# Patient Record
Sex: MALE | URBAN - METROPOLITAN AREA
[De-identification: names, ages, dates, MRNs, and addresses within clinical notes are randomized per-mention and may not be internally consistent; named-entity substitution may affect disease eponyms.]

---

## 2017-01-26 ENCOUNTER — 3 MONTH EXAM (OUTPATIENT)
Dept: URBAN - METROPOLITAN AREA CLINIC 44 | Facility: CLINIC | Age: 77
End: 2017-01-26

## 2017-01-26 DIAGNOSIS — H35.342: ICD-10-CM

## 2017-01-26 DIAGNOSIS — E11.9: ICD-10-CM

## 2017-01-26 DIAGNOSIS — Z98.890: ICD-10-CM

## 2017-01-26 PROCEDURE — 92014 COMPRE OPH EXAM EST PT 1/>: CPT

## 2017-01-26 PROCEDURE — 92134 CPTRZ OPH DX IMG PST SGM RTA: CPT

## 2017-01-26 ASSESSMENT — TONOMETRY
OS_IOP_MMHG: 15
OD_IOP_MMHG: 12

## 2017-01-26 ASSESSMENT — VISUAL ACUITY
OS_PH: 20/60
OS_CC: 20/80+1
OD_CC: 20/70

## 2017-05-18 ENCOUNTER — 3 MONTH EXAM (OUTPATIENT)
Dept: URBAN - METROPOLITAN AREA CLINIC 44 | Facility: CLINIC | Age: 77
End: 2017-05-18

## 2017-05-18 DIAGNOSIS — Z98.890: ICD-10-CM

## 2017-05-18 DIAGNOSIS — E11.9: ICD-10-CM

## 2017-05-18 DIAGNOSIS — H35.342: ICD-10-CM

## 2017-05-18 PROCEDURE — G8482 FLU IMMUNIZE ORDER/ADMIN: HCPCS

## 2017-05-18 PROCEDURE — 1036F TOBACCO NON-USER: CPT

## 2017-05-18 PROCEDURE — 92134 CPTRZ OPH DX IMG PST SGM RTA: CPT

## 2017-05-18 PROCEDURE — G8427 DOCREV CUR MEDS BY ELIG CLIN: HCPCS

## 2017-05-18 PROCEDURE — 92014 COMPRE OPH EXAM EST PT 1/>: CPT

## 2017-05-18 PROCEDURE — 4040F PNEUMOC VAC/ADMIN/RCVD: CPT

## 2017-05-18 ASSESSMENT — VISUAL ACUITY
OS_CC: 20/200
OD_CC: 20/60
OS_PH: 20/50-1

## 2017-05-18 ASSESSMENT — TONOMETRY
OS_IOP_MMHG: 9
OD_IOP_MMHG: 7

## 2017-09-28 ENCOUNTER — 3 MONTH EXAM (OUTPATIENT)
Dept: URBAN - METROPOLITAN AREA CLINIC 44 | Facility: CLINIC | Age: 77
End: 2017-09-28

## 2017-09-28 DIAGNOSIS — E11.9: ICD-10-CM

## 2017-09-28 DIAGNOSIS — H35.342: ICD-10-CM

## 2017-09-28 DIAGNOSIS — Z98.890: ICD-10-CM

## 2017-09-28 PROCEDURE — 92014 COMPRE OPH EXAM EST PT 1/>: CPT

## 2017-09-28 PROCEDURE — 92134 CPTRZ OPH DX IMG PST SGM RTA: CPT

## 2017-09-28 ASSESSMENT — VISUAL ACUITY
OD_CC: 20/80+1
OS_PH: 20/70
OS_CC: 20/200+2

## 2017-09-28 ASSESSMENT — TONOMETRY
OS_IOP_MMHG: 13
OD_IOP_MMHG: 13

## 2018-04-05 ENCOUNTER — 3 MONTH EXAM (OUTPATIENT)
Dept: URBAN - METROPOLITAN AREA CLINIC 44 | Facility: CLINIC | Age: 78
End: 2018-04-05

## 2018-04-05 DIAGNOSIS — Z98.890: ICD-10-CM

## 2018-04-05 DIAGNOSIS — H35.342: ICD-10-CM

## 2018-04-05 DIAGNOSIS — E11.9: ICD-10-CM

## 2018-04-05 PROCEDURE — 92014 COMPRE OPH EXAM EST PT 1/>: CPT

## 2018-04-05 PROCEDURE — 92134 CPTRZ OPH DX IMG PST SGM RTA: CPT

## 2018-04-05 ASSESSMENT — TONOMETRY
OD_IOP_MMHG: 13
OS_IOP_MMHG: 13

## 2018-04-05 ASSESSMENT — VISUAL ACUITY
OD_CC: 20/60+2
OS_PH: 20/70
OS_CC: 20/80

## 2018-10-25 ENCOUNTER — FOLLOW UP (OUTPATIENT)
Dept: URBAN - METROPOLITAN AREA CLINIC 44 | Facility: CLINIC | Age: 78
End: 2018-10-25

## 2018-10-25 DIAGNOSIS — H35.81: ICD-10-CM

## 2018-10-25 DIAGNOSIS — Z98.890: ICD-10-CM

## 2018-10-25 DIAGNOSIS — H35.342: ICD-10-CM

## 2018-10-25 PROCEDURE — 92134 CPTRZ OPH DX IMG PST SGM RTA: CPT

## 2018-10-25 PROCEDURE — 92014 COMPRE OPH EXAM EST PT 1/>: CPT

## 2018-10-25 ASSESSMENT — TONOMETRY
OD_IOP_MMHG: 16
OS_IOP_MMHG: 15

## 2018-10-25 ASSESSMENT — VISUAL ACUITY
OD_PH: 20/50
OS_PH: 20/70
OD_CC: 20/60
OS_CC: 20/80+1

## 2019-04-25 ENCOUNTER — FOLLOW UP (OUTPATIENT)
Dept: URBAN - METROPOLITAN AREA CLINIC 44 | Facility: CLINIC | Age: 79
End: 2019-04-25

## 2019-04-25 DIAGNOSIS — H35.81: ICD-10-CM

## 2019-04-25 DIAGNOSIS — H35.342: ICD-10-CM

## 2019-04-25 DIAGNOSIS — Z98.890: ICD-10-CM

## 2019-04-25 PROCEDURE — 92014 COMPRE OPH EXAM EST PT 1/>: CPT

## 2019-04-25 PROCEDURE — 92134 CPTRZ OPH DX IMG PST SGM RTA: CPT

## 2019-04-25 ASSESSMENT — VISUAL ACUITY
OS_CC: 20/60
OD_CC: 20/70

## 2023-01-01 ENCOUNTER — TRANSITIONAL CARE UNIT VISIT (OUTPATIENT)
Dept: GERIATRICS | Facility: CLINIC | Age: 83
End: 2023-01-01
Payer: COMMERCIAL

## 2023-01-01 ENCOUNTER — APPOINTMENT (OUTPATIENT)
Dept: PHYSICAL THERAPY | Facility: CLINIC | Age: 83
End: 2023-01-01
Payer: COMMERCIAL

## 2023-01-01 ENCOUNTER — LAB REQUISITION (OUTPATIENT)
Dept: LAB | Facility: CLINIC | Age: 83
End: 2023-01-01

## 2023-01-01 ENCOUNTER — ASSISTED LIVING VISIT (OUTPATIENT)
Dept: GERIATRICS | Facility: CLINIC | Age: 83
End: 2023-01-01
Payer: COMMERCIAL

## 2023-01-01 ENCOUNTER — APPOINTMENT (OUTPATIENT)
Dept: PHYSICAL THERAPY | Facility: CLINIC | Age: 83
End: 2023-01-01
Attending: INTERNAL MEDICINE
Payer: COMMERCIAL

## 2023-01-01 ENCOUNTER — HOSPITAL ENCOUNTER (OUTPATIENT)
Facility: CLINIC | Age: 83
Setting detail: OBSERVATION
Discharge: NURSING, REHAB OR LONG-TERM CARE | End: 2023-10-17
Attending: STUDENT IN AN ORGANIZED HEALTH CARE EDUCATION/TRAINING PROGRAM | Admitting: STUDENT IN AN ORGANIZED HEALTH CARE EDUCATION/TRAINING PROGRAM
Payer: COMMERCIAL

## 2023-01-01 ENCOUNTER — LAB REQUISITION (OUTPATIENT)
Dept: LAB | Facility: CLINIC | Age: 83
End: 2023-01-01
Payer: COMMERCIAL

## 2023-01-01 ENCOUNTER — APPOINTMENT (OUTPATIENT)
Dept: SPEECH THERAPY | Facility: CLINIC | Age: 83
End: 2023-01-01
Payer: COMMERCIAL

## 2023-01-01 ENCOUNTER — ANCILLARY PROCEDURE (OUTPATIENT)
Dept: CARDIOLOGY | Facility: CLINIC | Age: 83
End: 2023-01-01
Attending: INTERNAL MEDICINE
Payer: COMMERCIAL

## 2023-01-01 ENCOUNTER — HOSPITAL ENCOUNTER (OUTPATIENT)
Dept: WOUND CARE | Facility: CLINIC | Age: 83
Discharge: HOME | End: 2023-10-26
Attending: PHYSICIAN ASSISTANT
Payer: COMMERCIAL

## 2023-01-01 ENCOUNTER — HOSPITAL ENCOUNTER (OUTPATIENT)
Dept: CARDIOLOGY | Facility: CLINIC | Age: 83
Discharge: HOME | End: 2023-09-22
Attending: INTERNAL MEDICINE | Admitting: INTERNAL MEDICINE
Payer: COMMERCIAL

## 2023-01-01 ENCOUNTER — APPOINTMENT (OUTPATIENT)
Dept: OCCUPATIONAL THERAPY | Facility: CLINIC | Age: 83
End: 2023-01-01
Attending: INTERNAL MEDICINE
Payer: COMMERCIAL

## 2023-01-01 ENCOUNTER — LAB (OUTPATIENT)
Dept: LAB | Facility: CLINIC | Age: 83
End: 2023-01-01
Attending: INTERNAL MEDICINE
Payer: COMMERCIAL

## 2023-01-01 ENCOUNTER — DISCHARGE SUMMARY NURSING HOME (OUTPATIENT)
Dept: GERIATRICS | Facility: CLINIC | Age: 83
End: 2023-01-01
Payer: COMMERCIAL

## 2023-01-01 ENCOUNTER — APPOINTMENT (OUTPATIENT)
Dept: MRI IMAGING | Facility: CLINIC | Age: 83
End: 2023-01-01
Attending: INTERNAL MEDICINE
Payer: COMMERCIAL

## 2023-01-01 ENCOUNTER — APPOINTMENT (OUTPATIENT)
Dept: CT IMAGING | Facility: CLINIC | Age: 83
End: 2023-01-01
Attending: STUDENT IN AN ORGANIZED HEALTH CARE EDUCATION/TRAINING PROGRAM
Payer: COMMERCIAL

## 2023-01-01 ENCOUNTER — DOCUMENTATION ONLY (OUTPATIENT)
Dept: CARDIOLOGY | Facility: CLINIC | Age: 83
End: 2023-01-01

## 2023-01-01 ENCOUNTER — APPOINTMENT (OUTPATIENT)
Dept: OCCUPATIONAL THERAPY | Facility: CLINIC | Age: 83
End: 2023-01-01
Payer: COMMERCIAL

## 2023-01-01 ENCOUNTER — APPOINTMENT (OUTPATIENT)
Dept: SPEECH THERAPY | Facility: CLINIC | Age: 83
End: 2023-01-01
Attending: INTERNAL MEDICINE
Payer: COMMERCIAL

## 2023-01-01 ENCOUNTER — APPOINTMENT (OUTPATIENT)
Dept: GENERAL RADIOLOGY | Facility: CLINIC | Age: 83
End: 2023-01-01
Attending: STUDENT IN AN ORGANIZED HEALTH CARE EDUCATION/TRAINING PROGRAM
Payer: COMMERCIAL

## 2023-01-01 ENCOUNTER — PATIENT OUTREACH (OUTPATIENT)
Dept: CARE COORDINATION | Facility: CLINIC | Age: 83
End: 2023-01-01

## 2023-01-01 VITALS
HEART RATE: 66 BPM | BODY MASS INDEX: 26.51 KG/M2 | WEIGHT: 168.9 LBS | TEMPERATURE: 97.7 F | SYSTOLIC BLOOD PRESSURE: 156 MMHG | OXYGEN SATURATION: 96 % | RESPIRATION RATE: 18 BRPM | HEIGHT: 67 IN | DIASTOLIC BLOOD PRESSURE: 68 MMHG

## 2023-01-01 VITALS
BODY MASS INDEX: 26.97 KG/M2 | DIASTOLIC BLOOD PRESSURE: 59 MMHG | HEART RATE: 60 BPM | RESPIRATION RATE: 16 BRPM | TEMPERATURE: 98.4 F | HEIGHT: 67 IN | OXYGEN SATURATION: 99 % | WEIGHT: 171.8 LBS | SYSTOLIC BLOOD PRESSURE: 128 MMHG

## 2023-01-01 VITALS
DIASTOLIC BLOOD PRESSURE: 74 MMHG | HEART RATE: 60 BPM | SYSTOLIC BLOOD PRESSURE: 137 MMHG | BODY MASS INDEX: 27.94 KG/M2 | HEIGHT: 67 IN | WEIGHT: 178 LBS | OXYGEN SATURATION: 98 %

## 2023-01-01 VITALS
TEMPERATURE: 97.8 F | WEIGHT: 168.6 LBS | RESPIRATION RATE: 16 BRPM | BODY MASS INDEX: 26.46 KG/M2 | SYSTOLIC BLOOD PRESSURE: 120 MMHG | DIASTOLIC BLOOD PRESSURE: 71 MMHG | HEART RATE: 59 BPM | HEIGHT: 67 IN | OXYGEN SATURATION: 100 %

## 2023-01-01 VITALS
HEIGHT: 67 IN | WEIGHT: 166 LBS | DIASTOLIC BLOOD PRESSURE: 71 MMHG | BODY MASS INDEX: 26.06 KG/M2 | RESPIRATION RATE: 16 BRPM | HEART RATE: 60 BPM | OXYGEN SATURATION: 98 % | SYSTOLIC BLOOD PRESSURE: 123 MMHG | TEMPERATURE: 98 F

## 2023-01-01 VITALS
DIASTOLIC BLOOD PRESSURE: 62 MMHG | TEMPERATURE: 97.6 F | OXYGEN SATURATION: 97 % | WEIGHT: 162.2 LBS | BODY MASS INDEX: 25.46 KG/M2 | RESPIRATION RATE: 16 BRPM | HEART RATE: 60 BPM | SYSTOLIC BLOOD PRESSURE: 119 MMHG | HEIGHT: 67 IN

## 2023-01-01 VITALS
SYSTOLIC BLOOD PRESSURE: 131 MMHG | OXYGEN SATURATION: 97 % | BODY MASS INDEX: 26.34 KG/M2 | WEIGHT: 167.8 LBS | HEART RATE: 86 BPM | DIASTOLIC BLOOD PRESSURE: 74 MMHG | RESPIRATION RATE: 18 BRPM | TEMPERATURE: 97.6 F | HEIGHT: 67 IN

## 2023-01-01 VITALS
TEMPERATURE: 98.3 F | RESPIRATION RATE: 16 BRPM | HEART RATE: 61 BPM | HEIGHT: 67 IN | WEIGHT: 194.75 LBS | OXYGEN SATURATION: 100 % | DIASTOLIC BLOOD PRESSURE: 59 MMHG | SYSTOLIC BLOOD PRESSURE: 129 MMHG | BODY MASS INDEX: 30.57 KG/M2

## 2023-01-01 VITALS
BODY MASS INDEX: 26.43 KG/M2 | WEIGHT: 168.4 LBS | SYSTOLIC BLOOD PRESSURE: 129 MMHG | HEART RATE: 86 BPM | TEMPERATURE: 97.6 F | DIASTOLIC BLOOD PRESSURE: 66 MMHG | RESPIRATION RATE: 18 BRPM | HEIGHT: 67 IN | OXYGEN SATURATION: 95 %

## 2023-01-01 DIAGNOSIS — F03.90 DEMENTIA, UNSPECIFIED DEMENTIA SEVERITY, UNSPECIFIED DEMENTIA TYPE, UNSPECIFIED WHETHER BEHAVIORAL, PSYCHOTIC, OR MOOD DISTURBANCE OR ANXIETY (H): ICD-10-CM

## 2023-01-01 DIAGNOSIS — G47.52 REM SLEEP BEHAVIOR DISORDER: ICD-10-CM

## 2023-01-01 DIAGNOSIS — I44.2 CHB (COMPLETE HEART BLOCK) (H): ICD-10-CM

## 2023-01-01 DIAGNOSIS — I10 ESSENTIAL HYPERTENSION: ICD-10-CM

## 2023-01-01 DIAGNOSIS — E78.5 DYSLIPIDEMIA: ICD-10-CM

## 2023-01-01 DIAGNOSIS — E11.69 TYPE 2 DIABETES MELLITUS WITH OTHER SPECIFIED COMPLICATION, WITHOUT LONG-TERM CURRENT USE OF INSULIN (H): ICD-10-CM

## 2023-01-01 DIAGNOSIS — Z11.1 ENCOUNTER FOR SCREENING FOR RESPIRATORY TUBERCULOSIS: ICD-10-CM

## 2023-01-01 DIAGNOSIS — I50.9 CONGESTIVE HEART FAILURE, UNSPECIFIED HF CHRONICITY, UNSPECIFIED HEART FAILURE TYPE (H): ICD-10-CM

## 2023-01-01 DIAGNOSIS — R53.81 PHYSICAL DECONDITIONING: ICD-10-CM

## 2023-01-01 DIAGNOSIS — D64.9 ANEMIA, UNSPECIFIED TYPE: ICD-10-CM

## 2023-01-01 DIAGNOSIS — Z95.0 CARDIAC PACEMAKER IN SITU: ICD-10-CM

## 2023-01-01 DIAGNOSIS — Z95.3 S/P TAVR (TRANSCATHETER AORTIC VALVE REPLACEMENT), BIOPROSTHETIC: Primary | ICD-10-CM

## 2023-01-01 DIAGNOSIS — H35.3230 BILATERAL EXUDATIVE AGE-RELATED MACULAR DEGENERATION, UNSPECIFIED STAGE (H): ICD-10-CM

## 2023-01-01 DIAGNOSIS — R41.0 ACUTE CONFUSION: Primary | ICD-10-CM

## 2023-01-01 DIAGNOSIS — F32.A DEPRESSION, UNSPECIFIED DEPRESSION TYPE: ICD-10-CM

## 2023-01-01 DIAGNOSIS — Z95.3 STATUS POST TRANSCATHETER AORTIC VALVE REPLACEMENT (TAVR) USING BIOPROSTHESIS: ICD-10-CM

## 2023-01-01 DIAGNOSIS — I65.21 STENOSIS OF RIGHT CAROTID ARTERY: ICD-10-CM

## 2023-01-01 DIAGNOSIS — I10 ESSENTIAL (PRIMARY) HYPERTENSION: ICD-10-CM

## 2023-01-01 DIAGNOSIS — Z95.2 S/P TAVR (TRANSCATHETER AORTIC VALVE REPLACEMENT): ICD-10-CM

## 2023-01-01 DIAGNOSIS — I25.10 CORONARY ARTERY DISEASE INVOLVING NATIVE CORONARY ARTERY OF NATIVE HEART WITHOUT ANGINA PECTORIS: ICD-10-CM

## 2023-01-01 DIAGNOSIS — G93.40 ACUTE ENCEPHALOPATHY: Primary | ICD-10-CM

## 2023-01-01 DIAGNOSIS — I35.0 SEVERE AORTIC STENOSIS: ICD-10-CM

## 2023-01-01 DIAGNOSIS — I44.7 LBBB (LEFT BUNDLE BRANCH BLOCK): ICD-10-CM

## 2023-01-01 DIAGNOSIS — F03.B3 MODERATE DEMENTIA WITH MOOD DISTURBANCE, UNSPECIFIED DEMENTIA TYPE (H): ICD-10-CM

## 2023-01-01 DIAGNOSIS — R41.82 ALTERED MENTAL STATUS, UNSPECIFIED ALTERED MENTAL STATUS TYPE: ICD-10-CM

## 2023-01-01 DIAGNOSIS — I50.9 CHRONIC CONGESTIVE HEART FAILURE, UNSPECIFIED HEART FAILURE TYPE (H): ICD-10-CM

## 2023-01-01 DIAGNOSIS — Z95.3 S/P TAVR (TRANSCATHETER AORTIC VALVE REPLACEMENT), BIOPROSTHETIC: ICD-10-CM

## 2023-01-01 DIAGNOSIS — F32.1 DEPRESSION, MAJOR, SINGLE EPISODE, MODERATE (H): ICD-10-CM

## 2023-01-01 DIAGNOSIS — I25.10 CORONARY ARTERY DISEASE INVOLVING NATIVE CORONARY ARTERY OF NATIVE HEART WITHOUT ANGINA PECTORIS: Primary | ICD-10-CM

## 2023-01-01 DIAGNOSIS — R29.898 LEFT LEG WEAKNESS: Primary | ICD-10-CM

## 2023-01-01 DIAGNOSIS — M62.81 GENERALIZED MUSCLE WEAKNESS: ICD-10-CM

## 2023-01-01 DIAGNOSIS — F03.90 DEMENTIA, UNSPECIFIED DEMENTIA SEVERITY, UNSPECIFIED DEMENTIA TYPE, UNSPECIFIED WHETHER BEHAVIORAL, PSYCHOTIC, OR MOOD DISTURBANCE OR ANXIETY (H): Primary | ICD-10-CM

## 2023-01-01 DIAGNOSIS — I44.2 COMPLETE HEART BLOCK (H): ICD-10-CM

## 2023-01-01 DIAGNOSIS — R29.898 LEFT LEG WEAKNESS: ICD-10-CM

## 2023-01-01 DIAGNOSIS — D64.9 ANEMIA, UNSPECIFIED: ICD-10-CM

## 2023-01-01 DIAGNOSIS — K59.01 SLOW TRANSIT CONSTIPATION: ICD-10-CM

## 2023-01-01 DIAGNOSIS — E11.59 TYPE 2 DIABETES MELLITUS WITH OTHER CIRCULATORY COMPLICATION, WITHOUT LONG-TERM CURRENT USE OF INSULIN (H): ICD-10-CM

## 2023-01-01 LAB
ALBUMIN SERPL BCG-MCNC: 4.2 G/DL (ref 3.5–5.2)
ALBUMIN UR-MCNC: NEGATIVE MG/DL
ALP SERPL-CCNC: 102 U/L (ref 40–129)
ALT SERPL W P-5'-P-CCNC: 14 U/L (ref 0–70)
ANION GAP SERPL CALCULATED.3IONS-SCNC: 12 MMOL/L (ref 7–15)
ANION GAP SERPL CALCULATED.3IONS-SCNC: 13 MMOL/L (ref 7–15)
ANION GAP SERPL CALCULATED.3IONS-SCNC: 16 MMOL/L (ref 7–15)
APPEARANCE UR: CLEAR
APTT PPP: 32 SECONDS (ref 22–38)
AST SERPL W P-5'-P-CCNC: 24 U/L (ref 0–45)
ATRIAL RATE - MUSE: 72 BPM
BASO+EOS+MONOS # BLD AUTO: ABNORMAL 10*3/UL
BASO+EOS+MONOS NFR BLD AUTO: ABNORMAL %
BASOPHILS # BLD AUTO: 0.1 10E3/UL (ref 0–0.2)
BASOPHILS NFR BLD AUTO: 1 %
BILIRUB SERPL-MCNC: 1.1 MG/DL
BILIRUB UR QL STRIP: NEGATIVE
BUN SERPL-MCNC: 12.2 MG/DL (ref 8–23)
BUN SERPL-MCNC: 14.1 MG/DL (ref 8–23)
BUN SERPL-MCNC: 14.8 MG/DL (ref 8–23)
CALCIUM SERPL-MCNC: 9.3 MG/DL (ref 8.8–10.2)
CALCIUM SERPL-MCNC: 9.6 MG/DL (ref 8.8–10.2)
CALCIUM SERPL-MCNC: 9.6 MG/DL (ref 8.8–10.2)
CHLORIDE SERPL-SCNC: 100 MMOL/L (ref 98–107)
CHLORIDE SERPL-SCNC: 103 MMOL/L (ref 98–107)
CHLORIDE SERPL-SCNC: 105 MMOL/L (ref 98–107)
CHOLEST SERPL-MCNC: 137 MG/DL
COLOR UR AUTO: ABNORMAL
CREAT SERPL-MCNC: 0.61 MG/DL (ref 0.67–1.17)
CREAT SERPL-MCNC: 0.73 MG/DL (ref 0.67–1.17)
CREAT SERPL-MCNC: 0.73 MG/DL (ref 0.67–1.17)
DEPRECATED HCO3 PLAS-SCNC: 20 MMOL/L (ref 22–29)
DEPRECATED HCO3 PLAS-SCNC: 23 MMOL/L (ref 22–29)
DEPRECATED HCO3 PLAS-SCNC: 23 MMOL/L (ref 22–29)
DIASTOLIC BLOOD PRESSURE - MUSE: NORMAL MMHG
EGFRCR SERPLBLD CKD-EPI 2021: 89 ML/MIN/1.73M2
EGFRCR SERPLBLD CKD-EPI 2021: 89 ML/MIN/1.73M2
EGFRCR SERPLBLD CKD-EPI 2021: >90 ML/MIN/1.73M2
EOSINOPHIL # BLD AUTO: 0.1 10E3/UL (ref 0–0.7)
EOSINOPHIL NFR BLD AUTO: 2 %
ERYTHROCYTE [DISTWIDTH] IN BLOOD BY AUTOMATED COUNT: 12.8 % (ref 10–15)
ERYTHROCYTE [DISTWIDTH] IN BLOOD BY AUTOMATED COUNT: 12.9 % (ref 10–15)
ERYTHROCYTE [DISTWIDTH] IN BLOOD BY AUTOMATED COUNT: 13.5 % (ref 10–15)
FLUAV RNA SPEC QL NAA+PROBE: NEGATIVE
FLUBV RNA RESP QL NAA+PROBE: NEGATIVE
GAMMA INTERFERON BACKGROUND BLD IA-ACNC: 0.05 IU/ML
GLUCOSE BLDC GLUCOMTR-MCNC: 101 MG/DL (ref 70–99)
GLUCOSE BLDC GLUCOMTR-MCNC: 102 MG/DL (ref 70–99)
GLUCOSE BLDC GLUCOMTR-MCNC: 103 MG/DL (ref 70–99)
GLUCOSE BLDC GLUCOMTR-MCNC: 103 MG/DL (ref 70–99)
GLUCOSE BLDC GLUCOMTR-MCNC: 104 MG/DL (ref 70–99)
GLUCOSE BLDC GLUCOMTR-MCNC: 107 MG/DL (ref 70–99)
GLUCOSE BLDC GLUCOMTR-MCNC: 109 MG/DL (ref 70–99)
GLUCOSE BLDC GLUCOMTR-MCNC: 111 MG/DL (ref 70–99)
GLUCOSE BLDC GLUCOMTR-MCNC: 113 MG/DL (ref 70–99)
GLUCOSE BLDC GLUCOMTR-MCNC: 113 MG/DL (ref 70–99)
GLUCOSE BLDC GLUCOMTR-MCNC: 114 MG/DL (ref 70–99)
GLUCOSE BLDC GLUCOMTR-MCNC: 116 MG/DL (ref 70–99)
GLUCOSE BLDC GLUCOMTR-MCNC: 118 MG/DL (ref 70–99)
GLUCOSE BLDC GLUCOMTR-MCNC: 126 MG/DL (ref 70–99)
GLUCOSE BLDC GLUCOMTR-MCNC: 128 MG/DL (ref 70–99)
GLUCOSE BLDC GLUCOMTR-MCNC: 131 MG/DL (ref 70–99)
GLUCOSE BLDC GLUCOMTR-MCNC: 132 MG/DL (ref 70–99)
GLUCOSE BLDC GLUCOMTR-MCNC: 134 MG/DL (ref 70–99)
GLUCOSE BLDC GLUCOMTR-MCNC: 154 MG/DL (ref 70–99)
GLUCOSE BLDC GLUCOMTR-MCNC: 162 MG/DL (ref 70–99)
GLUCOSE BLDC GLUCOMTR-MCNC: 201 MG/DL (ref 70–99)
GLUCOSE BLDC GLUCOMTR-MCNC: 97 MG/DL (ref 70–99)
GLUCOSE BLDC GLUCOMTR-MCNC: 99 MG/DL (ref 70–99)
GLUCOSE SERPL-MCNC: 137 MG/DL (ref 70–99)
GLUCOSE SERPL-MCNC: 177 MG/DL (ref 70–99)
GLUCOSE SERPL-MCNC: 92 MG/DL (ref 70–99)
GLUCOSE UR STRIP-MCNC: NEGATIVE MG/DL
HCO3 BLDV-SCNC: 26 MMOL/L (ref 21–28)
HCT VFR BLD AUTO: 37.8 % (ref 40–53)
HCT VFR BLD AUTO: 38.2 % (ref 40–53)
HCT VFR BLD AUTO: 38.5 % (ref 40–53)
HDLC SERPL-MCNC: 69 MG/DL
HGB BLD-MCNC: 12.7 G/DL (ref 13.3–17.7)
HGB BLD-MCNC: 12.7 G/DL (ref 13.3–17.7)
HGB BLD-MCNC: 12.8 G/DL (ref 13.3–17.7)
HGB UR QL STRIP: NEGATIVE
HOLD SPECIMEN: NORMAL
HOLD SPECIMEN: NORMAL
IMM GRANULOCYTES # BLD: 0 10E3/UL
IMM GRANULOCYTES NFR BLD: 1 %
INR PPP: 1.03 (ref 0.85–1.15)
INTERPRETATION ECG - MUSE: NORMAL
KETONES UR STRIP-MCNC: 10 MG/DL
LACTATE BLD-SCNC: 1.9 MMOL/L
LDLC SERPL CALC-MCNC: 52 MG/DL
LEUKOCYTE ESTERASE UR QL STRIP: NEGATIVE
LVEF ECHO: NORMAL
LYMPHOCYTES # BLD AUTO: 2 10E3/UL (ref 0.8–5.3)
LYMPHOCYTES NFR BLD AUTO: 24 %
M TB IFN-G BLD-IMP: NEGATIVE
M TB IFN-G CD4+ BCKGRND COR BLD-ACNC: 5.78 IU/ML
MCH RBC QN AUTO: 31.8 PG (ref 26.5–33)
MCH RBC QN AUTO: 32.2 PG (ref 26.5–33)
MCH RBC QN AUTO: 32.4 PG (ref 26.5–33)
MCHC RBC AUTO-ENTMCNC: 33 G/DL (ref 31.5–36.5)
MCHC RBC AUTO-ENTMCNC: 33.5 G/DL (ref 31.5–36.5)
MCHC RBC AUTO-ENTMCNC: 33.6 G/DL (ref 31.5–36.5)
MCV RBC AUTO: 95 FL (ref 78–100)
MCV RBC AUTO: 97 FL (ref 78–100)
MCV RBC AUTO: 98 FL (ref 78–100)
MDC_IDC_EPISODE_DTM: NORMAL
MDC_IDC_EPISODE_DURATION: 2 S
MDC_IDC_EPISODE_ID: 1
MDC_IDC_EPISODE_TYPE: NORMAL
MDC_IDC_LEAD_CONNECTION_STATUS: NORMAL
MDC_IDC_LEAD_CONNECTION_STATUS: NORMAL
MDC_IDC_LEAD_IMPLANT_DT: NORMAL
MDC_IDC_LEAD_LOCATION: NORMAL
MDC_IDC_LEAD_LOCATION_DETAIL_1: NORMAL
MDC_IDC_LEAD_MFG: NORMAL
MDC_IDC_LEAD_MODEL: NORMAL
MDC_IDC_LEAD_POLARITY_TYPE: NORMAL
MDC_IDC_LEAD_SERIAL: NORMAL
MDC_IDC_MSMT_BATTERY_DTM: NORMAL
MDC_IDC_MSMT_BATTERY_DTM: NORMAL
MDC_IDC_MSMT_BATTERY_REMAINING_LONGEVITY: 148 MO
MDC_IDC_MSMT_BATTERY_REMAINING_LONGEVITY: 156 MO
MDC_IDC_MSMT_BATTERY_RRT_TRIGGER: 2.62
MDC_IDC_MSMT_BATTERY_RRT_TRIGGER: 2.62
MDC_IDC_MSMT_BATTERY_STATUS: NORMAL
MDC_IDC_MSMT_BATTERY_STATUS: NORMAL
MDC_IDC_MSMT_BATTERY_VOLTAGE: 3.19 V
MDC_IDC_MSMT_BATTERY_VOLTAGE: 3.22 V
MDC_IDC_MSMT_LEADCHNL_RA_IMPEDANCE_VALUE: 342 OHM
MDC_IDC_MSMT_LEADCHNL_RA_IMPEDANCE_VALUE: 380 OHM
MDC_IDC_MSMT_LEADCHNL_RA_IMPEDANCE_VALUE: 494 OHM
MDC_IDC_MSMT_LEADCHNL_RA_IMPEDANCE_VALUE: 570 OHM
MDC_IDC_MSMT_LEADCHNL_RA_PACING_THRESHOLD_AMPLITUDE: 0.88 V
MDC_IDC_MSMT_LEADCHNL_RA_PACING_THRESHOLD_AMPLITUDE: 1 V
MDC_IDC_MSMT_LEADCHNL_RA_PACING_THRESHOLD_AMPLITUDE: 2 V
MDC_IDC_MSMT_LEADCHNL_RA_PACING_THRESHOLD_PULSEWIDTH: 0.4 MS
MDC_IDC_MSMT_LEADCHNL_RA_SENSING_INTR_AMPL: 1.25 MV
MDC_IDC_MSMT_LEADCHNL_RA_SENSING_INTR_AMPL: 1.38 MV
MDC_IDC_MSMT_LEADCHNL_RA_SENSING_INTR_AMPL: 2 MV
MDC_IDC_MSMT_LEADCHNL_RA_SENSING_INTR_AMPL: 2 MV
MDC_IDC_MSMT_LEADCHNL_RV_IMPEDANCE_VALUE: 532 OHM
MDC_IDC_MSMT_LEADCHNL_RV_IMPEDANCE_VALUE: 608 OHM
MDC_IDC_MSMT_LEADCHNL_RV_IMPEDANCE_VALUE: 665 OHM
MDC_IDC_MSMT_LEADCHNL_RV_IMPEDANCE_VALUE: 760 OHM
MDC_IDC_MSMT_LEADCHNL_RV_PACING_THRESHOLD_AMPLITUDE: 0.5 V
MDC_IDC_MSMT_LEADCHNL_RV_PACING_THRESHOLD_AMPLITUDE: 0.5 V
MDC_IDC_MSMT_LEADCHNL_RV_PACING_THRESHOLD_PULSEWIDTH: 0.4 MS
MDC_IDC_MSMT_LEADCHNL_RV_PACING_THRESHOLD_PULSEWIDTH: 0.4 MS
MDC_IDC_MSMT_LEADCHNL_RV_SENSING_INTR_AMPL: 14.12 MV
MDC_IDC_MSMT_LEADCHNL_RV_SENSING_INTR_AMPL: 17 MV
MDC_IDC_MSMT_LEADCHNL_RV_SENSING_INTR_AMPL: 17 MV
MDC_IDC_MSMT_LEADCHNL_RV_SENSING_INTR_AMPL: 20.62 MV
MDC_IDC_PG_IMPLANT_DTM: NORMAL
MDC_IDC_PG_IMPLANT_DTM: NORMAL
MDC_IDC_PG_MFG: NORMAL
MDC_IDC_PG_MFG: NORMAL
MDC_IDC_PG_MODEL: NORMAL
MDC_IDC_PG_MODEL: NORMAL
MDC_IDC_PG_SERIAL: NORMAL
MDC_IDC_PG_SERIAL: NORMAL
MDC_IDC_PG_TYPE: NORMAL
MDC_IDC_PG_TYPE: NORMAL
MDC_IDC_SESS_CLINIC_NAME: NORMAL
MDC_IDC_SESS_CLINIC_NAME: NORMAL
MDC_IDC_SESS_DTM: NORMAL
MDC_IDC_SESS_DTM: NORMAL
MDC_IDC_SESS_TYPE: NORMAL
MDC_IDC_SESS_TYPE: NORMAL
MDC_IDC_SET_BRADY_AT_MODE_SWITCH_RATE: 171 {BEATS}/MIN
MDC_IDC_SET_BRADY_AT_MODE_SWITCH_RATE: 171 {BEATS}/MIN
MDC_IDC_SET_BRADY_HYSTRATE: NORMAL
MDC_IDC_SET_BRADY_HYSTRATE: NORMAL
MDC_IDC_SET_BRADY_LOWRATE: 60 {BEATS}/MIN
MDC_IDC_SET_BRADY_LOWRATE: 60 {BEATS}/MIN
MDC_IDC_SET_BRADY_MAX_SENSOR_RATE: 130 {BEATS}/MIN
MDC_IDC_SET_BRADY_MAX_SENSOR_RATE: 130 {BEATS}/MIN
MDC_IDC_SET_BRADY_MAX_TRACKING_RATE: 130 {BEATS}/MIN
MDC_IDC_SET_BRADY_MAX_TRACKING_RATE: 130 {BEATS}/MIN
MDC_IDC_SET_BRADY_MODE: NORMAL
MDC_IDC_SET_BRADY_MODE: NORMAL
MDC_IDC_SET_BRADY_PAV_DELAY_LOW: 180 MS
MDC_IDC_SET_BRADY_PAV_DELAY_LOW: 180 MS
MDC_IDC_SET_BRADY_SAV_DELAY_LOW: 150 MS
MDC_IDC_SET_BRADY_SAV_DELAY_LOW: 150 MS
MDC_IDC_SET_LEADCHNL_RA_PACING_AMPLITUDE: 1.5 V
MDC_IDC_SET_LEADCHNL_RA_PACING_AMPLITUDE: 1.5 V
MDC_IDC_SET_LEADCHNL_RA_PACING_ANODE_ELECTRODE_1: NORMAL
MDC_IDC_SET_LEADCHNL_RA_PACING_ANODE_ELECTRODE_1: NORMAL
MDC_IDC_SET_LEADCHNL_RA_PACING_ANODE_LOCATION_1: NORMAL
MDC_IDC_SET_LEADCHNL_RA_PACING_ANODE_LOCATION_1: NORMAL
MDC_IDC_SET_LEADCHNL_RA_PACING_CAPTURE_MODE: NORMAL
MDC_IDC_SET_LEADCHNL_RA_PACING_CAPTURE_MODE: NORMAL
MDC_IDC_SET_LEADCHNL_RA_PACING_CATHODE_ELECTRODE_1: NORMAL
MDC_IDC_SET_LEADCHNL_RA_PACING_CATHODE_ELECTRODE_1: NORMAL
MDC_IDC_SET_LEADCHNL_RA_PACING_CATHODE_LOCATION_1: NORMAL
MDC_IDC_SET_LEADCHNL_RA_PACING_CATHODE_LOCATION_1: NORMAL
MDC_IDC_SET_LEADCHNL_RA_PACING_POLARITY: NORMAL
MDC_IDC_SET_LEADCHNL_RA_PACING_POLARITY: NORMAL
MDC_IDC_SET_LEADCHNL_RA_PACING_PULSEWIDTH: 0.4 MS
MDC_IDC_SET_LEADCHNL_RA_PACING_PULSEWIDTH: 0.4 MS
MDC_IDC_SET_LEADCHNL_RA_SENSING_ANODE_ELECTRODE_1: NORMAL
MDC_IDC_SET_LEADCHNL_RA_SENSING_ANODE_ELECTRODE_1: NORMAL
MDC_IDC_SET_LEADCHNL_RA_SENSING_ANODE_LOCATION_1: NORMAL
MDC_IDC_SET_LEADCHNL_RA_SENSING_ANODE_LOCATION_1: NORMAL
MDC_IDC_SET_LEADCHNL_RA_SENSING_CATHODE_ELECTRODE_1: NORMAL
MDC_IDC_SET_LEADCHNL_RA_SENSING_CATHODE_ELECTRODE_1: NORMAL
MDC_IDC_SET_LEADCHNL_RA_SENSING_CATHODE_LOCATION_1: NORMAL
MDC_IDC_SET_LEADCHNL_RA_SENSING_CATHODE_LOCATION_1: NORMAL
MDC_IDC_SET_LEADCHNL_RA_SENSING_POLARITY: NORMAL
MDC_IDC_SET_LEADCHNL_RA_SENSING_POLARITY: NORMAL
MDC_IDC_SET_LEADCHNL_RA_SENSING_SENSITIVITY: 0.3 MV
MDC_IDC_SET_LEADCHNL_RA_SENSING_SENSITIVITY: 0.3 MV
MDC_IDC_SET_LEADCHNL_RV_PACING_AMPLITUDE: 2 V
MDC_IDC_SET_LEADCHNL_RV_PACING_AMPLITUDE: 2 V
MDC_IDC_SET_LEADCHNL_RV_PACING_ANODE_ELECTRODE_1: NORMAL
MDC_IDC_SET_LEADCHNL_RV_PACING_ANODE_ELECTRODE_1: NORMAL
MDC_IDC_SET_LEADCHNL_RV_PACING_ANODE_LOCATION_1: NORMAL
MDC_IDC_SET_LEADCHNL_RV_PACING_ANODE_LOCATION_1: NORMAL
MDC_IDC_SET_LEADCHNL_RV_PACING_CAPTURE_MODE: NORMAL
MDC_IDC_SET_LEADCHNL_RV_PACING_CAPTURE_MODE: NORMAL
MDC_IDC_SET_LEADCHNL_RV_PACING_CATHODE_ELECTRODE_1: NORMAL
MDC_IDC_SET_LEADCHNL_RV_PACING_CATHODE_ELECTRODE_1: NORMAL
MDC_IDC_SET_LEADCHNL_RV_PACING_CATHODE_LOCATION_1: NORMAL
MDC_IDC_SET_LEADCHNL_RV_PACING_CATHODE_LOCATION_1: NORMAL
MDC_IDC_SET_LEADCHNL_RV_PACING_POLARITY: NORMAL
MDC_IDC_SET_LEADCHNL_RV_PACING_POLARITY: NORMAL
MDC_IDC_SET_LEADCHNL_RV_PACING_PULSEWIDTH: 0.4 MS
MDC_IDC_SET_LEADCHNL_RV_PACING_PULSEWIDTH: 0.4 MS
MDC_IDC_SET_LEADCHNL_RV_SENSING_ANODE_ELECTRODE_1: NORMAL
MDC_IDC_SET_LEADCHNL_RV_SENSING_ANODE_ELECTRODE_1: NORMAL
MDC_IDC_SET_LEADCHNL_RV_SENSING_ANODE_LOCATION_1: NORMAL
MDC_IDC_SET_LEADCHNL_RV_SENSING_ANODE_LOCATION_1: NORMAL
MDC_IDC_SET_LEADCHNL_RV_SENSING_CATHODE_ELECTRODE_1: NORMAL
MDC_IDC_SET_LEADCHNL_RV_SENSING_CATHODE_ELECTRODE_1: NORMAL
MDC_IDC_SET_LEADCHNL_RV_SENSING_CATHODE_LOCATION_1: NORMAL
MDC_IDC_SET_LEADCHNL_RV_SENSING_CATHODE_LOCATION_1: NORMAL
MDC_IDC_SET_LEADCHNL_RV_SENSING_POLARITY: NORMAL
MDC_IDC_SET_LEADCHNL_RV_SENSING_POLARITY: NORMAL
MDC_IDC_SET_LEADCHNL_RV_SENSING_SENSITIVITY: 0.9 MV
MDC_IDC_SET_LEADCHNL_RV_SENSING_SENSITIVITY: 0.9 MV
MDC_IDC_SET_ZONE_DETECTION_INTERVAL: 350 MS
MDC_IDC_SET_ZONE_DETECTION_INTERVAL: 350 MS
MDC_IDC_SET_ZONE_DETECTION_INTERVAL: 400 MS
MDC_IDC_SET_ZONE_DETECTION_INTERVAL: 400 MS
MDC_IDC_SET_ZONE_STATUS: NORMAL
MDC_IDC_SET_ZONE_STATUS: NORMAL
MDC_IDC_SET_ZONE_TYPE: NORMAL
MDC_IDC_SET_ZONE_VENDOR_TYPE: NORMAL
MDC_IDC_STAT_AT_BURDEN_PERCENT: 0 %
MDC_IDC_STAT_AT_BURDEN_PERCENT: 0 %
MDC_IDC_STAT_AT_DTM_END: NORMAL
MDC_IDC_STAT_AT_DTM_END: NORMAL
MDC_IDC_STAT_AT_DTM_START: NORMAL
MDC_IDC_STAT_AT_DTM_START: NORMAL
MDC_IDC_STAT_BRADY_AP_VP_PERCENT: 70.56 %
MDC_IDC_STAT_BRADY_AP_VP_PERCENT: 88.46 %
MDC_IDC_STAT_BRADY_AP_VS_PERCENT: 0.06 %
MDC_IDC_STAT_BRADY_AP_VS_PERCENT: 0.74 %
MDC_IDC_STAT_BRADY_AS_VP_PERCENT: 29.26 %
MDC_IDC_STAT_BRADY_AS_VP_PERCENT: 9.37 %
MDC_IDC_STAT_BRADY_AS_VS_PERCENT: 0.12 %
MDC_IDC_STAT_BRADY_AS_VS_PERCENT: 1.43 %
MDC_IDC_STAT_BRADY_DTM_END: NORMAL
MDC_IDC_STAT_BRADY_DTM_END: NORMAL
MDC_IDC_STAT_BRADY_DTM_START: NORMAL
MDC_IDC_STAT_BRADY_DTM_START: NORMAL
MDC_IDC_STAT_BRADY_RA_PERCENT_PACED: 70.63 %
MDC_IDC_STAT_BRADY_RA_PERCENT_PACED: 89.77 %
MDC_IDC_STAT_BRADY_RV_PERCENT_PACED: 97.83 %
MDC_IDC_STAT_BRADY_RV_PERCENT_PACED: 99.82 %
MDC_IDC_STAT_EPISODE_RECENT_COUNT: 0
MDC_IDC_STAT_EPISODE_RECENT_COUNT: 1
MDC_IDC_STAT_EPISODE_RECENT_COUNT_DTM_END: NORMAL
MDC_IDC_STAT_EPISODE_RECENT_COUNT_DTM_START: NORMAL
MDC_IDC_STAT_EPISODE_TOTAL_COUNT: 0
MDC_IDC_STAT_EPISODE_TOTAL_COUNT: 1
MDC_IDC_STAT_EPISODE_TOTAL_COUNT_DTM_END: NORMAL
MDC_IDC_STAT_EPISODE_TOTAL_COUNT_DTM_START: NORMAL
MDC_IDC_STAT_EPISODE_TYPE: NORMAL
MDC_IDC_STAT_TACHYTHERAPY_RECENT_DTM_END: NORMAL
MDC_IDC_STAT_TACHYTHERAPY_RECENT_DTM_START: NORMAL
MDC_IDC_STAT_TACHYTHERAPY_TOTAL_DTM_END: NORMAL
MDC_IDC_STAT_TACHYTHERAPY_TOTAL_DTM_START: NORMAL
MITOGEN IGNF BCKGRD COR BLD-ACNC: 0 IU/ML
MITOGEN IGNF BCKGRD COR BLD-ACNC: 0.01 IU/ML
MONOCYTES # BLD AUTO: 0.8 10E3/UL (ref 0–1.3)
MONOCYTES NFR BLD AUTO: 9 %
MUCOUS THREADS #/AREA URNS LPF: PRESENT /LPF
NEUTROPHILS # BLD AUTO: 5.4 10E3/UL (ref 1.6–8.3)
NEUTROPHILS NFR BLD AUTO: 63 %
NITRATE UR QL: NEGATIVE
NONHDLC SERPL-MCNC: 68 MG/DL
NRBC # BLD AUTO: 0 10E3/UL
NRBC BLD AUTO-RTO: 0 /100
P AXIS - MUSE: 85 DEGREES
PCO2 BLDV: 35 MM HG (ref 40–50)
PH BLDV: 7.48 [PH] (ref 7.32–7.43)
PH UR STRIP: 7.5 [PH] (ref 5–7)
PLATELET # BLD AUTO: 148 10E3/UL (ref 150–450)
PLATELET # BLD AUTO: 156 10E3/UL (ref 150–450)
PLATELET # BLD AUTO: 194 10E3/UL (ref 150–450)
PO2 BLDV: 19 MM HG (ref 25–47)
POTASSIUM SERPL-SCNC: 3.7 MMOL/L (ref 3.4–5.3)
POTASSIUM SERPL-SCNC: 4 MMOL/L (ref 3.4–5.3)
POTASSIUM SERPL-SCNC: 4.2 MMOL/L (ref 3.4–5.3)
PR INTERVAL - MUSE: 244 MS
PROT SERPL-MCNC: 7.4 G/DL (ref 6.4–8.3)
QRS DURATION - MUSE: 124 MS
QT - MUSE: 442 MS
QTC - MUSE: 483 MS
QUANTIFERON MITOGEN: 5.83 IU/ML
QUANTIFERON NIL TUBE: 0.05 IU/ML
QUANTIFERON TB1 TUBE: 0.06 IU/ML
QUANTIFERON TB2 TUBE: 0.05
R AXIS - MUSE: 74 DEGREES
RBC # BLD AUTO: 3.95 10E6/UL (ref 4.4–5.9)
RBC # BLD AUTO: 3.95 10E6/UL (ref 4.4–5.9)
RBC # BLD AUTO: 4 10E6/UL (ref 4.4–5.9)
RBC URINE: 1 /HPF
RSV RNA SPEC NAA+PROBE: NEGATIVE
SAO2 % BLDV: 33 % (ref 94–100)
SARS-COV-2 RNA RESP QL NAA+PROBE: NEGATIVE
SODIUM SERPL-SCNC: 136 MMOL/L (ref 135–145)
SODIUM SERPL-SCNC: 139 MMOL/L (ref 135–145)
SODIUM SERPL-SCNC: 140 MMOL/L (ref 136–145)
SP GR UR STRIP: 1.02 (ref 1–1.03)
SYSTOLIC BLOOD PRESSURE - MUSE: NORMAL MMHG
T AXIS - MUSE: -55 DEGREES
TRIGL SERPL-MCNC: 80 MG/DL
TROPONIN T SERPL HS-MCNC: 21 NG/L
TROPONIN T SERPL HS-MCNC: 23 NG/L
TROPONIN T SERPL HS-MCNC: 23 NG/L
TSH SERPL DL<=0.005 MIU/L-ACNC: 1.47 UIU/ML (ref 0.3–4.2)
UROBILINOGEN UR STRIP-MCNC: NORMAL MG/DL
VENTRICULAR RATE- MUSE: 72 BPM
WBC # BLD AUTO: 5.8 10E3/UL (ref 4–11)
WBC # BLD AUTO: 6.8 10E3/UL (ref 4–11)
WBC # BLD AUTO: 8.4 10E3/UL (ref 4–11)
WBC URINE: <1 /HPF

## 2023-01-01 PROCEDURE — 0042T CT HEAD PERFUSION W CONTRAST: CPT

## 2023-01-01 PROCEDURE — 71045 X-RAY EXAM CHEST 1 VIEW: CPT

## 2023-01-01 PROCEDURE — 99232 SBSQ HOSP IP/OBS MODERATE 35: CPT | Performed by: INTERNAL MEDICINE

## 2023-01-01 PROCEDURE — 99304 1ST NF CARE SF/LOW MDM 25: CPT | Performed by: PHYSICIAN ASSISTANT

## 2023-01-01 PROCEDURE — G0378 HOSPITAL OBSERVATION PER HR: HCPCS

## 2023-01-01 PROCEDURE — 36415 COLL VENOUS BLD VENIPUNCTURE: CPT | Performed by: INTERNAL MEDICINE

## 2023-01-01 PROCEDURE — 99315 NF DSCHRG MGMT 30 MIN/LESS: CPT | Performed by: NURSE PRACTITIONER

## 2023-01-01 PROCEDURE — 93280 PM DEVICE PROGR EVAL DUAL: CPT | Performed by: INTERNAL MEDICINE

## 2023-01-01 PROCEDURE — 82962 GLUCOSE BLOOD TEST: CPT

## 2023-01-01 PROCEDURE — 250N000013 HC RX MED GY IP 250 OP 250 PS 637: Performed by: INTERNAL MEDICINE

## 2023-01-01 PROCEDURE — 84484 ASSAY OF TROPONIN QUANT: CPT | Performed by: INTERNAL MEDICINE

## 2023-01-01 PROCEDURE — 99285 EMERGENCY DEPT VISIT HI MDM: CPT | Mod: 25

## 2023-01-01 PROCEDURE — 36415 COLL VENOUS BLD VENIPUNCTURE: CPT | Performed by: NURSE PRACTITIONER

## 2023-01-01 PROCEDURE — 96361 HYDRATE IV INFUSION ADD-ON: CPT

## 2023-01-01 PROCEDURE — 85025 COMPLETE CBC W/AUTO DIFF WBC: CPT | Performed by: STUDENT IN AN ORGANIZED HEALTH CARE EDUCATION/TRAINING PROGRAM

## 2023-01-01 PROCEDURE — 250N000011 HC RX IP 250 OP 636: Performed by: INTERNAL MEDICINE

## 2023-01-01 PROCEDURE — 250N000011 HC RX IP 250 OP 636: Performed by: STUDENT IN AN ORGANIZED HEALTH CARE EDUCATION/TRAINING PROGRAM

## 2023-01-01 PROCEDURE — 80053 COMPREHEN METABOLIC PANEL: CPT | Performed by: STUDENT IN AN ORGANIZED HEALTH CARE EDUCATION/TRAINING PROGRAM

## 2023-01-01 PROCEDURE — 80061 LIPID PANEL: CPT | Performed by: INTERNAL MEDICINE

## 2023-01-01 PROCEDURE — 97162 PT EVAL MOD COMPLEX 30 MIN: CPT | Mod: GP | Performed by: PHYSICAL THERAPIST

## 2023-01-01 PROCEDURE — 97535 SELF CARE MNGMENT TRAINING: CPT | Mod: GO

## 2023-01-01 PROCEDURE — 97530 THERAPEUTIC ACTIVITIES: CPT | Mod: GP

## 2023-01-01 PROCEDURE — 99232 SBSQ HOSP IP/OBS MODERATE 35: CPT | Performed by: STUDENT IN AN ORGANIZED HEALTH CARE EDUCATION/TRAINING PROGRAM

## 2023-01-01 PROCEDURE — 85730 THROMBOPLASTIN TIME PARTIAL: CPT | Performed by: STUDENT IN AN ORGANIZED HEALTH CARE EDUCATION/TRAINING PROGRAM

## 2023-01-01 PROCEDURE — 84484 ASSAY OF TROPONIN QUANT: CPT | Performed by: STUDENT IN AN ORGANIZED HEALTH CARE EDUCATION/TRAINING PROGRAM

## 2023-01-01 PROCEDURE — 99344 HOME/RES VST NEW MOD MDM 60: CPT | Performed by: NURSE PRACTITIONER

## 2023-01-01 PROCEDURE — 255N000002 HC RX 255 OP 636: Performed by: INTERNAL MEDICINE

## 2023-01-01 PROCEDURE — 85027 COMPLETE CBC AUTOMATED: CPT | Performed by: NURSE PRACTITIONER

## 2023-01-01 PROCEDURE — 70450 CT HEAD/BRAIN W/O DYE: CPT | Mod: XU

## 2023-01-01 PROCEDURE — 85027 COMPLETE CBC AUTOMATED: CPT | Performed by: INTERNAL MEDICINE

## 2023-01-01 PROCEDURE — 250N000012 HC RX MED GY IP 250 OP 636 PS 637: Performed by: INTERNAL MEDICINE

## 2023-01-01 PROCEDURE — 82803 BLOOD GASES ANY COMBINATION: CPT

## 2023-01-01 PROCEDURE — 70498 CT ANGIOGRAPHY NECK: CPT

## 2023-01-01 PROCEDURE — 85610 PROTHROMBIN TIME: CPT | Performed by: STUDENT IN AN ORGANIZED HEALTH CARE EDUCATION/TRAINING PROGRAM

## 2023-01-01 PROCEDURE — 99223 1ST HOSP IP/OBS HIGH 75: CPT | Mod: 24 | Performed by: NURSE PRACTITIONER

## 2023-01-01 PROCEDURE — 99309 SBSQ NF CARE MODERATE MDM 30: CPT | Performed by: NURSE PRACTITIONER

## 2023-01-01 PROCEDURE — 81001 URINALYSIS AUTO W/SCOPE: CPT | Performed by: STUDENT IN AN ORGANIZED HEALTH CARE EDUCATION/TRAINING PROGRAM

## 2023-01-01 PROCEDURE — 999N000208 ECHOCARDIOGRAM COMPLETE

## 2023-01-01 PROCEDURE — P9604 ONE-WAY ALLOW PRORATED TRIP: HCPCS | Performed by: NURSE PRACTITIONER

## 2023-01-01 PROCEDURE — 97165 OT EVAL LOW COMPLEX 30 MIN: CPT | Mod: GO

## 2023-01-01 PROCEDURE — 92526 ORAL FUNCTION THERAPY: CPT | Mod: GN | Performed by: SPEECH-LANGUAGE PATHOLOGIST

## 2023-01-01 PROCEDURE — 99305 1ST NF CARE MODERATE MDM 35: CPT | Performed by: INTERNAL MEDICINE

## 2023-01-01 PROCEDURE — 84443 ASSAY THYROID STIM HORMONE: CPT | Performed by: INTERNAL MEDICINE

## 2023-01-01 PROCEDURE — 80048 BASIC METABOLIC PNL TOTAL CA: CPT | Performed by: INTERNAL MEDICINE

## 2023-01-01 PROCEDURE — 36415 COLL VENOUS BLD VENIPUNCTURE: CPT | Performed by: STUDENT IN AN ORGANIZED HEALTH CARE EDUCATION/TRAINING PROGRAM

## 2023-01-01 PROCEDURE — 97530 THERAPEUTIC ACTIVITIES: CPT | Mod: GP | Performed by: PHYSICAL THERAPIST

## 2023-01-01 PROCEDURE — 96360 HYDRATION IV INFUSION INIT: CPT

## 2023-01-01 PROCEDURE — 99207 PR NO BILLABLE SERVICE THIS VISIT: CPT | Performed by: INTERNAL MEDICINE

## 2023-01-01 PROCEDURE — 250N000009 HC RX 250: Performed by: STUDENT IN AN ORGANIZED HEALTH CARE EDUCATION/TRAINING PROGRAM

## 2023-01-01 PROCEDURE — 99223 1ST HOSP IP/OBS HIGH 75: CPT | Performed by: INTERNAL MEDICINE

## 2023-01-01 PROCEDURE — 93005 ELECTROCARDIOGRAM TRACING: CPT

## 2023-01-01 PROCEDURE — A9585 GADOBUTROL INJECTION: HCPCS | Performed by: INTERNAL MEDICINE

## 2023-01-01 PROCEDURE — 86481 TB AG RESPONSE T-CELL SUSP: CPT | Performed by: NURSE PRACTITIONER

## 2023-01-01 PROCEDURE — 93306 TTE W/DOPPLER COMPLETE: CPT | Mod: 26 | Performed by: INTERNAL MEDICINE

## 2023-01-01 PROCEDURE — 87637 SARSCOV2&INF A&B&RSV AMP PRB: CPT | Performed by: STUDENT IN AN ORGANIZED HEALTH CARE EDUCATION/TRAINING PROGRAM

## 2023-01-01 PROCEDURE — 99239 HOSP IP/OBS DSCHRG MGMT >30: CPT | Performed by: STUDENT IN AN ORGANIZED HEALTH CARE EDUCATION/TRAINING PROGRAM

## 2023-01-01 PROCEDURE — 92610 EVALUATE SWALLOWING FUNCTION: CPT | Mod: GN

## 2023-01-01 PROCEDURE — 93294 REM INTERROG EVL PM/LDLS PM: CPT | Performed by: INTERNAL MEDICINE

## 2023-01-01 PROCEDURE — 70553 MRI BRAIN STEM W/O & W/DYE: CPT

## 2023-01-01 PROCEDURE — 93296 REM INTERROG EVL PM/IDS: CPT | Performed by: INTERNAL MEDICINE

## 2023-01-01 PROCEDURE — 99349 HOME/RES VST EST MOD MDM 40: CPT | Performed by: INTERNAL MEDICINE

## 2023-01-01 PROCEDURE — 99214 OFFICE O/P EST MOD 30 MIN: CPT | Mod: 25 | Performed by: NURSE PRACTITIONER

## 2023-01-01 PROCEDURE — 92526 ORAL FUNCTION THERAPY: CPT | Mod: GN | Performed by: REHABILITATION PRACTITIONER

## 2023-01-01 RX ORDER — ONDANSETRON 2 MG/ML
4 INJECTION INTRAMUSCULAR; INTRAVENOUS EVERY 6 HOURS PRN
Status: DISCONTINUED | OUTPATIENT
Start: 2023-01-01 | End: 2023-01-01 | Stop reason: HOSPADM

## 2023-01-01 RX ORDER — ASPIRIN 300 MG/1
300 SUPPOSITORY RECTAL DAILY
Status: DISCONTINUED | OUTPATIENT
Start: 2023-01-01 | End: 2023-01-01

## 2023-01-01 RX ORDER — POLYETHYLENE GLYCOL 3350 17 G/17G
17 POWDER, FOR SOLUTION ORAL DAILY
Status: DISCONTINUED | OUTPATIENT
Start: 2023-01-01 | End: 2023-01-01 | Stop reason: HOSPADM

## 2023-01-01 RX ORDER — SODIUM CHLORIDE AND POTASSIUM CHLORIDE 150; 900 MG/100ML; MG/100ML
INJECTION, SOLUTION INTRAVENOUS CONTINUOUS
Status: ACTIVE | OUTPATIENT
Start: 2023-01-01 | End: 2023-01-01

## 2023-01-01 RX ORDER — GADOBUTROL 604.72 MG/ML
8 INJECTION INTRAVENOUS ONCE
Status: COMPLETED | OUTPATIENT
Start: 2023-01-01 | End: 2023-01-01

## 2023-01-01 RX ORDER — CARBOXYMETHYLCELLULOSE SODIUM 5 MG/ML
1 SOLUTION/ DROPS OPHTHALMIC 2 TIMES DAILY PRN
Status: DISCONTINUED | OUTPATIENT
Start: 2023-01-01 | End: 2023-01-01 | Stop reason: HOSPADM

## 2023-01-01 RX ORDER — ACETAMINOPHEN 325 MG/10.15ML
650 LIQUID ORAL EVERY 4 HOURS PRN
Status: DISCONTINUED | OUTPATIENT
Start: 2023-01-01 | End: 2023-01-01 | Stop reason: HOSPADM

## 2023-01-01 RX ORDER — METOPROLOL SUCCINATE 25 MG/1
25 TABLET, EXTENDED RELEASE ORAL DAILY
Status: DISCONTINUED | OUTPATIENT
Start: 2023-01-01 | End: 2023-01-01 | Stop reason: HOSPADM

## 2023-01-01 RX ORDER — QUETIAPINE FUMARATE 25 MG/1
25 TABLET, FILM COATED ORAL
Status: DISCONTINUED | OUTPATIENT
Start: 2023-01-01 | End: 2023-01-01 | Stop reason: HOSPADM

## 2023-01-01 RX ORDER — AMOXICILLIN 250 MG
2 CAPSULE ORAL DAILY
Status: DISCONTINUED | OUTPATIENT
Start: 2023-01-01 | End: 2023-01-01

## 2023-01-01 RX ORDER — ASPIRIN 81 MG/1
324 TABLET, CHEWABLE ORAL DAILY
Status: DISCONTINUED | OUTPATIENT
Start: 2023-01-01 | End: 2023-01-01

## 2023-01-01 RX ORDER — DEXTROSE MONOHYDRATE 25 G/50ML
25-50 INJECTION, SOLUTION INTRAVENOUS
Status: DISCONTINUED | OUTPATIENT
Start: 2023-01-01 | End: 2023-01-01 | Stop reason: HOSPADM

## 2023-01-01 RX ORDER — POLYETHYLENE GLYCOL 3350 17 G/17G
17 POWDER, FOR SOLUTION ORAL DAILY
Status: DISCONTINUED | OUTPATIENT
Start: 2023-01-01 | End: 2023-01-01

## 2023-01-01 RX ORDER — ONDANSETRON 4 MG/1
4 TABLET, ORALLY DISINTEGRATING ORAL EVERY 6 HOURS PRN
Status: DISCONTINUED | OUTPATIENT
Start: 2023-01-01 | End: 2023-01-01 | Stop reason: HOSPADM

## 2023-01-01 RX ORDER — ACETAMINOPHEN 325 MG/1
650 TABLET ORAL EVERY 4 HOURS PRN
Status: DISCONTINUED | OUTPATIENT
Start: 2023-01-01 | End: 2023-01-01 | Stop reason: HOSPADM

## 2023-01-01 RX ORDER — ROSUVASTATIN CALCIUM 20 MG/1
20 TABLET, COATED ORAL DAILY
Status: DISCONTINUED | OUTPATIENT
Start: 2023-01-01 | End: 2023-01-01 | Stop reason: HOSPADM

## 2023-01-01 RX ORDER — PHENOL 1.4 %
10 AEROSOL, SPRAY (ML) MUCOUS MEMBRANE AT BEDTIME
DISCHARGE
Start: 2023-01-01 | End: 2024-01-01

## 2023-01-01 RX ORDER — NICOTINE POLACRILEX 4 MG
15-30 LOZENGE BUCCAL
Status: DISCONTINUED | OUTPATIENT
Start: 2023-01-01 | End: 2023-01-01 | Stop reason: HOSPADM

## 2023-01-01 RX ORDER — LABETALOL HYDROCHLORIDE 5 MG/ML
10-40 INJECTION, SOLUTION INTRAVENOUS EVERY 10 MIN PRN
Status: DISCONTINUED | OUTPATIENT
Start: 2023-01-01 | End: 2023-01-01 | Stop reason: HOSPADM

## 2023-01-01 RX ORDER — ACETAMINOPHEN 650 MG/1
650 SUPPOSITORY RECTAL EVERY 4 HOURS PRN
Status: DISCONTINUED | OUTPATIENT
Start: 2023-01-01 | End: 2023-01-01 | Stop reason: HOSPADM

## 2023-01-01 RX ORDER — QUETIAPINE FUMARATE 25 MG/1
25 TABLET, FILM COATED ORAL
DISCHARGE
Start: 2023-01-01 | End: 2023-01-01

## 2023-01-01 RX ORDER — ASPIRIN 81 MG/1
81 TABLET ORAL DAILY
Status: DISCONTINUED | OUTPATIENT
Start: 2023-01-01 | End: 2023-01-01 | Stop reason: HOSPADM

## 2023-01-01 RX ORDER — IOPAMIDOL 755 MG/ML
125 INJECTION, SOLUTION INTRAVASCULAR ONCE
Status: COMPLETED | OUTPATIENT
Start: 2023-01-01 | End: 2023-01-01

## 2023-01-01 RX ORDER — ASPIRIN 325 MG
325 TABLET, DELAYED RELEASE (ENTERIC COATED) ORAL DAILY
Status: DISCONTINUED | OUTPATIENT
Start: 2023-01-01 | End: 2023-01-01

## 2023-01-01 RX ORDER — SERTRALINE HYDROCHLORIDE 25 MG/1
25 TABLET, FILM COATED ORAL DAILY
Status: DISCONTINUED | OUTPATIENT
Start: 2023-01-01 | End: 2023-01-01 | Stop reason: HOSPADM

## 2023-01-01 RX ORDER — AMOXICILLIN 250 MG
1-2 CAPSULE ORAL 2 TIMES DAILY
Status: DISCONTINUED | OUTPATIENT
Start: 2023-01-01 | End: 2023-01-01 | Stop reason: HOSPADM

## 2023-01-01 RX ORDER — HYDRALAZINE HYDROCHLORIDE 20 MG/ML
10-20 INJECTION INTRAMUSCULAR; INTRAVENOUS
Status: DISCONTINUED | OUTPATIENT
Start: 2023-01-01 | End: 2023-01-01 | Stop reason: HOSPADM

## 2023-01-01 RX ORDER — BISACODYL 10 MG
10 SUPPOSITORY, RECTAL RECTAL DAILY PRN
Status: DISCONTINUED | OUTPATIENT
Start: 2023-01-01 | End: 2023-01-01 | Stop reason: HOSPADM

## 2023-01-01 RX ADMIN — INSULIN ASPART: 100 INJECTION, SOLUTION INTRAVENOUS; SUBCUTANEOUS at 13:45

## 2023-01-01 RX ADMIN — SENNOSIDES AND DOCUSATE SODIUM 1 TABLET: 50; 8.6 TABLET ORAL at 08:57

## 2023-01-01 RX ADMIN — METOPROLOL SUCCINATE 25 MG: 25 TABLET, EXTENDED RELEASE ORAL at 09:07

## 2023-01-01 RX ADMIN — METOPROLOL SUCCINATE 25 MG: 25 TABLET, EXTENDED RELEASE ORAL at 08:13

## 2023-01-01 RX ADMIN — Medication 1 MG: at 21:44

## 2023-01-01 RX ADMIN — METOPROLOL SUCCINATE 25 MG: 25 TABLET, EXTENDED RELEASE ORAL at 08:57

## 2023-01-01 RX ADMIN — ROSUVASTATIN CALCIUM 20 MG: 20 TABLET, FILM COATED ORAL at 08:57

## 2023-01-01 RX ADMIN — SENNOSIDES AND DOCUSATE SODIUM 1 TABLET: 50; 8.6 TABLET ORAL at 22:20

## 2023-01-01 RX ADMIN — INSULIN ASPART 1 UNITS: 100 INJECTION, SOLUTION INTRAVENOUS; SUBCUTANEOUS at 12:00

## 2023-01-01 RX ADMIN — SENNOSIDES AND DOCUSATE SODIUM 1 TABLET: 50; 8.6 TABLET ORAL at 11:35

## 2023-01-01 RX ADMIN — POLYETHYLENE GLYCOL 3350 17 G: 17 POWDER, FOR SOLUTION ORAL at 09:49

## 2023-01-01 RX ADMIN — GADOBUTROL 8 ML: 604.72 INJECTION INTRAVENOUS at 14:59

## 2023-01-01 RX ADMIN — ROSUVASTATIN CALCIUM 20 MG: 20 TABLET, FILM COATED ORAL at 09:48

## 2023-01-01 RX ADMIN — POLYETHYLENE GLYCOL 3350 17 G: 17 POWDER, FOR SOLUTION ORAL at 09:07

## 2023-01-01 RX ADMIN — SERTRALINE HYDROCHLORIDE 25 MG: 25 TABLET ORAL at 09:07

## 2023-01-01 RX ADMIN — POTASSIUM CHLORIDE AND SODIUM CHLORIDE: 900; 150 INJECTION, SOLUTION INTRAVENOUS at 11:19

## 2023-01-01 RX ADMIN — ASPIRIN 81 MG: 81 TABLET, COATED ORAL at 09:48

## 2023-01-01 RX ADMIN — SENNOSIDES AND DOCUSATE SODIUM 1 TABLET: 50; 8.6 TABLET ORAL at 21:50

## 2023-01-01 RX ADMIN — POLYETHYLENE GLYCOL 3350 17 G: 17 POWDER, FOR SOLUTION ORAL at 08:13

## 2023-01-01 RX ADMIN — SODIUM CHLORIDE 100 ML: 9 INJECTION, SOLUTION INTRAVENOUS at 06:16

## 2023-01-01 RX ADMIN — IOPAMIDOL 125 ML: 755 INJECTION, SOLUTION INTRAVENOUS at 06:15

## 2023-01-01 RX ADMIN — Medication 1 MG: at 22:20

## 2023-01-01 RX ADMIN — MELATONIN TAB 3 MG 10 MG: 3 TAB at 21:50

## 2023-01-01 RX ADMIN — ROSUVASTATIN CALCIUM 20 MG: 20 TABLET, FILM COATED ORAL at 09:07

## 2023-01-01 RX ADMIN — POLYETHYLENE GLYCOL 3350 17 G: 17 POWDER, FOR SOLUTION ORAL at 08:55

## 2023-01-01 RX ADMIN — SENNOSIDES AND DOCUSATE SODIUM 2 TABLET: 50; 8.6 TABLET ORAL at 08:12

## 2023-01-01 RX ADMIN — METOPROLOL SUCCINATE 25 MG: 25 TABLET, EXTENDED RELEASE ORAL at 09:48

## 2023-01-01 RX ADMIN — SENNOSIDES AND DOCUSATE SODIUM 1 TABLET: 50; 8.6 TABLET ORAL at 09:48

## 2023-01-01 RX ADMIN — INSULIN ASPART 2 UNITS: 100 INJECTION, SOLUTION INTRAVENOUS; SUBCUTANEOUS at 12:38

## 2023-01-01 RX ADMIN — MELATONIN TAB 3 MG 10 MG: 3 TAB at 21:24

## 2023-01-01 RX ADMIN — HUMAN ALBUMIN MICROSPHERES AND PERFLUTREN 3 ML: 10; .22 INJECTION, SOLUTION INTRAVENOUS at 10:42

## 2023-01-01 RX ADMIN — SERTRALINE HYDROCHLORIDE 25 MG: 25 TABLET ORAL at 08:57

## 2023-01-01 RX ADMIN — ASPIRIN 81 MG: 81 TABLET, COATED ORAL at 09:07

## 2023-01-01 RX ADMIN — SENNOSIDES AND DOCUSATE SODIUM 1 TABLET: 50; 8.6 TABLET ORAL at 21:44

## 2023-01-01 RX ADMIN — ASPIRIN 81 MG 324 MG: 81 TABLET ORAL at 11:35

## 2023-01-01 RX ADMIN — ASPIRIN 81 MG: 81 TABLET, COATED ORAL at 08:57

## 2023-01-01 RX ADMIN — SERTRALINE HYDROCHLORIDE 25 MG: 25 TABLET ORAL at 09:48

## 2023-01-01 RX ADMIN — SERTRALINE HYDROCHLORIDE 25 MG: 25 TABLET ORAL at 08:15

## 2023-01-01 RX ADMIN — ROSUVASTATIN CALCIUM 20 MG: 20 TABLET, FILM COATED ORAL at 11:35

## 2023-01-01 RX ADMIN — SENNOSIDES AND DOCUSATE SODIUM 1 TABLET: 50; 8.6 TABLET ORAL at 21:25

## 2023-01-01 RX ADMIN — ASPIRIN 81 MG: 81 TABLET, COATED ORAL at 08:14

## 2023-01-01 RX ADMIN — SENNOSIDES AND DOCUSATE SODIUM 1 TABLET: 50; 8.6 TABLET ORAL at 09:07

## 2023-01-01 RX ADMIN — ROSUVASTATIN CALCIUM 20 MG: 20 TABLET, FILM COATED ORAL at 08:14

## 2023-01-01 RX ADMIN — POLYETHYLENE GLYCOL 3350 17 G: 17 POWDER, FOR SOLUTION ORAL at 11:35

## 2023-01-01 ASSESSMENT — ACTIVITIES OF DAILY LIVING (ADL)
ADLS_ACUITY_SCORE: 45
ADLS_ACUITY_SCORE: 47
ADLS_ACUITY_SCORE: 45
ADLS_ACUITY_SCORE: 48
ADLS_ACUITY_SCORE: 35
ADLS_ACUITY_SCORE: 41
ADLS_ACUITY_SCORE: 48
ADLS_ACUITY_SCORE: 53
ADLS_ACUITY_SCORE: 48
ADLS_ACUITY_SCORE: 51
ADLS_ACUITY_SCORE: 53
ADLS_ACUITY_SCORE: 53
ADLS_ACUITY_SCORE: 48
ADLS_ACUITY_SCORE: 51
ADLS_ACUITY_SCORE: 47
ADLS_ACUITY_SCORE: 41
ADLS_ACUITY_SCORE: 48
ADLS_ACUITY_SCORE: 48
ADLS_ACUITY_SCORE: 45
ADLS_ACUITY_SCORE: 53
ADLS_ACUITY_SCORE: 48
ADLS_ACUITY_SCORE: 53
ADLS_ACUITY_SCORE: 48
ADLS_ACUITY_SCORE: 41
ADLS_ACUITY_SCORE: 53
ADLS_ACUITY_SCORE: 53
ADLS_ACUITY_SCORE: 45
ADLS_ACUITY_SCORE: 48
ADLS_ACUITY_SCORE: 49
ADLS_ACUITY_SCORE: 48
ADLS_ACUITY_SCORE: 49
ADLS_ACUITY_SCORE: 53
ADLS_ACUITY_SCORE: 48
ADLS_ACUITY_SCORE: 48
ADLS_ACUITY_SCORE: 47
ADLS_ACUITY_SCORE: 41
ADLS_ACUITY_SCORE: 48
ADLS_ACUITY_SCORE: 39
ADLS_ACUITY_SCORE: 45
DEPENDENT_IADLS:: TRANSPORTATION;MONEY MANAGEMENT;SHOPPING
ADLS_ACUITY_SCORE: 48
ADLS_ACUITY_SCORE: 51
ADLS_ACUITY_SCORE: 48
ADLS_ACUITY_SCORE: 48
ADLS_ACUITY_SCORE: 35
ADLS_ACUITY_SCORE: 48

## 2023-07-23 ENCOUNTER — HOSPITAL ENCOUNTER (INPATIENT)
Facility: CLINIC | Age: 83
LOS: 5 days | Discharge: HOME HEALTH | DRG: 286 | End: 2023-08-02
Attending: EMERGENCY MEDICINE | Admitting: HOSPITALIST
Payer: COMMERCIAL

## 2023-07-23 ENCOUNTER — APPOINTMENT (OUTPATIENT)
Dept: GENERAL RADIOLOGY | Facility: CLINIC | Age: 83
DRG: 286 | End: 2023-07-23
Attending: EMERGENCY MEDICINE
Payer: COMMERCIAL

## 2023-07-23 ENCOUNTER — APPOINTMENT (OUTPATIENT)
Dept: CT IMAGING | Facility: CLINIC | Age: 83
DRG: 286 | End: 2023-07-23
Attending: EMERGENCY MEDICINE
Payer: COMMERCIAL

## 2023-07-23 DIAGNOSIS — I35.0 CRITICAL AORTIC VALVE STENOSIS: Primary | ICD-10-CM

## 2023-07-23 DIAGNOSIS — R29.6 MULTIPLE FALLS: ICD-10-CM

## 2023-07-23 DIAGNOSIS — R94.31 ABNORMAL ELECTROCARDIOGRAM: ICD-10-CM

## 2023-07-23 DIAGNOSIS — M79.604 RIGHT LEG PAIN: ICD-10-CM

## 2023-07-23 DIAGNOSIS — R41.89 COGNITIVE DECLINE: ICD-10-CM

## 2023-07-23 LAB
ALBUMIN SERPL BCG-MCNC: 4.3 G/DL (ref 3.5–5.2)
ALBUMIN UR-MCNC: NEGATIVE MG/DL
ALP SERPL-CCNC: 75 U/L (ref 40–129)
ALT SERPL W P-5'-P-CCNC: 11 U/L (ref 0–70)
ANION GAP SERPL CALCULATED.3IONS-SCNC: 11 MMOL/L (ref 7–15)
APPEARANCE UR: CLEAR
AST SERPL W P-5'-P-CCNC: 22 U/L (ref 0–45)
ATRIAL RATE - MUSE: 60 BPM
BASOPHILS # BLD AUTO: 0 10E3/UL (ref 0–0.2)
BASOPHILS NFR BLD AUTO: 1 %
BILIRUB SERPL-MCNC: 1.1 MG/DL
BILIRUB UR QL STRIP: NEGATIVE
BUN SERPL-MCNC: 16.6 MG/DL (ref 8–23)
CALCIUM SERPL-MCNC: 9.8 MG/DL (ref 8.8–10.2)
CHLORIDE SERPL-SCNC: 104 MMOL/L (ref 98–107)
CK SERPL-CCNC: 79 U/L (ref 39–308)
COLOR UR AUTO: YELLOW
CREAT SERPL-MCNC: 0.8 MG/DL (ref 0.67–1.17)
DEPRECATED HCO3 PLAS-SCNC: 24 MMOL/L (ref 22–29)
DIASTOLIC BLOOD PRESSURE - MUSE: NORMAL MMHG
EOSINOPHIL # BLD AUTO: 0 10E3/UL (ref 0–0.7)
EOSINOPHIL NFR BLD AUTO: 0 %
ERYTHROCYTE [DISTWIDTH] IN BLOOD BY AUTOMATED COUNT: 14 % (ref 10–15)
GFR SERPL CREATININE-BSD FRML MDRD: 87 ML/MIN/1.73M2
GLUCOSE SERPL-MCNC: 120 MG/DL (ref 70–99)
GLUCOSE UR STRIP-MCNC: NEGATIVE MG/DL
HCT VFR BLD AUTO: 39 % (ref 40–53)
HGB BLD-MCNC: 13.4 G/DL (ref 13.3–17.7)
HGB UR QL STRIP: NEGATIVE
HOLD SPECIMEN: NORMAL
HOLD SPECIMEN: NORMAL
IMM GRANULOCYTES # BLD: 0 10E3/UL
IMM GRANULOCYTES NFR BLD: 0 %
INTERPRETATION ECG - MUSE: NORMAL
KETONES UR STRIP-MCNC: NEGATIVE MG/DL
LEUKOCYTE ESTERASE UR QL STRIP: NEGATIVE
LYMPHOCYTES # BLD AUTO: 1.3 10E3/UL (ref 0.8–5.3)
LYMPHOCYTES NFR BLD AUTO: 21 %
MCH RBC QN AUTO: 32.5 PG (ref 26.5–33)
MCHC RBC AUTO-ENTMCNC: 34.4 G/DL (ref 31.5–36.5)
MCV RBC AUTO: 95 FL (ref 78–100)
MONOCYTES # BLD AUTO: 0.6 10E3/UL (ref 0–1.3)
MONOCYTES NFR BLD AUTO: 9 %
MUCOUS THREADS #/AREA URNS LPF: PRESENT /LPF
NEUTROPHILS # BLD AUTO: 4.3 10E3/UL (ref 1.6–8.3)
NEUTROPHILS NFR BLD AUTO: 69 %
NITRATE UR QL: NEGATIVE
NRBC # BLD AUTO: 0 10E3/UL
NRBC BLD AUTO-RTO: 0 /100
P AXIS - MUSE: 59 DEGREES
PH UR STRIP: 6 [PH] (ref 5–7)
PLATELET # BLD AUTO: 182 10E3/UL (ref 150–450)
POTASSIUM SERPL-SCNC: 4.2 MMOL/L (ref 3.4–5.3)
PR INTERVAL - MUSE: 322 MS
PROT SERPL-MCNC: 7.4 G/DL (ref 6.4–8.3)
QRS DURATION - MUSE: 162 MS
QT - MUSE: 486 MS
QTC - MUSE: 486 MS
R AXIS - MUSE: -46 DEGREES
RBC # BLD AUTO: 4.12 10E6/UL (ref 4.4–5.9)
RBC URINE: 2 /HPF
SODIUM SERPL-SCNC: 139 MMOL/L (ref 136–145)
SP GR UR STRIP: 1.02 (ref 1–1.03)
SQUAMOUS EPITHELIAL: <1 /HPF
SYSTOLIC BLOOD PRESSURE - MUSE: NORMAL MMHG
T AXIS - MUSE: 110 DEGREES
TROPONIN T SERPL HS-MCNC: 32 NG/L
UROBILINOGEN UR STRIP-MCNC: NORMAL MG/DL
VENTRICULAR RATE- MUSE: 60 BPM
WBC # BLD AUTO: 6.1 10E3/UL (ref 4–11)
WBC URINE: 1 /HPF

## 2023-07-23 PROCEDURE — 93005 ELECTROCARDIOGRAM TRACING: CPT

## 2023-07-23 PROCEDURE — 82550 ASSAY OF CK (CPK): CPT | Performed by: HOSPITALIST

## 2023-07-23 PROCEDURE — G0378 HOSPITAL OBSERVATION PER HR: HCPCS

## 2023-07-23 PROCEDURE — 85025 COMPLETE CBC W/AUTO DIFF WBC: CPT | Performed by: EMERGENCY MEDICINE

## 2023-07-23 PROCEDURE — 80053 COMPREHEN METABOLIC PANEL: CPT | Performed by: EMERGENCY MEDICINE

## 2023-07-23 PROCEDURE — 81001 URINALYSIS AUTO W/SCOPE: CPT | Performed by: EMERGENCY MEDICINE

## 2023-07-23 PROCEDURE — 36415 COLL VENOUS BLD VENIPUNCTURE: CPT | Performed by: EMERGENCY MEDICINE

## 2023-07-23 PROCEDURE — 99223 1ST HOSP IP/OBS HIGH 75: CPT | Performed by: HOSPITALIST

## 2023-07-23 PROCEDURE — 70450 CT HEAD/BRAIN W/O DYE: CPT

## 2023-07-23 PROCEDURE — 84484 ASSAY OF TROPONIN QUANT: CPT | Performed by: HOSPITALIST

## 2023-07-23 PROCEDURE — 72100 X-RAY EXAM L-S SPINE 2/3 VWS: CPT

## 2023-07-23 PROCEDURE — 99285 EMERGENCY DEPT VISIT HI MDM: CPT | Mod: 25

## 2023-07-23 PROCEDURE — 73502 X-RAY EXAM HIP UNI 2-3 VIEWS: CPT

## 2023-07-23 PROCEDURE — 71045 X-RAY EXAM CHEST 1 VIEW: CPT

## 2023-07-23 RX ORDER — ASPIRIN 81 MG/1
81 TABLET, CHEWABLE ORAL DAILY
Status: ON HOLD | COMMUNITY
End: 2023-08-16

## 2023-07-23 RX ORDER — ROSUVASTATIN CALCIUM 20 MG/1
20 TABLET, COATED ORAL AT BEDTIME
COMMUNITY
Start: 2023-06-21 | End: 2024-01-01

## 2023-07-23 RX ORDER — LANOLIN ALCOHOL/MO/W.PET/CERES
1000 CREAM (GRAM) TOPICAL DAILY
COMMUNITY
End: 2023-08-14

## 2023-07-23 RX ORDER — METHYLCELLULOSE 2 G/19G
1 POWDER, FOR SOLUTION ORAL DAILY
COMMUNITY
End: 2023-09-11

## 2023-07-23 RX ORDER — CYCLOSPORINE 0.5 MG/ML
1 EMULSION OPHTHALMIC 2 TIMES DAILY PRN
COMMUNITY
Start: 2022-06-16 | End: 2024-01-01

## 2023-07-23 RX ORDER — METOPROLOL SUCCINATE 25 MG/1
1 TABLET, EXTENDED RELEASE ORAL DAILY
Status: ON HOLD | COMMUNITY
Start: 2023-06-26 | End: 2023-08-02

## 2023-07-23 RX ORDER — VITAMIN B COMPLEX
25 TABLET ORAL DAILY
COMMUNITY
End: 2024-01-01

## 2023-07-23 RX ORDER — NYSTATIN 100000 [USP'U]/G
1 POWDER TOPICAL 3 TIMES DAILY PRN
COMMUNITY
Start: 2023-07-17 | End: 2024-01-01

## 2023-07-23 ASSESSMENT — ACTIVITIES OF DAILY LIVING (ADL)
ADLS_ACUITY_SCORE: 35

## 2023-07-23 NOTE — ED TRIAGE NOTES
Patient comes in after several days of weakness and altered gait. Today has had two falls onto the right side. Now states that his right calf and thigh hurt and the sensation is different in the leg. Hx R hip replacement in 2018. Able to stand after fall. Hit head on wall, not on thinners.     Triage Assessment       Row Name 07/23/23 [id number]       Triage Assessment (Adult)    Airway WDL WDL       Respiratory WDL    Respiratory WDL WDL       Skin Circulation/Temperature WDL    Skin Circulation/Temperature WDL WDL       Cardiac WDL    Cardiac WDL --  hypotension       Peripheral/Neurovascular WDL    Peripheral Neurovascular WDL --  altered sensation in R leg after fall       Cognitive/Neuro/Behavioral WDL    Cognitive/Neuro/Behavioral WDL WDL

## 2023-07-23 NOTE — ED PROVIDER NOTES
History     Chief Complaint:  Falls     The history is provided by the spouse and a relative (son and daughter).      Erick Shahid is a 83 year old male with history of Dementia, heart disease, hyperlipidemia, and type 2 diabetes who presents to the ED with wife, son, and daughter for evaluation after falls. The patient's son states that today he came to the patient's condo and found him on the floor. The son says that he had 2 falls today, first in his bedroom (unwitnessed) and he fell onto his butt, but was able to use the bed to get back up.  The son says he found him after the second fall, where he fell onto his right side in the kitchen, and when the son came over he was crawling to find something to help him up. Son says that after this fall he was complaining of some leg pain, mostly in the right leg where he has had long term troubles.    Wife notes that the patient has been slowly deteriorating since he fractured his hip in 2018.  She notes that he just had an MRI of his brain in March that looked good.  She says that home health came on Wednesday to evaluate the patient for at home physical therapy, and he was walking around and moving normally.  His daughter notes that he often doesn't move around much so physical therapy might have just tired him out and made his legs sore. Wife says that the patient walks abnormally at baseline, with his knees bent and swinging them to the side to step. Wife states he doesn't use a walker or cane to get around. Wife states that he had a normal wellness check at his clinic on Monday where he was still doing ok. Wife says that he lost weight recently due to not moving much and sleeping more. Wife denies fever, vomiting, diarrhea, change in medications, or history of cancer. Patient denies being in any pain right now.  Family confirms that they do not think he is safe to be at home right now.  They state he has been gradually declining from both a physical and  "cognitive standpoint for multiple years.    Independent Historian:   Wife, Son, and daughter provides supplemental history as noted above.     Review of External Notes:   I personally performed electronic chart review, I see that he had an MRI of the brain in March of this year showing no acute disease.  He also just enrolled in home health on July 19 with a have not shown up to his house yet, due to start this week.    Medications:    Aspirin 81 mg  Cyanocobalamin   Metformin  Toprol XL   Mycostatin   Crestor     Past Medical History:    Macular degeneration   BPH with urinary obstruction   Heart Failure  Dementia   Severe calcific aortic valve stenosis  Sensorineural hearing loss  Type 2 diabetes   Hyperlipidemia   Heart disease  Left bundle branch block   Retinal disease   Right hip fracture    Past Surgical History:    Right hip surgery   Retina surgery   Intracapsular cataract extraction   Vitrectomy     Physical Exam     Patient Vitals for the past 24 hrs:   BP Temp Temp src Pulse Resp SpO2 Height Weight   07/23/23 2300 134/77 -- -- 61 10 97 % -- --   07/23/23 2027 126/60 -- -- -- -- 100 % -- --   07/23/23 2000 128/61 -- -- 59 -- 99 % -- --   07/23/23 1900 128/63 -- -- 57 -- 99 % -- --   07/23/23 1830 131/59 -- -- 57 -- 99 % -- --   07/23/23 1800 118/57 -- -- 59 -- 97 % -- --   07/23/23 1700 136/80 -- -- 71 -- 99 % -- --   07/23/23 1446 103/54 -- -- 70 -- 96 % -- --   07/23/23 1433 91/50 -- -- -- -- -- -- --   07/23/23 1430 -- 98.5  F (36.9  C) Temporal 76 16 95 % 1.702 m (5' 7\") 83.9 kg (185 lb)      Physical Exam  General: Chronically ill and frail appearing male semirecumbent in room 7, family at bedside  HENT: face nontender with full painless ROM mandible, no bony deformity, OP clear, no difficulty controlling secretions, skull nontender  Eyes: PERRL without proptosis  CV:  regular rhythm, cap refill normal in all extremities, minimal symmetric lower extremity edema, compartments soft throughout both " legs, +murmur audible  Resp: normal effort, speaks in full phrases, no stridor  GI: abdomen soft, nontender, no guarding  Nontender external genitalia  MSK:  Cervical spine:  no midline tenderness, FROM  Thoracic spine: no midline tenderness, no CVAT  Lumbar spine: no midline tenderness  Chest wall: nontender without crepitus  Pelvis stable  Extremities: No appreciable focal tenderness to extremities  Skin:   No abrasion  No ecchymosis  No laceration  Neuro: awake, alert, hard of hearing,  equal, can lift both legs off bed, overall poor historian but follows basic commands without difficulty, no nuchal rigidity   Psych: cooperative, somewhat flat affect, no apparent hallucinations    Emergency Department Course   ECG  ECG results from 07/23/23   EKG 12 lead     Value    Systolic Blood Pressure     Diastolic Blood Pressure     Ventricular Rate 60    Atrial Rate 60    NC Interval 322    QRS Duration 162        QTc 486    P Axis 59    R AXIS -46    T Axis 110    Interpretation ECG      Sinus rhythm with 1st degree A-V block  Left axis deviation  Left bundle branch block  When compared to ECG from 11/22/21, no significant change was found       Imaging:  CT Head w/o Contrast   Final Result   IMPRESSION:   1.  No CT finding of a mass, hemorrhage or focal area suggestive of acute infarct.   2.  Diffuse age related changes.      XR Pelvis and Hip Right 1 View   Final Result   IMPRESSION: No acute fracture or dislocation within the limitations of osteopenia. If there is persistent clinical concern, MRI is recommended for further evaluation. Prior fixation of the right femur without evidence of hardware complication.    Degenerative changes of the right knee. Mild to moderate degenerative changes of the hips. Additional degenerative changes in the lower lumbar spine.      XR Lumbar Spine 2/3 Views   Final Result   IMPRESSION: There are 5 lumbar type vertebral bodies. There is good anatomic alignment of the lumbar  spine with no evidence of subluxation. The vertebral body heights are well-maintained throughout. There is diffuse degenerative disc disease throughout    the lumbar disc spaces with a mild loss of height, endplate changes and small anterior lateral osteophyte formations. There is diffuse facet arthropathy. There is possible at least partial ankylosis across the sacroiliac joints bilaterally. The patient    is status post femoral head screw fixation of the right femoral head.      XR Chest 1 View   Final Result   IMPRESSION: Mild bandlike scarring in the lung apices. Benign calcified granuloma left lower lung. Benign calcified left perihilar lymph nodes. Chest otherwise negative. No focal consolidation. No pleural effusion or pneumothorax.         Report per radiology    Laboratory:  Labs Ordered and Resulted from Time of ED Arrival to Time of ED Departure   COMPREHENSIVE METABOLIC PANEL - Abnormal       Result Value    Sodium 139      Potassium 4.2      Chloride 104      Carbon Dioxide (CO2) 24      Anion Gap 11      Urea Nitrogen 16.6      Creatinine 0.80      Calcium 9.8      Glucose 120 (*)     Alkaline Phosphatase 75      AST 22      ALT 11      Protein Total 7.4      Albumin 4.3      Bilirubin Total 1.1      GFR Estimate 87     CBC WITH PLATELETS AND DIFFERENTIAL - Abnormal    WBC Count 6.1      RBC Count 4.12 (*)     Hemoglobin 13.4      Hematocrit 39.0 (*)     MCV 95      MCH 32.5      MCHC 34.4      RDW 14.0      Platelet Count 182      % Neutrophils 69      % Lymphocytes 21      % Monocytes 9      % Eosinophils 0      % Basophils 1      % Immature Granulocytes 0      NRBCs per 100 WBC 0      Absolute Neutrophils 4.3      Absolute Lymphocytes 1.3      Absolute Monocytes 0.6      Absolute Eosinophils 0.0      Absolute Basophils 0.0      Absolute Immature Granulocytes 0.0      Absolute NRBCs 0.0     ROUTINE UA WITH MICROSCOPIC - Abnormal    Color Urine Yellow      Appearance Urine Clear      Glucose Urine  Negative      Bilirubin Urine Negative      Ketones Urine Negative      Specific Gravity Urine 1.021      Blood Urine Negative      pH Urine 6.0      Protein Albumin Urine Negative      Urobilinogen Urine Normal      Nitrite Urine Negative      Leukocyte Esterase Urine Negative      Mucus Urine Present (*)     RBC Urine 2      WBC Urine 1      Squamous Epithelials Urine <1     TROPONIN T, HIGH SENSITIVITY - Abnormal    Troponin T, High Sensitivity 32 (*)    CK TOTAL - Normal    CK 79        Emergency Department Course & Assessments:     Interventions:  Medications - No data to display     Independent Interpretation (X-rays, CTs, rhythm strip):  I personally reviewed XR images, hardware in appropriate positions.     Assessments/Consultations/Discussion of Management or Tests:   ED Course as of 07/24/23 0041   Sun Jul 23, 2023   1709 I obtained history and examined the patient as noted above.    1915 I rechecked the patient and explained findings.    1922 I spoke to Dr. Wells, hospitalist, who agreed to admit the patient.      Social Determinants of Health affecting care:   Healthcare Access/Compliance and Transportation    Disposition:  The patient was admitted to the hospital under the care of Dr. Wells.     Impression & Plan      Medical Decision Making:  He presents with falls of increasing frequency, in the setting of overall functional and cognitive decline progressive over quite some time.  He is a poor historian which made his evaluation more challenging.  I evaluated him for a variety potentially dangerous injuries, including fractures, pneumothorax, intracranial hemorrhage among others, though fortunately there are no immediately dangerous findings.  Troponin is minimally elevated though presentation is not highly suggestive of an acute coronary syndrome so anticoagulation was withheld.  Blood pressure slightly low on arrival the recheck was improved.  Family confirms that they consider him unsafe to  be at home in his current state and I have therefore arranged for him to be hospitalized for close monitoring and further care.  In the meantime I do not identify any fever, leukocytosis, anemia, or major metabolic abnormality warranting further aggressive intervention.  Antibiotics withheld in the absence of stronger suspicion for bacterial infection.  Electrocardiogram is abnormal though no prior comparisons available, he does not have syncope or palpitations so emergent cardiology consultation was deferred.    Diagnosis:    ICD-10-CM    1. Multiple falls  R29.6       2. Right leg pain  M79.604       3. Cognitive decline  R41.89       4. Abnormal electrocardiogram  R94.31           Scribe Disclosure:  I, Fernanda Juarez, am serving as a scribe at 5:41 PM on 7/23/2023 to document services personally performed by Alex Zavala MD based on my observations and the provider's statements to me.     7/23/2023   Alex Zavala MD Reitsema, Jeffrey Alan, MD  07/24/23 0042

## 2023-07-23 NOTE — ED NOTES
"[facility]  ED Nurse Handoff Report    ED Chief complaint: Fall and Leg Pain      ED Diagnosis:   Final diagnoses:   None       Code Status: Full Code    Allergies: No Known Allergies    Patient Story: Patient comes in with his family. He has had several days of weakness and a fall today.   Focused Assessment:  Alert and oriented, extremely hard of hearing and slower to respond.  He has pain all over and it is difficult to pinpoint exactly where the pain is.  His family is with him. VSS.  Unable to go back home due to his weakness and falls.      Treatments and/or interventions provided: IV, labs, imaging   Patient's response to treatments and/or interventions: tolerated well    To be done/followed up on inpatient unit:  see orders     Does this patient have any cognitive concerns?: none    Activity level - Baseline/Home:  Independent  Activity Level - Current:   Total Care    Patient's Preferred language: Data Unavailable   Needed?: No    Isolation: None  Infection: Not Applicable  Patient tested for COVID 19 prior to admission: NO  Bariatric?: No    Vital Signs:   Vitals:    07/23/23 1430 07/23/23 1433 07/23/23 1446 07/23/23 1700   BP:  91/50 103/54 136/80   Pulse: 76  70 71   Resp: 16      Temp: 98.5  F (36.9  C)      TempSrc: Temporal      SpO2: 95%  96% 99%   Weight: 83.9 kg (185 lb)      Height: 1.702 m (5' 7\")          Cardiac Rhythm:     Was the PSS-3 completed:   Yes  What interventions are required if any?               Family Comments: wife and children   OBS brochure/video discussed/provided to patient/family: N/A              Name of person given brochure if not patient:               Relationship to patient:     For the majority of the shift this patient's behavior was Green.   Behavioral interventions performed were .    ED NURSE PHONE NUMBER: *[phone]       " Problem: Falls - Risk of  Goal: *Absence of Falls  Document Estela Fall Risk and appropriate interventions in the flowsheet.    Outcome: Progressing Towards Goal  Fall Risk Interventions:  Mobility Interventions: Patient to call before getting OOB         Medication Interventions: Bed/chair exit alarm, Patient to call before getting OOB    Elimination Interventions: Call light in reach

## 2023-07-24 ENCOUNTER — APPOINTMENT (OUTPATIENT)
Dept: PHYSICAL THERAPY | Facility: CLINIC | Age: 83
DRG: 286 | End: 2023-07-24
Attending: HOSPITALIST
Payer: COMMERCIAL

## 2023-07-24 ENCOUNTER — APPOINTMENT (OUTPATIENT)
Dept: ULTRASOUND IMAGING | Facility: CLINIC | Age: 83
DRG: 286 | End: 2023-07-24
Attending: HOSPITALIST
Payer: COMMERCIAL

## 2023-07-24 LAB
GLUCOSE BLDC GLUCOMTR-MCNC: 108 MG/DL (ref 70–99)
SARS-COV-2 RNA RESP QL NAA+PROBE: NEGATIVE
TROPONIN T SERPL HS-MCNC: 25 NG/L

## 2023-07-24 PROCEDURE — G0378 HOSPITAL OBSERVATION PER HR: HCPCS

## 2023-07-24 PROCEDURE — 87635 SARS-COV-2 COVID-19 AMP PRB: CPT | Performed by: HOSPITALIST

## 2023-07-24 PROCEDURE — 97530 THERAPEUTIC ACTIVITIES: CPT | Mod: GP

## 2023-07-24 PROCEDURE — 93970 EXTREMITY STUDY: CPT

## 2023-07-24 PROCEDURE — 97116 GAIT TRAINING THERAPY: CPT | Mod: GP

## 2023-07-24 PROCEDURE — 36415 COLL VENOUS BLD VENIPUNCTURE: CPT | Performed by: HOSPITALIST

## 2023-07-24 PROCEDURE — 82962 GLUCOSE BLOOD TEST: CPT

## 2023-07-24 PROCEDURE — 84484 ASSAY OF TROPONIN QUANT: CPT | Performed by: HOSPITALIST

## 2023-07-24 PROCEDURE — 97161 PT EVAL LOW COMPLEX 20 MIN: CPT | Mod: GP

## 2023-07-24 PROCEDURE — 99232 SBSQ HOSP IP/OBS MODERATE 35: CPT | Performed by: HOSPITALIST

## 2023-07-24 PROCEDURE — 250N000013 HC RX MED GY IP 250 OP 250 PS 637: Performed by: HOSPITALIST

## 2023-07-24 RX ORDER — ASPIRIN 81 MG/1
81 TABLET, CHEWABLE ORAL DAILY
Status: DISCONTINUED | OUTPATIENT
Start: 2023-07-24 | End: 2023-08-02 | Stop reason: HOSPADM

## 2023-07-24 RX ORDER — ACETAMINOPHEN 325 MG/1
650 TABLET ORAL EVERY 6 HOURS PRN
Status: DISCONTINUED | OUTPATIENT
Start: 2023-07-24 | End: 2023-07-28

## 2023-07-24 RX ORDER — ACETAMINOPHEN 650 MG/1
650 SUPPOSITORY RECTAL EVERY 6 HOURS PRN
Status: DISCONTINUED | OUTPATIENT
Start: 2023-07-24 | End: 2023-08-02 | Stop reason: HOSPADM

## 2023-07-24 RX ORDER — CARBOXYMETHYLCELLULOSE SODIUM 5 MG/ML
1 SOLUTION/ DROPS OPHTHALMIC 2 TIMES DAILY
Status: DISCONTINUED | OUTPATIENT
Start: 2023-07-24 | End: 2023-08-02 | Stop reason: HOSPADM

## 2023-07-24 RX ORDER — ONDANSETRON 4 MG/1
4 TABLET, ORALLY DISINTEGRATING ORAL EVERY 6 HOURS PRN
Status: DISCONTINUED | OUTPATIENT
Start: 2023-07-24 | End: 2023-08-02 | Stop reason: HOSPADM

## 2023-07-24 RX ORDER — ONDANSETRON 2 MG/ML
4 INJECTION INTRAMUSCULAR; INTRAVENOUS EVERY 6 HOURS PRN
Status: DISCONTINUED | OUTPATIENT
Start: 2023-07-24 | End: 2023-08-02 | Stop reason: HOSPADM

## 2023-07-24 RX ORDER — BISACODYL 10 MG
10 SUPPOSITORY, RECTAL RECTAL DAILY PRN
Status: DISCONTINUED | OUTPATIENT
Start: 2023-07-24 | End: 2023-08-02 | Stop reason: HOSPADM

## 2023-07-24 RX ORDER — METOPROLOL SUCCINATE 25 MG/1
25 TABLET, EXTENDED RELEASE ORAL DAILY
Status: DISCONTINUED | OUTPATIENT
Start: 2023-07-24 | End: 2023-08-02 | Stop reason: HOSPADM

## 2023-07-24 RX ADMIN — ACETAMINOPHEN 650 MG: 325 TABLET, FILM COATED ORAL at 23:54

## 2023-07-24 RX ADMIN — METHYLCELLULOSE 500 MG: 500 TABLET ORAL at 08:43

## 2023-07-24 RX ADMIN — ASPIRIN 81 MG CHEWABLE TABLET 81 MG: 81 TABLET CHEWABLE at 08:44

## 2023-07-24 RX ADMIN — METOPROLOL SUCCINATE 25 MG: 25 TABLET, EXTENDED RELEASE ORAL at 08:43

## 2023-07-24 RX ADMIN — METFORMIN HYDROCHLORIDE 500 MG: 500 TABLET ORAL at 17:43

## 2023-07-24 RX ADMIN — Medication 1 MG: at 23:54

## 2023-07-24 RX ADMIN — CARBOXYMETHYLCELLULOSE SODIUM 1 DROP: 5 SOLUTION/ DROPS OPHTHALMIC at 20:19

## 2023-07-24 RX ADMIN — CARBOXYMETHYLCELLULOSE SODIUM 1 DROP: 5 SOLUTION/ DROPS OPHTHALMIC at 08:44

## 2023-07-24 ASSESSMENT — ACTIVITIES OF DAILY LIVING (ADL)
ADLS_ACUITY_SCORE: 43
ADLS_ACUITY_SCORE: 39
ADLS_ACUITY_SCORE: 39
ADLS_ACUITY_SCORE: 35
ADLS_ACUITY_SCORE: 43
ADLS_ACUITY_SCORE: 37
ADLS_ACUITY_SCORE: 39
ADLS_ACUITY_SCORE: 40
ADLS_ACUITY_SCORE: 39

## 2023-07-24 NOTE — PROGRESS NOTES
OBS GOALS    -diagnostic tests and consults completed and resulted: NOT MET; PT, OT, echo, SW/CC    -vital signs normal or at patient baseline: MET    -tolerating oral intake to maintain hydration: MET    -adequate pain control on oral analgesics: MET, denies pain

## 2023-07-24 NOTE — PROGRESS NOTES
OBS GOALS    -diagnostic tests and consults completed and resulted: NOT MET; PT/OT has seen. echo, SW/CC consult to be complete    -vital signs normal or at patient baseline: MET    -tolerating oral intake to maintain hydration: MET    -adequate pain control on oral analgesics: MET, denies pain

## 2023-07-24 NOTE — PROGRESS NOTES
MD Notification    Notified Person: MD    Notified Person Name: Dr. Wells    Notification Date/Time: 1130 7/24/23    Notification Interaction: in person    Purpose of Notification: NA notified me that BP is low, 89/44.    Orders Received: recheck BP and if still low page MD, may need fluids    Comments: Pt sitting up in chair. Ace wraps BLE. Denies pain, appears comfortable. Denies dizziness/lightheadedness when up.      ADDENDUM 1158: BP recheck 128/69

## 2023-07-24 NOTE — PLAN OF CARE
Goal Outcome Evaluation:         Orientation/Cognitive: WDL; A&O x 4  Observation Goals (Met/ Not Met): Not met  Mobility Level/Assist Equipment: SBA  Fall Risk (Y/N): Yes  Behavior Concerns: None  Pain Management: Pt denied pain  Tele/VS/O2: Tele A-Fib with CVR and BBB; AVSS; RA  ABNL Lab/BG: Troponin 17, 19, and 24; D-Dimer 0.63  Diet: NPO except ice chips  Bowel/Bladder: Voiding; no BM  overnight  Skin Concerns: None  Drains/Devices: IV NS at 75 ml/hr  Tests/Procedures for next shift: Echo; Cardiology consult; follow troponin  Anticipated DC date & active delays: To be decided.  Patient Stated Goal for Today: None given

## 2023-07-24 NOTE — PROGRESS NOTES
Pt asked for help calling his wife, Zainab. I helped him dial phone number listed in facesheet, call went to voicemail. I left a message w/ update on pt status and plan of care. Short Stay Nurse Station phone number also provided, encouraged her to call with any questions/concerns. Will attempt to call her again later this afternoon with another update and also so pt can speak with her.

## 2023-07-24 NOTE — PROGRESS NOTES
[facility]  Hospitalist Progress Note   07/24/2023          Assessment and Plan:       Erick Shahid is a 83 year old  male with medical history of cognitive impairment, hyperlipidemia, diabetes mellitus, coronary artery disease, aortic stenosis brought into the ED by wife and son with falls.     Unwitnessed falls.  Physical deconditioning from medical illness, senile frailty.  Progressive cognitive decline.  Patient brought into the ED by family with 2 unwitnessed falls today, progressive cognitive decline.  --In ED  appears chronically ill, frail.  No spinal tenderness. Tenderness bilateral calfs present. Skin no abrasions or ecchymosis or laceration. Strength 5/5 in all extremities.  No arm drift.  Able to answer simple questions.  --Sodium 139 potassium 4.2, calcium 9.8.  AST 22, ALT 11 albumin 4.3.  WBC 6.1.  Hemoglobin 13.4.  --UA nitrite negative, leukocyte esterase negative.  EKG sinus rhythm with first-degree AV block.  Left axis deviation.  Left bundle branch block.  Chest x-ray mild bandlike scarring in the lung bases.  Benign calcified left perihilar lymph nodes.  --CT head no acute pathology.  Diffuse age-related changes.  X-ray pelvis and hip-  No acute fracture or dislocation within the limitations of osteopenia. See  Report for details.  X-ray lumbar spine - good anatomic alignment of the lumbar spine with no evidence of subluxation.  Degenerative disc disease.  --Outpatient TSH, vitamin B12, MRI brain were normal in March 2023.   COVID-19 PCR negative.  CK within normal limits  PT, OT evaluation.  Social work assistance with transition  Fall precautions.  Hold PTA vitamins while under observation status.     Bilateral lower extremity edema.  Family reporting bilateral lower extremity edema with associated pain.  --Ultrasound bilateral lower extremity no DVT.  Limb elevation.  Ace wraps.     Detectable troponin likely in the setting of fall from demand ischemia.  Coronary artery  disease.  Severe aortic stenosis 2021  LBBB.  Hyperlipidemia.  --Patient denies any chest pain.  Family reporting leg edema, generalized weakness, intermittent cough. Per family report was recommended not a candidate for TAVR/valve surgery [few years back]  Last echocardiogram from 2021 with severe aortic stenosis, LVEF of 55 to 60%.  Troponin 32.  Follow repeat troponin.  Echocardiogram pending.  Continue telemetry monitoring, sinus rhythm with first-degree AV block and bundle branch block.  Continue PTA aspirin.  Continue PTA metoprolol.  Hold PTA rosuvastatin while under observation status.     Diabetes with a hemoglobin A1c of 6.0.  Decrease PTA metformin from 1 g daily to 500 mg daily.     Macular degeneration.  Hearing loss.  Continue PTA eyedrops, hearing aids.     Urinary incontinence.   No acute issues.     Orders Placed This Encounter      Regular Diet Adult      DVT Prophylaxis: SCDs, ambulate.  Code Status: No CPR- Do NOT Intubate  Disposition: Expected discharge likely 7/26 pending safe discharge plan in place.    Discussed with patient, bedside RN  Patient's family updated 7/23.  >35 minutes spent by me on the date of service doing chart review, history, exam, documentation & further activities per the note.     Elías Wells MD        Interval History:      Patient lying in bed.  Not oriented to time or place.  Hard of hearing.  Per report requiring assistance of 2 with walker and gait belt.  Denies any pain.  Continue telemetry monitoring, sinus rhythm with first-degree AV block and bundle branch block.  Unable to obtain review of systems given cognitive issues.         Physical Exam:        Physical Exam   Temp:  [98  F (36.7  C)-98.5  F (36.9  C)] (P) 98.1  F (36.7  C)  Pulse:  [57-76] (P) 67  Resp:  [10-16] (P) 16  BP: ()/(50-80) (P) 108/60  SpO2:  [95 %-100 %] (P) 96 %    Intake/Output Summary (Last 24 hours) at 7/24/2023 1026  Last data filed at 7/24/2023 0700  Gross per 24 hour    Intake --   Output 950 ml   Net -950 ml       Admission Weight: 83.9 kg (185 lb)  Current Weight: 78.6 kg (173 lb 4.5 oz)    PHYSICAL EXAM  GENERAL: Patient is in no distress. appears chronically ill, frail.    HEENT: Oropharynx pink, moist.   HEART: Regular rate and rhythm. S1S2. Systolic murmur   LUNGS: Respirations unlabored  NEURO: Strength 5/5 in all extremities.  No arm drift.  Able to answer simple questions.  No spinal tenderness  EXTREMITIES: .  Bilateral lower extremity 1+ pitting pedal edema.  Tenderness bilateral calfs present.  SKIN: Warm, dry. + bruising.  PSYCHIATRY Cooperative       Medications:         aspirin  81 mg Oral Daily    carboxymethylcellulose PF  1 drop Ophthalmic BID    metFORMIN  500 mg Oral Daily with supper    methylcellulose  500 mg Oral Daily    metoprolol succinate ER  25 mg Oral Daily     acetaminophen **OR** acetaminophen, bisacodyl, magnesium hydroxide, melatonin, ondansetron **OR** ondansetron         Data:      All new lab and imaging data was reviewed.

## 2023-07-24 NOTE — H&P
[facility]    History and Physical  Hospitalist    Erick Shahid MRN# [id number]   Age: 83 year old YOB: 1940     Date of Admission:  7/23/2023    Primary care provider: No Ref-Primary, Physician          Assessment and Plan:     Erick Shahid is a 83 year old  male with medical history of cognitive impairment, hyperlipidemia, diabetes mellitus, coronary artery disease, aortic stenosis brought into the ED by wife and son with falls.    Unwitnessed falls.  Physical deconditioning from medical illness, senile frailty.  Progressive cognitive decline.  Patient brought into the ED by family with 2 unwitnessed falls today, progressive cognitive decline.  --In ED  appears chronically ill, frail.  No spinal tenderness. Tenderness bilateral calfs present. Skin no abrasions or ecchymosis or laceration. Strength 5/5 in all extremities.  No arm drift.  Able to answer simple questions.  --Sodium 139 potassium 4.2, calcium 9.8.  AST 22, ALT 11 albumin 4.3.  WBC 6.1.  Hemoglobin 13.4.  --UA nitrite negative, leukocyte esterase negative.  EKG sinus rhythm with first-degree AV block.  Left axis deviation.  Left bundle branch block.  Chest x-ray mild bandlike scarring in the lung bases.  Benign calcified left perihilar lymph nodes.  --CT head no acute pathology.  Diffuse age-related changes.  X-ray pelvis and hip-  No acute fracture or dislocation within the limitations of osteopenia. See  Report for details.  X-ray lumbar spine - good anatomic alignment of the lumbar spine with no evidence of subluxation.  Degenerative disc disease.  --Outpatient TSH, vitamin B12, MRI brain were normal in March 2023.   Admit to observation unit.  Telemetry monitoring.  Follow troponin, CK level.  Echocardiogram ordered.  COVID-19 PCR ordered given intermittent cough, weakness.  PT evaluation.  OT evaluation.  Fall precautions.  Social work consult.  Hold PTA vitamins while under observation  status.    Bilateral lower extremity edema.  Family reporting bilateral lower extremity edema with associated pain.  --Ultrasound bilateral lower extremity ordered.  Limb elevation.  Ace wraps.    Coronary artery disease.  Severe aortic stenosis 2021  LBBB.  Hyperlipidemia.  --Patient denies any chest pain.  Family reporting leg edema, generalized weakness, intermittent cough. Per family report was recommended not a candidate for TAVR/valve surgery [few years back]  Last echocardiogram from 2021 with severe aortic stenosis, LVEF of 55 to 60%.  -- Given falls telemetry monitoring overnight, echocardiogram ordered.  Continue PTA aspirin.  Continue PTA metoprolol.  Hold PTA rosuvastatin while under observation status.    Diabetes with a hemoglobin A1c of 6.0.  Decrease PTA metformin from 1 g daily to 500 mg daily.    Macular degeneration.  Hearing loss.  Continue PTA eyedrops, hearing aids.    Urinary incontinence.   No acute issues.    DVT Prophylaxis: SCDs, ambulate.  Code Status: DNR, DNI.    Disposition: Expected discharge in 1 to 2 days pending clinical improvement.  More than 70% of time spent in direct patient care, care coordination, patient counseling, and formalizing plan of care.     Discussed with patient, his wife, son by the bedside and ED team.     Elías Wells MD          Chief Complaint:     History is obtained from patient, his wife and son by the bedside, chart review and ED team.  Patient with memory issues, limited historian, hard of hearing, macular degeneration.    Erick Shahid is a 83 year old  male with medical history of cognitive impairment, hyperlipidemia, diabetes mellitus, coronary artery disease, aortic stenosis brought into the ED by wife and son with falls.    Family reports 2 falls today, first in his bedroom unwitnessed, fell onto his buttock and was able to get up.  Family reports second fall where he fell on the to his right side in the kitchen, when was his son came over  he was crawling to find something to help him up..  Family reports leg pain.    Patient appears comfortable.  Denies any pain at this time.  Denies any chest pain or palpitations.  Denies any shortness of breath.  Denies any abdominal pain or vomiting.  Denies any diarrhea or constipation.  Denies any bleeding or bruising.  Patient denies any joint pains at this time.  No fever or chills.  No known exposure to sick contacts.    Family reporting overall slowly deteriorating since hip fracture in 2018.  Reports progressive memory issues.  Outpatient TSH, vitamin B12, MRI brain were normal in March 2023.   Family reports ongoing lower extremity edema, leg pains.  Report has not been moving around much.  Family also reporting gait issues.  Does not use a walker or cane.    In ED  appears chronically ill, frail.  No spinal tenderness.  Bilateral lower extremity 1+ pitting pedal edema.  Tenderness bilateral calfs present.  Skin no abrasions or ecchymosis or laceration.  Strength 5/5 in all extremities.  No arm drift.  Able to answer simple questions.  Sodium 139 potassium 4.2, calcium 9.8.  AST 22, ALT 11 albumin 4.3.  WBC 6.1.  Hemoglobin 13.4.  UA nitrite negative, leukocyte esterase negative.  EKG sinus rhythm with first-degree AV block.  Left axis deviation.  Left bundle branch block.  Chest x-ray mild bandlike scarring in the lung bases.  Benign calcified left perihilar lymph nodes.  CT head no acute pathology.  Diffuse age-related changes.  X-ray pelvis and hip-  No acute fracture or dislocation within the limitations of osteopenia. See  Report for details.  X-ray lumbar spine - good anatomic alignment of the lumbar spine with no evidence of subluxation.  Degenerative disc disease.            Review of Systems:     GENERAL:  no fever or chills  EENT: see HPI   PULMONARY: No shortness of breath  CARDIAC:  see HPI   GI: No abdominal pain, nausea, vomiting, diarrhea, constipation, black or bloody stools  : No  burning/pain with urination, no urgency, no hematuria.   NEURO: No significant headaches, no slurred speech, no seizures, no dizziness, no loss of consciousness  ENDOCRINE: No excessive thirst, no excessive bruising.  MUSCULOSKELETAL: No joint pain or swelling.  SKIN: No skin rashes  PSYCHIATRY no anxiety or depression    Medical History:     Medical impairment.  Hyperlipidemia.  Diabetes mellitus.  Coronary artery disease.  Aortic stenosis  Surgical History:     Surgical history reviewed.  No pertinent surgical history to [facility].         Social History:      Lives at home with his wife.  Denies any smoking or alcohol use.         Family History:     [place] history reviewed, no pertinent family history to [facility].         Allergies:   No Known Allergies          Medications:     Home meds reviewed.         Physical Exam      Admission Weight: 83.9 kg (185 lb)    Vital Signs with Ranges  Temp:  [98.5  F (36.9  C)] 98.5  F (36.9  C)  Pulse:  [57-76] 57  Resp:  [16] 16  BP: ()/(50-80) 128/63  SpO2:  [95 %-99 %] 99 %    PHYSICAL EXAM  GENERAL: Patient is in no distress. appears chronically ill, frail.    HEENT: Oropharynx pink, moist.   HEART: Regular rate and rhythm. S1S2. Systolic murmur   LUNGS: Clear to auscultation bilaterally. No expiratory wheeze.  Respirations unlabored  ABDOMEN: Soft, no abdominal tenderness, bowel sounds heard   NEURO: Strength 5/5 in all extremities.  No arm drift.  Able to answer simple questions.  No spinal tenderness  EXTREMITIES: .  Bilateral lower extremity 1+ pitting pedal edema.  Tenderness bilateral calfs present.  SKIN: Warm, dry. + bruising.  PSYCHIATRY Cooperative         Data:   All new lab and imaging data was reviewed.

## 2023-07-24 NOTE — PROGRESS NOTES
Observation Goals:    -diagnostic tests and consults completed and resulted - not met  -vital signs normal or at patient baseline - met  -tolerating oral intake to maintain hydration - met  -adequate pain control on oral analgesics - met

## 2023-07-24 NOTE — PLAN OF CARE
"Goal Outcome Evaluation:         Orientation/Cognitive: Disoriented to time; place, and situation; [facility]  Observation Goals (Met/ Not Met): Not met  Mobility Level/Assist Equipment: Assist of 2 with walker/gait belt  Fall Risk (Y/N): Yes  Behavior Concerns: Impulsive  Pain Management: Pt denied pain  Tele/VS/O2: Tele SR  with 1st degree AVB and BBB; AVSS; RA  ABNL Lab/BG: Trop 32  Diet: Regular  Bowel/Bladder: Voiding; no BM overnight  Skin Concerns: None  Drains/Devices: None  Tests/Procedures for next shift: Echo, PT/OT; SW  Anticipated DC date & active delays: To be decided  Patient Stated Goal for Today: \"Call my wife.\"                "

## 2023-07-24 NOTE — PROVIDER NOTIFICATION
MD Notification    Notified Person: MD    Notified Person Name: Dr Gayle    Notification Date/Time: 07/24/23 03:54    Notification Interaction: text paged    Purpose of Notification:  Adm CK 79; troponin 32; per adm MD note wanted to follow CK and Troponin; none ordered; ok to check with am labs? VSS; pt pain free.     Orders Received:    Comments:

## 2023-07-24 NOTE — PROGRESS NOTES
Spoke w/ pt's wife, Zainab, via telephone and provided update on pt status and plan of care. Zainab is planning on visiting pt later this afternoon around 2 or 3 PM.

## 2023-07-24 NOTE — CONSULTS
Care Management Initial Consult    General Information  Assessment completed with: Spouse or significant other, Zainab  Type of CM/SW Visit: Initial Assessment    Primary Care Provider verified and updated as needed: Yes   Readmission within the last 30 days: no previous admission in last 30 days      Reason for Consult: discharge planning  Advance Care Planning: Advance Care Planning Reviewed: present on chart          Communication Assessment  Patient's communication style: spoken language (English or Bilingual)    Hearing Difficulty or Deaf: yes        Cognitive  Cognitive/Neuro/Behavioral: .WDL except, orientation  Level of Consciousness: confused, alert  Arousal Level: opens eyes spontaneously  Orientation: disoriented to, time (knew the year, thought month was december)     Best Language: 0 - No aphasia  Speech: slow, clear    Living Environment:   People in home: spouse  Zainab  Current living Arrangements: condominium      Able to return to prior arrangements: other (see comments) (TCU recommendation)       Family/Social Support:  Care provided by: self, spouse/significant other  Provides care for: no one  Marital Status:   Wife  Zainab       Description of Support System: Supportive    Support Assessment: Adequate family and caregiver support    Current Resources:   Patient receiving home care services: Yes ([facility])  Skilled Home Care Services: Physical Therapy  Community Resources: None  Equipment currently used at home: cane, straight  Supplies currently used at home: None    Employment/Financial:  Employment Status: retired        Financial Concerns:             Does the patient's insurance plan have a 3 day qualifying hospital stay waiver?  Yes   Will the waiver be used for post-acute placement? Yes    Lifestyle & Psychosocial Needs:  Social Determinants of Health     Tobacco Use: Not on file   Alcohol Use: Not on file   Financial Resource Strain: Not on file   Food Insecurity: Not on  "file   Transportation Needs: Not on file   Physical Activity: Not on file   Stress: Not on file   Social Connections: Not on file   Intimate Partner Violence: Not on file   Depression: Not on file   Housing Stability: Not on file       Functional Status:  Prior to admission patient needed assistance:              Mental Health Status:          Chemical Dependency Status:                Values/Beliefs:  Spiritual, Cultural Beliefs, Restorationist Practices, Values that affect care:                 Additional Information:  Spoke with pt's spouse Wilda for initial consult/discharge planning. Pt was admitted on 7/23/23 for falls. Pt lives in a condo with spouse, was just about to start home care PT services with [facility] before admitting to hospital. Pt reports a gradual decline overall, but rapid within the last week. Discussed PT recommendations for TCU at discharge. Explained purpose of TCU and insurance coverage. Spouse in agreement \"if this is going to get him stronger.\" She would like referrals near [place]. Discussed Medicare ratings. Spouse to visit hospital today, so SW left SNF list in pt's room with contact information once choices are identified. Will continue to follow.     DEISY Luna  Social Work  [facility]        "

## 2023-07-24 NOTE — PROGRESS NOTES
"Pt observed wandering within room with monitors, clothing and primofit removd. Incontinent of urine at the time. Pt able to follow commands and states \"my family was just here,\" and was disoriented to time and situation. Pt cleansed, pad alarm placed under pt when placed back in bed. Continue to monitor.  "

## 2023-07-24 NOTE — PHARMACY-ADMISSION MEDICATION HISTORY
Pharmacist Admission Medication History    Admission medication history is complete. The information provided in this note is only as accurate as the sources available at the time of the update.    Medication reconciliation/reorder completed by provider prior to medication history? No    Information Source(s): Patient[facility]    Pertinent Information:   1. In addition to meds on list, patient and his wife reported he takes lisinopril 26 mg. However, lisinopril is not available as a 26 mg tablet. Additionally, no Surescripts or CareEverywhere records show he is on lisinopril. His wife did confirm all meds are filled through [facility] in [facility], and their fills show up on Surescripts    Changes made to PTA medication list:    Added: all meds added to list    Deleted: None    Changed: None     Allergies reviewed with patient and updates made in EHR: yes    Medication History Completed By: Deena Lafleur RPH 7/23/2023 7:17 PM    Prior to Admission medications    Medication Sig Last Dose Taking? Auth Provider Long Term End Date   Ascorbic Acid (VITAMIN C) 500 MG CHEW Take 500 mg by mouth daily 7/22/2023 Yes Unknown, Entered By History     aspirin (ASA) 81 MG chewable tablet Take 81 mg by mouth daily 7/22/2023 Yes Unknown, Entered By History     cyanocobalamin (VITAMIN B-12) 1000 MCG tablet Take 1,000 mcg by mouth daily 7/22/2023 Yes Unknown, Entered By History     cycloSPORINE (RESTASIS) 0.05 % ophthalmic emulsion Apply 1 drop to eye 2 times daily 7/22/2023 Yes Unknown, Entered By History     metFORMIN (GLUCOPHAGE) 1000 MG tablet Take 1 tablet by mouth daily (with dinner) 7/22/2023 Yes Unknown, Entered By History Yes    methylcellulose (CITRUCEL) powder Take 1 teaspoonful by mouth daily 7/22/2023 Yes Unknown, Entered By History     metoprolol succinate ER (TOPROL XL) 25 MG 24 hr tablet Take 1 tablet by mouth daily 7/22/2023 Yes Unknown, Entered By History Yes    nystatin  (MYCOSTATIN) 461878 UNIT/GM external powder Apply 1 strip topically 3 times daily as needed (jock itch) Unknown at PRN Yes Unknown, Entered By History     rosuvastatin (CRESTOR) 20 MG tablet Take 1 tablet by mouth daily 7/22/2023 Yes Unknown, Entered By History Yes    Vitamin D3 (CHOLECALCIFEROL) 25 mcg (1000 units) tablet Take 25 mcg by mouth daily 7/22/2023 Yes Unknown, Entered By History

## 2023-07-24 NOTE — PLAN OF CARE
OT: Order received, chart reviewed and discussed with care team. Per discussion with PT, patient is most likely needing a TCU following discharge. OT not indicated due to having no acute care OT needs. Defer discharge recommendations to PT and next level of care. Will complete OT orders.

## 2023-07-24 NOTE — PROGRESS NOTES
07/24/23 1020   Appointment Info   Signing Clinician's Name / Credentials (PT) Talib Pena DPT   Quick Adds   Quick Adds Certification   Living Environment   People in Home spouse   Current Living Arrangements condominium   Home Accessibility no concerns   Transportation Anticipated family or friend will provide   Living Environment Comments Reports that he lives in a condo w/ his spouse. Reports elevator access.   Self-Care   Usual Activity Tolerance moderate   Current Activity Tolerance fair   Equipment Currently Used at Home cane, straight   Fall history within last six months yes   Number of times patient has fallen within last six months 2   Activity/Exercise/Self-Care Comment Pt a poor historian. Unclear if Pt uses no AD or SEC at baseline. Reports 2 falls recently. Very [place].   General Information   Onset of Illness/Injury or Date of Surgery 07/23/23   Referring Physician Elías Wells MD   Patient/Family Therapy Goals Statement (PT) Return to home   Pertinent History of Current Problem (include personal factors and/or comorbidities that impact the POC) Erick Shahid is a 83 year old  male with medical history of cognitive impairment, hyperlipidemia, diabetes mellitus, coronary artery disease, aortic stenosis brought into the ED by wife and son with falls.   Existing Precautions/Restrictions fall   Cognition   Affect/Mental Status (Cognition) confused   Orientation Status (Cognition) oriented to;person;situation   Follows Commands (Cognition) WFL   Pain Assessment   Patient Currently in Pain No   Range of Motion (ROM)   ROM Comment WFLs for mobility and transfesr no formal testing completed   Strength (Manual Muscle Testing)   Strength Comments Generalized weakness noted   Bed Mobility   Comment, (Bed Mobility) Sit to supine w/ SBA, unable to get positioned correctly in bed or scoot up higher in bed   Transfers   Comment, (Transfers) Sit to stand w/ FWW and Min A x1   Gait/Stairs (Locomotion)    Comment, (Gait/Stairs) 10 ft w/ FWW and Min A x1   Balance   Balance Comments Decreased balance w/o AD   Clinical Impression   Criteria for Skilled Therapeutic Intervention Yes, treatment indicated   PT Diagnosis (PT) Impaired ambulation   Influenced by the following impairments Impaired strength, balance and activity tolerance   Functional limitations due to impairments Impaired ADLs, IADLs, functional mobility   Clinical Presentation (PT Evaluation Complexity) Stable/Uncomplicated   Clinical Presentation Rationale Clinical judgment   Clinical Decision Making (Complexity) low complexity   Planned Therapy Interventions (PT) balance training;bed mobility training;gait training;patient/family education;stair training;strengthening;transfer training;progressive activity/exercise;home exercise program   Risk & Benefits of therapy have been explained evaluation/treatment results reviewed;care plan/treatment goals reviewed;risks/benefits reviewed;current/potential barriers reviewed;participants voiced agreement with care plan;patient;spouse/significant other   PT Total Evaluation Time   PT Eval, Low Complexity Minutes (71098) 10   Therapy Certification   Start of care date 07/24/23   Certification date from 07/24/23   Certification date to 07/27/23   Medical Diagnosis Frequent falls   Physical Therapy Goals   PT Frequency 3x/week   PT Predicted Duration/Target Date for Goal Attainment 08/03/23   PT Goals Bed Mobility;Transfers;Gait   PT: Bed Mobility Independent;Supine to/from sit   PT: Transfers Modified independent;Sit to/from stand;Assistive device   PT: Gait Modified independent;Rolling walker;150 feet   Interventions   Interventions Quick Adds Gait Training;Therapeutic Activity   Therapeutic Activity   Therapeutic Activities: dynamic activities to improve functional performance Minutes (52378) 10   Symptoms Noted During/After Treatment None   Treatment Detail/Skilled Intervention Pt seated in bedside chair at start  of session. Pt agreeable to PT. Pt transfering sit to stand from chair height x3 w/ FWW and Min A x1. Slow titus noted. Frequent cues for UE hand placement. Pt transfering supine to sit w/ Min A x1. Multiple attempts to getting sitting EOB w/o assistance. Pt transfering stand <> sit from toilet height w/ Min A x1. Cues for sequencing of task. x10 sit to stands at end of session w/ Min A x1. Slow titus noted. Frequent cues for UE hand placement, frequently attempting to push up from FWW. Unable to stand w/o assistance while pushing up from FWW.   Gait Training   Gait Training Minutes (62625) 15   Symptoms Noted During/After Treatment (Gait Training) fatigue   Treatment Detail/Skilled Intervention Pt ambulating 150 ft x2 w/ FWW and Min A x1. Kyphotic posture noted. Slow titus. Poor balance noted. L knee in flexed position. Pt ambulating 10 ft w/o AD and Min to Mod A x1. L knee in very flexed position, very poor balance noted. Knee frequently buckling w/ each step w/o AD.   PT Discharge Planning   PT Plan Ambulation distance, repeat sit to stands, balance, strengthening   PT Discharge Recommendation (DC Rec) Transitional Care Facility   PT Rationale for DC Rec Pt below baseline mobility. Typically independent w/o AD at baseline. Lives w/ wife in condo. Currently A x1 w/ mobility and FWW. Poor balance and increased weakness noted. Anticipate will need TCU at time of DC to improve upon functional mobility and strengthening.   PT Brief overview of current status Min A w/ FWW   Total Session Time   Timed Code Treatment Minutes 25   Total Session Time (sum of timed and untimed services) 35   [facility]  OUTPATIENT PHYSICAL THERAPY EVALUATION  PLAN OF TREATMENT FOR OUTPATIENT REHABILITATION  (COMPLETE FOR INITIAL CLAIMS ONLY)  Patient's Last Name, First Name, M.I.  YOB: 1940  Erick Shahid                        Provider's Name  [facility] Medical Record No.  [id number]                             Onset Date:  07/23/23   Start of Care Date:  07/24/23   Type:     _X_PT   ___OT   ___SLP Medical Diagnosis:  Frequent falls              PT Diagnosis:  Impaired ambulation Visits from SOC:  1     See note for plan of treatment, functional goals and certification details    I CERTIFY THE NEED FOR THESE SERVICES FURNISHED UNDER        THIS PLAN OF TREATMENT AND WHILE UNDER MY CARE     (Physician co-signature of this document indicates review and certification of the therapy plan).

## 2023-07-24 NOTE — PLAN OF CARE
Goal Outcome Evaluation:    Observation goals  PRIOR TO DISCHARGE        Comments: -diagnostic tests and consults completed and resulted- not met  -vital signs normal or at patient baseline - met  -tolerating oral intake to maintain hydration- met  -adequate pain control on oral analgesics - met  Nurse to notify provider when observation goals have been met and patient is ready for discharge.

## 2023-07-24 NOTE — PLAN OF CARE
[id number]:    Orientation/Cognitive: Alert. Disoriented to time and place; knew the year but thought it was December. Knew he was in a hospital but could say which one or what state he lived in.     Mobility Level/Assist Equipment: Ax1, GB/Walker. Ambulated in hallway x2 today. Up in chair for meals.     Fall Risk (Y/N): Yes    Behavior Concerns: Impulsive at times, easily redirectable    Pain Management: Denies    Tele/VS/O2: Tele: SB w/ 1st degree AVB and BBB; VSS on RA    ABNL Lab/BG: Trop 32 on admission    Diet: Regular, fair appetite. Requires tray set up, able to feed himself independently    Bowel/Bladder: Voiding; no BM this shift. Uses urinal at times, other times incontinent.     Skin Concerns: Intact. Some scattered small bruises on extremities. 2+ BLE swelling, BLEs ace wrapped, elevate when in bed. Take off ace wraps at bedtime.     Drains/Devices: PIV SL    Tests/Procedures for next shift: PT recommending TCU. OT signed off. SW/CC consulted. Echo to be complete.    Anticipated DC date & active delays: TBD pending clinical progress.    Other: Pt's wife, Zainab, is planning to visit this afternoon (See previous notes). If for some reason she is unwilling to come this evening, pt would like to call her. (Phone number in facesheet).

## 2023-07-24 NOTE — PROGRESS NOTES
RECEIVING UNIT ED HANDOFF REVIEW    ED Nurse Handoff Report was reviewed by: Lindsay Mays RN on July 24, 2023 at 2:09 AM

## 2023-07-24 NOTE — PROGRESS NOTES
SW: Stopped by pt's room to see if spouse was in. Call to spouse to discuss PT recommendations for TCU, left VM.     Aleksandra Armas, MOLLYW  Social Work  [facility]

## 2023-07-25 ENCOUNTER — APPOINTMENT (OUTPATIENT)
Dept: CARDIOLOGY | Facility: CLINIC | Age: 83
DRG: 286 | End: 2023-07-25
Attending: HOSPITALIST
Payer: COMMERCIAL

## 2023-07-25 LAB — LVEF ECHO: NORMAL

## 2023-07-25 PROCEDURE — 250N000013 HC RX MED GY IP 250 OP 250 PS 637: Performed by: HOSPITALIST

## 2023-07-25 PROCEDURE — 93306 TTE W/DOPPLER COMPLETE: CPT | Mod: 26 | Performed by: INTERNAL MEDICINE

## 2023-07-25 PROCEDURE — G0378 HOSPITAL OBSERVATION PER HR: HCPCS

## 2023-07-25 PROCEDURE — 255N000002 HC RX 255 OP 636: Performed by: HOSPITALIST

## 2023-07-25 PROCEDURE — 99232 SBSQ HOSP IP/OBS MODERATE 35: CPT | Performed by: HOSPITALIST

## 2023-07-25 PROCEDURE — G0463 HOSPITAL OUTPT CLINIC VISIT: HCPCS | Mod: 25

## 2023-07-25 RX ADMIN — HUMAN ALBUMIN MICROSPHERES AND PERFLUTREN 3 ML: 10; .22 INJECTION, SOLUTION INTRAVENOUS at 12:45

## 2023-07-25 RX ADMIN — METHYLCELLULOSE 500 MG: 500 TABLET ORAL at 09:10

## 2023-07-25 RX ADMIN — ASPIRIN 81 MG CHEWABLE TABLET 81 MG: 81 TABLET CHEWABLE at 09:10

## 2023-07-25 RX ADMIN — CARBOXYMETHYLCELLULOSE SODIUM 1 DROP: 5 SOLUTION/ DROPS OPHTHALMIC at 09:10

## 2023-07-25 RX ADMIN — METOPROLOL SUCCINATE 25 MG: 25 TABLET, EXTENDED RELEASE ORAL at 11:37

## 2023-07-25 RX ADMIN — CARBOXYMETHYLCELLULOSE SODIUM 1 DROP: 5 SOLUTION/ DROPS OPHTHALMIC at 19:26

## 2023-07-25 RX ADMIN — METFORMIN HYDROCHLORIDE 500 MG: 500 TABLET ORAL at 17:35

## 2023-07-25 ASSESSMENT — ACTIVITIES OF DAILY LIVING (ADL)
ADLS_ACUITY_SCORE: 46
ADLS_ACUITY_SCORE: 45
ADLS_ACUITY_SCORE: 46
ADLS_ACUITY_SCORE: 46
ADLS_ACUITY_SCORE: 45
ADLS_ACUITY_SCORE: 46
ADLS_ACUITY_SCORE: 45

## 2023-07-25 NOTE — PLAN OF CARE
Goal Outcome Evaluation:      Observation goals  PRIOR TO DISCHARGE        Comments:     -Diagnostic tests and consults completed and resulted-Not met    -Vital signs normal or at patient baseline-Met    -Tolerating oral intake to maintain hydration-Met    -Adequate pain control on oral analgesics-Met    Nurse to notify provider when observation goals have been met and patient is ready for discharge.

## 2023-07-25 NOTE — PROGRESS NOTES
Observation goals    -diagnostic tests and consults completed and resulted : not met    -vital signs normal or at patient baseline ; partially met. Felix this morning in mid 40s with sleep    -tolerating oral intake to maintain hydration : partially met    -adequate pain control on oral analgesics : denies pain     Nurse to notify provider when observation goals have been met and patient is ready for discharge.

## 2023-07-25 NOTE — PROGRESS NOTES
Care Management Follow Up    Length of Stay (days): 0    Expected Discharge Date: 07/26/2023     Concerns to be Addressed:       Patient plan of care discussed at interdisciplinary rounds: Yes    Anticipated Discharge Disposition: Transitional Care     Anticipated Discharge Services: None  Anticipated Discharge DME: None    Patient/family educated on Medicare website which has current facility and service quality ratings: yes  Education Provided on the Discharge Plan:    Patient/Family in Agreement with the Plan: yes    Referrals Placed by CM/SW:    Private pay costs discussed: Not applicable    Additional Information:  Spoke with spouse regarding TCU referrals. Received choices. Discussed possibility of needing memory care TCU's if we get declines from preferred facilities, spouse understood and is agreeable to this. Spouse discussed coming to the hospital yesterday, thinks pt is moving better than they were. Sent referrals and will continue to follow.     DEISY Luna  Social Work  [facility]

## 2023-07-25 NOTE — PLAN OF CARE
Goal Outcome Evaluation:      Observation goals  PRIOR TO DISCHARGE        Comments:     -Diagnostic tests and consults completed and resulted-Not met    -Vital signs normal or at patient baseline-Met    -Tolerating oral intake to maintain hydration-Met    -Adequate pain control on oral analgesics-Met    Nurse to notify provider when observation goals have been met and patient is ready for discharge.          Summary:Patient is here with fall and weakness  Orientation/Cognitive:Alert and oriented to self only,disoriented to time; place, and situation; [facility]  Observation Goals (Met/ Not Met): Not met  Mobility Level/Assist Equipment: AX1 walker/gait belt  Fall Risk (Y/N): Yes  Behavior Concerns: Impulsive, restless, pulls out I.v and Tele  Pain Management: Pt reported leg pain, PRN Tylenol given  Tele/VS/O2: Tele SR  with 1st degree AVB and BBB; other VSS on RA  ABNL Lab/BG: Trop 32  Diet: Regular  Bowel/Bladder: Voiding; no BM overnight  Skin Concerns: None  Drains/Devices: None  Tests/Procedures for next shift: Echo  Anticipated DC date & active delays: TCU, SW following, wife to make choices  Patient Stated Goal for Today: None stated

## 2023-07-25 NOTE — PLAN OF CARE
Goal Outcome Evaluation:    Observation goals  PRIOR TO DISCHARGE        Comments: -diagnostic tests and consults completed and resulted- not met  -vital signs normal or at patient baseline- partially met  -tolerating oral intake to maintain hydration- met  -adequate pain control on oral analgesics- met  Nurse to notify provider when observation goals have been met and patient is ready for discharge.

## 2023-07-25 NOTE — PROGRESS NOTES
MD Notification    Notified Person: MD    Notified Person Name: dr Wells     Notification Date/Time:7/25/2023. 0737      Notification Interaction: via vocera msg    Purpose of Notification: HR in mid 40-50, metoprolol held for nw    Orders Received:    Comments:

## 2023-07-25 NOTE — PROGRESS NOTES
[facility]  Hospitalist Progress Note   07/25/2023          Assessment and Plan:       Erick Shahid is a 83 year old  male with medical history of cognitive impairment, hyperlipidemia, diabetes mellitus, coronary artery disease, aortic stenosis brought into the ED by wife and son with falls.     Unwitnessed falls.  Physical deconditioning from medical illness, senile frailty.  Progressive cognitive decline.  Patient brought into the ED by family with 2 unwitnessed falls today, progressive cognitive decline.  --In ED  appears chronically ill, frail.  No spinal tenderness. Tenderness bilateral calfs present. Skin no abrasions or ecchymosis or laceration. Strength 5/5 in all extremities.  No arm drift.  Able to answer simple questions.  --Sodium 139 potassium 4.2, calcium 9.8.  AST 22, ALT 11 albumin 4.3.  WBC 6.1.  Hemoglobin 13.4.  --UA nitrite negative, leukocyte esterase negative.  EKG sinus rhythm with first-degree AV block.  Left axis deviation.  Left bundle branch block.  Chest x-ray mild bandlike scarring in the lung bases.  Benign calcified left perihilar lymph nodes.  --CT head no acute pathology.  Diffuse age-related changes.  X-ray pelvis and hip-  No acute fracture or dislocation within the limitations of osteopenia. See  Report for details.  X-ray lumbar spine - good anatomic alignment of the lumbar spine with no evidence of subluxation.  Degenerative disc disease.  --Outpatient TSH, vitamin B12, MRI brain were normal in March 2023.   COVID-19 PCR negative.  CK within normal limits  PT, OT followed, recommending TCU.  Family in agreement with TCU.  Social work assisting with transition plans.   WOC RN consult for coccyx wound.  Fall precautions.  Hold PTA vitamins while under observation status.     Bilateral lower extremity edema.  Family reporting bilateral lower extremity edema with associated pain.  --Ultrasound bilateral lower extremity no DVT.  Echocardiogram pending.  Limb  elevation.  Ace wraps.     Detectable troponin likely in the setting of fall from demand ischemia.  Coronary artery disease.  Severe aortic stenosis 2021  LBBB.  Hyperlipidemia.  --Patient denies any chest pain.  Family reporting leg edema, generalized weakness, intermittent cough. Per family report was recommended not a candidate for TAVR/valve surgery [few years back]  Last echocardiogram from 2021 with severe aortic stenosis, LVEF of 55 to 60%.  Troponin 32 > 25    Telemetry monitoring with sinus rhythm, first-degree AV block and bundle branch block  Echocardiogram pending.  --Continue PTA aspirin.  Continue PTA metoprolol.  Hold PTA rosuvastatin while under observation status.     Diabetes with a hemoglobin A1c of 6.0.  Decreased PTA metformin from 1 g daily to 500 mg daily.     Macular degeneration.  Hearing loss.  Continue PTA eyedrops, hearing aids.     Urinary incontinence.   No acute issues.     Orders Placed This Encounter      Regular Diet Adult      DVT Prophylaxis: SCDs, ambulate.  Code Status: No CPR- Do NOT Intubate  Disposition: Expected discharge pending echo, safe discharge plan in place.    Discussed with patient, bedside RN  Patient's wife updated over telephone 7/25  >35 minutes spent by me on the date of service doing chart review, history, exam, documentation & further activities per the note.     Elías Wells MD        Interval History:        Patient lying in bed.  Not oriented to time or place.  Hard of hearing.    Per report requiring assistance of 1 with walker and gait belt.  Denies any pain.  Unable to obtain review of systems given cognitive issues.  Telemetry monitoring, sinus rhythm with first-degree AV block and bundle branch block.         Physical Exam:        Physical Exam   Temp:  [97.7  F (36.5  C)-98.2  F (36.8  C)] 98.2  F (36.8  C)  Pulse:  [48-73] 48  Resp:  [16] 16  BP: ()/(44-69) 100/56  SpO2:  [93 %-98 %] 97 %    Intake/Output Summary (Last 24 hours) at  7/24/2023 1026  Last data filed at 7/24/2023 0700  Gross per 24 hour   Intake --   Output 950 ml   Net -950 ml       Admission Weight: 83.9 kg (185 lb)  Current Weight: 78.6 kg (173 lb 4.5 oz)    PHYSICAL EXAM  GENERAL: Patient is in no distress. appears chronically ill, frail.    HEART: Regular rate and rhythm. S1S2. Systolic murmur   LUNGS: Respirations unlabored  NEURO: Strength 5/5 in all extremities.  No arm drift.  Able to answer simple questions.  No spinal tenderness  EXTREMITIES: .  Bilateral lower extremity 1+ pitting pedal edema.   SKIN: Warm, dry. + bruising.  PSYCHIATRY Cooperative       Medications:         aspirin  81 mg Oral Daily    carboxymethylcellulose PF  1 drop Ophthalmic BID    metFORMIN  500 mg Oral Daily with supper    methylcellulose  500 mg Oral Daily    metoprolol succinate ER  25 mg Oral Daily     acetaminophen **OR** acetaminophen, bisacodyl, magnesium hydroxide, melatonin, ondansetron **OR** ondansetron         Data:      All new lab and imaging data was reviewed.

## 2023-07-25 NOTE — PROGRESS NOTES
Orientation/Cognitive: Aler to self.   Observation Goals (Met/ Not Met): Not Met  Mobility Level/Assist Equipment: A-1 GB/walker  Fall Risk (Y/N): Y  Behavior Concerns: Green  Pain Management: PRN tylenol, not endorsing pain  Tele/VS/O2: VSS on RA, Tele- Sinus tor w/ 1st degree AV block & BBB  ABNL Lab/BG: Trop- 25  Diet: Reg  Bowel/Bladder: Incont. At times of bladder.  Skin Concerns: Edema to BLE  Drains/Devices: None  Tests/Procedures for next shift: Pending Placement, ECHO  Anticipated DC date & active delays: TBD  Patient Stated Goal for Today: None

## 2023-07-25 NOTE — PROGRESS NOTES
MD Notification    Notified Person: MD    Notified Person Name:  Russell      Notification Date/Time: 7/25/2023 1120      Notification Interaction:via vocera and verbal     Purpose of Notification: rash between groin and coccyx. Need woc    Orders Received:     Comments: MD will enter the order.

## 2023-07-25 NOTE — PLAN OF CARE
Goal Outcome Evaluation:      Plan of Care Reviewed With: patient     Summary: pt here with fall/ weakness. [place]  Orientation/Cognitive: A/O to self. Knows he is in the hospital but unable to name   Observation Goals (Met/ Not Met): not met  Mobility Level/Assist Equipment: up with 1 walker/GB  Fall Risk (Y/N): yes   Behavior Concerns: impulsive at times. Otherwise calm and cooperative   Pain Management: denies pain .   Tele/VS/O2: VSS, RA, SR/SB BBB 1st deg av block  ABNL Lab/BG: no new labs this am   Diet: regular, needs help with ordering   Bowel/Bladder: incontinence at times.   Skin Concerns: bilateral groin rash, coccyx redness and skin break down. WOC consult placed  Drains/Devices: PIV/ Tele.   Tests/Procedures for next shift: echo pending.   Anticipated DC date & active delays: placement to MCU/TCU  Patient Stated Goal for Today: rest

## 2023-07-25 NOTE — UTILIZATION REVIEW
Concurrent stay review; Secondary Review Determination -         Under the authority of the Utilization Management Committee, the utilization review process indicated a secondary review on the above patient.  The review outcome is based on review of the medical records, discussions with staff, and applying clinical experience noted on the date of the review.        (x) Observation/outpatient Status Appropriate - Concurrent stay review       RATIONALE FOR DETERMINATION:     The patient is an 83-year-old male who came to the ED with wife and son and daughter for evaluation after multiple falls.  The patient has had a declining history with significant dementia and underlying heart disease and hyperlipidemia with type 2 diabetes mellitus.  He has been deteriorating since his hip fracture in 2018.  Patient was evaluated with multiple imaging and lab tests with no acute change from chronic underlying conditions.  Urine analysis was normal.  PT evaluation recommends TCU placement for possible strengthening and social work is working on transfer to a TCU.  Patient does have perianal erythema with recommendations for treatment.  Based on current diagnosis and plan, the patient is appropriate for observational snf care pending transfer to TCU.    Patient delayed discharge is related to disposition, there is no medical necessity for inpatient admission at the time of this review. If there is a change in patient status, please resend for review.    The information on this document is developed by the utilization review team in order for the business office to ensure compliance.  This only denotes the appropriateness of proper admission status and does not reflect the quality of care rendered.       The definitions of Inpatient Status and Observation Status used in making the determination above are those provided in the CMS Coverage Manual, Chapter 1 and Chapter 6, section 70.4.        Sincerely,    Alon Garcias MD

## 2023-07-25 NOTE — PLAN OF CARE
Goal Outcome Evaluation:      Observation goals  PRIOR TO DISCHARGE        Comments: -diagnostic tests and consults completed and resulted- not met  -vital signs normal or at patient baseline- met  -tolerating oral intake to maintain hydration- met  -adequate pain control on oral analgesics - met  Nurse to notify provider when observation goals have been met and patient is ready for discharge.

## 2023-07-25 NOTE — CONSULTS
"[facility]  WO Nurse Inpatient Assessment     Consulted for: \"groin area\"    Summary: Mild MASD and IASD    Patient History (according to provider note(s):      \"Erick Shahid is a 83 year old  male with medical history of cognitive impairment, hyperlipidemia, diabetes mellitus, coronary artery disease, aortic stenosis brought into the ED by wife and son with falls.\"    Assessment:      Areas visualized during today's visit: Perineal area, Skin folds , and Sacrum/coccyx    Skin Injury Location: Gluteal cleft, bilateral groin            Last photo: 7/25/23  Skin injury due to: Intertrigo and Moisture associated skin damage (MASD)  Skin history and plan of care: Patient is alert and very Crow. Denies recent diarrhea. Wearing a pull-on BWAP at time of assessment. Per chart review, pt is incontinent of urine at times.  Affected area:      Skin assessment: Erythema and Maceration     Measurements (length x width x depth, in cm) generalized t/o perineum, groin, and buttock     Color: normal and consistent with surrounding tissue     Temperature  warm     Drainage: none .      Color: none      Odor: none  Pain: denies , none  Pain interventions prior to dressing change: patient tolerated well and no significant pain present   Treatment goal: Heal  and Decrease moisture  STATUS: initial assessment  Supplies ordered: supplies stored on unit and discussed with RN      Treatment Plan:     Groin/buttock/perineal skin care: BID and PRN with incontinence care  Use Edson and white cloths for perineal care. Avoid blue Bath wipes. Ensure skin is dry.  Can use thin layer of Critic-aid barrier paste to red, moist areas or dust powder to red, moist skin. Brush off excess.  Avoid brief in bed.     Orders: Written    RECOMMEND PRIMARY TEAM ORDER: None, at this time  Education provided: plan of care, Moisture management, and Hygiene  Discussed plan of care with: Patient and Nurse  [facility] nurse follow-up plan: " "signing off  Notify WOC if wound(s) deteriorate.  Nursing to notify the Provider(s) and re-consult the WOC Nurse if new skin concern.    DATA:     Current support surface: Standard  Standard gel/foam mattress (IsoFlex, Atmos air, etc)  Containment of urine/stool: Continent of bowel, Incontinence Protocol, and Pull on BWAP  BMI: Body mass index is 27.14 kg/m .   Active diet order: Orders Placed This Encounter      Regular Diet Adult     Output: I/O last 3 completed shifts:  In: -   Out: 550 [Urine:550]     Labs:   Recent Labs   Lab 07/23/23  1445   ALBUMIN 4.3   HGB 13.4   WBC 6.1     Pressure injury risk assessment:   Sensory Perception: 3-->slightly limited  Moisture: 3-->occasionally moist  Activity: 3-->walks occasionally  Mobility: 3-->slightly limited  Nutrition: 3-->adequate  Friction and Shear: 2-->potential problem  Aj Score: 17    Sheri Engel RN, CWON  Please contact via [employer] at name or group \"[facility] nurse\"- M-F [phone]  Leave  @ [phone] for non-urgent needs. Checked occasionally M-F.   "

## 2023-07-26 ENCOUNTER — APPOINTMENT (OUTPATIENT)
Dept: PHYSICAL THERAPY | Facility: CLINIC | Age: 83
DRG: 286 | End: 2023-07-26
Payer: COMMERCIAL

## 2023-07-26 PROCEDURE — 99233 SBSQ HOSP IP/OBS HIGH 50: CPT | Performed by: PHYSICIAN ASSISTANT

## 2023-07-26 PROCEDURE — G0378 HOSPITAL OBSERVATION PER HR: HCPCS

## 2023-07-26 PROCEDURE — 97116 GAIT TRAINING THERAPY: CPT | Mod: GP

## 2023-07-26 PROCEDURE — 97530 THERAPEUTIC ACTIVITIES: CPT | Mod: GP

## 2023-07-26 PROCEDURE — 99223 1ST HOSP IP/OBS HIGH 75: CPT | Performed by: INTERNAL MEDICINE

## 2023-07-26 PROCEDURE — 250N000013 HC RX MED GY IP 250 OP 250 PS 637: Performed by: HOSPITALIST

## 2023-07-26 RX ADMIN — CARBOXYMETHYLCELLULOSE SODIUM 1 DROP: 5 SOLUTION/ DROPS OPHTHALMIC at 20:03

## 2023-07-26 RX ADMIN — METFORMIN HYDROCHLORIDE 500 MG: 500 TABLET ORAL at 17:41

## 2023-07-26 RX ADMIN — CARBOXYMETHYLCELLULOSE SODIUM 1 DROP: 5 SOLUTION/ DROPS OPHTHALMIC at 08:52

## 2023-07-26 RX ADMIN — METHYLCELLULOSE 500 MG: 500 TABLET ORAL at 08:52

## 2023-07-26 RX ADMIN — ASPIRIN 81 MG CHEWABLE TABLET 81 MG: 81 TABLET CHEWABLE at 08:52

## 2023-07-26 ASSESSMENT — ACTIVITIES OF DAILY LIVING (ADL)
ADLS_ACUITY_SCORE: 46

## 2023-07-26 NOTE — PROGRESS NOTES
"  Observation goals  PRIOR TO DISCHARGE        Comments: -diagnostic tests and consults completed and resulted Not Met  -vital signs normal or at patient baseline Met  -tolerating oral intake to maintain hydration Met  -adequate pain control on oral analgesics Met  Nurse to notify provider when observation goals have been met and patient is ready for discharge.        /68 (BP Location: Left arm)   Pulse 50   Temp 97.5  F (36.4  C) (Oral)   Resp 16   Ht 1.702 m (5' 7\")   Wt 78.6 kg (173 lb 4.5 oz)   SpO2 98%   BMI 27.14 kg/m      "

## 2023-07-26 NOTE — PLAN OF CARE
"Orientation/Cognitive: A&Ox3, disoriented to situation  Observation Goals (Met/ Not Met): Not Met  Mobility Level/Assist Equipment: Ax1, gb, walker  Fall Risk (Y/N): Y  Behavior Concerns: none  Pain Management: denies pain  Tele/VS/O2: VSS  ABNL Lab/BG: none  Diet: tolerating a regular diet  Bowel/Bladder: Intermittently incontinent of urine, sometimes using urinal, ambulated to bathroom for a BM  Skin Concerns: scattered bruising  Drains/Devices: PIV SL  Tests/Procedures for next shift: none  Anticipated DC date & active delays: TBD pending placement  Patient Stated Goal for Today: POLLY    ./46 (BP Location: Right arm)   Pulse 56   Temp 98  F (36.7  C) (Oral)   Resp 16   Ht 1.702 m (5' 7\")   Wt 78.6 kg (173 lb 4.5 oz)   SpO2 99%   BMI 27.14 kg/m      "

## 2023-07-26 NOTE — CONSULTS
"[facility]    Cardiology Consultation     Date of Admission:  7/23/2023    Assessment & Plan   Erick Shahid is a 83 year old male who was admitted on 7/23/2023.    Critical calcific aortic stenosis  Acute on chronic HFrEF, roughly euvolemic, NYHA III  Cardiomyopathy, unspecified etiology (likely valvular +/- ischemic)  CAD by outside CT scan, angiogram not yet performed  Chronic LBBB  Recent falls, potentially related to #1  Deconditioning, failure to thrive  Dementia      It was a pleasure to speak with Bernardo and his wife at the bedside today.  We had a very helpful discussion about the potential treatment options for his severe aortic stenosis, especially in light of his goals of care.  Briefly, I would entirely agree with the recommendations made by Dr. Loving in 2021 regarding his valve at that time.  We now see new systolic dysfunction, which likely represents the effect of his critical aortic stenosis (though could also be ischemic in nature), but the options remain essentially the same.  If an aggressive approach were preferred, this could certainly be done, and in this case we would set him up to see our structural team, get an angiogram and TAVR CT, and proceed with work-up for likely TAVR.  That said, in addition to his deconditioning, his dementia does appear significant, and he specifically mentions that he \"does not want to be a burden,\" and generally prefers a conservative approach.  If these are his wishes and those of the family, I think a conservative approach is very reasonable as well.  We did discuss that unfortunately this would likely result in him passing away from the aortic stenosis (and now that we are seeing possible syncopal episodes due to the aortic stenosis, this very likely could occur within the next few weeks to few months).  However, a conservative approach may be the most appropriate option.    I have asked him and his wife to discuss this with " their family and let me know how they would like to proceed (of course they can change their mind at any time).  If they would like to continue with a conservative approach, nothing else needs to be done from our standpoint.  We would recommend avoiding volume depletion, however, as he will be extremely sensitive to this.  If, instead, the family prefers to go down the pathway of a potential TAVR, we can set this up either as an inpatient or on an urgent outpatient basis depending on his planned discharge timeline.        High complexity     Giorgio Talamantes MD    Primary Care Physician   Physician No Ref-Primary    Reason for Consult   Reason for consult: I was asked by Dr. Diaz to evaluate this patient for second opinion of aortic stenosis.    History of Present Illness   Erick Shahid is a 83 year old male who presents with falls, deconditioning, and failure to thrive.  An echocardiogram was done as part of his work-up which showed a drop in LVEF from 55-60% previously on his outside echo in 2021, down to around 30-35% now.  His aortic stenosis also progressed, with a calculated valve area now around 0.6 cm  and a mean gradient in the 60s.  He had previously been seen by Dr. Loving with [facility], for consideration of TAVR, and following discussion with patient and family a conservative approach was taken.  Symptomatically, patient currently denies any cardiac complaints including no chest pain, shortness of breath, or lightheadedness, though he is extremely inactive and also has significant memory impairment.  He was admitted with several unwitnessed falls.  He reports that he did not get dizzy or lose consciousness with this, though this history is not entirely reliable.    As part of his evaluation, I reviewed his labs since admission, his outside echo report and outside cardiology clinic visit note, as well as the images of his echocardiogram here from yesterday.      Prior to Admission Medications    Prior to Admission Medications   Prescriptions Last Dose Informant Patient Reported? Taking?   Ascorbic Acid (VITAMIN C) 500 MG CHEW 7/22/2023 Spouse/Significant Other Yes Yes   Sig: Take 500 mg by mouth daily   Vitamin D3 (CHOLECALCIFEROL) 25 mcg (1000 units) tablet 7/22/2023 Spouse/Significant Other Yes Yes   Sig: Take 25 mcg by mouth daily   aspirin (ASA) 81 MG chewable tablet 7/22/2023 Spouse/Significant Other Yes Yes   Sig: Take 81 mg by mouth daily   cyanocobalamin (VITAMIN B-12) 1000 MCG tablet 7/22/2023 Spouse/Significant Other Yes Yes   Sig: Take 1,000 mcg by mouth daily   cycloSPORINE (RESTASIS) 0.05 % ophthalmic emulsion 7/22/2023 Spouse/Significant Other Yes Yes   Sig: Apply 1 drop to eye 2 times daily   metFORMIN (GLUCOPHAGE) 1000 MG tablet 7/22/2023 Spouse/Significant Other Yes Yes   Sig: Take 1 tablet by mouth daily (with dinner)   methylcellulose (CITRUCEL) powder 7/22/2023 Spouse/Significant Other Yes Yes   Sig: Take 1 teaspoonful by mouth daily   metoprolol succinate ER (TOPROL XL) 25 MG 24 hr tablet 7/22/2023 Spouse/Significant Other Yes Yes   Sig: Take 1 tablet by mouth daily   nystatin (MYCOSTATIN) 872612 UNIT/GM external powder Unknown at PRN Spouse/Significant Other Yes Yes   Sig: Apply 1 strip topically 3 times daily as needed (jock itch)   rosuvastatin (CRESTOR) 20 MG tablet 7/22/2023 Spouse/Significant Other Yes Yes   Sig: Take 1 tablet by mouth daily      Facility-Administered Medications: None     Current Facility-Administered Medications   Medication Dose Route Frequency    aspirin  81 mg Oral Daily    carboxymethylcellulose PF  1 drop Ophthalmic BID    metFORMIN  500 mg Oral Daily with supper    methylcellulose  500 mg Oral Daily    metoprolol succinate ER  25 mg Oral Daily     Current Facility-Administered Medications   Medication Last Rate     Allergies   No Known Allergies      Review of Systems   A comprehensive review of system was performed and is negative other than that noted  "in the HPI or here.     Physical Exam   Vital Signs with Ranges  Temp:  [97.1  F (36.2  C)-98.6  F (37  C)] 98  F (36.7  C)  Pulse:  [50-56] 56  Resp:  [16-18] 16  BP: (103-131)/(46-68) 103/46  SpO2:  [97 %-100 %] 99 %  Wt Readings from Last 4 Encounters:   07/24/23 78.6 kg (173 lb 4.5 oz)     I/O last 3 completed shifts:  In: 600 [P.O.:600]  Out: 375 [Urine:375]      Vitals: /46 (BP Location: Right arm)   Pulse 56   Temp 98  F (36.7  C) (Oral)   Resp 16   Ht 1.702 m (5' 7\")   Wt 78.6 kg (173 lb 4.5 oz)   SpO2 99%   BMI 27.14 kg/m      Physical Exam:   General - Alert and oriented to time place and person in no acute distress  Eyes - No scleral icterus  HEENT - Neck supple, moist mucous membranes  Cardiovascular -RRR, 3/6 mid-to-late peaking systolic murmur at the base, JVP does not appear elevated  Extremities - There is trace bilateral lower extremity edema  Respiratory -clear to auscultation bilaterally  Skin - No pallor or cyanosis  Gastrointestinal - Non tender and non distended without rebound or guarding  Psych - Appropriate affect   Neurological - No gross motor neurological focal deficits    No lab results found in last 7 days.    Invalid input(s): TROPONINIES    Recent Labs   Lab 07/24/23  0341 07/23/23  1445   WBC  --  6.1   HGB  --  13.4   MCV  --  95   PLT  --  182   NA  --  139   POTASSIUM  --  4.2   CHLORIDE  --  104   CO2  --  24   BUN  --  16.6   CR  --  0.80   GFRESTIMATED  --  87   ANIONGAP  --  11   FERMIN  --  9.8   * 120*   ALBUMIN  --  4.3   PROTTOTAL  --  7.4   BILITOTAL  --  1.1   ALKPHOS  --  75   ALT  --  11   AST  --  22     No results for input(s): CHOL, HDL, LDL, TRIG, CHOLHDLRATIO in the last 56373 hours.  Recent Labs   Lab 07/23/23  1445   WBC 6.1   HGB 13.4   HCT 39.0*   MCV 95        No results for input(s): PH, PHV, PO2, PO2V, SAT, PCO2, PCO2V, HCO3, HCO3V in the last 168 hours.  No results for input(s): NTBNPI, NTBNP in the last 168 hours.  No results for " input(s): DD in the last 168 hours.  No results for input(s): SED, CRP in the last 168 hours.  Recent Labs   Lab 23  1445        No results for input(s): TSH in the last 168 hours.  Recent Labs   Lab 23  1817   COLOR Yellow   APPEARANCE Clear   URINEGLC Negative   URINEBILI Negative   URINEKETONE Negative   SG 1.021   UBLD Negative   URINEPH 6.0   PROTEIN Negative   NITRITE Negative   LEUKEST Negative   RBCU 2   WBCU 1       Imaging:  Recent Results (from the past 48 hour(s))   Echocardiogram Complete   Result Value    LVEF  30-35%    Narrative    [id number]  [id number]  [id number]  [id number]^SOWMYA^NEHEMIAS     [facility]  Echocardiography Laboratory  [facility] [place] [address]     Name: BALTA SIMEON  MRN: [id number]  : 1940  Study Date: 2023 12:22 PM  Age: 83 yrs  Gender: Male  Patient Location: [facility]  Reason For Study: Aortic Valve Disorder  Ordering Physician: NEHEMISA DENG  Referring Physician: NEHEMIAS DENG  Performed By: JOSE L Rosenberg     BSA: 1.9 m2  Height: 67 in  Weight: 173 lb  HR: 48  BP: 107/57 mmHg  ______________________________________________________________________________  Procedure  Complete Portable Echo Adult. Optison (NDC #[id number]) given intravenously.  ______________________________________________________________________________  Interpretation Summary     Mild concentric left ventricular hypertrophy.  Left ventricular systolic function is moderate to severely reduced.  The visual ejection fraction is 30-35%.  Moderate to severe global hypokinesia of the left ventricle.  The right ventricle is normal in size and function.  Critical aortic valve stenosis with mild regurgitation.  Peak transaortic velocity 4.8 m/s, mean gradient 63 mmHg, valve area 0.63 cmÂ .  Normal inferior vena cava.     There is no comparison study available.  ______________________________________________________________________________  Left  Ventricle  The left ventricle is normal in size. There is mild concentric left  ventricular hypertrophy. Left ventricular systolic function is moderate to  severely reduced. The visual ejection fraction is 30-35%. Grade I or early  diastolic dysfunction. There is mod-severe global hypokinesia of the left  ventricle.     Right Ventricle  The right ventricle is normal in size and function.     Atria  The left atrium is mildly dilated. Right atrial size is normal.     Mitral Valve  The mitral valve leaflets are mildly thickened. There is trace mitral  regurgitation. There is no mitral valve stenosis.     Tricuspid Valve  Normal tricuspid valve. There is trace tricuspid regurgitation. Right  ventricular systolic pressure could not be approximated due to inadequate  tricuspid regurgitation.     Aortic Valve  The aortic valve is not well visualized. There is mild (1+) aortic  regurgitation. Severe valvular aortic stenosis. The mean AoV pressure gradient  is 63.0 mmHg. The calculated aortic valve are is 0.63 cm^2.     Pulmonic Valve  The pulmonic valve is not well visualized. There is trace pulmonic valvular  regurgitation.     Vessels  The aortic root is not well visualized. The inferior vena cava was normal in  size with preserved respiratory variability.     Pericardium  There is no pericardial effusion.     Rhythm  Sinus rhythm was noted.  ______________________________________________________________________________  MMode/2D Measurements & Calculations  IVSd: 1.2 cm     LVIDd: 4.6 cm  LVIDs: 3.9 cm  LVPWd: 1.0 cm  FS: 15.8 %  LV mass(C)d: 181.9 grams  LV mass(C)dI: 95.7 grams/m2  Ao root diam: 2.9 cm  asc Aorta Diam: 3.4 cm  LVOT diam: 2.0 cm  LVOT area: 3.1 cm2  LA Volume (BP): 71.0 ml  LA Volume Index (BP): 37.4 ml/m2  RV Base: 3.8 cm  RWT: 0.44     TAPSE: 2.7 cm     Doppler Measurements & Calculations  MV E max leesa: 58.6 cm/sec  MV A max leesa: 114.0 cm/sec  MV E/A: 0.51  MV dec time: 0.42 sec  Ao V2 max: 484.0  "cm/sec  Ao max P.7 mmHg  Ao V2 mean: 378.0 cm/sec  Ao mean P.0 mmHg  Ao V2 VTI: 139.0 cm  SHALOM(I,D): 0.63 cm2  SHALOM(V,D): 0.67 cm2  AI P1/2t: 425.2 msec  LV V1 max P.2 mmHg  LV V1 max: 103.0 cm/sec  LV V1 VTI: 27.7 cm  SV(LVOT): 87.0 ml  SI(LVOT): 45.8 ml/m2  PA acc time: 0.09 sec  Pulm Sys Terrence: 64.0 cm/sec  Pulm Dale Terrence: 38.1 cm/sec  Pulm A Revs Terrence: 31.8 cm/sec  Pulm S/D: 1.7  AV Terrence Ratio (DI): 0.21  SHALOM Index (cm2/m2): 0.33  E/E' av.7  Lateral E/e': 12.9  Medial E/e': 12.6     RV S Terrence: 11.0 cm/sec     ______________________________________________________________________________  Report approved by: Dr Hilario Williamson 2023 02:04 PM             Echo:  No results found for this or any previous visit (from the past 4320 hour(s)).    Clinically Significant Risk Factors Present on Admission                # Drug Induced Platelet Defect: home medication list includes an antiplatelet medication    # Chronic heart failure with reduced ejection fraction: last echo with EF <40%     # Overweight: Estimated body mass index is 27.14 kg/m  as calculated from the following:    Height as of this encounter: 1.702 m (5' 7\").    Weight as of this encounter: 78.6 kg (173 lb 4.5 oz).           Non-Rheumatic Valve Disease: Aortic valve stenosis                        Dementia: Unspecified dementia without behavioral disturbance        Pulmonary Heart Disease (Pulmonary hypertension or Cor pulmonale): Pulmonary Hypertension, unspecified    Chronic Fatigue and Other Debilities: Age-related physical debility  Chronic fatigue, unspecified  Other reduced mobility                 "

## 2023-07-26 NOTE — PROGRESS NOTES
Orientation/Cognitive: A&Ox2. Disoriented to time and place.  Observation Goals (Met/ Not Met): Not Met  Mobility Level/Assist Equipment: A-1 GB/walker  Fall Risk (Y/N): Y  Behavior Concerns: [place], re-directable  Pain Management: Denies  Tele/VS/O2: VSS on RA  ABNL Lab/BG: n/a- no current labs  Diet: Reg- needs help order meals  Bowel/Bladder: Incont. At times of bladder  Skin Concerns: reddness to groin and between buttock folds  Drains/Devices: None  Tests/Procedures for next shift: none  Anticipated DC date & active delays: Pending placement- SW following  Patient Stated Goal for Today: None

## 2023-07-26 NOTE — PROGRESS NOTES
Pt here with Abnormal electrocardiogram [R94.31]  Right leg pain [M79.604]  Cognitive decline [R41.89]  Multiple falls [R29.6].      Cognitive/Mentation:Alert and oriented to self and place, confused  VS: WDL, on room air, afebrile    Activity: .SBA/GB/W    Diet: Regular    Pulmonary: on room air    Pain: Denies.     GI: active bowel sounds, no BM this shift    : Frequency. Incontinence    IVs: PIV SL    Drains:  n/a    Integument Wound consult , redness to groin    Discharge, awaiting TCU placement    shift summary , denies pain, calm and cooperative with cares, will continue to assist

## 2023-07-26 NOTE — PROVIDER NOTIFICATION
"MD Notification    Notified Person: MD    Notified Person Name: Joe    Notification Date/Time: 07/26/23 8:46 AM     Notification Interaction: [employer] messaging    Purpose of Notification: \"e:Can you put parameters on his metoprolol? His HR is 50 this AM\"    Orders Received: parameters ordered    Comments:    "

## 2023-07-26 NOTE — UTILIZATION REVIEW
Concurrent stay review; Secondary Review Determination    Under the authority of the Utilization Management Committee, the utilization review process indicated a secondary review on the above patient. The review outcome is based on review of the medical records, discussions with staff, and applying clinical experience noted on the date of the review.    (x) Observation Status Appropriate - Concurrent stay review        RATIONALE FOR DETERMINATION: 83-year-old male with significant cognitive impairment/dementia, diabetes, CAD with known severe aortic stenosis in 2021 presents to hospital after repeated falls in his condominium and unable to get up.  Wife notes patient has not been eating well and is gait is significantly abnormal at baseline but now having frequent falls and family feels that he is not safe at home thus brought to the hospital.  Patient awaiting disposition and echocardiogram which showed the expected significant worsening of the aortic stenosis to a critical state now associated with a cardiomyopathy with ejection fraction 30-35% in the face of critical aortic stenosis.  This has been unfortunately the expected progression of his disease that was deemed not treatable 2 years ago.  Patient still awaiting placement.  If level of treatment changes and there is a decision to treat patient's severe cardiac valve abnormalities in the hospital, then at that time patient would be appropriate to advance to inpatient care.    Patient is clinically improving and there is no clear indication to change patient's status to inpatient. The severity of illness, intensity of service provided, expected LOS and risk for adverse outcome make the care appropriate for observation.    This document was produced using voice recognition software    The information on this document is developed by the utilization review team in order for the business office to ensure compliance. This only denotes the appropriateness of proper  admission status and does not reflect the quality of care rendered.    The definitions of Inpatient Status and Observation Status used in making the determination above are those provided in the CMS Coverage Manual, Chapter 1 and Chapter 6, section 70.4.    Sincerely,    Nagi Ledezma MD  Utilization Review  Physician Advisor  [facility].

## 2023-07-26 NOTE — PLAN OF CARE
Goal Outcome Evaluation:      Observation goals  PRIOR TO DISCHARGE        Comments: -diagnostic tests and consults completed and resulted- not met  -vital signs normal or at patient baseline- met  -tolerating oral intake to maintain hydration- partially met  -adequate pain control on oral analgesics- met  Nurse to notify provider when observation goals have been met and patient is ready for discharge.

## 2023-07-27 LAB
ABO/RH(D): NORMAL
ANTIBODY SCREEN: NEGATIVE
INR PPP: 1.07 (ref 0.85–1.15)
NT-PROBNP SERPL-MCNC: 2019 PG/ML (ref 0–1800)
SPECIMEN EXPIRATION DATE: NORMAL

## 2023-07-27 PROCEDURE — 86901 BLOOD TYPING SEROLOGIC RH(D): CPT | Performed by: NURSE PRACTITIONER

## 2023-07-27 PROCEDURE — 250N000013 HC RX MED GY IP 250 OP 250 PS 637: Performed by: PHYSICIAN ASSISTANT

## 2023-07-27 PROCEDURE — 999N000040 HC STATISTIC CONSULT NO CHARGE VASC ACCESS

## 2023-07-27 PROCEDURE — 99233 SBSQ HOSP IP/OBS HIGH 50: CPT | Mod: FS | Performed by: NURSE PRACTITIONER

## 2023-07-27 PROCEDURE — 99232 SBSQ HOSP IP/OBS MODERATE 35: CPT | Mod: 25 | Performed by: NURSE PRACTITIONER

## 2023-07-27 PROCEDURE — 250N000013 HC RX MED GY IP 250 OP 250 PS 637: Performed by: HOSPITALIST

## 2023-07-27 PROCEDURE — 85610 PROTHROMBIN TIME: CPT | Performed by: NURSE PRACTITIONER

## 2023-07-27 PROCEDURE — 99232 SBSQ HOSP IP/OBS MODERATE 35: CPT | Performed by: PHYSICIAN ASSISTANT

## 2023-07-27 PROCEDURE — 36415 COLL VENOUS BLD VENIPUNCTURE: CPT | Performed by: NURSE PRACTITIONER

## 2023-07-27 PROCEDURE — 86850 RBC ANTIBODY SCREEN: CPT | Performed by: NURSE PRACTITIONER

## 2023-07-27 PROCEDURE — G0378 HOSPITAL OBSERVATION PER HR: HCPCS

## 2023-07-27 PROCEDURE — 83880 ASSAY OF NATRIURETIC PEPTIDE: CPT | Performed by: NURSE PRACTITIONER

## 2023-07-27 RX ORDER — ASPIRIN 81 MG/1
243 TABLET, CHEWABLE ORAL ONCE
Status: CANCELLED | OUTPATIENT
Start: 2023-07-27

## 2023-07-27 RX ORDER — ASPIRIN 325 MG
325 TABLET ORAL ONCE
Status: CANCELLED | OUTPATIENT
Start: 2023-07-27 | End: 2023-07-27

## 2023-07-27 RX ORDER — LIDOCAINE 40 MG/G
CREAM TOPICAL
Status: CANCELLED | OUTPATIENT
Start: 2023-07-27

## 2023-07-27 RX ORDER — LORAZEPAM 2 MG/ML
0.5 INJECTION INTRAMUSCULAR
Status: CANCELLED | OUTPATIENT
Start: 2023-07-27

## 2023-07-27 RX ORDER — LORAZEPAM 0.5 MG/1
0.5 TABLET ORAL
Status: CANCELLED | OUTPATIENT
Start: 2023-07-27

## 2023-07-27 RX ORDER — SODIUM CHLORIDE 9 MG/ML
INJECTION, SOLUTION INTRAVENOUS CONTINUOUS
Status: CANCELLED | OUTPATIENT
Start: 2023-07-27

## 2023-07-27 RX ORDER — POTASSIUM CHLORIDE 1500 MG/1
20 TABLET, EXTENDED RELEASE ORAL
Status: CANCELLED | OUTPATIENT
Start: 2023-07-27

## 2023-07-27 RX ADMIN — METOPROLOL SUCCINATE 25 MG: 25 TABLET, EXTENDED RELEASE ORAL at 09:35

## 2023-07-27 RX ADMIN — ASPIRIN 81 MG CHEWABLE TABLET 81 MG: 81 TABLET CHEWABLE at 09:35

## 2023-07-27 RX ADMIN — METHYLCELLULOSE 500 MG: 500 TABLET ORAL at 09:35

## 2023-07-27 RX ADMIN — CARBOXYMETHYLCELLULOSE SODIUM 1 DROP: 5 SOLUTION/ DROPS OPHTHALMIC at 21:13

## 2023-07-27 RX ADMIN — CARBOXYMETHYLCELLULOSE SODIUM 1 DROP: 5 SOLUTION/ DROPS OPHTHALMIC at 09:35

## 2023-07-27 ASSESSMENT — ACTIVITIES OF DAILY LIVING (ADL)
ADLS_ACUITY_SCORE: 45
ADLS_ACUITY_SCORE: 42
ADLS_ACUITY_SCORE: 45

## 2023-07-27 NOTE — PLAN OF CARE
OBSERVATION GOALS    Comments:      -Diagnostic tests and consults completed and resulted-Not Met (TAVR workup 7/28)    -Vital signs normal or at patient baseline-Met    -Tolerating oral intake to maintain hydration-Met    -Adequate pain control on oral analgesics-Met    Nurse to notify provider when observation goals have been met and patient is ready for discharge.

## 2023-07-27 NOTE — PROGRESS NOTES
[facility]  Cardiology Progress Note  Date of Service: 07/27/2023      Assessment & Plan    Erick Shahid is a 83 year old male admitted on 7/23/2023 with falls.     Assessment:  Critical calcific aortic stenosis - valve area 0.63 cm2  Acute on chronic HFrEF, roughly euvolemic, NYHA III, LVEF 30-35%  Cardiomyopathy, unspecified etiology (likely valvular +/- ischemic)  CAD by outside CT scan   Chronic LBBB  Recent falls, potentially related to #1  Deconditioning, failure to thrive  Dementia    Patient/family had some time to think and wish to pursue evaluation for TAVR consideration.     Risks and benefits of coronary angiogram discussed today including, bleeding, bruising, infection, allergic reaction, kidney damage (including need for dialysis), stroke, heart attack, vascular damage, emergency open heart surgery, up to and including death.  Patient and family indicates understanding and is agreeable to proceed. Performed consent with his wife, primary decision maker, given his dementia.     Reviewed CODE status and patient/family okay with reversal to full code for coronary angiogram tomorrow.     Plan:   Structural heart team consultation  CT TAVR ordered  Plan for coronary angiogram tomorrow  Continue aspirin 81mg daily  Continue metoprolol XL 25mg daily   NPO after LESTER Aguay[facility]  Pager: [phone]        Interval History   Denies chest pain. Denies shortness of breath.     Physical Exam   Temp: 97.6  F (36.4  C) Temp src: Oral BP: 111/52 Pulse: 53   Resp: 16 SpO2: 97 % O2 Device: None (Room air)    Vitals:    07/23/23 1430 07/24/23 0248   Weight: 83.9 kg (185 lb) 78.6 kg (173 lb 4.5 oz)     GEN: well nourished, in no acute distress.  HEENT:  Pupils equal, round. Sclerae nonicteric.   NECK: Supple, no masses appreciated.   C/V:  Regular rate and rhythm, 3/6 systolic murmur  RESP: Respirations are unlabored. Clear to auscultation bilaterally without  wheezing, rales, or rhonchi.  GI: Abdomen soft, nontender.  EXTREM: Trace bilateral LE edema.  NEURO: Alert and cooperative.  SKIN: Warm and dry.     Medications      aspirin  81 mg Oral Daily    carboxymethylcellulose PF  1 drop Ophthalmic BID    metFORMIN  500 mg Oral Daily with supper    methylcellulose  500 mg Oral Daily    metoprolol succinate ER  25 mg Oral Daily       Data   Last 24 hours labs reviewed       Echo:   Mild concentric left ventricular hypertrophy.  Left ventricular systolic function is moderate to severely reduced.  The visual ejection fraction is 30-35%.  Moderate to severe global hypokinesia of the left ventricle.  The right ventricle is normal in size and function.  Critical aortic valve stenosis with mild regurgitation.  Peak transaortic velocity 4.8 m/s, mean gradient 63 mmHg, valve area 0.63 cmÂ .  Normal inferior vena cava.     There is no comparison study available.

## 2023-07-27 NOTE — PLAN OF CARE
Goal Outcome Evaluation:      Plan of Care Reviewed With: patient    Overall Patient Progress: improving    Observation goals  PRIOR TO DISCHARGE          Comments:     -Diagnostic tests and consults completed and resulted-Met    -Vital signs normal or at patient baseline-Met    -Tolerating oral intake to maintain hydration-Met    -Adequate pain control on oral analgesics-Met    Nurse to notify provider when observation goals have been met and patient is ready for discharge.

## 2023-07-27 NOTE — CONSULTS
[facility]    Cardiology Consultation     Date of Admission:  7/23/2023    Assessment & Plan   Erick Shahid is a 83 year old male who was admitted on 7/23/2023 with possible syncope and falls, found to have critical aortic stenosis.    Assessment:   1. Critical aortic stenosis with a valve area of 0.63 cm  and MG of 63 mmHg  - TAVR CT (2021 at [facility]) demonstrated aortic valve calcium score of 1520 and favorable anatomy for transfemoral approach  - Possible syncope and falls 2/2 valvular disease, though difficult historian     2. Acute on chronic systolic heart failure, NYHA class III.   - LVEF 30-35% (55-60% in 2021)   - Unclear etiology though likely valvular vs ischemic.   - Appears euvolemic on exam    3. CAD per CT showing heavily calcified pLAD disease.   - Noted on TAVR CT in 2021   - Denies angina    4. Possible syncope 2/2 valvular disease  5. Chronic LBBB  6. Deconditioning/Failure to thrive  7. Dementia       Plan:   1. Given clinical presentation and critical nature of his aortic stenoiss, aortic valve replacement is indicated. Due to advanced age and co-morbidities, I believe he will be best treated with transcatheter aortic valve replacement.  2. Obtain repeat TAVR guided CT to assess candidacy of transfemoral approach.   3. Plan for coronary angiogram tomorrow.   4. CV surgery consult to discuss potential bailout options.   5. Pending CT findings, given possible syncope and the severity of his valve, would recommend expedited outpatient vs inpatient TAVR.   6. Will continue to follow.    High complexity     Sheri Davis, CNP    Primary Care Physician   Physician No Ref-Primary    Reason for Consult   Reason for consult: I was asked to evaluate this patient for consideration of TAVR.    History of Present Illness   Erick Shahid is a 83 year old male with known aortic stenosis presented on 7/26/23 with falls, deconditioning, and failure to thrive. Echocardiogram  shows newly reduced LV function with EF 30-35%, previously 55-60% in 2021 at [facility]. His aortic stenosis has also progressed, with a valve of 0.6 cm  and a mean gradient of 63 mmHg, V max of 4.8 m/s.     Of note, he had previously been seen by Dr. Loving at [facility] for consideration of TAVR, and following discussion with patient and family a conservative approach was taken. However, given the progression of his symptoms, they would like to discuss options to repair the valve.     The patient currently denies any chest pain, shortness of breath, or lightheadedness, though he is extremely inactive and also has significant memory impairment.  He was admitted with several unwitnessed falls.  He reports that he did not get dizzy and denies any LOC, though this history is not entirely reliable.    His renal unction is preserved with a creatinine of 0.80, GFR 87, normal electrolytes.  His CBC is unremarkable with a hemoglobin of 13.4.  Notably, he does have full set of dentures.      Past Medical History   No past medical history on file.      Past Surgical History   No past surgical history on file.      Prior to Admission Medications   Prior to Admission Medications   Prescriptions Last Dose Informant Patient Reported? Taking?   Ascorbic Acid (VITAMIN C) 500 MG CHEW 7/22/2023 Spouse/Significant Other Yes Yes   Sig: Take 500 mg by mouth daily   Vitamin D3 (CHOLECALCIFEROL) 25 mcg (1000 units) tablet 7/22/2023 Spouse/Significant Other Yes Yes   Sig: Take 25 mcg by mouth daily   aspirin (ASA) 81 MG chewable tablet 7/22/2023 Spouse/Significant Other Yes Yes   Sig: Take 81 mg by mouth daily   cyanocobalamin (VITAMIN B-12) 1000 MCG tablet 7/22/2023 Spouse/Significant Other Yes Yes   Sig: Take 1,000 mcg by mouth daily   cycloSPORINE (RESTASIS) 0.05 % ophthalmic emulsion 7/22/2023 Spouse/Significant Other Yes Yes   Sig: Apply 1 drop to eye 2 times daily   metFORMIN (GLUCOPHAGE) 1000 MG tablet 7/22/2023  "Spouse/Significant Other Yes Yes   Sig: Take 1 tablet by mouth daily (with dinner)   methylcellulose (CITRUCEL) powder 7/22/2023 Spouse/Significant Other Yes Yes   Sig: Take 1 teaspoonful by mouth daily   metoprolol succinate ER (TOPROL XL) 25 MG 24 hr tablet 7/22/2023 Spouse/Significant Other Yes Yes   Sig: Take 1 tablet by mouth daily   nystatin (MYCOSTATIN) 716904 UNIT/GM external powder Unknown at PRN Spouse/Significant Other Yes Yes   Sig: Apply 1 strip topically 3 times daily as needed (jock itch)   rosuvastatin (CRESTOR) 20 MG tablet 7/22/2023 Spouse/Significant Other Yes Yes   Sig: Take 1 tablet by mouth daily      Facility-Administered Medications: None     Current Facility-Administered Medications   Medication Dose Route Frequency     aspirin  81 mg Oral Daily     carboxymethylcellulose PF  1 drop Ophthalmic BID     metFORMIN  500 mg Oral Daily with supper     methylcellulose  500 mg Oral Daily     metoprolol succinate ER  25 mg Oral Daily     Current Facility-Administered Medications   Medication Last Rate     Allergies   No Known Allergies    Social History          Family History            Review of Systems   A comprehensive review of system was performed and is negative other than that noted in the HPI or here.     Physical Exam   Vital Signs with Ranges  Temp:  [97.6  F (36.4  C)-98.1  F (36.7  C)] 97.6  F (36.4  C)  Pulse:  [53-69] 53  Resp:  [15-18] 16  BP: (111-140)/(50-71) 111/52  SpO2:  [96 %-99 %] 97 %  Wt Readings from Last 4 Encounters:   07/24/23 78.6 kg (173 lb 4.5 oz)     I/O last 3 completed shifts:  In: -   Out: 675 [Urine:675]      Vitals: /52 (BP Location: Left arm)   Pulse 53   Temp 97.6  F (36.4  C) (Oral)   Resp 16   Ht 1.702 m (5' 7\")   Wt 78.6 kg (173 lb 4.5 oz)   SpO2 97%   BMI 27.14 kg/m      Physical Exam:   General - Alert and oriented to time place and person in no acute distress  Eyes - No scleral icterus  HEENT - Neck supple, moist mucous " membranes  Cardiovascular - RRR, grade II GABBIE   Extremities - There is trace peripheral edema  Respiratory - CTA bilaterally.   Skin - No pallor or cyanosis  Gastrointestinal - Non tender and non distended without rebound or guarding  Psych - Appropriate affect   Neurological - No gross motor neurological focal deficits    No lab results found in last 7 days.    Invalid input(s): TROPONINIES    Recent Labs   Lab 07/24/23  0341 07/23/23  1445   WBC  --  6.1   HGB  --  13.4   MCV  --  95   PLT  --  182   NA  --  139   POTASSIUM  --  4.2   CHLORIDE  --  104   CO2  --  24   BUN  --  16.6   CR  --  0.80   GFRESTIMATED  --  87   ANIONGAP  --  11   FERMIN  --  9.8   * 120*   ALBUMIN  --  4.3   PROTTOTAL  --  7.4   BILITOTAL  --  1.1   ALKPHOS  --  75   ALT  --  11   AST  --  22     No results for input(s): CHOL, HDL, LDL, TRIG, CHOLHDLRATIO in the last 49240 hours.  Recent Labs   Lab 07/23/23  1445   WBC 6.1   HGB 13.4   HCT 39.0*   MCV 95        No results for input(s): PH, PHV, PO2, PO2V, SAT, PCO2, PCO2V, HCO3, HCO3V in the last 168 hours.  No results for input(s): NTBNPI, NTBNP in the last 168 hours.  No results for input(s): DD in the last 168 hours.  No results for input(s): SED, CRP in the last 168 hours.  Recent Labs   Lab 07/23/23  1445        No results for input(s): TSH in the last 168 hours.  Recent Labs   Lab 07/23/23  1817   COLOR Yellow   APPEARANCE Clear   URINEGLC Negative   URINEBILI Negative   URINEKETONE Negative   SG 1.021   UBLD Negative   URINEPH 6.0   PROTEIN Negative   NITRITE Negative   LEUKEST Negative   RBCU 2   WBCU 1       Imaging:  No results found for this or any previous visit (from the past 48 hour(s)).    Echo:  No results found for this or any previous visit (from the past 4320 hour(s)).    Clinically Significant Risk Factors Present on Admission                # Drug Induced Platelet Defect: home medication list includes an antiplatelet medication    # Chronic heart  "failure with reduced ejection fraction: last echo with EF <40%     # Overweight: Estimated body mass index is 27.14 kg/m  as calculated from the following:    Height as of this encounter: 1.702 m (5' 7\").    Weight as of this encounter: 78.6 kg (173 lb 4.5 oz).               "

## 2023-07-27 NOTE — PLAN OF CARE
OBSERVATION GOALS    Comments:      -Diagnostic tests and consults completed and resulted: Not Met     -Vital signs normal or at patient baseline: Met    -Tolerating oral intake to maintain hydration: Met    -Adequate pain control on oral analgesics: Met    Nurse to notify provider when observation goals have been met and patient is ready for discharge.

## 2023-07-27 NOTE — PLAN OF CARE
Goal Outcome Evaluation:      Plan of Care Reviewed With: patient    Overall Patient Progress: improving    Observation goals  PRIOR TO DISCHARGE          Comments:     -Diagnostic tests and consults completed and resulted-Met    -Vital signs normal or at patient baseline-Met    -Tolerating oral intake to maintain hydration-Met    -Adequate pain control on oral analgesics-Met    Nurse to notify provider when observation goals have been met and patient is ready for discharge.          Patient is here following a fall,physical deconditioning and progressive cognitive decline, hx of critical aortic stenosis    Orientation/Cognitive: A&Ox2, disoriented to time and situation  Observation Goals (Met/ Not Met): Not Met  Mobility Level/Assist Equipment: Ax1GB/W   Fall Risk (Y/N): Y  Behavior Concerns: None this shift, slept most of the night  Pain Management:Denies pain  Tele/VS/O2: VSS on RA  ABNL Lab/BG:None this shift  Diet:Regular diet  Bowel/Bladder:incontinent of urine occasionally, most times ambulates to the bathroom and using urinal  Skin Concerns: scattered bruising/scabs  Drains/Devices: PIV SL  Tests/Procedures for next shift: None, cardiology following, family to decide on TAVR or conservative management  Anticipated DC date & active delays:pending TCU placement, SW following, sent referrals

## 2023-07-27 NOTE — PROGRESS NOTES
Observation goals  PRIOR TO DISCHARGE        Comments: -diagnostic tests and consults completed and resulted=Not Met  -vital signs normal or at patient baseline=Met  -tolerating oral intake to maintain hydration=Met  -adequate pain control on oral analgesics=Met  Nurse to notify provider when observation goals have been met and patient is ready for discharge.

## 2023-07-27 NOTE — PROGRESS NOTES
[facility]    Medicine Progress Note - Hospitalist Service    Date of Admission:  7/23/2023    Assessment & Plan   Ercik Shahid is a 83 year old  male with medical history of cognitive impairment, hyperlipidemia, diabetes mellitus, coronary artery disease, aortic stenosis brought into the ED by wife and son with falls.     Unwitnessed falls.  Physical deconditioning from medical illness, senile frailty.  Progressive cognitive decline.  Patient brought into the ED by family with 2 unwitnessed falls today, progressive cognitive decline.  *In ED  appears chronically ill, frail.  No spinal tenderness. Tenderness bilateral calfs present. Skin no abrasions or ecchymosis or laceration. Strength 5/5 in all extremities.  No arm drift.  Able to answer simple questions.  *Sodium 139 potassium 4.2, calcium 9.8.  AST 22, ALT 11 albumin 4.3.  WBC 6.1.  Hemoglobin 13.4.  *UA nitrite negative, leukocyte esterase negative.  EKG sinus rhythm with first-degree AV block.  Left axis deviation.  Left bundle branch block.  Chest x-ray mild bandlike scarring in the lung bases.  Benign calcified left perihilar lymph nodes.  *CT head no acute pathology.  Diffuse age-related changes.  X-ray pelvis and hip-  No acute fracture or dislocation within the limitations of osteopenia. See  Report for details.  X-ray lumbar spine - good anatomic alignment of the lumbar spine with no evidence of subluxation.  Degenerative disc disease.  *Outpatient TSH, vitamin B12, MRI brain were normal in March 2023.   COVID-19 PCR negative.  CK within normal limits  --PT, OT followed, recommending TCU.  timing unclear pending cardiology plan   --WOC RN consult for coccyx wound.  --Fall precautions.  --Hold PTA vitamins while under observation status.    New cardiomyopathy, HFrEF with WMA   Detectable troponin likely in the setting of fall from demand ischemia.  Coronary artery disease.  Critical aortic stenosis    LBBB.  Hyperlipidemia.  Follows with [facility]. TAVR workup performed in 2021 with CTA showing heavily calcified proximal LAD with possible obstructive stenosis. It appears he was not felt to be a good candidate for angiogram or TAVR given dementia and limited functional capacity at that time.   *Patient denies any chest pain.  Family reporting leg edema, generalized weakness, intermittent cough. Per family report was recommended not a candidate for TAVR/valve surgery [few years back]  *Last echocardiogram from 2021 with severe aortic stenosis, LVEF of 55 to 60%.  *Troponin 32 > 25    *Telemetry monitoring with sinus rhythm, first-degree AV block and bundle branch bloc  *ECHO from 7/25 shows EF 30-35% with moderate to severe global hypokinesis of LV as well as critical AS  --Continue PTA aspirin.  --Continue PTA metoprolol.  --Hold PTA rosuvastatin while under observation status  --Cardiology consulted, discussed options with pt and family including review by TAVR team. If decision made to proceed will need to determine inpatient vs close outpatient. Awaiting follow up today     Bilateral lower extremity edema.  Family reporting bilateral lower extremity edema with associated pain.  *Ultrasound bilateral lower extremity no DVT.    *ECHO with new HFrEF as below   --Limb elevation.  Ace wraps.  --defer diuretic given critical AS      Diabetes with a hemoglobin A1c of 6.0.  Decreased PTA metformin from 1 g daily to 500 mg daily.     Macular degeneration.  Hearing loss.  Continue PTA eyedrops, hearing aids.     Urinary incontinence.   No acute issues.       Diet: Regular Diet Adult    DVT Prophylaxis: none currently, if needed cardiology recommending heparin over lovenox. Awaiting plan today to start anything   Pablo Catheter: Not present  Lines: None     Cardiac Monitoring: None  Code Status: No CPR- Do NOT Intubate      Clinically Significant Risk Factors Present on Admission                # Drug Induced Platelet Defect:  "home medication list includes an antiplatelet medication    # Chronic heart failure with reduced ejection fraction: last echo with EF <40%     # Overweight: Estimated body mass index is 27.14 kg/m  as calculated from the following:    Height as of this encounter: 1.702 m (5' 7\").    Weight as of this encounter: 78.6 kg (173 lb 4.5 oz).            Disposition Plan      Expected Discharge Date: 07/28/2023      Destination: inpatient rehabilitation facility  Discharge Comments: SW following for placement  PT- TCU        The patient's care was discussed with Dr. Leo who agrees with the above plan     Jessica Diaz PA-C  Hospitalist Service  [facility]  Securely message with [employer] (more info)  Text page via [employer] Paging/Directory   ______________________________________________________________________    Interval History   Pt reports feeling well today. No dizziness. No chest pain or dyspnea. Awaiting TAVR team evaluation for further plan.     Physical Exam   Vital Signs: Temp: 97.9  F (36.6  C) Temp src: Oral BP: 122/62 Pulse: 57   Resp: 15 SpO2: 96 % O2 Device: None (Room air)    Weight: 173 lbs 4.5 oz    Constitutional: Alert, oriented to hospital, year, month. Forgetful but appropriately conversant   ENT: moist mucous membranes  Respiratory: Lungs clear to auscultation bilaterally, no increased work of breathing  Cardiovascular: Regular rhythm with mild bradycardia, loud systolic murmur   GI: active bowel sounds, abdomen soft, non-tender  Extremities: 1-2+ bilateral LE edema   MSK:  moves all four extremities.     Medical Decision Making       35 MINUTES SPENT BY ME on the date of service doing chart review, history, exam, documentation & further activities per the note.      Data         "

## 2023-07-27 NOTE — PLAN OF CARE
Patient is here following a fall and critical aortic stenosis     Orientation/Cognitive: A&Ox2, disoriented to time and situation  Observation Goals (Met/ Not Met): Not Met  Mobility Level/Assist Equipment: Ax1GB/W   Fall Risk (Y/N): Y  Behavior Concerns: None this shift very plesant  Pain Management: Denies pain  Tele/VS/O2: VSS on RA   ABNL Lab/BG: None this shift  Diet: Regular diet  Bowel/Bladder: incontinent of urine occasionally, most times ambulates to the bathroom and/or will use urinal  Skin Concerns: scattered bruising/scabs  Drains/Devices: PIV SL  Tests/Procedures for next shift: None, cardiology following -will have TAVR, and angio  Anticipated DC date & active delays: pending TCU placement, SW following, sent referrals        OBSERVATION GOALS     Comments:      -Diagnostic tests and consults completed and resulted-Not Met TAVR and angio 7/28     -Vital signs normal or at patient baseline-Met     -Tolerating oral intake to maintain hydration-Met     -Adequate pain control on oral analgesics-Met     Nurse to notify provider when observation goals have been met and patient is ready for discharge.

## 2023-07-27 NOTE — PLAN OF CARE
Goal Outcome Evaluation:      Plan of Care Reviewed With: patient    Overall Patient Progress: improving  Orientation/Cognitive: A&Ox3, disoriented to situation  Observation Goals (Met/ Not Met): Not Met  Mobility Level/Assist Equipment: Ax1, gb, walker  Fall Risk (Y/N): Y  Behavior Concerns: none  Pain Management: denies pain  Tele/VS/O2: VSS  ABNL Lab/BG: none  Diet: tolerating a regular diet  Bowel/Bladder: Intermittently incontinent of urine, sometimes using urinal, ambulated to bathroom x2 this shift  Skin Concerns: scattered bruising  Drains/Devices: PIV SL  Tests/Procedures for next shift: none  Anticipated DC date & active delays: TBD pending placement  Patient Stated Goal for Today: POLLY    Observation goals  PRIOR TO DISCHARGE        Comments: -diagnostic tests and consults completed and resulted=Not Met  -vital signs normal or at patient baseline=Met  -tolerating oral intake to maintain hydration=Met  -adequate pain control on oral analgesics=Met  Nurse to notify provider when observation goals have been met and patient is ready for discharge.

## 2023-07-28 ENCOUNTER — APPOINTMENT (OUTPATIENT)
Dept: PHYSICAL THERAPY | Facility: CLINIC | Age: 83
DRG: 286 | End: 2023-07-28
Payer: COMMERCIAL

## 2023-07-28 PROBLEM — R94.31 ABNORMAL ELECTROCARDIOGRAM: Status: ACTIVE | Noted: 2023-07-28

## 2023-07-28 PROBLEM — I35.0 CRITICAL AORTIC VALVE STENOSIS: Status: ACTIVE | Noted: 2023-07-28

## 2023-07-28 LAB
ANION GAP SERPL CALCULATED.3IONS-SCNC: 11 MMOL/L (ref 7–15)
BUN SERPL-MCNC: 11.8 MG/DL (ref 8–23)
CALCIUM SERPL-MCNC: 9.1 MG/DL (ref 8.8–10.2)
CHLORIDE SERPL-SCNC: 103 MMOL/L (ref 98–107)
CREAT SERPL-MCNC: 0.69 MG/DL (ref 0.67–1.17)
DEPRECATED HCO3 PLAS-SCNC: 22 MMOL/L (ref 22–29)
ERYTHROCYTE [DISTWIDTH] IN BLOOD BY AUTOMATED COUNT: 13.9 % (ref 10–15)
GFR SERPL CREATININE-BSD FRML MDRD: >90 ML/MIN/1.73M2
GLUCOSE SERPL-MCNC: 120 MG/DL (ref 70–99)
HCT VFR BLD AUTO: 37.2 % (ref 40–53)
HGB BLD-MCNC: 12.5 G/DL (ref 13.3–17.7)
MCH RBC QN AUTO: 32.3 PG (ref 26.5–33)
MCHC RBC AUTO-ENTMCNC: 33.6 G/DL (ref 31.5–36.5)
MCV RBC AUTO: 96 FL (ref 78–100)
PLATELET # BLD AUTO: 174 10E3/UL (ref 150–450)
POTASSIUM SERPL-SCNC: 3.6 MMOL/L (ref 3.4–5.3)
RBC # BLD AUTO: 3.87 10E6/UL (ref 4.4–5.9)
SODIUM SERPL-SCNC: 136 MMOL/L (ref 136–145)
WBC # BLD AUTO: 4.9 10E3/UL (ref 4–11)

## 2023-07-28 PROCEDURE — G0278 ILIAC ART ANGIO,CARDIAC CATH: HCPCS | Performed by: INTERNAL MEDICINE

## 2023-07-28 PROCEDURE — 93454 CORONARY ARTERY ANGIO S&I: CPT | Mod: 26 | Performed by: INTERNAL MEDICINE

## 2023-07-28 PROCEDURE — 999N000127 HC STATISTIC PERIPHERAL IV START W US GUIDANCE

## 2023-07-28 PROCEDURE — 120N000001 HC R&B MED SURG/OB

## 2023-07-28 PROCEDURE — C1894 INTRO/SHEATH, NON-LASER: HCPCS | Performed by: INTERNAL MEDICINE

## 2023-07-28 PROCEDURE — C1760 CLOSURE DEV, VASC: HCPCS | Performed by: INTERNAL MEDICINE

## 2023-07-28 PROCEDURE — 80048 BASIC METABOLIC PNL TOTAL CA: CPT | Performed by: NURSE PRACTITIONER

## 2023-07-28 PROCEDURE — 85027 COMPLETE CBC AUTOMATED: CPT | Performed by: NURSE PRACTITIONER

## 2023-07-28 PROCEDURE — 99152 MOD SED SAME PHYS/QHP 5/>YRS: CPT | Performed by: INTERNAL MEDICINE

## 2023-07-28 PROCEDURE — 250N000009 HC RX 250: Performed by: INTERNAL MEDICINE

## 2023-07-28 PROCEDURE — 250N000011 HC RX IP 250 OP 636: Mod: JZ | Performed by: INTERNAL MEDICINE

## 2023-07-28 PROCEDURE — 93454 CORONARY ARTERY ANGIO S&I: CPT | Performed by: INTERNAL MEDICINE

## 2023-07-28 PROCEDURE — G0378 HOSPITAL OBSERVATION PER HR: HCPCS

## 2023-07-28 PROCEDURE — B2111ZZ FLUOROSCOPY OF MULTIPLE CORONARY ARTERIES USING LOW OSMOLAR CONTRAST: ICD-10-PCS | Performed by: INTERNAL MEDICINE

## 2023-07-28 PROCEDURE — 250N000013 HC RX MED GY IP 250 OP 250 PS 637: Performed by: HOSPITALIST

## 2023-07-28 PROCEDURE — 36415 COLL VENOUS BLD VENIPUNCTURE: CPT | Performed by: NURSE PRACTITIONER

## 2023-07-28 PROCEDURE — 75716 ARTERY X-RAYS ARMS/LEGS: CPT | Performed by: INTERNAL MEDICINE

## 2023-07-28 PROCEDURE — 272N000001 HC OR GENERAL SUPPLY STERILE: Performed by: INTERNAL MEDICINE

## 2023-07-28 PROCEDURE — 250N000011 HC RX IP 250 OP 636: Performed by: INTERNAL MEDICINE

## 2023-07-28 PROCEDURE — 99232 SBSQ HOSP IP/OBS MODERATE 35: CPT | Performed by: INTERNAL MEDICINE

## 2023-07-28 PROCEDURE — 97116 GAIT TRAINING THERAPY: CPT | Mod: GP

## 2023-07-28 PROCEDURE — 99232 SBSQ HOSP IP/OBS MODERATE 35: CPT | Mod: 25 | Performed by: NURSE PRACTITIONER

## 2023-07-28 PROCEDURE — 250N000013 HC RX MED GY IP 250 OP 250 PS 637: Performed by: NURSE PRACTITIONER

## 2023-07-28 DEVICE — CLOSURE ANGIOSEAL 6FR 610130: Type: IMPLANTABLE DEVICE | Status: FUNCTIONAL

## 2023-07-28 RX ORDER — FENTANYL CITRATE 50 UG/ML
25 INJECTION, SOLUTION INTRAMUSCULAR; INTRAVENOUS
Status: DISCONTINUED | OUTPATIENT
Start: 2023-07-28 | End: 2023-08-02 | Stop reason: HOSPADM

## 2023-07-28 RX ORDER — NALOXONE HYDROCHLORIDE 0.4 MG/ML
0.4 INJECTION, SOLUTION INTRAMUSCULAR; INTRAVENOUS; SUBCUTANEOUS
Status: ACTIVE | OUTPATIENT
Start: 2023-07-28 | End: 2023-07-28

## 2023-07-28 RX ORDER — ACETAMINOPHEN 325 MG/1
650 TABLET ORAL EVERY 4 HOURS PRN
Status: DISCONTINUED | OUTPATIENT
Start: 2023-07-28 | End: 2023-08-02 | Stop reason: HOSPADM

## 2023-07-28 RX ORDER — NALOXONE HYDROCHLORIDE 0.4 MG/ML
0.2 INJECTION, SOLUTION INTRAMUSCULAR; INTRAVENOUS; SUBCUTANEOUS
Status: ACTIVE | OUTPATIENT
Start: 2023-07-28 | End: 2023-07-28

## 2023-07-28 RX ORDER — OXYCODONE HYDROCHLORIDE 5 MG/1
5 TABLET ORAL EVERY 4 HOURS PRN
Status: DISCONTINUED | OUTPATIENT
Start: 2023-07-28 | End: 2023-08-02 | Stop reason: HOSPADM

## 2023-07-28 RX ORDER — IOPAMIDOL 755 MG/ML
INJECTION, SOLUTION INTRAVASCULAR
Status: DISCONTINUED | OUTPATIENT
Start: 2023-07-28 | End: 2023-07-28 | Stop reason: HOSPADM

## 2023-07-28 RX ORDER — FLUMAZENIL 0.1 MG/ML
0.2 INJECTION, SOLUTION INTRAVENOUS
Status: ACTIVE | OUTPATIENT
Start: 2023-07-28 | End: 2023-07-28

## 2023-07-28 RX ORDER — OXYCODONE HYDROCHLORIDE 5 MG/1
10 TABLET ORAL EVERY 4 HOURS PRN
Status: DISCONTINUED | OUTPATIENT
Start: 2023-07-28 | End: 2023-08-02 | Stop reason: HOSPADM

## 2023-07-28 RX ORDER — ROSUVASTATIN CALCIUM 20 MG/1
20 TABLET, COATED ORAL DAILY
Status: DISCONTINUED | OUTPATIENT
Start: 2023-07-28 | End: 2023-08-02 | Stop reason: HOSPADM

## 2023-07-28 RX ORDER — ATROPINE SULFATE 0.1 MG/ML
0.5 INJECTION INTRAVENOUS
Status: ACTIVE | OUTPATIENT
Start: 2023-07-28 | End: 2023-07-28

## 2023-07-28 RX ORDER — FENTANYL CITRATE 50 UG/ML
INJECTION, SOLUTION INTRAMUSCULAR; INTRAVENOUS
Status: DISCONTINUED | OUTPATIENT
Start: 2023-07-28 | End: 2023-07-28 | Stop reason: HOSPADM

## 2023-07-28 RX ORDER — HEPARIN SODIUM 10000 [USP'U]/100ML
100-1000 INJECTION, SOLUTION INTRAVENOUS CONTINUOUS PRN
Status: DISCONTINUED | OUTPATIENT
Start: 2023-07-28 | End: 2023-07-28 | Stop reason: HOSPADM

## 2023-07-28 RX ADMIN — METHYLCELLULOSE 500 MG: 500 TABLET ORAL at 08:38

## 2023-07-28 RX ADMIN — ASPIRIN 81 MG CHEWABLE TABLET 81 MG: 81 TABLET CHEWABLE at 08:38

## 2023-07-28 RX ADMIN — ROSUVASTATIN CALCIUM 20 MG: 20 TABLET, FILM COATED ORAL at 14:44

## 2023-07-28 RX ADMIN — CARBOXYMETHYLCELLULOSE SODIUM 1 DROP: 5 SOLUTION/ DROPS OPHTHALMIC at 20:44

## 2023-07-28 RX ADMIN — CARBOXYMETHYLCELLULOSE SODIUM 1 DROP: 5 SOLUTION/ DROPS OPHTHALMIC at 08:38

## 2023-07-28 ASSESSMENT — ACTIVITIES OF DAILY LIVING (ADL)
DRESSING/BATHING_DIFFICULTY: NO
WEAR_GLASSES_OR_BLIND: YES
ADLS_ACUITY_SCORE: 37
ADLS_ACUITY_SCORE: 42
HEARING_DIFFICULTY_OR_DEAF: YES
VISION_MANAGEMENT: GLASSES
ADLS_ACUITY_SCORE: 34
FALL_HISTORY_WITHIN_LAST_SIX_MONTHS: YES
ADLS_ACUITY_SCORE: 42
ADLS_ACUITY_SCORE: 42
THE_FOLLOWING_AIDS_WERE_PROVIDED;: PATIENT DECLINED OFFER OF AUXILIARY AIDS
DIFFICULTY_EATING/SWALLOWING: NO
EQUIPMENT_CURRENTLY_USED_AT_HOME: CANE, STRAIGHT
USE_OF_HEARING_ASSISTIVE_DEVICES: BILATERAL HEARING AIDS
ADLS_ACUITY_SCORE: 37
WERE_AUXILIARY_AIDS_OFFERED?: NO
CHANGE_IN_FUNCTIONAL_STATUS_SINCE_ONSET_OF_CURRENT_ILLNESS/INJURY: NO
ADLS_ACUITY_SCORE: 42
DOING_ERRANDS_INDEPENDENTLY_DIFFICULTY: YES
ADLS_ACUITY_SCORE: 42
PATIENT'S_PREFERRED_MEANS_OF_COMMUNICATION: VERBAL
ADLS_ACUITY_SCORE: 42
ADLS_ACUITY_SCORE: 42
TOILETING_ISSUES: NO
DESCRIBE_HEARING_LOSS: BILATERAL HEARING LOSS
CONCENTRATING,_REMEMBERING_OR_MAKING_DECISIONS_DIFFICULTY: YES
NUMBER_OF_TIMES_PATIENT_HAS_FALLEN_WITHIN_LAST_SIX_MONTHS: 2
ADLS_ACUITY_SCORE: 34
ADLS_ACUITY_SCORE: 42
WALKING_OR_CLIMBING_STAIRS_DIFFICULTY: NO

## 2023-07-28 NOTE — PRE-PROCEDURE
GENERAL PRE-PROCEDURE:   Procedure:  Coronary angiogram  Date/Time:  7/28/2023 12:00 PM    Written consent obtained?: Yes    Risks and benefits: Risks, benefits and alternatives were discussed    Consent given by:  Patient  Patient states understanding of procedure being performed: Yes    Patient's understanding of procedure matches consent: Yes    Procedure consent matches procedure scheduled: Yes    Expected level of sedation:  Moderate  Appropriately NPO:  Yes  ASA Class:  4  Mallampati  :  Grade 3- soft palate visible, posterior pharyngeal wall not visible  Lungs:  Lungs clear with good breath sounds bilaterally  Heart:  Normal heart sounds and rate  History & Physical reviewed:  History and physical reviewed and no updates needed  Statement of review:  I have reviewed the lab findings, diagnostic data, medications, and the plan for sedation

## 2023-07-28 NOTE — PROGRESS NOTES
Orientation/Cognitive:Disoriented to time  Observation Goals (Met/ Not Met): Not met  Mobility Level/Assist Equipment: A1 with GBW  Fall Risk (Y/N): Yes  Behavior Concerns: None  Pain Management: Denies  Tele/VS/O2: VSS On RA  ABNL Lab/BG: Pro BNP-2,019  Diet: NPO  Bowel/Bladder: Incontinent   Skin Concerns: Scattered bruises  Drains/Devices: PIV SL  Tests/Procedures for next shift: Angiogram  Anticipated DC date & active delays: Today  Patient Stated Goal for Today: Rest    Observation goals  PRIOR TO DISCHARGE        Comments: -diagnostic tests and consults completed and resulted: Not Met  -vital signs normal or at patient baseline: Partially Met  -tolerating oral intake to maintain hydration: Met  -adequate pain control on oral analgesics: Met

## 2023-07-28 NOTE — PROGRESS NOTES
[facility]    Medicine Progress Note - Hospitalist Service    Date of Admission:  7/23/2023    Assessment & Plan   Erick Shahid is a 83 year old  male with medical history of cognitive impairment, hyperlipidemia, diabetes mellitus, coronary artery disease, aortic stenosis brought into the ED by wife and son with falls.     Syncope/fall likely secondary to valvular disorder  Critical aortic stenosis  Physical deconditioning  Progressive cognitive decline.  Patient brought into the ED by family with 2 unwitnessed falls today, progressive cognitive decline.  Most likely this is secondary to critical aortic stenosis, management as below.  Work-up in the ED .  *Sodium 139 potassium 4.2, calcium 9.8.  AST 22, ALT 11 albumin 4.3.  WBC 6.1.  Hemoglobin 13.4.  *UA nitrite negative, leukocyte esterase negative.  EKG sinus rhythm with first-degree AV block.  Left axis deviation.  Left bundle branch block.  Chest x-ray mild bandlike scarring in the lung bases.  Benign calcified left perihilar lymph nodes.  *CT head no acute pathology.  Diffuse age-related changes.  X-ray pelvis and hip-  No acute fracture or dislocation within the limitations of osteopenia. See  Report for details.  X-ray lumbar spine - good anatomic alignment of the lumbar spine with no evidence of subluxation.  Degenerative disc disease.  *Outpatient TSH, vitamin B12, MRI brain were normal in March 2023.   COVID-19 PCR negative.  CK within normal limits  --PT, OT followed, recommending TCU.  timing unclear pending cardiology plan   --WOC RN consult for coccyx wound.  --Fall precautions.      Critical aortic stenosis with aortic valve area of 0.63 and gradient of 63 mmHg  New cardiomyopathy, HFrEF with WMA: Likely secondary to aortic stenosis  Detectable troponin likely in the setting of fall from demand ischemia.  Coronary artery disease.  LBBB.  Hyperlipidemia.  Follows with [facility]. TAVR workup performed in 2021 with CTA showing  heavily calcified proximal LAD with possible obstructive stenosis.   *Last echocardiogram from 2021 with severe aortic stenosis, LVEF of 55 to 60%.  *Troponin 32 > 25    *Telemetry monitoring with sinus rhythm, first-degree AV block and bundle branch bloc  *ECHO from 7/25 shows EF 30-35% with moderate to severe global hypokinesis of LV as well as critical AS  --Continue PTA aspirin.  --Continue PTA metoprolol.  --  --Cardiology consulted, discussed options with pt and family including review by TAVR team,  Cardiology did recommend TAVR, family now agree.  The patient is undergoing work-up for the TAVR at this time.  And scheduled for coronary angiogram.  Work-up and timing of the TAVR as per cardiology team.    Bilateral lower extremity edema.  Family reporting bilateral lower extremity edema with associated pain.  *Ultrasound bilateral lower extremity no DVT.    *ECHO with new HFrEF as below   --Limb elevation.  Ace wraps.  --defer diuretic given critical AS      Diabetes with a hemoglobin A1c of 6.0.  Decreased PTA metformin from 1 g daily to 500 mg daily.  Hold metformin while in the hospital, as he is undergoing angiogram today.     Macular degeneration.  Hearing loss.  Continue PTA eyedrops, hearing aids.     Urinary incontinence.   No acute issues.       Diet: NPO for Medical/Clinical Reasons Except for: Meds  NPO per Anesthesia Guidelines for Procedure/Surgery Except for: Meds    DVT Prophylaxis: none currently, if needed cardiology recommending heparin over lovenox. Awaiting plan today to start anything   Pablo Catheter: Not present  Lines: None     Cardiac Monitoring: ACTIVE order. Indication: coronary angiogram  Code Status: No CPR- Do NOT Intubate      Clinically Significant Risk Factors Present on Admission                  # Drug Induced Platelet Defect: home medication list includes an antiplatelet medication      # Chronic heart failure with reduced ejection fraction: last echo with EF <40%     #  "Overweight: Estimated body mass index is 27.14 kg/m  as calculated from the following:    Height as of this encounter: 1.702 m (5' 7\").    Weight as of this encounter: 78.6 kg (173 lb 4.5 oz).            Disposition Plan      Expected Discharge Date: 07/29/2023      Destination: inpatient rehabilitation facility  Discharge Comments: PT-TCU  CT for chest/abdomen/pelvis  coronary angio 7/28  TAVR?  SW          Overall the patient is stable at this time.  Perhaps critical aortic stenosis that need TAVR.  He is undergoing work-up for the TAVR, cardiology team is following closely.  Can switch from observation to inpatient giving his work-up and need for TAVR during this admission.    Discussed with the patient and the nursing staff thank you the patient.    Edison Wallis MD  Hospitalist Service  [facility]  Securely message with [employer] (more info)  Text page via [employer] Paging/Directory   ______________________________________________________________________    Interval History   Patient seen and evaluated in his room today, feeling well, denies any chest pain shortness breath orthopnea PND palpitation no headache dizziness lightheadedness no fever chills or cough.    No other significant event overnight    Physical Exam   Vital Signs: Temp: 97.5  F (36.4  C) Temp src: Axillary BP: 111/57 Pulse: 51   Resp: 18 SpO2: 97 % O2 Device: None (Room air)    Weight: 173 lbs 4.5 oz    Constitutional: Awake, alert, in no acute distress.  ENT: moist mucous membranes  Respiratory: Lungs clear to auscultation bilaterally, no increased work of breathing  Cardiovascular: Regular rhythm with mild bradycardia, systolic murmur positive  GI: active bowel sounds, abdomen soft, non-tender  Extremities: 1-2+ bilateral lower extremity edema MSK:  moves all four extremities.     Medical Decision Making       35 MINUTES SPENT BY ME on the date of service doing chart review, history, exam, documentation & further " activities per the note.      Data     I have personally reviewed the following data over the past 24 hrs:    4.9  \   12.5 (L)   / 174     136 103 11.8 /  120 (H)   3.6 22 0.69 \     Trop: N/A BNP: 2,019 (H)     INR:  1.07 PTT:  N/A   D-dimer:  N/A Fibrinogen:  N/A

## 2023-07-28 NOTE — PROGRESS NOTES
07/28/23 0044  Coping/Psychosocial  Observed Emotional State calm;cooperative  Verbalized Emotional State acceptance  Cognitive  Cognitive/Neuro/Behavioral WDL X  Arousal Level opens eyes spontaneously  Orientation disoriented to;place  Speech slurred  Best Language 0 - No aphasia  Mood/Behavior calm;cooperative  CAM (Confusion Assessment Method)  (1) Acute Onset/Fluctuating Course no  (2) Inattention no  (3) Disorganized Thinking no  (4) Altered Level of Consciousness no  Delirium present? no  Cognitive Interventions  Communication Enhancement Strategies call light answered in person;device use encouraged  Reorientation Measures clock in view  Sensory Stimulation Regulation care clustered;quiet environment promoted  Wallace Coma Scale  Best Eye Response 4-->(E4) spontaneous  Best Motor Response 6-->(M6) obeys commands  Best Verbal Response 4-->(V4) confused  Wallace Coma Scale Score 14  Neglect  Visual Neglect none  Motor Strength  Left Upper Motor Strength 5 - active movement against gravity and full resistance  Right Upper Motor Strength 5 - active movement against gravity and full resistance  Left Lower Motor Strength 4 - active movement against gravity and some resistance  Motor Response  LUE Motor Response purposeful motor response  RUE Motor Response purposeful motor response  LLE Motor Response purposeful motor response  RLE Motor Response purposeful motor response  Extraocular Movement  Left Extraocular Movement conjugate  Right Extraocular Movement conjugate  Visual Assessment (CN II, III, IV, VI)  Visual Tracking normal  Respiratory  Respiratory WDL WDL  Cough And Deep Breathing done independently per patient  Breath Sounds  Breath Sounds All Fields  Cardiac  Cardiac WDL X  Pulse Rate & Regularity bradycardic  Peripheral Neurovascular  Peripheral Neurovascular WDL X;edema  VTE Prevention/Management compression stockings off  Edema  Edema dependent;leg, left;leg, right  Leg, Left Edema 2+ (Mild)  Leg,  Right Edema 2+ (Mild)  Dependent Edema 2+ (Mild)  Gastrointestinal  Gastrointestinal WDL WDL  Genitourinary  Genitourinary WDL X;voiding ability/characteristics  Voiding Characteristics incontinence  Skin  Skin WDL X  Skin Color redness blanchable  Skin Temperature warm  Skin Moisture dry  Skin Elasticity quick return to original state  Skin Integrity bruised  Abrasion / Excoriation Sacrum/Coccyx  Bruised (ecchymotic) location Right;Left;Fore Arm  Device Skin Interventions Taken No adjustments needed  Aj Risk Assessment  Sensory Perception 3-->slightly limited  Moisture 3-->occasionally moist  Activity 3-->walks occasionally  Mobility 3-->slightly limited  Nutrition 3-->adequate  Friction and Shear 2-->potential problem  Aj Score 17  Sensory/Activity/Mobility Interventions Encourage activity/OOB  Moisture Interventions Encourage regular toileting  Friction/Shear Interventions HOB 30 degrees or less  Mattress Standard gel/foam mattress (IsoFlex, Atmos Air, etc.)  Bed Frame Standard width and length  Musculoskeletal  Musculoskeletal WDL X;mobility  General Mobility generalized weakness  Safety  Safety WDL WDL  Safety Factors ID band on;upper side rails raised x 2;call light in reach;wheels locked;bed in low position  Safety Management  Safety Promotion/Fall Prevention room door open;room near nurse's station;clutter free environment maintained  Enhanced Safety Measures room near unit station  Medication Review/Management medications reviewed  Fall Prevention Bundle  High fall risk medications prescribed? no  Within arms reach in use Yes  Transfer belt in use Yes  Fall precaution band in place? Yes  Medication interventions educate the patient on the implications of orthostatic blood pressure and postural hypotension  Elimination interventions provide diaper/personal hygiene product;remind to call for help before getting up  Mobility interventions use lifts/safe patient handling equipment  Cognition  interventions reorient to environment  Bed Alarm on  Chair Alarm off  Seatbelt Alarm off  Daily Care  Activity Management ambulated to bathroom;back to bed  Activity Assistance Provided assistance, 1 person  Assistive Device Utilized gait belt;walker  Perineal Care absorbent brief/pad changed  Positioning  Head of Bed (HOB) Positioning HOB at 20-30 degrees  Positioning/Transfer Devices pillows;in use

## 2023-07-28 NOTE — PROGRESS NOTES
Care Management Follow Up    Length of Stay (days): 0    Expected Discharge Date: 07/29/2023     Concerns to be Addressed:       Patient plan of care discussed at interdisciplinary rounds: Yes    Anticipated Discharge Disposition: Transitional Care     Anticipated Discharge Services: None  Anticipated Discharge DME: None    Patient/family educated on Medicare website which has current facility and service quality ratings: yes  Education Provided on the Discharge Plan:    Patient/Family in Agreement with the Plan: yes    Referrals Placed by CM/SW:    Private pay costs discussed: Not applicable    Additional Information:  Received message from Devi at [facility], they do not see a skilled need for TCU but thought pt could be LTC appropriate. Call to Devi to discuss. Informed that pt has [employer]BS medicare advantage and SW would be completing auth. Discussed pt's condition. Devi to discuss with  and will follow up with SW.     Informed by MD that pt is getting taver and not ready until next week likely.     DEISY Luna  Social Work  [facility]

## 2023-07-28 NOTE — PLAN OF CARE
Shift:7100-3307 7/28/23  Summary:unwitnessed falls   Orientation/Cognitive:A/Ox4, forgetful   Observation Goals (Met/ Not Met):not met   Mobility Level/Assist Equipment:A1-2 Gb/walker; Bedrest until 1640 today   Fall Risk (Y/N):Yes   Behavior Concerns:none   Pain Management:denies pain   Tele/VS/O2:VSS on room air   ABNL Lab/BG:BNP 2019, Hgb 12.5  Diet:regular   Bowel/Bladder:cont  Skin Concerns: rash to perineum, Right groin site, CDI  Drains/Devices:IV SL   Tests/Procedures for next shift:angio completed, plan of CT  for TAVR on Monday   Anticipated DC date & active delays:pending   Patient Stated Goal for Today:  Other important info:

## 2023-07-28 NOTE — PROGRESS NOTES
Observation goals  PRIOR TO DISCHARGE        Comments: -diagnostic tests and consults completed and resulted: Not Met  -vital signs normal or at patient baseline: Partially Met  -tolerating oral intake to maintain hydration: Met  -adequate pain control on oral analgesics: Met

## 2023-07-28 NOTE — PROGRESS NOTES
Observation goals  PRIOR TO DISCHARGE        Comments: -diagnostic tests and consults completed and resulted: not met   -vital signs normal or at patient baseline: partially met   -tolerating oral intake to maintain hydration: met   -adequate pain control on oral analgesics: met   Nurse to notify provider when observation goals have been met and patient is ready for discharge.

## 2023-07-28 NOTE — PROGRESS NOTES
MD Notification    Notified Person: PA    Notified Person Name: Jessica Diaz    Notification Date/Time: 7/27/23 at 1700    Notification Interaction: Vocera Web      Purpose of Notification: Need telemetry order. Plan for coronary angio tomorrow     Orders Received: Telemetry     Comments:

## 2023-07-28 NOTE — PLAN OF CARE
Shift 6410-2111. Patient is here following a fall and critical aortic stenosis     Orientation/Cognitive: A+O x 2, disoriented to time and situation  Observation Goals (Met/ Not Met): Partially met  Mobility Level/Assist Equipment: A1/GB/W   Fall Risk (Y/N): Y  Behavior Concerns: None   Pain Management: Denies pain  Tele/VS/O2: VSS on RA. Tele is SB w/ 1st degree AVB BBB  ABNL Lab/BG: See lab results  Diet: Regular   Bowel/Bladder: Amb to BR, voids and is occasionally incontinent of urine   Skin Concerns: Scattered bruising/scabs. Non-blanchable redness with scant drainage noted in upper gluteal cleft, mepilex applied.   Drains/Devices: PIV SL  Tests/Procedures for next shift: None. Plan for coronary angio on 7/28/23, CT TAVR ordered  Anticipated DC date & active delays: Pending TCU placement, referrals sent by SW.       OBSERVATION GOALS     Comments:      -Diagnostic tests and consults completed and resulted: Not Met TAVR and angio 7/28     -Vital signs normal or at patient baseline-Met     -Tolerating oral intake to maintain hydration-Met     -Adequate pain control on oral analgesics-Met     Nurse to notify provider when observation goals have been met and patient is ready for discharge.

## 2023-07-28 NOTE — PROGRESS NOTES
[facility]  Cardiology Progress Note  Date of Service: 07/28/2023      Assessment & Plan    Erick Shahid is a 83 year old male admitted on 7/23/2023 with falls.     Assessment:  Critical calcific aortic stenosis - valve area 0.63 cm2  Acute on chronic HFrEF, roughly euvolemic, NYHA III, LVEF 30-35%  Cardiomyopathy, appears consistent with valvular   Coronary artery disease - angiogram today showed 90% of proximal RCA, 25% stenosis of proximal-mid LAD.   Chronic LBBB  Recent falls, potentially related to #1  Deconditioning, failure to thrive  Dementia    Coronary angiogram today showed single vessel coronary artery disease with 90% stenosis of proximal RCA. 25% stenosis of proximal-mid LAD. Recommend medial management of CAD.     Additionally abdominal aortogram with bilateral iliofemoral angiography demonstrates normal sized ascending aorta, normal bilateral iliofemoral arteries with no significant stenoses identified. Transfemoral TAVR appears feasible.     Plan:   Structural heart team following for TAVR consideration, appreciate their assitance  CT TAVR on Monday   Continue aspirin 81mg daily  Continue metoprolol XL 25mg daily   Resume PTA rosuvastatin 20mg daily       LESTER Gonzalez CNP  [facility]  Pager: [phone]        Interval History   Denies chest pain. Denies shortness of breath. Denies dizziness/lightheadedness.     Physical Exam   Temp: 97.8  F (36.6  C) Temp src: Oral BP: 109/60 Pulse: 50   Resp: 12 SpO2: 99 % O2 Device: None (Room air) Oxygen Delivery: 2 LPM  Vitals:    07/23/23 1430 07/24/23 0248   Weight: 83.9 kg (185 lb) 78.6 kg (173 lb 4.5 oz)     GEN: well nourished, in no acute distress.  HEENT:  Pupils equal, round. Sclerae nonicteric.   NECK: Supple, no masses appreciated.   C/V:  Regular rate and rhythm, 3/6 systolic murmur   RESP: Respirations are unlabored. Clear to auscultation bilaterally without wheezing, rales, or rhonchi.  GI: Abdomen soft,  nontender.  EXTREM: Trace bilateral LE edema. Right groin access soft, non-tender, no ecchymosis/drainage, no bruit  NEURO: Alert and cooperative.  SKIN: Warm and dry.     Medications      aspirin  81 mg Oral Daily    carboxymethylcellulose PF  1 drop Ophthalmic BID    [Held by provider] metFORMIN  500 mg Oral Daily with supper    methylcellulose  500 mg Oral Daily    metoprolol succinate ER  25 mg Oral Daily       Data   Last 24 hours labs reviewed     Echo:   Mild concentric left ventricular hypertrophy.  Left ventricular systolic function is moderate to severely reduced.  The visual ejection fraction is 30-35%.  Moderate to severe global hypokinesia of the left ventricle.  The right ventricle is normal in size and function.  Critical aortic valve stenosis with mild regurgitation.  Peak transaortic velocity 4.8 m/s, mean gradient 63 mmHg, valve area 0.63 cmÂ .  Normal inferior vena cava.     There is no comparison study available.    Coronary angiogram 7/28/23  Prox LAD to Mid LAD lesion is 25% stenosed.    Prox RCA lesion is 90% stenosed.

## 2023-07-28 NOTE — CONSULTS
Cardiac Surgery   Consult Note     Erick Shahid  Code Status: No CPR- Do NOT Intubate  Primary Care Physician: No Ref-Primary, Physician   : [id number]   Age: 83 year old     Date of Service: 2023     Subjective     Chief Complaint: Falls    History of present illness:     82 y/o M who presents with 2 episodes of falls. He has known history of severe AORTIC STENOSIS but reportedly was not a candidate for any sort of valve replacement previously. He currently is feeling much better and has no complaints. We were asked to see this patient to discuss TAVR and bailout options. The patient is DNR/DNI which he is adamant about and states he does not want to undergo any procedures that would require him to not be able to go home.     Subjective   Review of Systems:   General- denies any fevers, chills, or night sweats  HEENT- denies headache, change in vision or change in hearing, sore throat, or rhinorrhea  Cardiac- denies chest pain or palpitations  Pulmonary- denies any shortness of breath or cough  GI- denies any abd pain, nausea, vomiting, constipation, or diarrhea, no hematochezia  - denies any dysuria, hematuria, or urinary changes  Skin- denies any skin rashes or skin changes  MSK- denies any muscle pains  Psych- denies any suicidal or homicidal ideation  Neuro- denies dizziness, syncope, or tremors  Endocrine- denies heat or cold intolerance     Patient Active Problem List   Diagnosis     Right leg pain     Cognitive decline     Multiple falls     No past medical history on file.  Past Surgical History:   Procedure Laterality Date     CV CORONARY ANGIOGRAM N/A 2023    Procedure: Coronary Angiogram;  Surgeon: Mando Field MD;  Location: [facility] [address] CARDIAC CATH LAB     CV PCI N/A 2023    Procedure: Percutaneous Coronary Intervention;  Surgeon: Mando Field MD;  Location: [facility] [address] CARDIAC CATH LAB     Social History     Socioeconomic History     Marital status: Patient  Declined     Spouse name: Not on file     Number of children: Not on file     Years of education: Not on file     Highest education level: Not on file   Occupational History     Not on file   Tobacco Use     Smoking status: Not on file     Smokeless tobacco: Not on file   Substance and Sexual Activity     Alcohol use: Not on file     Drug use: Not on file     Sexual activity: Not on file   Other Topics Concern     Not on file   Social History Narrative     Not on file     Social Determinants of Health     Financial Resource Strain: Not on file   Food Insecurity: Not on file   Transportation Needs: Not on file   Physical Activity: Not on file   Stress: Not on file   Social Connections: Not on file   Intimate Partner Violence: Not on file   Housing Stability: Not on file     No family history on file.  Medications Prior to Admission   Medication Sig Dispense Refill Last Dose     Ascorbic Acid (VITAMIN C) 500 MG CHEW Take 500 mg by mouth daily   7/22/2023     aspirin (ASA) 81 MG chewable tablet Take 81 mg by mouth daily   7/22/2023     cyanocobalamin (VITAMIN B-12) 1000 MCG tablet Take 1,000 mcg by mouth daily   7/22/2023     cycloSPORINE (RESTASIS) 0.05 % ophthalmic emulsion Apply 1 drop to eye 2 times daily   7/22/2023     metFORMIN (GLUCOPHAGE) 1000 MG tablet Take 1 tablet by mouth daily (with dinner)   7/22/2023     methylcellulose (CITRUCEL) powder Take 1 teaspoonful by mouth daily   7/22/2023     metoprolol succinate ER (TOPROL XL) 25 MG 24 hr tablet Take 1 tablet by mouth daily   7/22/2023     nystatin (MYCOSTATIN) 772892 UNIT/GM external powder Apply 1 strip topically 3 times daily as needed (jock itch)   Unknown at PRN     rosuvastatin (CRESTOR) 20 MG tablet Take 1 tablet by mouth daily   7/22/2023     Vitamin D3 (CHOLECALCIFEROL) 25 mcg (1000 units) tablet Take 25 mcg by mouth daily   7/22/2023     No Known Allergies       Objective   Objective   /60 (BP Location: Left arm, Patient Position: Supine,  "Cuff Size: Adult Regular)   Pulse 50   Temp 97.8  F (36.6  C) (Oral)   Resp 12   Ht 1.702 m (5' 7\")   Wt 78.6 kg (173 lb 4.5 oz)   SpO2 99%   BMI 27.14 kg/m      Temp  Min: 97.5  F (36.4  C)  Max: 97.8  F (36.6  C)  BP  Min: 105/58  Max: 148/67     Intake/Output Summary (Last 24 hours) at 7/28/2023 1448  Last data filed at 7/28/2023 1311  Gross per 24 hour   Intake 240 ml   Output 350 ml   Net -110 ml     I/O last 3 completed shifts:  In: 240 [P.O.:240]  Out: 175 [Urine:175]     Physical Exam:   Gen- alert and oriented x3, NAD  HEENT- NC/AT, EOMI  Cardiac- RRR  Pulm- normal respiratory effort  Abd- soft, NT/ND, no rebound or guarding  - deferred  Extremities- no peripheral edema  Neuro- gross motor intact  Skin- no obvious rashes, bruises, or cuts     Pertinent Labs: Reviewed.     Arterial Blood Gases   No lab results found in last 7 days.    Complete Blood Count   Recent Labs   Lab 07/28/23  0653 07/23/23  1445   WBC 4.9 6.1   HGB 12.5* 13.4    182       Basic Metabolic Panel  Recent Labs   Lab 07/28/23  0653 07/23/23  1445    139   POTASSIUM 3.6 4.2   CHLORIDE 103 104   CO2 22 24   BUN 11.8 16.6   CR 0.69 0.80       Liver Function Tests  Recent Labs   Lab 07/27/23  1827 07/23/23  1445   AST  --  22   ALT  --  11   ALKPHOS  --  75   BILITOTAL  --  1.1   ALBUMIN  --  4.3   INR 1.07  --        Coagulation Profile  Recent Labs   Lab 07/27/23  1827   INR 1.07          Pertinent Imaging (Last 24 hours):  Recent Results (from the past 24 hour(s))   Cardiac Catheterization    Narrative       Prox LAD to Mid LAD lesion is 25% stenosed.     Prox RCA lesion is 90% stenosed.      Single-vessel occlusive coronary artery disease namely mid small size   nondominant RCA  Remainder of coronaries with minimal nonocclusive disease  Abdominal aortogram with bilateral iliofemoral angiography demonstrates   normal sized ascending aorta, normal bilateral iliofemoral arteries with   no significant stenoses " identified.  Transfemoral TAVR appears feasible.         Last Echo:  Echo result w/o MOPS: Interpretation Summary Mild concentric left ventricular hypertrophy.Left ventricular systolic function is moderate to severely reduced.The visual ejection fraction is 30-35%.Moderate to severe global hypokinesia of the left ventricle.The right ventricle is normal in size and function.Critical aortic valve stenosis with mild regurgitation.Peak transaortic velocity 4.8 m/s, mean gradient 63 mmHg, valve area 0.63 cmÂ .Normal inferior vena cava. There is no comparison study available.               Current Medications     S  C  H  E  D   aspirin  81 mg Oral Daily     carboxymethylcellulose PF  1 drop Ophthalmic BID     [Held by provider] metFORMIN  500 mg Oral Daily with supper     methylcellulose  500 mg Oral Daily     metoprolol succinate ER  25 mg Oral Daily     rosuvastatin  20 mg Oral Daily      G  T  T    P  R  N sodium chloride 0.9%, [DISCONTINUED] acetaminophen **OR** acetaminophen, acetaminophen, atropine, bisacodyl, fentaNYL, flumazenil, HOLD MEDICATION, magnesium hydroxide, melatonin, midazolam, naloxone **OR** naloxone **OR** naloxone **OR** naloxone, ondansetron **OR** ondansetron, oxyCODONE **OR** oxyCODONE         Assessment     83 year old male with severe symptomatic AS     Plan     - we will await TAVR CT results to discuss further  - I discussed bailout with him and his family and he is leaning towards no bailout but they would like time to discuss amongst themselves  - he has RCA CAD but this is a nondominant system, agree with TAVR if able  - we will [place] back to discuss further with him and the family after CT is done  - rest of cares per primary, please call with any questions or concerns    This plan was discussed with Dr. Alcazar who agrees with the above.       Signed:    Jimmy Romero MD 7/28/2023 at 2:48 PM  Cardiothoracic Surgery Fellow  Pager: [phone]

## 2023-07-29 LAB
ANION GAP SERPL CALCULATED.3IONS-SCNC: 10 MMOL/L (ref 7–15)
BUN SERPL-MCNC: 9.5 MG/DL (ref 8–23)
CALCIUM SERPL-MCNC: 9.2 MG/DL (ref 8.8–10.2)
CHLORIDE SERPL-SCNC: 105 MMOL/L (ref 98–107)
CREAT SERPL-MCNC: 0.75 MG/DL (ref 0.67–1.17)
DEPRECATED HCO3 PLAS-SCNC: 26 MMOL/L (ref 22–29)
ERYTHROCYTE [DISTWIDTH] IN BLOOD BY AUTOMATED COUNT: 14 % (ref 10–15)
GFR SERPL CREATININE-BSD FRML MDRD: 88 ML/MIN/1.73M2
GLUCOSE SERPL-MCNC: 117 MG/DL (ref 70–99)
HCT VFR BLD AUTO: 36.5 % (ref 40–53)
HGB BLD-MCNC: 12.2 G/DL (ref 13.3–17.7)
MCH RBC QN AUTO: 32.1 PG (ref 26.5–33)
MCHC RBC AUTO-ENTMCNC: 33.4 G/DL (ref 31.5–36.5)
MCV RBC AUTO: 96 FL (ref 78–100)
PLATELET # BLD AUTO: 189 10E3/UL (ref 150–450)
POTASSIUM SERPL-SCNC: 3.8 MMOL/L (ref 3.4–5.3)
RBC # BLD AUTO: 3.8 10E6/UL (ref 4.4–5.9)
SODIUM SERPL-SCNC: 141 MMOL/L (ref 136–145)
WBC # BLD AUTO: 5.5 10E3/UL (ref 4–11)

## 2023-07-29 PROCEDURE — 250N000013 HC RX MED GY IP 250 OP 250 PS 637: Performed by: PHYSICIAN ASSISTANT

## 2023-07-29 PROCEDURE — 36415 COLL VENOUS BLD VENIPUNCTURE: CPT | Performed by: NURSE PRACTITIONER

## 2023-07-29 PROCEDURE — 99232 SBSQ HOSP IP/OBS MODERATE 35: CPT | Performed by: INTERNAL MEDICINE

## 2023-07-29 PROCEDURE — 250N000013 HC RX MED GY IP 250 OP 250 PS 637: Performed by: NURSE PRACTITIONER

## 2023-07-29 PROCEDURE — 210N000002 HC R&B HEART CARE

## 2023-07-29 PROCEDURE — 250N000013 HC RX MED GY IP 250 OP 250 PS 637: Performed by: HOSPITALIST

## 2023-07-29 PROCEDURE — 85027 COMPLETE CBC AUTOMATED: CPT | Performed by: NURSE PRACTITIONER

## 2023-07-29 PROCEDURE — 80048 BASIC METABOLIC PNL TOTAL CA: CPT | Performed by: NURSE PRACTITIONER

## 2023-07-29 RX ORDER — NALOXONE HYDROCHLORIDE 0.4 MG/ML
0.2 INJECTION, SOLUTION INTRAMUSCULAR; INTRAVENOUS; SUBCUTANEOUS
Status: DISCONTINUED | OUTPATIENT
Start: 2023-07-29 | End: 2023-08-02 | Stop reason: HOSPADM

## 2023-07-29 RX ORDER — NALOXONE HYDROCHLORIDE 0.4 MG/ML
0.4 INJECTION, SOLUTION INTRAMUSCULAR; INTRAVENOUS; SUBCUTANEOUS
Status: DISCONTINUED | OUTPATIENT
Start: 2023-07-29 | End: 2023-08-02 | Stop reason: HOSPADM

## 2023-07-29 RX ADMIN — METHYLCELLULOSE 500 MG: 500 TABLET ORAL at 08:55

## 2023-07-29 RX ADMIN — CARBOXYMETHYLCELLULOSE SODIUM 1 DROP: 5 SOLUTION/ DROPS OPHTHALMIC at 08:54

## 2023-07-29 RX ADMIN — ASPIRIN 81 MG CHEWABLE TABLET 81 MG: 81 TABLET CHEWABLE at 08:55

## 2023-07-29 RX ADMIN — METOPROLOL SUCCINATE 25 MG: 25 TABLET, EXTENDED RELEASE ORAL at 08:54

## 2023-07-29 RX ADMIN — ROSUVASTATIN CALCIUM 20 MG: 20 TABLET, FILM COATED ORAL at 08:55

## 2023-07-29 ASSESSMENT — ACTIVITIES OF DAILY LIVING (ADL)
ADLS_ACUITY_SCORE: 37
ADLS_ACUITY_SCORE: 39
ADLS_ACUITY_SCORE: 37
ADLS_ACUITY_SCORE: 37
ADLS_ACUITY_SCORE: 39
ADLS_ACUITY_SCORE: 37
ADLS_ACUITY_SCORE: 39
ADLS_ACUITY_SCORE: 37
ADLS_ACUITY_SCORE: 39
ADLS_ACUITY_SCORE: 37

## 2023-07-29 NOTE — PLAN OF CARE
Pt A&O x4, but forgetful. Pt up Ax1 with gb/w. VSS on RA. Tele - SR, SB w/ 1st degree AV block and BBB. During shift, pt had brief pauses, the longest being 2 seconds. Cards notified - continue to monitor. R groin site CDI.     Plan: monitor until CT workup for TAVR on Monday.     More Shannon SN

## 2023-07-29 NOTE — PLAN OF CARE
Shift 0616-0330 Goal Outcome Evaluation:    Orientation/Cognitive: A+O x 4. Forgetful   Observation Goals (Met/ Not Met): n/a IP status   Mobility Level/Assist Equipment: A1/GB/W  Fall Risk (Y/N): Y   Behavior Concerns: None   Pain Management: Denies    Tele/VS/O2: VSS on RA.Tele is SB w/ 1st degree AVB  ABNL Lab/BG: See lab results  Diet: Regular   Bowel/Bladder: Continent. Voiding in BR  Skin Concerns: Rash to perineum and migel groins. Powder applied. POD1 coronary angio-Drg on right arun is CDI, no bleeding, no hematoma, soft, nontender.   Drains/Devices: PIV SL   Tests/Procedures for next shift: Plan for CT TAVR on Monday   Anticipated DC date & active delays: TBD  Patient Stated Goal for Today: Did not state [place]

## 2023-07-29 NOTE — PROGRESS NOTES
[facility]    Medicine Progress Note - Hospitalist Service    Date of Admission:  7/23/2023    Assessment & Plan  Erick Shahid is a 83 year old  male with medical history of cognitive impairment, hyperlipidemia, diabetes mellitus, coronary artery disease, aortic stenosis brought into the ED by wife and son with falls.     Syncope/fall likely secondary to valvular disorder  Critical aortic stenosis  Physical deconditioning  Progressive cognitive decline.  Patient brought into the ED by family with 2 unwitnessed falls today, progressive cognitive decline.  Most likely this is secondary to critical aortic stenosis, management as below.  Work-up in the ED .  *Sodium 139 potassium 4.2, calcium 9.8.  AST 22, ALT 11 albumin 4.3.  WBC 6.1.  Hemoglobin 13.4.  *UA nitrite negative, leukocyte esterase negative.  EKG sinus rhythm with first-degree AV block.  Left axis deviation.  Left bundle branch block.  Chest x-ray mild bandlike scarring in the lung bases.  Benign calcified left perihilar lymph nodes.  *CT head no acute pathology.  Diffuse age-related changes.  X-ray pelvis and hip-  No acute fracture or dislocation within the limitations of osteopenia. See  Report for details.  X-ray lumbar spine - good anatomic alignment of the lumbar spine with no evidence of subluxation.  Degenerative disc disease.  *Outpatient TSH, vitamin B12, MRI brain were normal in March 2023.   COVID-19 PCR negative.  CK within normal limits  --PT, OT followed, recommending TCU.  timing unclear pending cardiology plan   --WOC RN consult for coccyx wound.  --Fall precautions.        Critical aortic stenosis with aortic valve area of 0.63 and gradient of 63 mmHg  New cardiomyopathy, HFrEF with WMA: Likely secondary to aortic stenosis  Detectable troponin likely in the setting of fall from demand ischemia.  Coronary artery disease.  LBBB.  Hyperlipidemia.  Follows with [facility]. TAVR workup performed in 2021 with CTA showing  "heavily calcified proximal LAD with possible obstructive stenosis.   *Last echocardiogram from 2021 with severe aortic stenosis, LVEF of 55 to 60%.  *Troponin 32 > 25    *Telemetry monitoring with sinus rhythm, first-degree AV block and bundle branch bloc  *ECHO from 7/25 shows EF 30-35% with moderate to severe global hypokinesis of LV as well as critical AS  --Continue PTA aspirin.  --Cardiology consulted, discussed options with pt and family including review by TAVR team,  Cardiology did recommend TAVR, family now agree.  The patient is undergoing work-up for the TAVR at this time.  And scheduled for coronary angiogram.  Work-up and timing of the TAVR as per cardiology team.  -- Metoprolol held today due to low HR    Bilateral lower extremity edema.  Family reporting bilateral lower extremity edema with associated pain.  *Ultrasound bilateral lower extremity no DVT.    *ECHO with new HFrEF as below   --Limb elevation.  Ace wraps.  --defer diuretic given critical AS      Diabetes with a hemoglobin A1c of 6.0.  Decreased PTA metformin from 1 g daily to 500 mg daily.  Hold metformin while in the hospital, as he is undergoing angiogram today.     Macular degeneration.  Hearing loss.  Continue PTA eyedrops, hearing aids.     Urinary incontinence.   No acute issues.     Diet: Regular Diet Adult    DVT Prophylaxis: SCD and ambulate.   Pablo Catheter: Not present  Lines: None     Cardiac Monitoring: ACTIVE order. Indication: Post- PCI/Angiogram (24 hours)  Code Status: No CPR- Do NOT Intubate      Clinically Significant Risk Factors                   # Chronic heart failure with reduced ejection fraction: last echo with EF <40%       # Overweight: Estimated body mass index is 26 kg/m  as calculated from the following:    Height as of this encounter: 1.702 m (5' 7\").    Weight as of this encounter: 75.3 kg (166 lb 0.1 oz)., PRESENT ON ADMISSION          Disposition Plan      Expected Discharge Date: 08/02/2023    "   Destination: inpatient rehabilitation facility  Discharge Comments: PT-TCU  CT for chest/abdomen/pelvis  coronary angio 7/28  TAVR?  KANE Ruiz MD  Hospitalist Service  [facility]  Securely message with [employer] (more info)  Text page via [employer] Paging/Directory   ______________________________________________________________________    Interval History   Stable. Patient was seen and evaluated in the room today.   HR was low, Metoprolol held. No new symptoms.   TAVR planned for Monday. Can resume Regular diet.  Discussed plan with the nurse    Physical Exam   Vital Signs: Temp: 98.6  F (37  C) Temp src: Oral BP: 112/56 Pulse: 56   Resp: 16 SpO2: 98 % O2 Device: None (Room air)    Weight: 166 lbs .1 oz    General Appearance: stable  Respiratory: lungs clear   Cardiovascular: systolic murmur on exam  GI: unchanged  Skin: unchanged  Other: unchanged       30 MINUTES SPENT BY ME on the date of service doing chart review, history, exam, documentation & further activities per the note.  Tests REVIEWED in the past 24 hours:      Data     I have personally reviewed the following data over the past 24 hrs:    5.5  \   12.2 (L)   / 189     141 105 9.5 /  117 (H)   3.8 26 0.75 \

## 2023-07-29 NOTE — PLAN OF CARE
Shift:4739-4800 7/29/23  Summary:unwitnessed falls, severe aortic stenosis    Orientation/Cognitive:A/Ox4, forgetful, wears hearing aids   Observation Goals (Met/ Not Met):Inpt   Mobility Level/Assist Equipment:A1 Gb/walker  Fall Risk (Y/N):Yes   Behavior Concerns:none   Pain Management:denies pain   Tele/VS/O2:VSS on room air, Tele SR w/ 1st AVB   ABNL Lab/BG:BNP 2019, Hgb 12.5, Troponin 25  Diet:regular   Bowel/Bladder:cont, voiding in bathroom  Skin Concerns: rash to perineum, Right groin site, CDI, no bleeding or hematoma. Rash to perineum and groin.    Drains/Devices:IV SL, 18 gauge   Tests/Procedures for next shift:angio completed on 7/28/23, plan of CT for TAVR workup on Monday   Anticipated DC date & active delays:pending   Patient Stated Goal for Today:  Other important info: Report given to heart center RN, pt transferred to room [facility]. Daughter updated

## 2023-07-29 NOTE — PROGRESS NOTES
Observation goals  PRIOR TO DISCHARGE        Comments:   -Diagnostic tests and consults completed and resulted: Partially met   -Vital signs normal or at patient baseline: Partially met   -Tolerating oral intake to maintain hydration: Met   -Adequate pain control on oral analgesics: Met   Nurse to notify provider when observation goals have been met and patient is ready for discharge.

## 2023-07-30 LAB
ANION GAP SERPL CALCULATED.3IONS-SCNC: 11 MMOL/L (ref 7–15)
BUN SERPL-MCNC: 9.7 MG/DL (ref 8–23)
CALCIUM SERPL-MCNC: 9.1 MG/DL (ref 8.8–10.2)
CHLORIDE SERPL-SCNC: 104 MMOL/L (ref 98–107)
CREAT SERPL-MCNC: 0.71 MG/DL (ref 0.67–1.17)
DEPRECATED HCO3 PLAS-SCNC: 24 MMOL/L (ref 22–29)
ERYTHROCYTE [DISTWIDTH] IN BLOOD BY AUTOMATED COUNT: 13.5 % (ref 10–15)
GFR SERPL CREATININE-BSD FRML MDRD: 90 ML/MIN/1.73M2
GLUCOSE SERPL-MCNC: 122 MG/DL (ref 70–99)
HBA1C MFR BLD: 6 %
HCT VFR BLD AUTO: 38.6 % (ref 40–53)
HGB BLD-MCNC: 12.8 G/DL (ref 13.3–17.7)
MCH RBC QN AUTO: 31.7 PG (ref 26.5–33)
MCHC RBC AUTO-ENTMCNC: 33.2 G/DL (ref 31.5–36.5)
MCV RBC AUTO: 96 FL (ref 78–100)
PLATELET # BLD AUTO: 202 10E3/UL (ref 150–450)
POTASSIUM SERPL-SCNC: 3.7 MMOL/L (ref 3.4–5.3)
RBC # BLD AUTO: 4.04 10E6/UL (ref 4.4–5.9)
SODIUM SERPL-SCNC: 139 MMOL/L (ref 136–145)
WBC # BLD AUTO: 6 10E3/UL (ref 4–11)

## 2023-07-30 PROCEDURE — 250N000013 HC RX MED GY IP 250 OP 250 PS 637: Performed by: HOSPITALIST

## 2023-07-30 PROCEDURE — 210N000002 HC R&B HEART CARE

## 2023-07-30 PROCEDURE — 999N000128 HC STATISTIC PERIPHERAL IV START W/O US GUIDANCE

## 2023-07-30 PROCEDURE — 83036 HEMOGLOBIN GLYCOSYLATED A1C: CPT | Performed by: INTERNAL MEDICINE

## 2023-07-30 PROCEDURE — 36415 COLL VENOUS BLD VENIPUNCTURE: CPT | Performed by: NURSE PRACTITIONER

## 2023-07-30 PROCEDURE — 99232 SBSQ HOSP IP/OBS MODERATE 35: CPT | Performed by: INTERNAL MEDICINE

## 2023-07-30 PROCEDURE — 99233 SBSQ HOSP IP/OBS HIGH 50: CPT | Performed by: INTERNAL MEDICINE

## 2023-07-30 PROCEDURE — 80048 BASIC METABOLIC PNL TOTAL CA: CPT | Performed by: NURSE PRACTITIONER

## 2023-07-30 PROCEDURE — 250N000013 HC RX MED GY IP 250 OP 250 PS 637: Performed by: NURSE PRACTITIONER

## 2023-07-30 PROCEDURE — 85027 COMPLETE CBC AUTOMATED: CPT | Performed by: NURSE PRACTITIONER

## 2023-07-30 RX ADMIN — METHYLCELLULOSE 500 MG: 500 TABLET ORAL at 09:23

## 2023-07-30 RX ADMIN — CARBOXYMETHYLCELLULOSE SODIUM 1 DROP: 5 SOLUTION/ DROPS OPHTHALMIC at 09:23

## 2023-07-30 RX ADMIN — CARBOXYMETHYLCELLULOSE SODIUM 1 DROP: 5 SOLUTION/ DROPS OPHTHALMIC at 19:54

## 2023-07-30 RX ADMIN — ASPIRIN 81 MG CHEWABLE TABLET 81 MG: 81 TABLET CHEWABLE at 09:23

## 2023-07-30 RX ADMIN — ROSUVASTATIN CALCIUM 20 MG: 20 TABLET, FILM COATED ORAL at 09:23

## 2023-07-30 RX ADMIN — Medication 1 MG: at 20:27

## 2023-07-30 ASSESSMENT — ACTIVITIES OF DAILY LIVING (ADL)
ADLS_ACUITY_SCORE: 37
ADLS_ACUITY_SCORE: 37
ADLS_ACUITY_SCORE: 39
ADLS_ACUITY_SCORE: 37
ADLS_ACUITY_SCORE: 39
ADLS_ACUITY_SCORE: 37
ADLS_ACUITY_SCORE: 39

## 2023-07-30 NOTE — PLAN OF CARE
A&O x self, redirectable. Impulsive. Bed alarm activated. Patient denies pain. VSS, on RA. Up with Ax1 gb and walker at bedside for urinal. Tele: SB w/ 1AVB and  BBB. Known coccyx injury, WOC following. Plan for TAVR CT Monday.

## 2023-07-30 NOTE — PROGRESS NOTES
[facility]    Medicine Progress Note - Hospitalist Service    Date of Admission:  7/23/2023         Expand All Collapse All    [facility]     Medicine Progress Note - Hospitalist Service     Date of Admission:  7/23/2023     Assessment & Plan  83 year old  male with medical history of cognitive impairment, hyperlipidemia, diabetes mellitus, coronary artery disease, aortic stenosis brought into the ED by wife and son with falls.     Syncope/fall likely secondary to valvular disorder  Critical aortic stenosis  Physical deconditioning  Progressive cognitive decline.  Patient brought into the ED by family with 2 unwitnessed falls abd progressive cognitive decline.  Most likely this is secondary to critical aortic stenos  EKG sinus rhythm with first-degree AV block.  Left axis deviation.  Left bundle branch block.  Chest x-ray mild bandlike scarring in the lung bases.  Benign calcified left perihilar lymph nodes.  *CT head no acute pathology.  Diffuse age-related changes.  X-ray pelvis and hip-  No acute fracture or dislocation within the limitations of osteopenia. See  Report for details.  X-ray lumbar spine - good anatomic alignment of the lumbar spine with no evidence of subluxation.  Degenerative disc disease.  *Outpatient TSH, vitamin B12, MRI brain were normal in March 2023.   COVID-19 PCR negative.  CK within normal limits  --PT, OT followed, recommending TCU.  timing unclear pending cardiology plan   --WOC RN consult for coccyx wound.  --Fall precautions.        Critical aortic stenosis with aortic valve area of 0.63 and gradient of 63 mmHg  New cardiomyopathy, HFrEF with WMA: Likely secondary to aortic stenosis  Detectable troponin likely in the setting of fall from demand ischemia.  Coronary artery disease.  LBBB.  Hyperlipidemia.  Follows with [facility]. TAVR workup performed in 2021 with CTA showing heavily calcified proximal LAD with possible obstructive  stenosis.   *Last echocardiogram from 2021 with severe aortic stenosis, LVEF of 55 to 60%.  *Troponin 32 > 25    *Telemetry monitoring with sinus rhythm, first-degree AV block and bundle branch bloc  *ECHO from 7/25 shows EF 30-35% with moderate to severe global hypokinesis of LV as well as critical AS  --Continue PTA aspirin.  --Cardiology consulted, discussed options with pt and family including review by TAVR team,  Cardiology did recommend TAVR, family now agree.  The patient is undergoing work-up for the TAVR at this time.  And scheduled for coronary angiogram.  Work-up and timing of the TAVR as per cardiology team.  -- Metoprolol is held  due to low HR     Bilateral lower extremity edema.  Family reporting bilateral lower extremity edema with associated pain.  Ultrasound bilateral lower extremity no DVT.    ECHO with new HFrEF as below   -Limb elevation.    -Ace wraps.  --defer diuretic given critical AS      Diabetes type 2 with a hemoglobin A1c of 6.0.  Decreased PTA metformin from 1 g daily to 500 mg daily.  Hold metformin while in the hospital, as he is undergoing angiogram today.      Macular degeneration and hearing loss:  Continue PTA eyedrops, hearing aids.     Urinary incontinence.   No acute issues.    Cognitive impairment :  He has cognitive impairment     Patient has slightly low hemoglobin but numbers are stable     Discussed care with patient and his RN           Diet: Regular Diet Adult    DVT Prophylaxis: Pneumatic Compression Devices and Ambulate every shift  Pablo Catheter: Not present  Lines: None     Cardiac Monitoring: ACTIVE order. Indication: Post- PCI/Angiogram (24 hours)  Code Status: No CPR- Do NOT Intubate      Clinically Significant Risk Factors                   # Chronic heart failure with reduced ejection fraction: last echo with EF <40%       # Overweight: Estimated body mass index is 26.03 kg/m  as calculated from the following:    Height as of this encounter: 1.702 m (5'  "7\").    Weight as of this encounter: 75.4 kg (166 lb 3.6 oz)., PRESENT ON ADMISSION          Disposition Plan      Expected Discharge Date: 08/02/2023      Destination: inpatient rehabilitation facility  Discharge Comments: PT-TCU  CT for chest/abdomen/pelvis  coronary angio 7/28  TAVR?  SW          Jenae Ledesma MD  Hospitalist Service  [facility]  Securely message with [employer] (more info)  Text page via [employer] Paging/Directory   ______________________________________________________________________    Interval History   Denies any chest pain or shortness of breath  Appetite is good  Appears slightly confused   HR was low, Metoprolol held. No new symptoms.   TAVR planned for Monday  Physical Exam   Vital Signs: Temp: 98.4  F (36.9  C) Temp src: Oral BP: (!) 146/75 Pulse: 61   Resp: 16 SpO2: 99 % O2 Device: None (Room air)    Weight: 166 lbs 3.63 oz      General Appearance:  alert and awake   Memory fair   Mild confused as has cognitive impairment   Respiratory: lungs clear   Cardiovascular: systolic murmur on exam  GI: unchanged  Skin: unchanged  Other:  unchanged   Has bilateral leg edema     Medical Decision Making       40 MINUTES SPENT BY ME on the date of service doing chart review, history, exam, documentation & further activities per the note.      Data     I have personally reviewed the following data over the past 24 hrs:    6.0  \   12.8 (L)   / 202     139 104 9.7 /  122 (H)   3.7 24 0.71 \       "

## 2023-07-30 NOTE — PLAN OF CARE
A&O x4, very [place], forgetful. Pt denied pain. VSS, on RA. Up w/ SBA +GB+walker. Tele: SB BBB. Up in chair for meals.  Alarms on. Plan for CTA TAVR tomorrow. Continue to monitor.

## 2023-07-30 NOTE — PLAN OF CARE
Goal Outcome Evaluation:    [id number]    Pt is A&OX 3-4. Very forgetful. Impulsive at times overnight. Pt also pulled out his iv overnight. VSS on RA. Tele SB with 1 AVB. He is up with 1 asssist gait belt and walker. Used bedside urinal overnight. Known Coccyx injury, WOC following. Plan for TAVR work up and CT on Monday.

## 2023-07-30 NOTE — PROGRESS NOTES
[facility]    Cardiology Consultation - Progress Note     Date of Admission:  7/23/2023  Date of Service: 07/30/23    Reason for Consult   Reason for consult: aortic stenosis    History of Present Illness   Erick Shahid is a 83 year old male who was admitted on 7/23/2023 for syncope in the setting of critical aortic stenosis. Medical history includes CAD and diabetes.     Yesterday he was having bradycardia and intermittent pauses up to 2s on telemetry - asymptomatic and hemodynamically tolerated. Metoprolol was discontinued and we opted to observe.     He is feeling well today. No chest pain, palpitations, dizziness, lightheadedness.    Assessment & Plan   #1 Critical aortic stenosis, valve area 0.63 cm2  #2 Acute on chronic HFrEF, LVEF 30-35%  #3 Coronary artery disease, severe prox RCA (nondominant)  #4 LBBB and 1st degree AV block    Mr. Shahid is doing well today. He is planned to have TAVR CT tomorrow. He is doing well off of the metoprolol with no significant pauses. Heart rates remain in the 50-60 range. Given 1st degree AV block, preexisting LBBB, anticipate high likelihood of needing pacemaker post-TAVR.     Plan:  TAVR CT tomorrow. Appreciate input from CV surgery and structural heart team.  Agree with holding metoprolol.  Continue aspirin and statin.     Deena Buck MD    Primary Care Physician   Physician No Ref-Primary    Patient Active Problem List   Diagnosis    Right leg pain    Cognitive decline    Multiple falls    Abnormal electrocardiogram    Critical aortic valve stenosis       Past Medical History   I have reviewed this patient's medical history and updated it with pertinent information if needed.   No past medical history on file.    Past Surgical History   I have reviewed this patient's surgical history and updated it with pertinent information if needed.  Past Surgical History:   Procedure Laterality Date    CV CORONARY ANGIOGRAM N/A 7/28/2023     Procedure: Coronary Angiogram;  Surgeon: Mando Field MD;  Location:  [facility]    CV PCI N/A 7/28/2023    Procedure: Percutaneous Coronary Intervention;  Surgeon: Mando Field MD;  Location:  [facility]ATH LAB       Prior to Admission Medications   Prior to Admission Medications   Prescriptions Last Dose Informant Patient Reported? Taking?   Ascorbic Acid (VITAMIN C) 500 MG CHEW 7/22/2023 Spouse/Significant Other Yes Yes   Sig: Take 500 mg by mouth daily   Vitamin D3 (CHOLECALCIFEROL) 25 mcg (1000 units) tablet 7/22/2023 Spouse/Significant Other Yes Yes   Sig: Take 25 mcg by mouth daily   aspirin (ASA) 81 MG chewable tablet 7/22/2023 Spouse/Significant Other Yes Yes   Sig: Take 81 mg by mouth daily   cyanocobalamin (VITAMIN B-12) 1000 MCG tablet 7/22/2023 Spouse/Significant Other Yes Yes   Sig: Take 1,000 mcg by mouth daily   cycloSPORINE (RESTASIS) 0.05 % ophthalmic emulsion 7/22/2023 Spouse/Significant Other Yes Yes   Sig: Apply 1 drop to eye 2 times daily   metFORMIN (GLUCOPHAGE) 1000 MG tablet 7/22/2023 Spouse/Significant Other Yes Yes   Sig: Take 1 tablet by mouth daily (with dinner)   methylcellulose (CITRUCEL) powder 7/22/2023 Spouse/Significant Other Yes Yes   Sig: Take 1 teaspoonful by mouth daily   metoprolol succinate ER (TOPROL XL) 25 MG 24 hr tablet 7/22/2023 Spouse/Significant Other Yes Yes   Sig: Take 1 tablet by mouth daily   nystatin (MYCOSTATIN) 599790 UNIT/GM external powder Unknown at PRN Spouse/Significant Other Yes Yes   Sig: Apply 1 strip topically 3 times daily as needed (jock itch)   rosuvastatin (CRESTOR) 20 MG tablet 7/22/2023 Spouse/Significant Other Yes Yes   Sig: Take 1 tablet by mouth daily      Facility-Administered Medications: None     Current Facility-Administered Medications   Medication Dose Route Frequency    aspirin  81 mg Oral Daily    carboxymethylcellulose PF  1 drop Ophthalmic BID    [Held by provider] metFORMIN  500 mg Oral  "Daily with supper    methylcellulose  500 mg Oral Daily    [Held by provider] metoprolol succinate ER  25 mg Oral Daily    rosuvastatin  20 mg Oral Daily     Current Facility-Administered Medications   Medication Last Rate     Allergies   No Known Allergies    Social History        Family History   No family history on file.    Review of Systems   The comprehensive Review of Systems is negative other than noted in the HPI or here.     Physical Exam   Vital Signs with Ranges  Temp:  [98.1  F (36.7  C)-98.6  F (37  C)] 98.2  F (36.8  C)  Pulse:  [49-61] 58  Resp:  [15-17] 15  BP: (112-146)/(54-75) 144/59  SpO2:  [98 %-99 %] 99 %  Wt Readings from Last 4 Encounters:   07/30/23 75.4 kg (166 lb 3.6 oz)     I/O last 3 completed shifts:  In: 240 [P.O.:240]  Out: 500 [Urine:500]      Vitals: BP (!) 144/59 (BP Location: Left arm)   Pulse 58   Temp 98.2  F (36.8  C) (Oral)   Resp 15   Ht 1.702 m (5' 7\")   Wt 75.4 kg (166 lb 3.6 oz)   SpO2 99%   BMI 26.03 kg/m      General: Alert, oriented, in no acute distress  HEENT: Mucous membranes moist  Neck: Normal JVP  CV: Regular rate and rhythm. Harsh, high pitched systolic murmur throughout the precordium, loudest at right upper sternal border.  Respiratory: Clear to auscultation bilaterally  GI: Normoactive bowel sounds; soft, non-tender abdomen  Neuro: No focal deficits appreciated  Extremities: No peripheral edema bilaterally    Recent Labs   Lab 07/30/23  0601 07/29/23  0621 07/28/23  0653 07/27/23  1827 07/24/23  0341 07/23/23  1445   WBC 6.0 5.5 4.9  --   --  6.1   HGB 12.8* 12.2* 12.5*  --   --  13.4   MCV 96 96 96  --   --  95    189 174  --   --  182   INR  --   --   --  1.07  --   --     141 136  --   --  139   POTASSIUM 3.7 3.8 3.6  --   --  4.2   CHLORIDE 104 105 103  --   --  104   CO2 24 26 22  --   --  24   BUN 9.7 9.5 11.8  --   --  16.6   CR 0.71 0.75 0.69  --   --  0.80   GFRESTIMATED 90 88 >90  --   --  87   ANIONGAP 11 10 11  --   --  11   FERMIN " 9.1 9.2 9.1  --   --  9.8   * 117* 120*  --    < > 120*   ALBUMIN  --   --   --   --   --  4.3   PROTTOTAL  --   --   --   --   --  7.4   BILITOTAL  --   --   --   --   --  1.1   ALKPHOS  --   --   --   --   --  75   ALT  --   --   --   --   --  11   AST  --   --   --   --   --  22    < > = values in this interval not displayed.     No results for input(s): CHOL, HDL, LDL, TRIG, CHOLHDLRATIO in the last 82914 hours.  Recent Labs   Lab 07/30/23  0601 07/29/23  0621 07/28/23  0653   WBC 6.0 5.5 4.9   HGB 12.8* 12.2* 12.5*   HCT 38.6* 36.5* 37.2*   MCV 96 96 96    189 174     No results for input(s): PH, PHV, PO2, PO2V, SAT, PCO2, PCO2V, HCO3, HCO3V in the last 168 hours.  Recent Labs   Lab 07/27/23  1827   NTBNP 2,019*     No results for input(s): DD in the last 168 hours.  No results for input(s): SED, CRP in the last 168 hours.  Recent Labs   Lab 07/30/23  0601 07/29/23  0621 07/28/23  0653    189 174     No results for input(s): TSH in the last 168 hours.  Recent Labs   Lab 07/23/23  1817   COLOR Yellow   APPEARANCE Clear   URINEGLC Negative   URINEBILI Negative   URINEKETONE Negative   SG 1.021   UBLD Negative   URINEPH 6.0   PROTEIN Negative   NITRITE Negative   LEUKEST Negative   RBCU 2   WBCU 1       Imaging:  Recent Results (from the past 48 hour(s))   Cardiac Catheterization    Narrative      Prox LAD to Mid LAD lesion is 25% stenosed.    Prox RCA lesion is 90% stenosed.      Single-vessel occlusive coronary artery disease namely mid small size   nondominant RCA  Remainder of coronaries with minimal nonocclusive disease  Abdominal aortogram with bilateral iliofemoral angiography demonstrates   normal sized ascending aorta, normal bilateral iliofemoral arteries with   no significant stenoses identified.  Transfemoral TAVR appears feasible.           Medical Decision Making       60 MINUTES SPENT BY ME on the date of service doing chart review, history, exam, documentation & further  activities per the note.

## 2023-07-31 ENCOUNTER — APPOINTMENT (OUTPATIENT)
Dept: CARDIOLOGY | Facility: CLINIC | Age: 83
DRG: 286 | End: 2023-07-31
Attending: INTERNAL MEDICINE
Payer: COMMERCIAL

## 2023-07-31 LAB
ANION GAP SERPL CALCULATED.3IONS-SCNC: 9 MMOL/L (ref 7–15)
BUN SERPL-MCNC: 10.4 MG/DL (ref 8–23)
CALCIUM SERPL-MCNC: 9.5 MG/DL (ref 8.8–10.2)
CHLORIDE SERPL-SCNC: 105 MMOL/L (ref 98–107)
CREAT SERPL-MCNC: 0.66 MG/DL (ref 0.67–1.17)
DEPRECATED HCO3 PLAS-SCNC: 25 MMOL/L (ref 22–29)
ERYTHROCYTE [DISTWIDTH] IN BLOOD BY AUTOMATED COUNT: 13.7 % (ref 10–15)
GFR SERPL CREATININE-BSD FRML MDRD: >90 ML/MIN/1.73M2
GLUCOSE SERPL-MCNC: 124 MG/DL (ref 70–99)
HCT VFR BLD AUTO: 42.2 % (ref 40–53)
HGB BLD-MCNC: 14.1 G/DL (ref 13.3–17.7)
MCH RBC QN AUTO: 31.9 PG (ref 26.5–33)
MCHC RBC AUTO-ENTMCNC: 33.4 G/DL (ref 31.5–36.5)
MCV RBC AUTO: 96 FL (ref 78–100)
PLATELET # BLD AUTO: 207 10E3/UL (ref 150–450)
POTASSIUM SERPL-SCNC: 4.1 MMOL/L (ref 3.4–5.3)
RBC # BLD AUTO: 4.42 10E6/UL (ref 4.4–5.9)
SODIUM SERPL-SCNC: 139 MMOL/L (ref 136–145)
WBC # BLD AUTO: 6.1 10E3/UL (ref 4–11)

## 2023-07-31 PROCEDURE — 999N000040 HC STATISTIC CONSULT NO CHARGE VASC ACCESS

## 2023-07-31 PROCEDURE — 250N000013 HC RX MED GY IP 250 OP 250 PS 637: Performed by: NURSE PRACTITIONER

## 2023-07-31 PROCEDURE — 999N000127 HC STATISTIC PERIPHERAL IV START W US GUIDANCE

## 2023-07-31 PROCEDURE — 71275 CT ANGIOGRAPHY CHEST: CPT | Mod: 26 | Performed by: INTERNAL MEDICINE

## 2023-07-31 PROCEDURE — 85027 COMPLETE CBC AUTOMATED: CPT | Performed by: NURSE PRACTITIONER

## 2023-07-31 PROCEDURE — 74174 CTA ABD&PLVS W/CONTRAST: CPT

## 2023-07-31 PROCEDURE — 250N000013 HC RX MED GY IP 250 OP 250 PS 637: Performed by: HOSPITALIST

## 2023-07-31 PROCEDURE — 99233 SBSQ HOSP IP/OBS HIGH 50: CPT | Mod: 25 | Performed by: PHYSICIAN ASSISTANT

## 2023-07-31 PROCEDURE — 250N000011 HC RX IP 250 OP 636: Performed by: HOSPITALIST

## 2023-07-31 PROCEDURE — 99232 SBSQ HOSP IP/OBS MODERATE 35: CPT | Performed by: INTERNAL MEDICINE

## 2023-07-31 PROCEDURE — 36415 COLL VENOUS BLD VENIPUNCTURE: CPT | Performed by: NURSE PRACTITIONER

## 2023-07-31 PROCEDURE — 80048 BASIC METABOLIC PNL TOTAL CA: CPT | Performed by: NURSE PRACTITIONER

## 2023-07-31 PROCEDURE — 74174 CTA ABD&PLVS W/CONTRAST: CPT | Mod: 26 | Performed by: INTERNAL MEDICINE

## 2023-07-31 PROCEDURE — 210N000002 HC R&B HEART CARE

## 2023-07-31 RX ORDER — IOPAMIDOL 755 MG/ML
500 INJECTION, SOLUTION INTRAVASCULAR ONCE
Status: COMPLETED | OUTPATIENT
Start: 2023-07-31 | End: 2023-07-31

## 2023-07-31 RX ADMIN — IOPAMIDOL 115 ML: 755 INJECTION, SOLUTION INTRAVENOUS at 12:54

## 2023-07-31 RX ADMIN — CARBOXYMETHYLCELLULOSE SODIUM 1 DROP: 5 SOLUTION/ DROPS OPHTHALMIC at 10:31

## 2023-07-31 RX ADMIN — ROSUVASTATIN CALCIUM 20 MG: 20 TABLET, FILM COATED ORAL at 10:30

## 2023-07-31 RX ADMIN — CARBOXYMETHYLCELLULOSE SODIUM 1 DROP: 5 SOLUTION/ DROPS OPHTHALMIC at 20:58

## 2023-07-31 RX ADMIN — METHYLCELLULOSE 500 MG: 500 TABLET ORAL at 10:30

## 2023-07-31 RX ADMIN — ASPIRIN 81 MG CHEWABLE TABLET 81 MG: 81 TABLET CHEWABLE at 10:30

## 2023-07-31 ASSESSMENT — ACTIVITIES OF DAILY LIVING (ADL)
ADLS_ACUITY_SCORE: 37

## 2023-07-31 NOTE — PROGRESS NOTES
[facility] Cardiology Progress Note  Date of Service: 07/31/2023  Primary Cardiologist: Will be Dr. Albin Suarez SUSAN Shahid is a 83 year old male with a hx of dementia and recent falls, who was admitted on 7/23/2023 with recurrent fall, found to have critical aortic stenosis.    Subjective: Denies lightheadedness/dizziness, dyspnea.     Assessment:  Critical calcific aortic stenosis - valve area 0.63 cm2  Acute on chronic HFrEF, roughly euvolemic, NYHA III, LVEF 30-35%  Cardiomyopathy, appears consistent with valvular   Coronary artery disease - inpt angio showed 90% of proximal RCA (non-dominant), 25% stenosis of proximal-mid LAD.   Chronic LBBB, first deg AVB, bradycardia - PTA Toprol held.   Recent falls, potentially related to #1  Deconditioning, failure to thrive, Dementia, DNR/DNI    Plan:   CTA TAVR Today. Structural heart team following for inpt TAVR consideration, appreciate their assistance.  Continue 81 mg aspirin, statin. Keeping off vasodilator therapy for now to avoid hypotension, can start post-TAVR.    Plan was formulated under direction of Dr. Tristan.   Rosa Isela Ramirez PA-C  [facility]  Pager: [phone]  Text Page  (7:30am - 4pm M-F)   __________________________________________________________________________  Physical Exam   Temp: 98.5  F (36.9  C) Temp src: Oral BP: 126/63 Pulse: 57   Resp: 19 SpO2: 100 % O2 Device: None (Room air)    Vitals:    07/24/23 0248 07/29/23 0656 07/30/23 0632   Weight: 78.6 kg (173 lb 4.5 oz) 75.3 kg (166 lb 0.1 oz) 75.4 kg (166 lb 3.6 oz)       GENERAL:  The patient is in no apparent distress.   NECK: CVP appears normal, no masses or thyromegaly.  PULMONARY:  There is a normal respiratory effort. Clear lungs to auscultation bilaterally.   CARDIOVASCULAR:  RRR, normal S1 S2, grade 2/6 GABBIE.   GI:  Non tender abdomen with normoactive bowel sounds and no hepatosplenomegaly. There are no masses palpable.   EXTREMITIES:   Tr-1+ pitting edema BLE's.   VASCULAR: 2+ Pulses bilaterally in upper and lower extremities.    Medications      aspirin  81 mg Oral Daily    carboxymethylcellulose PF  1 drop Ophthalmic BID    [Held by provider] metFORMIN  500 mg Oral Daily with supper    methylcellulose  500 mg Oral Daily    [Held by provider] metoprolol succinate ER  25 mg Oral Daily    rosuvastatin  20 mg Oral Daily    sodium chloride (PF)  10 mL Intravenous Once       Data   Most Recent 3 CBC's:  Recent Labs   Lab Test 07/31/23  0617 07/30/23  0601 07/29/23  0621   WBC 6.1 6.0 5.5   HGB 14.1 12.8* 12.2*   MCV 96 96 96    202 189     Most Recent 3 BMP's:  Recent Labs   Lab Test 07/31/23  0617 07/30/23  0601 07/29/23  0621    139 141   POTASSIUM 4.1 3.7 3.8   CHLORIDE 105 104 105   CO2 25 24 26   BUN 10.4 9.7 9.5   CR 0.66* 0.71 0.75   ANIONGAP 9 11 10   FERMIN 9.5 9.1 9.2   * 122* 117*

## 2023-07-31 NOTE — PROGRESS NOTES
1027-0802 Pt is confused, agitated, and anxious.  Prn Melatonin given.  Pt repeatedly took off tele box.  Unsecured mitts started around 0400.  VSS.  Tele SR with 1st deg AVB, BBB, and prolonged QT.  Denies pain.  NPO for CT angio today.  Pt is incontinent of urine.  Incont cares provided.

## 2023-07-31 NOTE — PROGRESS NOTES
[facility]    Medicine Progress Note - Hospitalist Service    Date of Admission:  7/23/2023         Expand All Collapse All    [facility]     Medicine Progress Note - Hospitalist Service     Date of Admission:  7/23/2023     Assessment & Plan  83 year old  male with medical history of cognitive impairment, hyperlipidemia, diabetes mellitus, coronary artery disease, aortic stenosis brought into the ED by wife and son with falls.     Syncope/fall likely secondary to valvular disorder  Critical aortic stenosis  Physical deconditioning  Progressive cognitive decline.  Patient brought into the ED by family with 2 unwitnessed falls abd progressive cognitive decline.  Most likely this is secondary to critical aortic stenos  EKG sinus rhythm with first-degree AV block.  Left axis deviation.  Left bundle branch block.  Chest x-ray mild bandlike scarring in the lung bases.  Benign calcified left perihilar lymph nodes.  *CT head no acute pathology.  Diffuse age-related changes.  X-ray pelvis and hip-  No acute fracture or dislocation within the limitations of osteopenia. See  Report for details.  X-ray lumbar spine - good anatomic alignment of the lumbar spine with no evidence of subluxation.  Degenerative disc disease.  *Outpatient TSH, vitamin B12, MRI brain were normal in March 2023.   COVID-19 PCR negative.  CK within normal limits  --PT, OT followed, recommending TCU.  timing unclear pending cardiology plan   --WOC RN consult for coccyx wound.  --Fall precautions.        Critical aortic stenosis with aortic valve area of 0.63 and gradient of 63 mmHg  New cardiomyopathy, HFrEF with WMA: Likely secondary to aortic stenosis  Detectable troponin likely in the setting of fall from demand ischemia.  Coronary artery disease.  LBBB.  Hyperlipidemia.  Follows with [facility]. TAVR workup performed in 2021 with CTA showing heavily calcified proximal LAD with possible obstructive  stenosis.   *Last echocardiogram from 2021 with severe aortic stenosis, LVEF of 55 to 60%.  *Troponin 32 > 25    *Telemetry monitoring with sinus rhythm, first-degree AV block and bundle branch bloc  *ECHO from 7/25 shows EF 30-35% with moderate to severe global hypokinesis of LV as well as critical AS  --Continue PTA aspirin.  --Cardiology consulted, discussed options with pt and family including review by TAVR team,  Cardiology did recommend TAVR, family now agree.  The patient is undergoing work-up for the TAVR at this time.  And scheduled for coronary angiogram.  Work-up and timing of the TAVR as per cardiology team.  -- Metoprolol is held  due to low HR     Bilateral lower extremity edema.  Family reporting bilateral lower extremity edema with associated pain.  Ultrasound bilateral lower extremity no DVT.    ECHO with new HFrEF as below   -Limb elevation.    -Ace wraps.  --defer diuretic given critical AS      Diabetes type 2 with a hemoglobin A1c of 6.0.  Decreased PTA metformin from 1 g daily to 500 mg daily.  Hold metformin while in the hospital, as he is undergoing angiogram today.      Macular degeneration and hearing loss:  Continue PTA eyedrops, hearing aids.     Urinary incontinence.   No acute issues.    Cognitive impairment :  He has cognitive impairment     Patient has slightly low hemoglobin but numbers are stable     Discussed with family member at bedside           Diet: Combination Diet Low Saturated Fat Na <2400mg Diet    DVT Prophylaxis: Pneumatic Compression Devices and Ambulate every shift  Pablo Catheter: Not present  Lines: None     Cardiac Monitoring: ACTIVE order. Indication: Post- PCI/Angiogram (24 hours)  Code Status: No CPR- Do NOT Intubate      Clinically Significant Risk Factors                   # Chronic heart failure with reduced ejection fraction: last echo with EF <40%         # Overweight: Estimated body mass index is 26.03 kg/m  as calculated from the following:    Height as  "of this encounter: 1.702 m (5' 7\").    Weight as of this encounter: 75.4 kg (166 lb 3.6 oz).  , PRESENT ON ADMISSION          Disposition Plan      Expected Discharge Date: 08/01/2023      Destination: inpatient rehabilitation facility  Discharge Comments: PT-TCU  CT for chest/abdomen/pelvis  coronary angio 7/28  TAVR?  KANE Han MD  Hospitalist Service  [facility]  Securely message with [employer] (more info)  Text page via [employer] Paging/Directory   ______________________________________________________________________    Interval History   CT TAVR done today  Ongoing discussions with TAVR team tomorrow regarding TAVR  Patient is comfortable and has no complaints today    Physical Exam   Vital Signs: Temp: 98.3  F (36.8  C) Temp src: Oral BP: (!) 141/66 Pulse: 70   Resp: 18 SpO2: 100 % O2 Device: None (Room air)    Weight: 166 lbs 3.63 oz      General Appearance:  alert and awake   Memory fair   Mild confused as has cognitive impairment   Respiratory: lungs clear   Cardiovascular: aortic stenosis murmur on exam  GI: unchanged  Skin: unchanged  Other:  unchanged   Has bilateral leg edema     Medical Decision Making       40 MINUTES SPENT BY ME on the date of service doing chart review, history, exam, documentation & further activities per the note.      Data     I have personally reviewed the following data over the past 24 hrs:    6.1  \   14.1   / 207     139 105 10.4 /  124 (H)   4.1 25 0.66 (L) \     TSH: N/A T4: N/A A1C: N/A       "

## 2023-08-01 ENCOUNTER — APPOINTMENT (OUTPATIENT)
Dept: PHYSICAL THERAPY | Facility: CLINIC | Age: 83
DRG: 286 | End: 2023-08-01
Payer: COMMERCIAL

## 2023-08-01 LAB
ANION GAP SERPL CALCULATED.3IONS-SCNC: 10 MMOL/L (ref 7–15)
BUN SERPL-MCNC: 11.5 MG/DL (ref 8–23)
CALCIUM SERPL-MCNC: 9.2 MG/DL (ref 8.8–10.2)
CHLORIDE SERPL-SCNC: 105 MMOL/L (ref 98–107)
CREAT SERPL-MCNC: 0.66 MG/DL (ref 0.67–1.17)
DEPRECATED HCO3 PLAS-SCNC: 23 MMOL/L (ref 22–29)
ERYTHROCYTE [DISTWIDTH] IN BLOOD BY AUTOMATED COUNT: 13.8 % (ref 10–15)
GFR SERPL CREATININE-BSD FRML MDRD: >90 ML/MIN/1.73M2
GLUCOSE SERPL-MCNC: 119 MG/DL (ref 70–99)
HCT VFR BLD AUTO: 38.6 % (ref 40–53)
HGB BLD-MCNC: 13.1 G/DL (ref 13.3–17.7)
MCH RBC QN AUTO: 32.1 PG (ref 26.5–33)
MCHC RBC AUTO-ENTMCNC: 33.9 G/DL (ref 31.5–36.5)
MCV RBC AUTO: 95 FL (ref 78–100)
PLATELET # BLD AUTO: 210 10E3/UL (ref 150–450)
POTASSIUM SERPL-SCNC: 3.7 MMOL/L (ref 3.4–5.3)
RBC # BLD AUTO: 4.08 10E6/UL (ref 4.4–5.9)
SODIUM SERPL-SCNC: 138 MMOL/L (ref 136–145)
WBC # BLD AUTO: 5.2 10E3/UL (ref 4–11)

## 2023-08-01 PROCEDURE — 250N000013 HC RX MED GY IP 250 OP 250 PS 637: Performed by: HOSPITALIST

## 2023-08-01 PROCEDURE — 82310 ASSAY OF CALCIUM: CPT | Performed by: NURSE PRACTITIONER

## 2023-08-01 PROCEDURE — 99233 SBSQ HOSP IP/OBS HIGH 50: CPT | Performed by: INTERNAL MEDICINE

## 2023-08-01 PROCEDURE — 97530 THERAPEUTIC ACTIVITIES: CPT | Mod: GP

## 2023-08-01 PROCEDURE — 210N000002 HC R&B HEART CARE

## 2023-08-01 PROCEDURE — 97116 GAIT TRAINING THERAPY: CPT | Mod: GP

## 2023-08-01 PROCEDURE — 36415 COLL VENOUS BLD VENIPUNCTURE: CPT | Performed by: NURSE PRACTITIONER

## 2023-08-01 PROCEDURE — 85027 COMPLETE CBC AUTOMATED: CPT | Performed by: NURSE PRACTITIONER

## 2023-08-01 PROCEDURE — 250N000013 HC RX MED GY IP 250 OP 250 PS 637: Performed by: NURSE PRACTITIONER

## 2023-08-01 RX ADMIN — ASPIRIN 81 MG CHEWABLE TABLET 81 MG: 81 TABLET CHEWABLE at 08:36

## 2023-08-01 RX ADMIN — ROSUVASTATIN CALCIUM 20 MG: 20 TABLET, FILM COATED ORAL at 08:36

## 2023-08-01 RX ADMIN — CARBOXYMETHYLCELLULOSE SODIUM 1 DROP: 5 SOLUTION/ DROPS OPHTHALMIC at 21:24

## 2023-08-01 RX ADMIN — CARBOXYMETHYLCELLULOSE SODIUM 1 DROP: 5 SOLUTION/ DROPS OPHTHALMIC at 08:37

## 2023-08-01 RX ADMIN — METHYLCELLULOSE 500 MG: 500 TABLET ORAL at 08:36

## 2023-08-01 ASSESSMENT — ACTIVITIES OF DAILY LIVING (ADL)
ADLS_ACUITY_SCORE: 41
ADLS_ACUITY_SCORE: 37
ADLS_ACUITY_SCORE: 41
ADLS_ACUITY_SCORE: 41
ADLS_ACUITY_SCORE: 37

## 2023-08-01 NOTE — PROGRESS NOTES
Patient is A/O x 2 with forgetfulness and occasional confusion. Up with A1 GB and walker to bathroom, vss, a-febrile, denies chest pain, on room air, tele SR with 1st deg AVB, incontinent of Urine, TAVR planned outpatient in coming week, discharge to home tomorrow at 11 am with home care.

## 2023-08-01 NOTE — PLAN OF CARE
Goal Outcome Evaluation:   Disoriented to time & situation-reoriented, cooperative.. VSS ex tor HR at times upper 50s, on RA. Tele SB w/1st degree AVB+BBB. Up with 1A/GB/W. Incontinent of urine. Coccyx wound, turn/repo q2hrs, WOC following. Had [employer] worked yesterday. Denies pain. Plans for TCU at discharge.

## 2023-08-01 NOTE — PROGRESS NOTES
Care Management Follow Up    Length of Stay (days): 4    Expected Discharge Date: 08/02/2023     Concerns to be Addressed:       Patient plan of care discussed at interdisciplinary rounds: Yes    Anticipated Discharge Disposition: Transitional Care     Anticipated Discharge Services: None  Anticipated Discharge DME: None    Patient/family educated on Medicare website which has current facility and service quality ratings: yes  Education Provided on the Discharge Plan:    Patient/Family in Agreement with the Plan: yes    Referrals Placed by CM/SW:    Private pay costs discussed: Not applicable    Additional Information:  Writer notified by bedside RN that patient and spouse now wanting to discharge home with homecare.  Referral made via the HUB.    4:15 PM Addendum    Met with pt and spouse regarding homecare and spouse told writer that patient was likely open to [facility] already.  Writer did confirm that pt is open to home PT thru [facility].  Writer spoke with Neo at intake.    Rhonda Quispe RN Care Coordinator  [facility]  [phone]        Airborne+Contact precautions

## 2023-08-01 NOTE — PROGRESS NOTES
Patient is Alert to self with some confusion, patient has been calm and cooperative this shift, vss, a-febrile, denies pain, tele SR with 1st deg AVB, up to bathroom with A1 GB and walker, incontinent of bladder, patient is being worked up for a TAVR, CTA done this afternoon results pending.

## 2023-08-01 NOTE — CONSULTS
Please see initial consult note from 7-24-23 and ongoing social work notes for discharge planning.    Will continue to follow.      MARII Tripp, [facility]    [phone]  [facility]

## 2023-08-01 NOTE — PROGRESS NOTES
[facility]    Hospitalist Progress Note    Assessment & Plan   83 year old  male with medical history of cognitive impairment, hyperlipidemia, diabetes mellitus, coronary artery disease, aortic stenosis brought into the ED by wife and son with falls.     Syncope/fall likely secondary to valvular disorder  Critical aortic stenosis  Physical deconditioning  Progressive cognitive decline.  Patient brought into the ED by family with 2 unwitnessed falls abd progressive cognitive decline.  Most likely this is secondary to critical aortic stenos  EKG sinus rhythm with first-degree AV block.  Left axis deviation.  Left bundle branch block.  Chest x-ray mild bandlike scarring in the lung bases.  Benign calcified left perihilar lymph nodes.  *CT head no acute pathology.  Diffuse age-related changes.  X-ray pelvis and hip-  No acute fracture or dislocation within the limitations of osteopenia. See  Report for details.  X-ray lumbar spine - good anatomic alignment of the lumbar spine with no evidence of subluxation.  Degenerative disc disease.  *Outpatient TSH, vitamin B12, MRI brain were normal in March 2023.   COVID-19 PCR negative.  CK within normal limits  --PT, OT followed, recommending TCU.  Repeat PT assessment and possibly TCU as early as 8/1.  --WOC RN consult for coccyx wound.  --Fall precautions.        Critical aortic stenosis with aortic valve area of 0.63 and gradient of 63 mmHg  New cardiomyopathy, HFrEF with WMA: Likely secondary to aortic stenosis  Detectable troponin likely in the setting of fall from demand ischemia.  Coronary artery disease.  LBBB.  Hyperlipidemia.  Follows with [facility]. TAVR workup performed in 2021 with CTA showing heavily calcified proximal LAD with possible obstructive stenosis. Last echocardiogram from 2021 with severe aortic stenosis, LVEF of 55 to 60%.  *Troponin 32 > 25  .Telemetry monitoring with sinus rhythm, first-degree AV block and bundle branch bloc  *ECHO  from 7/25 shows EF 30-35% with moderate to severe global hypokinesis of LV as well as critical AS  --Continue PTA aspirin.  --Cardiology consulted, discussed options with pt and family including review by TAVR team,  Cardiology did recommend TAVR, family now agree.  The patient is undergoing work-up for the TAVR at this time.  And scheduled for coronary angiogram.Coronary artery disease with a 90% stenosis of the proximal RCA which is a nondominant vessel.   -Structural team is recommending outpatient follow-up in the valve clinic next week with a tentative TAVR in the upcoming weeks.  They are okay for patient to be discharged to TCU as planned.  -- Metoprolol is held  due to low HR     Bilateral lower extremity edema.  Family reporting bilateral lower extremity edema with associated pain.  Ultrasound bilateral lower extremity no DVT.    ECHO with new HFrEF as below   -Limb elevation.    -Ace wraps.  --defer diuretic given critical AS      Diabetes type 2 with a hemoglobin A1c of 6.0.  Decreased PTA metformin from 1 g daily to 500 mg daily.  Hold metformin while in the hospital, we will restart on discharge.        Macular degeneration and hearing loss:  Continue PTA eyedrops, hearing aids.     Urinary incontinence.   No acute issues.     Cognitive impairment :  He has cognitive impairment       DVT Prophylaxis: Pneumatic Compression Devices  Code Status: No CPR- Do NOT Intubate     50 MINUTES SPENT BY ME on the date of service doing chart review, history, exam, documentation & further activities per the note.  Discussed with social work, plan is to discharge to TCU when bed available  Disposition: Expected discharge possibly tomorrow to tcu   Clinically Significant Risk Factors                   # Chronic heart failure with reduced ejection fraction: last echo with EF <40%       # Overweight: Estimated body mass index is 26.21 kg/m  as calculated from the following:    Height as of this encounter: 1.702 m (5'  "7\").    Weight as of this encounter: 75.9 kg (167 lb 5.3 oz)., PRESENT ON ADMISSION          Keysha Pretty MD  Text Page   (7am to 6pm)    Interval History   Patient is calm and resting, current plan is to do a TAVR outpatient, TAVR CTA has been obtained.    -Data reviewed today: I reviewed all new labs and imaging results over the last 24 hours.     Physical Exam     Vital Signs with Ranges  Temp:  [97.5  F (36.4  C)-98.5  F (36.9  C)] 98.4  F (36.9  C)  Pulse:  [58-89] 89  Resp:  [16-18] 16  BP: (118-141)/(56-66) 127/62  SpO2:  [99 %-100 %] 100 %  I/O last 3 completed shifts:  In: 480 [P.O.:480]  Out: 775 [Urine:775]    Constitutional: Awake, confused.  Respiratory: Clear to auscultation bilaterally, no crackles or wheezing  Cardiovascular: Regular rate and rhythm, normal S1 and S2, and no murmur noted  GI: Normal bowel sounds, soft, non-distended, non-tender  Skin/Integumen: No rashes, no cyanosis, no edema  Neuro : moving all 4 extremities, no focal deficit noted     Medications      aspirin  81 mg Oral Daily    carboxymethylcellulose PF  1 drop Ophthalmic BID    [Held by provider] metFORMIN  500 mg Oral Daily with supper    methylcellulose  500 mg Oral Daily    [Held by provider] metoprolol succinate ER  25 mg Oral Daily    rosuvastatin  20 mg Oral Daily    sodium chloride (PF)  10 mL Intravenous Once       Data   Recent Labs   Lab 08/01/23  0627 07/31/23  0617 07/30/23  0601 07/28/23  0653 07/27/23  1827   WBC 5.2 6.1 6.0   < >  --    HGB 13.1* 14.1 12.8*   < >  --    MCV 95 96 96   < >  --     207 202   < >  --    INR  --   --   --   --  1.07    139 139   < >  --    POTASSIUM 3.7 4.1 3.7   < >  --    CHLORIDE 105 105 104   < >  --    CO2 23 25 24   < >  --    BUN 11.5 10.4 9.7   < >  --    CR 0.66* 0.66* 0.71   < >  --    ANIONGAP 10 9 11   < >  --    FERMIN 9.2 9.5 9.1   < >  --    * 124* 122*   < >  --     < > = values in this interval not displayed.     Recent Labs   Lab 08/01/23  0627 " 07/31/23  0617 07/30/23  0601 07/29/23  0621 07/28/23  0653   * 124* 122* 117* 120*       Imaging:   Recent Results (from the past 24 hour(s))   CT Angiogram TAVR    Narrative    Procedure: CT ANGIOGRAM TAVR   Examination Date: 7/31/2023 1:47 PM   Indication: Severe aortic stenosis, Pre TAVR  Ordering Provider: Dr. Talamantes    TECHNIQUE: ECG gated CT of the heart, aorta and nongated CT of the  chest abdomen pelvis without and with IV contrast Isovue 115 mL was  performed per the BIGG protocol on a Siemens Dual Source Flash scanner  without incident. A noncontrast CT of the chest abdomen and pelvis was  performed followed by contrast-enhanced CTA of the heart in a spiral  gated mode with limited field-of-view followed by gated high pitch  spiral CTA of the chest, abdomen, pelvis at 100 kVP to evaluate the  thoracoabdominal aorta and iliac vasculature. Scan protocol was  optimized to minimize radiation exposure. The total radiation exposure  was 1285 DLP and 17.99 mSv. Images were reconstructed and analyzed on  a [employer] Workstation.       Impression    IMPRESSION:  1.  No acute aortic pathology like dissection, intramural hematoma or  contained rupture noted.  2.  Please review detailed radiology report, to follow separately, for  incidental non-cardiovascular findings.    FINDINGS:    1.  Tricuspid aortic valve. Aortic valve calcium score is 2539. The  ascending aorta is none calcified, aortic arch is  mild calcified, the  descending thoracic aorta is mildly calcified.   2.  The arch vessel branching pattern is bovine   3.  The suprarenal abdominal aorta is mild calcified; infrarenal  abdominal aorta is mild calcified.   4.  There is no acute aortic pathology, such as dissection, intramural  hematoma, or contained rupture.     MEASUREMENTS:   Representative dimensions of the thoracoabdominal aorta are as  follows:    1. AORTIC ANNULUS MEASUREMENTS:  ANNULAR AND LVOT MEASUREMENTS POOR  QUALITY SECONDARY TO  PATIENT NOT ABLE TO HOLD BREATH    *  The average aortic annulus diameter is 22.4 mm,   *  Aortic annulus area is 3.94 cm2  *  Aortic annulus perimeter is 73.7 mm  *  The 3 cusp coaxial angle for aortic valve is ([place] 8 , CAU 5) these   angles will vary depending upon the patient's body position  *  Left main - Annulus distance: 15.9 mm, RCA - Annulus distance: 17.7  mm    2. LVOT MEASUREMENTS:    *  The average LVOT diameter is 21.8 mm  *  LVOT area is 3.73 cm2  *  LVOT perimeter is 71.8 mm  *  LVOT calcification NONE mm    3. AORTA MEASUREMENTS    1.  The aortic root at the sinuses of Valsalva (L/R/N) 24.3 mm x  26.1  mm 0.2 mm  2.  The sinotubular junction:  24.4 mm x 25.4 mm  3.  Sinotubular junction height: 22.1 mm x 22.5 mm  4.  Proximal ascending aorta: 33.5 mm x 31.7 mm  5.  Distal abdominal aorta proximal to the bifurcation: 17.4 mm x 16.9  mm    4. ILIOFEMORAL MEASUREMENTS:    RIGHT:  1.  Right common iliac artery: 10.5 mm x 11.2 mm   2.  Right external iliac artery: 7.58 mm x 8.83 mm   3.  Right common femoral artery: 7.93 mm x 9.20 mm   4.  Tortuosity: mild   5.  Calcification: mild     LEFT:  1.  Left common iliac artery: 9.27 mm x  11.3 mm  2.  Left external iliac artery: 7.84 mm x 8.66 mm  3.  Left common femoral artery: 7.42 mm x 9.6 mm  4.  Tortuosity: mild   5.  Calcification: mild       7. Aortic Root Angle 47.1 degrees  OTHER FINDINGS:   1.    2.  Please review radiology review for incidental non-cardiovascular  findings including lungs, abdominal organs, bone, soft tissue, nodes  to follow separately    MARCELINA COSTA MD         SYSTEM ID:  [id number]   Radiologist Consult For Cardiology    Narrative    RADIOLOGIST CONSULT FOR CARDIOLOGY  7/31/2023 1:47 PM     HISTORY: Fall, leg pain, critical aortic stenosis.    COMPARISON: None.    TECHNIQUE: Volumetric helical acquisition of CT images of the chest  from the clavicles to the kidneys were acquired without IV contrast.  Radiation dose for this  scan was reduced using automated exposure  control, adjustment of the mA and/or kV according to patient size, or  iterative reconstruction technique.      Impression    IMPRESSION: Benign granulomatous change. Minimal dependent atelectasis  and/or fibrosis. Trace pleural fluid bilaterally.  The liver, spleen,  adrenal glands, kidneys, and pancreas demonstrate no worrisome focal  lesion. There are no dilated loops of small intestine or large bowel  to suggest ileus or obstruction. There is extensive diverticulosis  without evidence for diverticulitis. Survey of the visualized bony  structures demonstrates no destructive bony lesions.     Please see cardiology report for cardiac and vascular findings.    VIRAL GODINEZ MD         SYSTEM ID:  [id number]

## 2023-08-01 NOTE — PROGRESS NOTES
"Cardiology Progress Note          Assessment and Plan:         83-year-old gentleman with cognitive impairment who was admitted on 2023 with recurrent falls.  He was found to have critical aortic stenosis.  He also has a severe cardiomyopathy with an LVEF of 30 to 35% and evidence of coronary artery disease with a 90% stenosis of a proximal nondominant right coronary artery.    IMPRESSION    Critical calcific aortic stenosis.  Acute on chronic heart failure with reduced ejection fraction with an LVEF of 30 to 35%  Coronary artery disease with a 90% stenosis of the proximal RCA which is a nondominant vessel.  Chronic left bundle branch block and first-degree AV block    PLAN  -CT TAVR performed yesterday.  -Appreciate structural heart team assistance.  -Continue current medical therapy.          Interval History:     No chest discomfort or dyspnea.             Review of Systems:   As per subjective, otherwise 5 systems reviewed and negative.           Physical Exam:   Blood pressure 120/60, pulse 58, temperature 97.5  F (36.4  C), temperature source Axillary, resp. rate 16, height 1.702 m (5' 7\"), weight 75.9 kg (167 lb 5.3 oz), SpO2 99 %.      Vital Sign Ranges  Temperature Temp  Av.1  F (36.7  C)  Min: 97.5  F (36.4  C)  Max: 98.5  F (36.9  C)   Blood pressure Systolic (24hrs), Av , Min:118 , Max:141        Diastolic (24hrs), Av, Min:56, Max:66      Pulse Pulse  Av.2  Min: 57  Max: 71   Respirations Resp  Av.4  Min: 16  Max: 19   Pulse oximetry SpO2  Av.8 %  Min: 99 %  Max: 100 %         Intake/Output Summary (Last 24 hours) at 2023 0653  Last data filed at 2023 0535  Gross per 24 hour   Intake 480 ml   Output 775 ml   Net -295 ml       Constitutional: NAD  Skin: Warm and dry  Neck: No JVD  Lungs: CTA  Cardiovascular: RRR, 3/6 high-pitched systolic ejection murmur at right upper sternal border  Abdomen: Soft, non tender.  Extremities and Back: No LE edema  Neurological: " Nonfocal           Medications:     I have reviewed this patient's current medications.              Data:     Labs reviewed.             Julian Tristan MD, MPARAS.

## 2023-08-01 NOTE — PROGRESS NOTES
Brief Cardiology Note  Pt: Erick Shahid    1940      Erick Shahid is a 83 year old gentleman with mild cognitive impairment who was admitted on 2023 with recurrent falls and questionable syncope/presyncope. He was found to have critical aortic stenosis and is currently being evaluated for transcatheter aortic valve replacement. He also has a severe cardiomyopathy with an LVEF of 30 to 35%. He underwent coronary angiogram that showed non-dominant RCA disease that is being medically managed. His TAVR guided CT is pending. Of note, his TAVR CT from  ([facility]) showed favorable anatomy for transfemoral approach with an aortic valve calcium score of 1520. He is doing well with therapy and lives independently with his wife. Okay to discharge from a cardiology standpoint. Will arrange outpatient follow-up in our valve clinic next week with tentative TAVR in the upcoming weeks.     Patient was discussed with Dr. Field who agrees with the above plan.     Please don't hesitate to reach out with any questions or concerns.     Sheri Davis CNP  11:10 AM 2023   [facility]  Pager: [phone]

## 2023-08-02 VITALS
OXYGEN SATURATION: 99 % | WEIGHT: 167.33 LBS | RESPIRATION RATE: 16 BRPM | HEART RATE: 95 BPM | SYSTOLIC BLOOD PRESSURE: 134 MMHG | HEIGHT: 67 IN | DIASTOLIC BLOOD PRESSURE: 63 MMHG | BODY MASS INDEX: 26.26 KG/M2 | TEMPERATURE: 98.4 F

## 2023-08-02 LAB
ANION GAP SERPL CALCULATED.3IONS-SCNC: 10 MMOL/L (ref 7–15)
BUN SERPL-MCNC: 9.5 MG/DL (ref 8–23)
CALCIUM SERPL-MCNC: 9.4 MG/DL (ref 8.8–10.2)
CHLORIDE SERPL-SCNC: 104 MMOL/L (ref 98–107)
CREAT SERPL-MCNC: 0.73 MG/DL (ref 0.67–1.17)
DEPRECATED HCO3 PLAS-SCNC: 25 MMOL/L (ref 22–29)
ERYTHROCYTE [DISTWIDTH] IN BLOOD BY AUTOMATED COUNT: 13.7 % (ref 10–15)
GFR SERPL CREATININE-BSD FRML MDRD: 89 ML/MIN/1.73M2
GLUCOSE SERPL-MCNC: 126 MG/DL (ref 70–99)
HCT VFR BLD AUTO: 37.6 % (ref 40–53)
HGB BLD-MCNC: 12.7 G/DL (ref 13.3–17.7)
MCH RBC QN AUTO: 32.2 PG (ref 26.5–33)
MCHC RBC AUTO-ENTMCNC: 33.8 G/DL (ref 31.5–36.5)
MCV RBC AUTO: 95 FL (ref 78–100)
PLATELET # BLD AUTO: 205 10E3/UL (ref 150–450)
POTASSIUM SERPL-SCNC: 3.9 MMOL/L (ref 3.4–5.3)
RBC # BLD AUTO: 3.94 10E6/UL (ref 4.4–5.9)
SODIUM SERPL-SCNC: 139 MMOL/L (ref 136–145)
WBC # BLD AUTO: 5.1 10E3/UL (ref 4–11)

## 2023-08-02 PROCEDURE — 250N000013 HC RX MED GY IP 250 OP 250 PS 637: Performed by: NURSE PRACTITIONER

## 2023-08-02 PROCEDURE — 250N000013 HC RX MED GY IP 250 OP 250 PS 637: Performed by: HOSPITALIST

## 2023-08-02 PROCEDURE — 36415 COLL VENOUS BLD VENIPUNCTURE: CPT | Performed by: NURSE PRACTITIONER

## 2023-08-02 PROCEDURE — 99239 HOSP IP/OBS DSCHRG MGMT >30: CPT | Performed by: INTERNAL MEDICINE

## 2023-08-02 PROCEDURE — 80048 BASIC METABOLIC PNL TOTAL CA: CPT | Performed by: NURSE PRACTITIONER

## 2023-08-02 PROCEDURE — 85027 COMPLETE CBC AUTOMATED: CPT | Performed by: NURSE PRACTITIONER

## 2023-08-02 PROCEDURE — 99233 SBSQ HOSP IP/OBS HIGH 50: CPT | Performed by: INTERNAL MEDICINE

## 2023-08-02 RX ADMIN — METHYLCELLULOSE 500 MG: 500 TABLET ORAL at 08:09

## 2023-08-02 RX ADMIN — CARBOXYMETHYLCELLULOSE SODIUM 1 DROP: 5 SOLUTION/ DROPS OPHTHALMIC at 08:09

## 2023-08-02 RX ADMIN — ASPIRIN 81 MG CHEWABLE TABLET 81 MG: 81 TABLET CHEWABLE at 08:09

## 2023-08-02 RX ADMIN — ROSUVASTATIN CALCIUM 20 MG: 20 TABLET, FILM COATED ORAL at 08:09

## 2023-08-02 ASSESSMENT — ACTIVITIES OF DAILY LIVING (ADL)
ADLS_ACUITY_SCORE: 39
ADLS_ACUITY_SCORE: 37
ADLS_ACUITY_SCORE: 39
ADLS_ACUITY_SCORE: 37

## 2023-08-02 NOTE — DISCHARGE SUMMARY
[facility]    Discharge Summary  Hospitalist    Date of Admission:  7/23/2023  Date of Discharge:  8/2/2023 10:50 AM  Discharging Provider: Keysha Pretty MD    Discharge Diagnoses   Syncope secondary to aortic stenosis    History of Present Illness   Please review admission history and physical.    Hospital Course   Erick Shahid was admitted on 7/23/2023.  The following problems were addressed during his hospitalization:    Principal Problem:    Cognitive decline  Active Problems:    Right leg pain    Multiple falls    Abnormal electrocardiogram    Critical aortic valve stenosis  83 year old  male with medical history of cognitive impairment, hyperlipidemia, diabetes mellitus, coronary artery disease, aortic stenosis brought into the ED by wife and son with falls.     Syncope/fall likely secondary to valvular disorder  Critical aortic stenosis  Physical deconditioning  Progressive cognitive decline.  Patient brought into the ED by family with 2 unwitnessed falls abd progressive cognitive decline.  Most likely this is secondary to critical aortic stenos  EKG sinus rhythm with first-degree AV block.  Left axis deviation.  Left bundle branch block.  Chest x-ray mild bandlike scarring in the lung bases.  Benign calcified left perihilar lymph nodes.  *CT head no acute pathology.  Diffuse age-related changes.  X-ray pelvis and hip-  No acute fracture or dislocation within the limitations of osteopenia. See  Report for details.  X-ray lumbar spine - good anatomic alignment of the lumbar spine with no evidence of subluxation.  Degenerative disc disease.Outpatient TSH, vitamin B12, MRI brain were normal in March 2023.   COVID-19 PCR negative.  CK within normal limits  --PT, OT followed, recommending TCU.  Repeat PT assessment and possibly TCU as early as 8/1.  --WOC RN consult for coccyx wound.  --Fall precautions.        Critical aortic stenosis with aortic valve area of 0.63 and gradient  of 63 mmHg  New cardiomyopathy, HFrEF with WMA: Likely secondary to aortic stenosis  Detectable troponin likely in the setting of fall from demand ischemia.  Coronary artery disease.  LBBB.  Hyperlipidemia.  Follows with [facility]. TAVR workup performed in 2021 with CTA showing heavily calcified proximal LAD with possible obstructive stenosis. Last echocardiogram from 2021 with severe aortic stenosis, LVEF of 55 to 60%.  *Troponin 32 > 25  .Telemetry monitoring with sinus rhythm, first-degree AV block and bundle branch bloc  *ECHO from 7/25 shows EF 30-35% with moderate to severe global hypokinesis of LV as well as critical AS  --Continue PTA aspirin.  --Cardiology consulted, discussed options with pt and family including review by TAVR team,  Cardiology did recommend TAVR, family now agree.  The patient is undergoing work-up for the TAVR at this time.  And scheduled for coronary angiogram.Coronary artery disease with a 90% stenosis of the proximal RCA which is a nondominant vessel.   -Structural team is recommending outpatient follow-up in the valve clinic next week with a tentative TAVR in the upcoming weeks.  They are okay for patient to be discharged to TCU as planned.  -- Metoprolol is held  due to low HR     Bilateral lower extremity edema.  Family reporting bilateral lower extremity edema with associated pain.  Ultrasound bilateral lower extremity no DVT.    ECHO with new HFrEF as below   -Limb elevation.    -Ace wraps.  --defer diuretic given critical AS      Diabetes type 2 with a hemoglobin A1c of 6.0.  Decreased PTA metformin from 1 g daily to 500 mg daily.  Hold metformin while in the hospital, we will restart on discharge.        Macular degeneration and hearing loss:  Continue PTA eyedrops, hearing aids.     Urinary incontinence.   No acute issues.     Cognitive impairment :  He has cognitive impairment        Keysha Pretty MD    Significant Results and Procedures       Pending Results   These results will  be followed up by   Unresulted Labs Ordered in the Past 30 Days of this Admission       No orders found from 6/23/2023 to 7/24/2023.            Code Status   Full Code       Primary Care Physician   Minh Samuel    Physical Exam   Temp: 98.4  F (36.9  C) Temp src: Oral BP: 134/63 Pulse: 95   Resp: 16 SpO2: 99 %      Vitals:    07/29/23 0656 07/30/23 0632 08/01/23 0345   Weight: 75.3 kg (166 lb 0.1 oz) 75.4 kg (166 lb 3.6 oz) 75.9 kg (167 lb 5.3 oz)     Vital Signs with Ranges  Temp:  [98.4  F (36.9  C)-98.5  F (36.9  C)] 98.4  F (36.9  C)  Pulse:  [71-95] 95  Resp:  [16-17] 16  BP: (131-134)/(63-65) 134/63  SpO2:  [99 %] 99 %  I/O last 3 completed shifts:  In: 240 [P.O.:240]  Out: 300 [Urine:300]    The patient was examined on the day of discharge.    Discharge Disposition   Discharged to nursing home  Condition at discharge: Stable    Consultations This Hospital Stay   CARE MANAGEMENT / SOCIAL WORK IP CONSULT  PHYSICAL THERAPY ADULT IP CONSULT  OCCUPATIONAL THERAPY ADULT IP CONSULT  WOUND OSTOMY CONTINENCE NURSE  IP CONSULT  CARDIOLOGY IP CONSULT  CARDIOLOGY INTERVENTIONAL (CATH LAB) IP CONSULT  VASCULAR ACCESS ADULT IP CONSULT  CARDIOVASCULAR SURGERY IP CONSULT  PHARMACY IP CONSULT  PHARMACY IP CONSULT  VASCULAR ACCESS ADULT IP CONSULT  VASCULAR ACCESS ADULT IP CONSULT  VASCULAR ACCESS ADULT IP CONSULT  VASCULAR ACCESS ADULT IP CONSULT  VASCULAR ACCESS ADULT IP CONSULT  SOCIAL WORK IP CONSULT  SMOKING CESSATION PROGRAM IP CONSULT    Time Spent on this Encounter   IKeysha MD, personally saw the patient today and spent greater than 30 minutes discharging this patient.    Discharge Orders      Home Care Referral      Reason for your hospital stay    syncope     Follow-up and recommended labs and tests     Follow up with primary care provider, Minh Samuel, within 7 days for hospital follow- up.  No follow up labs or test are needed.follow up with TAVR team as recommended.     Activity    Your activity upon  discharge: activity as tolerated     Diet    Follow this diet upon discharge: Orders Placed This Encounter      Room Service      Combination Diet Low Saturated Fat Na <2400mg Diet     Discharge Medications   Discharge Medication List as of 8/2/2023 10:30 AM        CONTINUE these medications which have NOT CHANGED    Details   Ascorbic Acid (VITAMIN C) 500 MG CHEW Take 500 mg by mouth daily, Historical      aspirin (ASA) 81 MG chewable tablet Take 81 mg by mouth daily, Historical      cyanocobalamin (VITAMIN B-12) 1000 MCG tablet Take 1,000 mcg by mouth daily, Historical      cycloSPORINE (RESTASIS) 0.05 % ophthalmic emulsion Apply 1 drop to eye 2 times daily, Historical      metFORMIN (GLUCOPHAGE) 1000 MG tablet Take 1 tablet by mouth daily (with dinner), Historical      methylcellulose (CITRUCEL) powder Take 1 teaspoonful by mouth daily, Historical      nystatin (MYCOSTATIN) 313137 UNIT/GM external powder Apply 1 strip topically 3 times daily as needed (jock itch)Historical      rosuvastatin (CRESTOR) 20 MG tablet Take 1 tablet by mouth daily, Historical      Vitamin D3 (CHOLECALCIFEROL) 25 mcg (1000 units) tablet Take 25 mcg by mouth daily, Historical           STOP taking these medications       metoprolol succinate ER (TOPROL XL) 25 MG 24 hr tablet Comments:   Reason for Stopping:             Allergies   No Known Allergies  Data   Most Recent 3 CBC's:  Recent Labs   Lab Test 08/02/23  0603 08/01/23  0627 07/31/23  0617   WBC 5.1 5.2 6.1   HGB 12.7* 13.1* 14.1   MCV 95 95 96    210 207      Most Recent 3 BMP's:  Recent Labs   Lab Test 08/02/23  0603 08/01/23  0627 07/31/23  0617    138 139   POTASSIUM 3.9 3.7 4.1   CHLORIDE 104 105 105   CO2 25 23 25   BUN 9.5 11.5 10.4   CR 0.73 0.66* 0.66*   ANIONGAP 10 10 9   FERMIN 9.4 9.2 9.5   * 119* 124*     Most Recent 2 LFT's:  Recent Labs   Lab Test 07/23/23  1445   AST 22   ALT 11   ALKPHOS 75   BILITOTAL 1.1     Most Recent INR's and Anticoagulation  Dosing History:  Anticoagulation Dose History          Latest Ref Rng & Units 7/27/2023   Recent Dosing and Labs   INR 0.85 - 1.15 1.07      Most Recent 3 Troponin's:No lab results found.  Most Recent Cholesterol Panel:No lab results found.  Most Recent 6 Bacteria Isolates From Any Culture (See EPIC Reports for Culture Details):No lab results found.  Most Recent TSH, T4 and A1c Labs:  Recent Labs   Lab Test 07/30/23  0601   A1C 6.0*     Results for orders placed or performed during the hospital encounter of 07/23/23   XR Lumbar Spine 2/3 Views    Narrative    EXAM: XR LUMBAR SPINE 2/3 VIEWS  LOCATION: [facility]  DATE: 7/23/2023    INDICATION: pain after blunt trauma  COMPARISON: None.      Impression    IMPRESSION: There are 5 lumbar type vertebral bodies. There is good anatomic alignment of the lumbar spine with no evidence of subluxation. The vertebral body heights are well-maintained throughout. There is diffuse degenerative disc disease throughout   the lumbar disc spaces with a mild loss of height, endplate changes and small anterior lateral osteophyte formations. There is diffuse facet arthropathy. There is possible at least partial ankylosis across the sacroiliac joints bilaterally. The patient   is status post femoral head screw fixation of the right femoral head.   XR Pelvis and Hip Right 1 View    Narrative    EXAM: XR PELVIS AND HIP RIGHT 1 VIEW  LOCATION: [facility]  DATE: 7/23/2023    INDICATION: Pain.  COMPARISON: None.      Impression    IMPRESSION: No acute fracture or dislocation within the limitations of osteopenia. If there is persistent clinical concern, MRI is recommended for further evaluation. Prior fixation of the right femur without evidence of hardware complication.   Degenerative changes of the right knee. Mild to moderate degenerative changes of the hips. Additional degenerative changes in the lower lumbar spine.   CT Head w/o Contrast     Narrative    EXAM: CT HEAD W/O CONTRAST  LOCATION: [facility]  DATE: 7/23/2023    INDICATION: falls, confusion, generalized weakness  COMPARISON: None.  TECHNIQUE: Routine CT Head without IV contrast. Multiplanar reformats. Dose reduction techniques were used.    FINDINGS:  INTRACRANIAL CONTENTS: No intracranial hemorrhage, extraaxial collection, or mass effect.  No CT evidence of acute infarct. There is scattered diffuse low attenuation within the periventricular and subcortical white matter consistent with diffuse small   vessel ischemic disease.  Ventricular system basal cisterns and the cortical sulci are consistent with diffuse volume loss.     VISUALIZED ORBITS/SINUSES/MASTOIDS: No intraorbital abnormality. No paranasal sinus mucosal disease. No middle ear or mastoid effusion.    BONES/SOFT TISSUES: No acute abnormality.      Impression    IMPRESSION:  1.  No CT finding of a mass, hemorrhage or focal area suggestive of acute infarct.  2.  Diffuse age related changes.   XR Chest 1 View    Narrative    EXAM: XR CHEST 1 VIEW  LOCATION: [facility]  DATE: 7/23/2023    INDICATION: Weakness. Falls.  COMPARISON: None.      Impression    IMPRESSION: Mild bandlike scarring in the lung apices. Benign calcified granuloma left lower lung. Benign calcified left perihilar lymph nodes. Chest otherwise negative. No focal consolidation. No pleural effusion or pneumothorax.   US Lower Extremity Venous Duplex Bilateral    Narrative    EXAM: US LOWER EXTREMITY VENOUS DUPLEX BILATERAL  LOCATION: [facility]  DATE: 7/24/2023    INDICATION: Bilateral lower extremity swelling, pain.  COMPARISON: None.  TECHNIQUE: Venous Duplex ultrasound of bilateral lower extremities with and without compression, augmentation and duplex. Color flow and spectral Doppler with waveform analysis performed.    FINDINGS: Exam includes the common femoral, femoral, popliteal  veins as well as segmentally visualized deep calf veins and greater saphenous vein.     RIGHT: No deep vein thrombosis. No superficial thrombophlebitis. No popliteal cyst.    LEFT: No deep vein thrombosis. No superficial thrombophlebitis. No popliteal cyst.      Impression    IMPRESSION:  No deep venous thrombosis in the bilateral lower extremities.   CT Angiogram TAVR    Narrative    Procedure: CT ANGIOGRAM TAVR   Examination Date: 7/31/2023 1:47 PM   Indication: Severe aortic stenosis, Pre TAVR  Ordering Provider: Dr. Talamantes    TECHNIQUE: ECG gated CT of the heart, aorta and nongated CT of the  chest abdomen pelvis without and with IV contrast Isovue 115 mL was  performed per the BIGG protocol on a Siemens Dual Source Flash scanner  without incident. A noncontrast CT of the chest abdomen and pelvis was  performed followed by contrast-enhanced CTA of the heart in a spiral  gated mode with limited field-of-view followed by gated high pitch  spiral CTA of the chest, abdomen, pelvis at 100 kVP to evaluate the  thoracoabdominal aorta and iliac vasculature. Scan protocol was  optimized to minimize radiation exposure. The total radiation exposure  was 1285 DLP and 17.99 mSv. Images were reconstructed and analyzed on  a [employer] Workstation.       Impression    IMPRESSION:  1.  No acute aortic pathology like dissection, intramural hematoma or  contained rupture noted.  2.  Please review detailed radiology report, to follow separately, for  incidental non-cardiovascular findings.    FINDINGS:    1.  Tricuspid aortic valve. Aortic valve calcium score is 2539. The  ascending aorta is none calcified, aortic arch is  mild calcified, the  descending thoracic aorta is mildly calcified.   2.  The arch vessel branching pattern is bovine   3.  The suprarenal abdominal aorta is mild calcified; infrarenal  abdominal aorta is mild calcified.   4.  There is no acute aortic pathology, such as dissection, intramural  hematoma, or  contained rupture.     MEASUREMENTS:   Representative dimensions of the thoracoabdominal aorta are as  follows:    1. AORTIC ANNULUS MEASUREMENTS:  ANNULAR AND LVOT MEASUREMENTS POOR  QUALITY SECONDARY TO PATIENT NOT ABLE TO HOLD BREATH    *  The average aortic annulus diameter is 22.4 mm,   *  Aortic annulus area is 3.94 cm2  *  Aortic annulus perimeter is 73.7 mm  *  The 3 cusp coaxial angle for aortic valve is (STEPHAN 8 , CAU 5) these   angles will vary depending upon the patient's body position  *  Left main - Annulus distance: 15.9 mm, RCA - Annulus distance: 17.7  mm    2. LVOT MEASUREMENTS:    *  The average LVOT diameter is 21.8 mm  *  LVOT area is 3.73 cm2  *  LVOT perimeter is 71.8 mm  *  LVOT calcification NONE mm    3. AORTA MEASUREMENTS    1.  The aortic root at the sinuses of Valsalva (L/R/N) 24.3 mm x  26.1  mm 0.2 mm  2.  The sinotubular junction:  24.4 mm x 25.4 mm  3.  Sinotubular junction height: 22.1 mm x 22.5 mm  4.  Proximal ascending aorta: 33.5 mm x 31.7 mm  5.  Distal abdominal aorta proximal to the bifurcation: 17.4 mm x 16.9  mm    4. ILIOFEMORAL MEASUREMENTS:    RIGHT:  1.  Right common iliac artery: 10.5 mm x 11.2 mm   2.  Right external iliac artery: 7.58 mm x 8.83 mm   3.  Right common femoral artery: 7.93 mm x 9.20 mm   4.  Tortuosity: mild   5.  Calcification: mild     LEFT:  1.  Left common iliac artery: 9.27 mm x  11.3 mm  2.  Left external iliac artery: 7.84 mm x 8.66 mm  3.  Left common femoral artery: 7.42 mm x 9.6 mm  4.  Tortuosity: mild   5.  Calcification: mild       7. Aortic Root Angle 47.1 degrees  OTHER FINDINGS:   1.    2.  Please review radiology review for incidental non-cardiovascular  findings including lungs, abdominal organs, bone, soft tissue, nodes  to follow separately    MARCELINA COSTA MD         SYSTEM ID:  [id number]   Radiologist Consult For Cardiology    Narrative    RADIOLOGIST CONSULT FOR CARDIOLOGY  7/31/2023 1:47 PM     HISTORY: Fall, leg pain, critical  aortic stenosis.    COMPARISON: None.    TECHNIQUE: Volumetric helical acquisition of CT images of the chest  from the clavicles to the kidneys were acquired without IV contrast.  Radiation dose for this scan was reduced using automated exposure  control, adjustment of the mA and/or kV according to patient size, or  iterative reconstruction technique.      Impression    IMPRESSION: Benign granulomatous change. Minimal dependent atelectasis  and/or fibrosis. Trace pleural fluid bilaterally.  The liver, spleen,  adrenal glands, kidneys, and pancreas demonstrate no worrisome focal  lesion. There are no dilated loops of small intestine or large bowel  to suggest ileus or obstruction. There is extensive diverticulosis  without evidence for diverticulitis. Survey of the visualized bony  structures demonstrates no destructive bony lesions.     Please see cardiology report for cardiac and vascular findings.    VIRAL GODINEZ MD         SYSTEM ID:  [id number]   Echocardiogram Complete     Value    LVEF  30-35%    Narrative    [id number]  [id number]  [id number]  [id number]^[email or web address]^[email or web address]     [facility]  Echocardiography Laboratory  [address] [place] [address]     Name: BALTA SIMEON  MRN: [id number]  : 1940  Study Date: 2023 12:22 PM  Age: 83 yrs  Gender: Male  Patient Location: [facility]  Reason For Study: Aortic Valve Disorder  Ordering Physician: NEHEMIAS DENG  Referring Physician: NEHEMIAS DENG  Performed By: NIKI Rosenberg     BSA: 1.9 m2  Height: 67 in  Weight: 173 lb  HR: 48  BP: 107/57 mmHg  ______________________________________________________________________________  Procedure  Complete Portable Echo Adult. Optison (NDC #4207-5631) given intravenously.  ______________________________________________________________________________  Interpretation Summary     Mild concentric left ventricular hypertrophy.  Left ventricular systolic function is moderate to severely  reduced.  The visual ejection fraction is 30-35%.  Moderate to severe global hypokinesia of the left ventricle.  The right ventricle is normal in size and function.  Critical aortic valve stenosis with mild regurgitation.  Peak transaortic velocity 4.8 m/s, mean gradient 63 mmHg, valve area 0.63 cmÂ .  Normal inferior vena cava.     There is no comparison study available.  ______________________________________________________________________________  Left Ventricle  The left ventricle is normal in size. There is mild concentric left  ventricular hypertrophy. Left ventricular systolic function is moderate to  severely reduced. The visual ejection fraction is 30-35%. Grade I or early  diastolic dysfunction. There is mod-severe global hypokinesia of the left  ventricle.     Right Ventricle  The right ventricle is normal in size and function.     Atria  The left atrium is mildly dilated. Right atrial size is normal.     Mitral Valve  The mitral valve leaflets are mildly thickened. There is trace mitral  regurgitation. There is no mitral valve stenosis.     Tricuspid Valve  Normal tricuspid valve. There is trace tricuspid regurgitation. Right  ventricular systolic pressure could not be approximated due to inadequate  tricuspid regurgitation.     Aortic Valve  The aortic valve is not well visualized. There is mild (1+) aortic  regurgitation. Severe valvular aortic stenosis. The mean AoV pressure gradient  is 63.0 mmHg. The calculated aortic valve are is 0.63 cm^2.     Pulmonic Valve  The pulmonic valve is not well visualized. There is trace pulmonic valvular  regurgitation.     Vessels  The aortic root is not well visualized. The inferior vena cava was normal in  size with preserved respiratory variability.     Pericardium  There is no pericardial effusion.     Rhythm  Sinus rhythm was noted.  ______________________________________________________________________________  MMode/2D Measurements & Calculations  IVSd: 1.2  cm     LVIDd: 4.6 cm  LVIDs: 3.9 cm  LVPWd: 1.0 cm  FS: 15.8 %  LV mass(C)d: 181.9 grams  LV mass(C)dI: 95.7 grams/m2  Ao root diam: 2.9 cm  asc Aorta Diam: 3.4 cm  LVOT diam: 2.0 cm  LVOT area: 3.1 cm2  LA Volume (BP): 71.0 ml  LA Volume Index (BP): 37.4 ml/m2  RV Base: 3.8 cm  RWT: 0.44     TAPSE: 2.7 cm     Doppler Measurements & Calculations  MV E max terrence: 58.6 cm/sec  MV A max terrence: 114.0 cm/sec  MV E/A: 0.51  MV dec time: 0.42 sec  Ao V2 max: 484.0 cm/sec  Ao max P.7 mmHg  Ao V2 mean: 378.0 cm/sec  Ao mean P.0 mmHg  Ao V2 VTI: 139.0 cm  SHALOM(I,D): 0.63 cm2  SHALOM(V,D): 0.67 cm2  AI P1/2t: 425.2 msec  LV V1 max P.2 mmHg  LV V1 max: 103.0 cm/sec  LV V1 VTI: 27.7 cm  SV(LVOT): 87.0 ml  SI(LVOT): 45.8 ml/m2  PA acc time: 0.09 sec  Pulm Sys Terrence: 64.0 cm/sec  Pulm Dale Terrence: 38.1 cm/sec  Pulm A Revs Terrence: 31.8 cm/sec  Pulm S/D: 1.7  AV Terrence Ratio (DI): 0.21  SHALOM Index (cm2/m2): 0.33  E/E' av.7  Lateral E/e': 12.9  Medial E/e': 12.6     RV S Terrence: 11.0 cm/sec     ______________________________________________________________________________  Report approved by: Dr Hilario Williamson 2023 02:04 PM         Cardiac Catheterization    Narrative      Prox LAD to Mid LAD lesion is 25% stenosed.    Prox RCA lesion is 90% stenosed.      Single-vessel occlusive coronary artery disease namely mid small size   nondominant RCA  Remainder of coronaries with minimal nonocclusive disease  Abdominal aortogram with bilateral iliofemoral angiography demonstrates   normal sized ascending aorta, normal bilateral iliofemoral arteries with   no significant stenoses identified.  Transfemoral TAVR appears feasible.

## 2023-08-02 NOTE — PROGRESS NOTES
"Cardiology Progress Note          Assessment and Plan:         83-year-old gentleman with cognitive impairment who was admitted on 2023 with recurrent falls.  He was found to have critical aortic stenosis.  He also has a severe cardiomyopathy with an LVEF of 30 to 35% and evidence of coronary artery disease with a 90% stenosis of a proximal nondominant right coronary artery.    IMPRESSION    Critical calcific aortic stenosis.  Acute on chronic heart failure with reduced ejection fraction with an LVEF of 30 to 35%  Coronary artery disease with a 90% stenosis of the proximal RCA which is a nondominant vessel.  Chronic left bundle branch block and first-degree AV block    PLAN  -TAVR evaluation has been completed and he will be scheduled for later this month.  -Beta-blocker therapy was previously held due to bradycardia.  Vasodilator therapy has been deferred until after his TAVR.  -Okay to discharge today.          Interval History:     No chest discomfort or dyspnea.             Review of Systems:   As per subjective, otherwise 5 systems reviewed and negative.           Physical Exam:   Blood pressure 134/63, pulse 95, temperature 98.4  F (36.9  C), temperature source Oral, resp. rate 16, height 1.702 m (5' 7\"), weight 75.9 kg (167 lb 5.3 oz), SpO2 99 %.      Vital Sign Ranges  Temperature Temp  Av.1  F (36.7  C)  Min: 97.5  F (36.4  C)  Max: 98.5  F (36.9  C)   Blood pressure Systolic (24hrs), Av , Min:118 , Max:141        Diastolic (24hrs), Av, Min:56, Max:66      Pulse Pulse  Av.2  Min: 57  Max: 71   Respirations Resp  Av.4  Min: 16  Max: 19   Pulse oximetry SpO2  Av.8 %  Min: 99 %  Max: 100 %         Intake/Output Summary (Last 24 hours) at 2023 0665  Last data filed at 2023 0535  Gross per 24 hour   Intake 480 ml   Output 775 ml   Net -295 ml       Constitutional: NAD  Skin: Warm and dry  Neck: No JVD  Lungs: CTA  Cardiovascular: RRR, 3/6 high-pitched systolic ejection " murmur at right upper sternal border  Abdomen: Soft, non tender.  Extremities and Back: No LE edema  Neurological: Nonfocal           Medications:     I have reviewed this patient's current medications.              Data:     Labs reviewed.             Julian Tristan MD, M.D.

## 2023-08-02 NOTE — PLAN OF CARE
Pt here for fall from syncope. A&Ox4 during assessment. Neuros including confusion. VSS. Tele SR with 1st degree AVB and BBB. Cardiac diet. Up with A1/walker. Ex cath in place. Denies pain. Pt scoring green on the Aggression Stop Light Tool. Plan for TAVR on the 10th. Discharge on 8/2 to home with home care.

## 2023-08-02 NOTE — PROGRESS NOTES
Care Management Discharge Note    Discharge Date: 08/02/2023       Discharge Disposition: Home    Discharge Services: homecare    Discharge DME: None    Discharge Transportation: family or friend will provide    Private pay costs discussed: Not applicable    Does the patient's insurance plan have a 3 day qualifying hospital stay waiver?  No    PAS Confirmation Code:    Patient/family educated on Medicare website which has current facility and service quality ratings: yes    Education Provided on the Discharge Plan:    Persons Notified of Discharge Plans: bedside RN, [facility]  Patient/Family in Agreement with the Plan: yes    Handoff Referral Completed: No    Additional Information:  Pt to discharge home with resumption of homecare thru [facility] and adding RN services (open to PT/OT already).    Rhonda Quispe RN Care Coordinator  [facility]  [phone]

## 2023-08-03 NOTE — PLAN OF CARE
Physical Therapy Discharge Summary    Reason for therapy discharge:    Discharged to home with home therapy.    Progress towards therapy goal(s). See goals on Care Plan in [facility] electronic health record for goal details.  Goals not met.  Barriers to achieving goals:   discharge from facility.    Therapy recommendation(s):    Continued therapy is recommended.  Rationale/Recommendations:  Pt has made good progress in therapy. Mobilizing CGA with FWW. Pt's wife reports pt has this level assist at DC, has a walker available to use at DC, recommend use of FWW, A x 1 for mobility and ADLs, HHPT at DC. .  Pt not seen by this writer, recommendations written based on prior therapist's notes.

## 2023-08-10 ENCOUNTER — TRANSFERRED RECORDS (OUTPATIENT)
Dept: HEALTH INFORMATION MANAGEMENT | Facility: CLINIC | Age: 83
End: 2023-08-10

## 2023-08-10 ENCOUNTER — OFFICE VISIT (OUTPATIENT)
Dept: CARDIOLOGY | Facility: CLINIC | Age: 83
End: 2023-08-10
Payer: COMMERCIAL

## 2023-08-10 ENCOUNTER — CARE COORDINATION (OUTPATIENT)
Dept: CARDIOLOGY | Facility: CLINIC | Age: 83
End: 2023-08-10

## 2023-08-10 VITALS
HEART RATE: 54 BPM | WEIGHT: 165 LBS | OXYGEN SATURATION: 100 % | HEIGHT: 67 IN | SYSTOLIC BLOOD PRESSURE: 118 MMHG | DIASTOLIC BLOOD PRESSURE: 64 MMHG | BODY MASS INDEX: 25.9 KG/M2

## 2023-08-10 DIAGNOSIS — I35.0 CRITICAL AORTIC VALVE STENOSIS: Primary | ICD-10-CM

## 2023-08-10 DIAGNOSIS — R94.31 ABNORMAL ELECTROCARDIOGRAM: Primary | ICD-10-CM

## 2023-08-10 DIAGNOSIS — R93.1 ABNORMAL CT SCAN OF HEART: ICD-10-CM

## 2023-08-10 DIAGNOSIS — I35.0 CRITICAL AORTIC VALVE STENOSIS: ICD-10-CM

## 2023-08-10 DIAGNOSIS — I35.0 NONRHEUMATIC AORTIC VALVE STENOSIS: ICD-10-CM

## 2023-08-10 PROCEDURE — 99205 OFFICE O/P NEW HI 60 MIN: CPT | Performed by: INTERNAL MEDICINE

## 2023-08-10 PROCEDURE — 93000 ELECTROCARDIOGRAM COMPLETE: CPT | Performed by: INTERNAL MEDICINE

## 2023-08-10 RX ORDER — SODIUM CHLORIDE 9 MG/ML
INJECTION, SOLUTION INTRAVENOUS CONTINUOUS
Status: CANCELLED | OUTPATIENT
Start: 2023-08-10

## 2023-08-10 RX ORDER — CEFAZOLIN SODIUM 2 G/100ML
2 INJECTION, SOLUTION INTRAVENOUS
Status: CANCELLED | OUTPATIENT
Start: 2023-08-10

## 2023-08-10 RX ORDER — ASPIRIN 325 MG
325 TABLET ORAL ONCE
Status: CANCELLED | OUTPATIENT
Start: 2023-08-10 | End: 2023-08-10

## 2023-08-10 RX ORDER — LIDOCAINE 40 MG/G
CREAM TOPICAL
Status: CANCELLED | OUTPATIENT
Start: 2023-08-10

## 2023-08-10 NOTE — NURSING NOTE
TAVR Coordinator visit:  Patient provided our direct contact number and instructed to call with any questions.     Completed frailty testing and KCCQ.   5 meter walk: 7 seconds  Frailty score: 3/5 (eyeball, ADLs, ambulation)    KCCQ Results:   1a. 5  1b. 4  1c. 1  2. 2  3. 3  4. 7  5. 5  6. 5  7. 2  8a. 6  8b. 6  8c. 5    Preliminary STS Risk Score: 2.69%  NYHA Class: II      Yessy Garcia RN  Structural Heart Coordinator  [facility]

## 2023-08-10 NOTE — LETTER
8/10/2023    Minh Samuel MD  [address] [facility] [address]    RE: Erick Hamiltonnelly       Dear Colleague,     I had the pleasure of seeing Erick Shahid in the [facility].  REASON FOR CONSULTATION: Severe symptomatic aortic stenosis    HISTORY OF PRESENT ILLNESS: Erick Shahid is a very pleasant 83-year-old gentleman with critical aortic stenosis, moderate to severe cardiomyopathy (likely due to progressive aortic stenosis) and significant dementia/memory issues she was referred for possible aortic valve replacement.    He is known to have severe aortic stenosis and was evaluated several years ago at an outside institution for possible TAVR.  At that time the patient did not have any significant symptoms.  He was also in the midst of the pandemic and patient and family elected to continue conservative management.    He lives at home with his wife independently.  He is not very active and and his main activity consisted of go downstairs to the hallway to  the mailbox.  His wife tries to take him out for activities but the patient prefers to stay home.  He is an avid baseball fan and prefers to stay home and watch baseball.    Although he is physically robust, he has significant memory issues.  He also does have significant hearing impairment which makes conversation challenging.  Nonetheless he appears to have good quality of life and would like to live longer if possible.    The patient had several unwitnessed falls/syncopal spells last week.  He was admitted to the hospital where repeat echocardiogram revealed progression of aortic stenosis which is now critical.  I reviewed his echocardiogram personally.  He is LV function is moderately reduced with an EF of 30 to 35%.  His aortic valve area 0.6 cm  and his mean gradient 63 mmHg.    He had lengthy discussion with inpatient team with regards to goals of care.  Aggressive intervention for his severe aortic stenosis versus  continued conservative management was discussed.  The patient and family elected to pursue aortic valve replacement.  As such, he underwent coronary angiography as well as CT TAVR.  I personally reviewed the coronary angiogram.  He has mild to moderate mid LAD disease and high-grade stenosis involving a nondominant RCA.  The CT TAVR which I also reviewed shows adequate iliofemoral arteries and no anatomical contraindications to TAVR.      ASSESSMENT AND PLAN: He was unfortunately admitted to the hospital last week with syncopal spells.  His aortic stenosis is now critical and the spells were most likely related to his critical aortic stenosis.  He also developed significant cardiomyopathy which is likely related to the aortic stenosis.    The patient lives with his wife and does not appear to be frail physically.  However he has significant memory issues.  He is not active but if he would be to be more active he will certainly have symptoms.    He presents with difficult therapeutic challenge.  On one hand, he has significant memory issues which is concerning but on the other life expectancy will certainly be reduced if intervention is not pursued.  Despite significant memory issues, the patient appears to understand and follow all conversations.  After long discussion regarding pros and cons of intervention versus continued conservative management, the patient and family strongly requested to pursue transcatheter aortic replacement.  I believe this is very reasonable.    We also discussed potential complications regarding the TAVR procedure.  He has underlying conduction issues and would be high risk for pacemaker post TAVR procedure.  I told him there will be high likelihood that he will need pacemaker following his TAVR procedure.  He agrees to have a pacemaker if necessary.  Will also discussed bailout options should there be any complications that would require open heart surgery.  The patient and family wish  to not have bailout surgery.    Given recent syncope, we will proceed with TAVR as soon as possible, likely early next week.    I appreciate opportunity to participate in Bernardo's care.    Bossman Felipe MD    Today's clinic visit entailed:  60 minutes spent by me on the date of the encounter doing chart review, history and exam, documentation and further activities per the note  Provider  Link to MDM Help Grid       Orders Placed This Encounter   Procedures    N terminal pro BNP outpatient    CBC with platelets    Comprehensive metabolic panel    INR    EKG 12-lead complete w/read - Clinics (performed today)    Echocardiogram Complete    Case Request Cath Lab: Transcatheter Aortic Valve Replacement-Femoral Approach    ABO/Rh type and screen       No orders of the defined types were placed in this encounter.      There are no discontinued medications.      Encounter Diagnoses   Name Primary?    Critical aortic valve stenosis Yes    Abnormal CT scan of heart     Dyspnea     Nonrheumatic aortic valve stenosis        CURRENT MEDICATIONS:  Current Outpatient Medications   Medication Sig Dispense Refill    Ascorbic Acid (VITAMIN C) 500 MG CHEW Take 500 mg by mouth daily      aspirin (ASA) 81 MG chewable tablet Take 81 mg by mouth daily      cyanocobalamin (VITAMIN B-12) 1000 MCG tablet Take 1,000 mcg by mouth daily      cycloSPORINE (RESTASIS) 0.05 % ophthalmic emulsion Apply 1 drop to eye 2 times daily      metFORMIN (GLUCOPHAGE) 1000 MG tablet Take 1 tablet by mouth daily (with dinner)      methylcellulose (CITRUCEL) powder Take 1 teaspoonful by mouth daily      nystatin (MYCOSTATIN) 769132 UNIT/GM external powder Apply 1 strip topically 3 times daily as needed (jock itch)      rosuvastatin (CRESTOR) 20 MG tablet Take 1 tablet by mouth daily      Vitamin D3 (CHOLECALCIFEROL) 25 mcg (1000 units) tablet Take 25 mcg by mouth daily         ALLERGIES   No Known Allergies    PAST MEDICAL HISTORY:  No past medical history on  "file.    PAST SURGICAL HISTORY:  Past Surgical History:   Procedure Laterality Date    CV CORONARY ANGIOGRAM N/A 7/28/2023    Procedure: Coronary Angiogram;  Surgeon: Mando Field MD;  Location:  HEART CARDIAC CATH LAB    CV PCI N/A 7/28/2023    Procedure: Percutaneous Coronary Intervention;  Surgeon: Mando Field MD;  Location:  HEART CARDIAC CATH LAB       FAMILY HISTORY:  No family history on file.    SOCIAL HISTORY:  Social History     Socioeconomic History    Marital status:        Review of Systems:  Skin:          Eyes:         ENT:         Respiratory:          Cardiovascular:         Gastroenterology:        Genitourinary:         Musculoskeletal:         Neurologic:         Psychiatric:         Heme/Lymph/Imm:         Endocrine:           Physical Exam:  Vitals: /64   Pulse 54   Ht 1.702 m (5' 7\")   Wt 74.8 kg (165 lb)   SpO2 100%   BMI 25.84 kg/m      Constitutional:           Skin:             Head:           Eyes:           Lymph:      ENT:           Neck:           Respiratory:            Cardiac:                                                           GI:           Extremities and Muscular Skeletal:                 Neurological:           Psych:           CC  No referring provider defined for this encounter.      Thank you for allowing me to participate in the care of your patient.      Sincerely,     Bossman Felipe MD, MD     [facility]  "

## 2023-08-10 NOTE — PATIENT INSTRUCTIONS
- Please head downstairs to the Welcome Desk for pre-op labs at [facility].     TAVR PRE-PROCEDURE INSTRUCTIONS:    - Your TAVR is scheduled Tuesday 8/15/23, 1st case. Please check-in at 5:30AM at the Registration Desk in the [facility] Lobby at [facility].    - Use the Hibiclens soap I have provided you the night before and morning of the procedure. Wash from chin to toes, please avoid your face and eyes.    - Nothing to eat or drink the morning of your TAVR.     Medication instructions:  - You may take your morning medications with sips of water.   - Hold your metformin the morning of your TAVR.  - Please hold all vitamins and supplements the morning of your procedure.  - You may continue taking 81mg aspirin daily. OR You will need to take 325mg of aspirin the morning of your TAVR.      -On the day of the procedure, you may have two visitors in the pre-operative area. Two visitors may see you when you get to your room in the Heart Center (2nd floor of [facility]).    - You will stay overnight on Tuesday night. We will monitor you overnight for signs of bleeding, stroke, as well as need for pacemaker. On Wednesday morning, you will have an echo of your new valve and work with cardiac rehab.     It was nice to see you today! Please call with any other questions, concerns, or any changes in your health: [phone]    Yessy Garcia RN  Structural Heart Coordinator  [facility]

## 2023-08-10 NOTE — PROGRESS NOTES
REASON FOR CONSULTATION: Severe symptomatic aortic stenosis    HISTORY OF PRESENT ILLNESS: Erick Shahid is a very pleasant 83-year-old gentleman with critical aortic stenosis, moderate to severe cardiomyopathy (likely due to progressive aortic stenosis) and significant dementia/memory issues she was referred for possible aortic valve replacement.    He is known to have severe aortic stenosis and was evaluated several years ago at an outside institution for possible TAVR.  At that time the patient did not have any significant symptoms.  He was also in the midst of the pandemic and patient and family elected to continue conservative management.    He lives at home with his wife independently.  He is not very active and and his main activity consisted of go downstairs to the hallway to  the mailbox.  His wife tries to take him out for activities but the patient prefers to stay home.  He is an avid baseball fan and prefers to stay home and watch baseball.  He was a  before retiring    Although he is physically robust, he has significant memory issues.  He also does have significant hearing impairment which makes conversation challenging.  Nonetheless he appears to have good quality of life and would like to live longer if possible.    The patient had several unwitnessed falls/syncopal spells last week.  He was admitted to the hospital where repeat echocardiogram revealed progression of aortic stenosis which is now critical.  I reviewed his echocardiogram personally.  He is LV function is moderately reduced with an EF of 30 to 35%.  His aortic valve area 0.6 cm  and his mean gradient 63 mmHg.    He had lengthy discussion with inpatient team with regards to goals of care.  Aggressive intervention for his severe aortic stenosis versus continued conservative management was discussed.  The patient and family elected to pursue aortic valve replacement.  As such, he underwent coronary angiography  as well as CT TAVR.  I personally reviewed the coronary angiogram.  He has mild to moderate mid LAD disease and high-grade stenosis involving a nondominant RCA.  The CT TAVR which I also reviewed shows adequate iliofemoral arteries and no anatomical contraindications to TAVR.      ASSESSMENT AND PLAN: He was unfortunately admitted to the hospital last week with syncopal spells.  His aortic stenosis is now critical and the spells were most likely related to his critical aortic stenosis.  He also developed significant cardiomyopathy which is likely related to the aortic stenosis.    The patient lives with his wife and does not appear to be frail physically.  However he has significant memory issues.  He is not active but if he would be to be more active he will certainly have symptoms.    He presents with difficult therapeutic challenge.  On one hand, he has significant memory issues which is concerning but on the other life expectancy will certainly be reduced if intervention is not pursued.  Despite significant memory issues, the patient appears to understand and follow all conversations.  After long discussion regarding pros and cons of intervention versus continued conservative management, the patient and family strongly requested to pursue transcatheter aortic replacement.  I believe this is very reasonable.    We also discussed potential complications regarding the TAVR procedure.  He has underlying conduction issues and would be high risk for pacemaker post TAVR procedure.  I told him there will be high likelihood that he will need pacemaker following his TAVR procedure.  He agrees to have a pacemaker if necessary.  Will also discussed bailout options should there be any complications that would require open heart surgery.  The patient and family wish to not have bailout surgery.    Given recent syncope, we will proceed with TAVR as soon as possible, likely early next week.    I appreciate opportunity to  participate in Bernardo's care.    Bossman Felipe MD    Today's clinic visit entailed:  60 minutes spent by me on the date of the encounter doing chart review, history and exam, documentation and further activities per the note  Provider  Link to MDM Help Grid       Orders Placed This Encounter   Procedures    N terminal pro BNP outpatient    CBC with platelets    Comprehensive metabolic panel    INR    EKG 12-lead complete w/read - Clinics (performed today)    Echocardiogram Complete    Case Request Cath Lab: Transcatheter Aortic Valve Replacement-Femoral Approach    ABO/Rh type and screen       No orders of the defined types were placed in this encounter.      There are no discontinued medications.      Encounter Diagnoses   Name Primary?    Critical aortic valve stenosis Yes    Abnormal CT scan of heart     Dyspnea     Nonrheumatic aortic valve stenosis        CURRENT MEDICATIONS:  Current Outpatient Medications   Medication Sig Dispense Refill    Ascorbic Acid (VITAMIN C) 500 MG CHEW Take 500 mg by mouth daily      aspirin (ASA) 81 MG chewable tablet Take 81 mg by mouth daily      cyanocobalamin (VITAMIN B-12) 1000 MCG tablet Take 1,000 mcg by mouth daily      cycloSPORINE (RESTASIS) 0.05 % ophthalmic emulsion Apply 1 drop to eye 2 times daily      metFORMIN (GLUCOPHAGE) 1000 MG tablet Take 1 tablet by mouth daily (with dinner)      methylcellulose (CITRUCEL) powder Take 1 teaspoonful by mouth daily      nystatin (MYCOSTATIN) 200725 UNIT/GM external powder Apply 1 strip topically 3 times daily as needed (jock itch)      rosuvastatin (CRESTOR) 20 MG tablet Take 1 tablet by mouth daily      Vitamin D3 (CHOLECALCIFEROL) 25 mcg (1000 units) tablet Take 25 mcg by mouth daily         ALLERGIES   No Known Allergies    PAST MEDICAL HISTORY:  No past medical history on file.    PAST SURGICAL HISTORY:  Past Surgical History:   Procedure Laterality Date    CV CORONARY ANGIOGRAM N/A 7/28/2023    Procedure: Coronary Angiogram;   "Surgeon: Mando Field MD;  Location:  HEART CARDIAC CATH LAB    CV PCI N/A 7/28/2023    Procedure: Percutaneous Coronary Intervention;  Surgeon: Mando Field MD;  Location:  HEART CARDIAC CATH LAB       FAMILY HISTORY:  No family history on file.    SOCIAL HISTORY:  Social History     Socioeconomic History    Marital status:        Review of Systems:  Skin:          Eyes:         ENT:         Respiratory:          Cardiovascular:         Gastroenterology:        Genitourinary:         Musculoskeletal:         Neurologic:         Psychiatric:         Heme/Lymph/Imm:         Endocrine:           Physical Exam:  Vitals: /64   Pulse 54   Ht 1.702 m (5' 7\")   Wt 74.8 kg (165 lb)   SpO2 100%   BMI 25.84 kg/m      Constitutional:           Skin:             Head:           Eyes:           Lymph:      ENT:           Neck:           Respiratory:            Cardiac:                                                           GI:           Extremities and Muscular Skeletal:                 Neurological:           Psych:           CC  No referring provider defined for this encounter.                "

## 2023-08-10 NOTE — H&P (VIEW-ONLY)
REASON FOR CONSULTATION: Severe symptomatic aortic stenosis    HISTORY OF PRESENT ILLNESS: Erick Shahid is a very pleasant 83-year-old gentleman with critical aortic stenosis, moderate to severe cardiomyopathy (likely due to progressive aortic stenosis) and significant dementia/memory issues she was referred for possible aortic valve replacement.    He is known to have severe aortic stenosis and was evaluated several years ago at an outside institution for possible TAVR.  At that time the patient did not have any significant symptoms.  He was also in the midst of the pandemic and patient and family elected to continue conservative management.    He lives at home with his wife independently.  He is not very active and and his main activity consisted of go downstairs to the hallway to  the mailbox.  His wife tries to take him out for activities but the patient prefers to stay home.  He is an avid baseball fan and prefers to stay home and watch baseball.  He was a  before retiring    Although he is physically robust, he has significant memory issues.  He also does have significant hearing impairment which makes conversation challenging.  Nonetheless he appears to have good quality of life and would like to live longer if possible.    The patient had several unwitnessed falls/syncopal spells last week.  He was admitted to the hospital where repeat echocardiogram revealed progression of aortic stenosis which is now critical.  I reviewed his echocardiogram personally.  He is LV function is moderately reduced with an EF of 30 to 35%.  His aortic valve area 0.6 cm  and his mean gradient 63 mmHg.    He had lengthy discussion with inpatient team with regards to goals of care.  Aggressive intervention for his severe aortic stenosis versus continued conservative management was discussed.  The patient and family elected to pursue aortic valve replacement.  As such, he underwent coronary angiography  as well as CT TAVR.  I personally reviewed the coronary angiogram.  He has mild to moderate mid LAD disease and high-grade stenosis involving a nondominant RCA.  The CT TAVR which I also reviewed shows adequate iliofemoral arteries and no anatomical contraindications to TAVR.      ASSESSMENT AND PLAN: He was unfortunately admitted to the hospital last week with syncopal spells.  His aortic stenosis is now critical and the spells were most likely related to his critical aortic stenosis.  He also developed significant cardiomyopathy which is likely related to the aortic stenosis.    The patient lives with his wife and does not appear to be frail physically.  However he has significant memory issues.  He is not active but if he would be to be more active he will certainly have symptoms.    He presents with difficult therapeutic challenge.  On one hand, he has significant memory issues which is concerning but on the other life expectancy will certainly be reduced if intervention is not pursued.  Despite significant memory issues, the patient appears to understand and follow all conversations.  After long discussion regarding pros and cons of intervention versus continued conservative management, the patient and family strongly requested to pursue transcatheter aortic replacement.  I believe this is very reasonable.    We also discussed potential complications regarding the TAVR procedure.  He has underlying conduction issues and would be high risk for pacemaker post TAVR procedure.  I told him there will be high likelihood that he will need pacemaker following his TAVR procedure.  He agrees to have a pacemaker if necessary.  Will also discussed bailout options should there be any complications that would require open heart surgery.  The patient and family wish to not have bailout surgery.    Given recent syncope, we will proceed with TAVR as soon as possible, likely early next week.    I appreciate opportunity to  participate in Bernardo's care.    Bossman Felipe MD    Today's clinic visit entailed:  60 minutes spent by me on the date of the encounter doing chart review, history and exam, documentation and further activities per the note  Provider  Link to MDM Help Grid       Orders Placed This Encounter   Procedures    N terminal pro BNP outpatient    CBC with platelets    Comprehensive metabolic panel    INR    EKG 12-lead complete w/read - Clinics (performed today)    Echocardiogram Complete    Case Request Cath Lab: Transcatheter Aortic Valve Replacement-Femoral Approach    ABO/Rh type and screen       No orders of the defined types were placed in this encounter.      There are no discontinued medications.      Encounter Diagnoses   Name Primary?    Critical aortic valve stenosis Yes    Abnormal CT scan of heart     Dyspnea     Nonrheumatic aortic valve stenosis        CURRENT MEDICATIONS:  Current Outpatient Medications   Medication Sig Dispense Refill    Ascorbic Acid (VITAMIN C) 500 MG CHEW Take 500 mg by mouth daily      aspirin (ASA) 81 MG chewable tablet Take 81 mg by mouth daily      cyanocobalamin (VITAMIN B-12) 1000 MCG tablet Take 1,000 mcg by mouth daily      cycloSPORINE (RESTASIS) 0.05 % ophthalmic emulsion Apply 1 drop to eye 2 times daily      metFORMIN (GLUCOPHAGE) 1000 MG tablet Take 1 tablet by mouth daily (with dinner)      methylcellulose (CITRUCEL) powder Take 1 teaspoonful by mouth daily      nystatin (MYCOSTATIN) 436402 UNIT/GM external powder Apply 1 strip topically 3 times daily as needed (jock itch)      rosuvastatin (CRESTOR) 20 MG tablet Take 1 tablet by mouth daily      Vitamin D3 (CHOLECALCIFEROL) 25 mcg (1000 units) tablet Take 25 mcg by mouth daily         ALLERGIES   No Known Allergies    PAST MEDICAL HISTORY:  No past medical history on file.    PAST SURGICAL HISTORY:  Past Surgical History:   Procedure Laterality Date    CV CORONARY ANGIOGRAM N/A 7/28/2023    Procedure: Coronary Angiogram;   "Surgeon: Mando Field MD;  Location:  HEART CARDIAC CATH LAB    CV PCI N/A 7/28/2023    Procedure: Percutaneous Coronary Intervention;  Surgeon: Mando Field MD;  Location:  HEART CARDIAC CATH LAB       FAMILY HISTORY:  No family history on file.    SOCIAL HISTORY:  Social History     Socioeconomic History    Marital status:        Review of Systems:  Skin:          Eyes:         ENT:         Respiratory:          Cardiovascular:         Gastroenterology:        Genitourinary:         Musculoskeletal:         Neurologic:         Psychiatric:         Heme/Lymph/Imm:         Endocrine:           Physical Exam:  Vitals: /64   Pulse 54   Ht 1.702 m (5' 7\")   Wt 74.8 kg (165 lb)   SpO2 100%   BMI 25.84 kg/m      Constitutional:           Skin:             Head:           Eyes:           Lymph:      ENT:           Neck:           Respiratory:            Cardiac:                                                           GI:           Extremities and Muscular Skeletal:                 Neurological:           Psych:           CC  No referring provider defined for this encounter.                "

## 2023-08-10 NOTE — PROGRESS NOTES
Patient was presented this morning at the multidisciplinary valve conference. Plan is to proceed with TAVR procedure.    Valve: Allison  Approach: TF, perclose  Sedation: MAC    Above information was discussed with patient and wife Zainab in clinic today.    Yessy Garcia RN  Structural Heart Coordinator  [facility]

## 2023-08-11 ENCOUNTER — ANESTHESIA EVENT (OUTPATIENT)
Dept: CARDIOLOGY | Facility: CLINIC | Age: 83
DRG: 267 | End: 2023-08-11
Payer: COMMERCIAL

## 2023-08-14 RX ORDER — ASCORBIC ACID 500 MG
500 TABLET ORAL DAILY
COMMUNITY
End: 2024-01-01

## 2023-08-14 RX ORDER — METOPROLOL SUCCINATE 25 MG/1
25 TABLET, EXTENDED RELEASE ORAL DAILY
COMMUNITY
End: 2024-01-01

## 2023-08-14 ASSESSMENT — COPD QUESTIONNAIRES: COPD: 0

## 2023-08-14 ASSESSMENT — LIFESTYLE VARIABLES: TOBACCO_USE: 0

## 2023-08-14 NOTE — ANESTHESIA PREPROCEDURE EVALUATION
Anesthesia Pre-Procedure Evaluation    Patient: Erick Shahid   MRN: [id number] : 1940        Procedure : Procedure(s):  Transcatheter Aortic Valve Replacement-Femoral Approach          No past medical history on file.   Past Surgical History:   Procedure Laterality Date    CV CORONARY ANGIOGRAM N/A 2023    Procedure: Coronary Angiogram;  Surgeon: Mando Field MD;  Location:  HEART CARDIAC CATH LAB    CV PCI N/A 2023    Procedure: Percutaneous Coronary Intervention;  Surgeon: Mando Field MD;  Location: [facility] CARDIAC CATH LAB      No Known Allergies   Social History     Tobacco Use    Smoking status: Not on file    Smokeless tobacco: Not on file   Substance Use Topics    Alcohol use: Not on file      Wt Readings from Last 1 Encounters:   08/10/23 74.8 kg (165 lb)        Anesthesia Evaluation            ROS/MED HX  ENT/Pulmonary:    (-) tobacco use, asthma, COPD and sleep apnea   Neurologic:     (+)   dementia,                             Cardiovascular:     (+) Dyslipidemia hypertension- -   -  - -      CHF                     valvular problems/murmurs type: AS     Previous cardiac testing   Echo: Date: Results:  Interpretation Summary     Mild concentric left ventricular hypertrophy.  Left ventricular systolic function is moderate to severely reduced.  The visual ejection fraction is 30-35%.  Moderate to severe global hypokinesia of the left ventricle.  The right ventricle is normal in size and function.  Critical aortic valve stenosis with mild regurgitation.  Peak transaortic velocity 4.8 m/s, mean gradient 63 mmHg, valve area 0.63 cmÂ .  Normal inferior vena cava.    Stress Test:  Date: Results:    ECG Reviewed:  Date: Results:    Cath:  Date: Results:      METS/Exercise Tolerance:     Hematologic:       Musculoskeletal:       GI/Hepatic:    (-) GERD   Renal/Genitourinary:  - neg Renal ROS     Endo:     (+)  type II DM,   Not using insulin, - not using insulin  pump.             (-) Type I DM and obesity   Psychiatric/Substance Use:  - neg psychiatric ROS     Infectious Disease:       Malignancy:    (-) malignancy   Other:            Physical Exam    Airway        Mallampati: II       Respiratory Devices and Support         Dental       (+) Edentulous      Cardiovascular          Rhythm and rate: regular   (+) murmur       Pulmonary           breath sounds clear to auscultation           OUTSIDE LABS:  CBC:   Lab Results   Component Value Date    WBC 5.1 08/02/2023    WBC 5.2 08/01/2023    HGB 12.7 (L) 08/02/2023    HGB 13.1 (L) 08/01/2023    HCT 37.6 (L) 08/02/2023    HCT 38.6 (L) 08/01/2023     08/02/2023     08/01/2023     BMP:   Lab Results   Component Value Date     08/02/2023     08/01/2023    POTASSIUM 3.9 08/02/2023    POTASSIUM 3.7 08/01/2023    CHLORIDE 104 08/02/2023    CHLORIDE 105 08/01/2023    CO2 25 08/02/2023    CO2 23 08/01/2023    BUN 9.5 08/02/2023    BUN 11.5 08/01/2023    CR 0.73 08/02/2023    CR 0.66 (L) 08/01/2023     (H) 08/02/2023     (H) 08/01/2023     COAGS:   Lab Results   Component Value Date    INR 1.07 07/27/2023     POC: No results found for: BGM, HCG, HCGS  HEPATIC:   Lab Results   Component Value Date    ALBUMIN 4.3 07/23/2023    PROTTOTAL 7.4 07/23/2023    ALT 11 07/23/2023    AST 22 07/23/2023    ALKPHOS 75 07/23/2023    BILITOTAL 1.1 07/23/2023     OTHER:   Lab Results   Component Value Date    A1C 6.0 (H) 07/30/2023    FERMIN 9.4 08/02/2023       Anesthesia Plan    ASA Status:  4    NPO Status:  NPO Appropriate    Anesthesia Type: MAC.     - Reason for MAC: chronic cardiopulmonary disease   Induction: Intravenous.           Consents    Anesthesia Plan(s) and associated risks, benefits, and realistic alternatives discussed. Questions answered and patient/representative(s) expressed understanding.     - Discussed:     - Discussed with:  Patient      - Extended Intubation/Ventilatory Support Discussed:  No.      - Patient is DNR/DNI Status: No     Use of blood products discussed: Yes.     - Discussed with: Patient.     - Consented: consented to blood products            Reason for refusal: other.     Postoperative Care    Pain management: Multi-modal analgesia.   PONV prophylaxis: Ondansetron (or other 5HT-3), Dexamethasone or Solumedrol     Comments:    Other Comments: Patient is counseled on the anesthesia plan and relevant anesthesia procedures including all risks and benefits. All patient questions were answered.               Familia Leonard MD

## 2023-08-14 NOTE — PROGRESS NOTES
PTA medications updated by Medication Scribe prior to surgery via phone call with patient (last doses completed by Nurse)     Medication history sources: Patient's family/friend (SPOUSE), Surescripts, and H&P  In the past week, patient estimated taking medication this percent of the time: Greater than 90%      Significant changes made to the medication list:  None      Additional medication history information:   Patient was advised to bring: RESTASIS OP    Medication reconciliation completed by provider prior to medication history? No    Time spent in this activity: 30 MINUTES    The information provided in this note is only as accurate as the sources available at the time of update(s)      Prior to Admission medications    Medication Sig Last Dose Taking? Auth Provider Long Term End Date   aspirin (ASA) 81 MG chewable tablet Take 81 mg by mouth daily  at AM Yes Unknown, Entered By History     cycloSPORINE (RESTASIS) 0.05 % ophthalmic emulsion Apply 1 drop to eye 2 times daily  at PM Yes Unknown, Entered By History     metFORMIN (GLUCOPHAGE) 1000 MG tablet Take 1 tablet by mouth daily (with dinner)  at PM Yes Unknown, Entered By History Yes    methylcellulose (CITRUCEL) powder Take 1 teaspoonful by mouth daily  at AM Yes Unknown, Entered By History     metoprolol succinate ER (TOPROL XL) 25 MG 24 hr tablet Take 25 mg by mouth daily  at AM Yes Reported, Patient No    nystatin (MYCOSTATIN) 602555 UNIT/GM external powder Apply 1 strip topically 3 times daily as needed (jock itch)  at PM Yes Unknown, Entered By History     rosuvastatin (CRESTOR) 20 MG tablet Take 1 tablet by mouth daily  at PM Yes Unknown, Entered By History Yes    vitamin B-12 (CYANOCOBALAMIN) 1000 MCG tablet Take 1,000 mcg by mouth daily  at AM Yes Reported, Patient     vitamin C (ASCORBIC ACID) 500 MG tablet Take 500 mg by mouth daily  at AM Yes Reported, Patient     Vitamin D3 (CHOLECALCIFEROL) 25 mcg (1000 units) tablet Take 25 mcg by mouth daily    Unknown, Entered By History

## 2023-08-14 NOTE — PROGRESS NOTES
Completed frailty testing and KCCQ.   5 meter walk: 7.1 seconds             7.5 seconds             7.4 seconds  Frailty score: 3/5 (eyeball, ambulation, ADLs)    KCCQ Results:   1a. 5  1b. 4  1c. 1  2. 2  3. 3  4. 7  5. 5  6. 5  7. 2  8a. 6  8b. 6  8c. 5    Preliminary STS Risk Score: 2.69%  NYHA Class: II    Yessy Garcia RN  Structural Heart Coordinator  [facility]

## 2023-08-15 ENCOUNTER — APPOINTMENT (OUTPATIENT)
Dept: CT IMAGING | Facility: CLINIC | Age: 83
DRG: 267 | End: 2023-08-15
Attending: NURSE PRACTITIONER
Payer: COMMERCIAL

## 2023-08-15 ENCOUNTER — ANESTHESIA (OUTPATIENT)
Dept: CARDIOLOGY | Facility: CLINIC | Age: 83
DRG: 267 | End: 2023-08-15
Payer: COMMERCIAL

## 2023-08-15 ENCOUNTER — HOSPITAL ENCOUNTER (INPATIENT)
Dept: CARDIOLOGY | Facility: CLINIC | Age: 83
Setting detail: SURGERY ADMIT
Discharge: HOME | DRG: 267 | End: 2023-08-15
Attending: INTERNAL MEDICINE | Admitting: INTERNAL MEDICINE
Payer: COMMERCIAL

## 2023-08-15 ENCOUNTER — HOSPITAL ENCOUNTER (INPATIENT)
Facility: CLINIC | Age: 83
LOS: 3 days | Discharge: NURSING, REHAB OR LONG-TERM CARE | DRG: 267 | End: 2023-08-18
Attending: INTERNAL MEDICINE | Admitting: STUDENT IN AN ORGANIZED HEALTH CARE EDUCATION/TRAINING PROGRAM
Payer: COMMERCIAL

## 2023-08-15 DIAGNOSIS — R93.1 ABNORMAL CT SCAN OF HEART: ICD-10-CM

## 2023-08-15 DIAGNOSIS — I44.2 COMPLETE HEART BLOCK, POST-SURGICAL (H): ICD-10-CM

## 2023-08-15 DIAGNOSIS — R94.31 ABNORMAL ELECTROCARDIOGRAM: ICD-10-CM

## 2023-08-15 DIAGNOSIS — I97.89 COMPLETE HEART BLOCK, POST-SURGICAL (H): ICD-10-CM

## 2023-08-15 DIAGNOSIS — Z95.2 S/P TAVR (TRANSCATHETER AORTIC VALVE REPLACEMENT): Primary | ICD-10-CM

## 2023-08-15 DIAGNOSIS — I35.0 CRITICAL AORTIC VALVE STENOSIS: ICD-10-CM

## 2023-08-15 DIAGNOSIS — Z95.3 S/P TAVR (TRANSCATHETER AORTIC VALVE REPLACEMENT), BIOPROSTHETIC: Primary | ICD-10-CM

## 2023-08-15 DIAGNOSIS — I65.21 INTERNAL CAROTID ARTERY STENOSIS, RIGHT: ICD-10-CM

## 2023-08-15 DIAGNOSIS — G45.9 TIA (TRANSIENT ISCHEMIC ATTACK): ICD-10-CM

## 2023-08-15 DIAGNOSIS — I35.0 AORTIC STENOSIS, SEVERE: ICD-10-CM

## 2023-08-15 DIAGNOSIS — E11.9 TYPE 2 DIABETES MELLITUS WITHOUT COMPLICATION, WITHOUT LONG-TERM CURRENT USE OF INSULIN (H): ICD-10-CM

## 2023-08-15 LAB
ABO/RH(D): NORMAL
ACT BLD: 122 SECONDS (ref 74–150)
ACT BLD: 309 SECONDS (ref 74–150)
ANION GAP SERPL CALCULATED.3IONS-SCNC: 12 MMOL/L (ref 7–15)
ANTIBODY SCREEN: NEGATIVE
BLD PROD TYP BPU: NORMAL
BLD PROD TYP BPU: NORMAL
BLOOD COMPONENT TYPE: NORMAL
BLOOD COMPONENT TYPE: NORMAL
BUN SERPL-MCNC: 14.2 MG/DL (ref 8–23)
CALCIUM SERPL-MCNC: 9.5 MG/DL (ref 8.8–10.2)
CHLORIDE SERPL-SCNC: 104 MMOL/L (ref 98–107)
CODING SYSTEM: NORMAL
CODING SYSTEM: NORMAL
CREAT SERPL-MCNC: 0.74 MG/DL (ref 0.67–1.17)
CROSSMATCH: NORMAL
CROSSMATCH: NORMAL
DEPRECATED HCO3 PLAS-SCNC: 23 MMOL/L (ref 22–29)
ERYTHROCYTE [DISTWIDTH] IN BLOOD BY AUTOMATED COUNT: 14.3 % (ref 10–15)
GFR SERPL CREATININE-BSD FRML MDRD: 89 ML/MIN/1.73M2
GLUCOSE BLDC GLUCOMTR-MCNC: 111 MG/DL (ref 70–99)
GLUCOSE BLDC GLUCOMTR-MCNC: 94 MG/DL (ref 70–99)
GLUCOSE BLDC GLUCOMTR-MCNC: 95 MG/DL (ref 70–99)
GLUCOSE SERPL-MCNC: 108 MG/DL (ref 70–99)
GLUCOSE SERPL-MCNC: 110 MG/DL (ref 70–99)
HCG INTACT+B SERPL-ACNC: <1 MIU/ML
HCT VFR BLD AUTO: 34.3 % (ref 40–53)
HGB BLD-MCNC: 11.4 G/DL (ref 13.3–17.7)
INR PPP: 1.17 (ref 0.85–1.15)
ISSUE DATE AND TIME: NORMAL
ISSUE DATE AND TIME: NORMAL
LVEF ECHO: NORMAL
MAGNESIUM SERPL-MCNC: 2.2 MG/DL (ref 1.7–2.3)
MCH RBC QN AUTO: 32.3 PG (ref 26.5–33)
MCHC RBC AUTO-ENTMCNC: 33.2 G/DL (ref 31.5–36.5)
MCV RBC AUTO: 97 FL (ref 78–100)
PHOSPHATE SERPL-MCNC: 3.8 MG/DL (ref 2.5–4.5)
PLATELET # BLD AUTO: 145 10E3/UL (ref 150–450)
POTASSIUM SERPL-SCNC: 4.3 MMOL/L (ref 3.4–5.3)
RBC # BLD AUTO: 3.53 10E6/UL (ref 4.4–5.9)
SODIUM SERPL-SCNC: 139 MMOL/L (ref 136–145)
SPECIMEN EXPIRATION DATE: NORMAL
TSH SERPL DL<=0.005 MIU/L-ACNC: 2.32 UIU/ML (ref 0.3–4.2)
UNIT ABO/RH: NORMAL
UNIT ABO/RH: NORMAL
UNIT NUMBER: NORMAL
UNIT NUMBER: NORMAL
UNIT STATUS: NORMAL
UNIT STATUS: NORMAL
UNIT TYPE ISBT: 6200
UNIT TYPE ISBT: 6200
WBC # BLD AUTO: 5.3 10E3/UL (ref 4–11)

## 2023-08-15 PROCEDURE — 0042T CT HEAD PERFUSION W CONTRAST: CPT

## 2023-08-15 PROCEDURE — C1769 GUIDE WIRE: HCPCS | Performed by: INTERNAL MEDICINE

## 2023-08-15 PROCEDURE — 93308 TTE F-UP OR LMTD: CPT | Mod: 26 | Performed by: INTERNAL MEDICINE

## 2023-08-15 PROCEDURE — C1785 PMKR, DUAL, RATE-RESP: HCPCS | Performed by: INTERNAL MEDICINE

## 2023-08-15 PROCEDURE — 84100 ASSAY OF PHOSPHORUS: CPT | Performed by: PHYSICIAN ASSISTANT

## 2023-08-15 PROCEDURE — 250N000011 HC RX IP 250 OP 636: Performed by: INTERNAL MEDICINE

## 2023-08-15 PROCEDURE — 85027 COMPLETE CBC AUTOMATED: CPT | Performed by: NURSE PRACTITIONER

## 2023-08-15 PROCEDURE — 250N000009 HC RX 250: Performed by: INTERNAL MEDICINE

## 2023-08-15 PROCEDURE — 85347 COAGULATION TIME ACTIVATED: CPT

## 2023-08-15 PROCEDURE — 250N000011 HC RX IP 250 OP 636: Mod: JZ | Performed by: REGISTERED NURSE

## 2023-08-15 PROCEDURE — 93321 DOPPLER ECHO F-UP/LMTD STD: CPT

## 2023-08-15 PROCEDURE — 278N000051 HC OR IMPLANT GENERAL: Performed by: INTERNAL MEDICINE

## 2023-08-15 PROCEDURE — C1730 CATH, EP, 19 OR FEW ELECT: HCPCS | Performed by: INTERNAL MEDICINE

## 2023-08-15 PROCEDURE — 0JH606Z INSERTION OF PACEMAKER, DUAL CHAMBER INTO CHEST SUBCUTANEOUS TISSUE AND FASCIA, OPEN APPROACH: ICD-10-PCS | Performed by: INTERNAL MEDICINE

## 2023-08-15 PROCEDURE — 86923 COMPATIBILITY TEST ELECTRIC: CPT | Performed by: INTERNAL MEDICINE

## 2023-08-15 PROCEDURE — 82947 ASSAY GLUCOSE BLOOD QUANT: CPT | Performed by: ANESTHESIOLOGY

## 2023-08-15 PROCEDURE — 99222 1ST HOSP IP/OBS MODERATE 55: CPT | Mod: 24 | Performed by: NURSE PRACTITIONER

## 2023-08-15 PROCEDURE — 99291 CRITICAL CARE FIRST HOUR: CPT | Performed by: PHYSICIAN ASSISTANT

## 2023-08-15 PROCEDURE — 250N000009 HC RX 250: Performed by: REGISTERED NURSE

## 2023-08-15 PROCEDURE — 272N000001 HC OR GENERAL SUPPLY STERILE: Performed by: INTERNAL MEDICINE

## 2023-08-15 PROCEDURE — 02H63JZ INSERTION OF PACEMAKER LEAD INTO RIGHT ATRIUM, PERCUTANEOUS APPROACH: ICD-10-PCS | Performed by: INTERNAL MEDICINE

## 2023-08-15 PROCEDURE — 250N000013 HC RX MED GY IP 250 OP 250 PS 637: Performed by: INTERNAL MEDICINE

## 2023-08-15 PROCEDURE — 258N000003 HC RX IP 258 OP 636: Performed by: INTERNAL MEDICINE

## 2023-08-15 PROCEDURE — 258N000003 HC RX IP 258 OP 636: Performed by: REGISTERED NURSE

## 2023-08-15 PROCEDURE — 86850 RBC ANTIBODY SCREEN: CPT | Performed by: INTERNAL MEDICINE

## 2023-08-15 PROCEDURE — 33206 INSERT HEART PM ATRIAL: CPT | Mod: KX | Performed by: INTERNAL MEDICINE

## 2023-08-15 PROCEDURE — 33361 REPLACE AORTIC VALVE PERQ: CPT | Performed by: INTERNAL MEDICINE

## 2023-08-15 PROCEDURE — 250N000013 HC RX MED GY IP 250 OP 250 PS 637: Performed by: NURSE PRACTITIONER

## 2023-08-15 PROCEDURE — 36415 COLL VENOUS BLD VENIPUNCTURE: CPT | Performed by: INTERNAL MEDICINE

## 2023-08-15 PROCEDURE — C1894 INTRO/SHEATH, NON-LASER: HCPCS | Performed by: INTERNAL MEDICINE

## 2023-08-15 PROCEDURE — 250N000011 HC RX IP 250 OP 636: Mod: JZ | Performed by: NURSE PRACTITIONER

## 2023-08-15 PROCEDURE — 93005 ELECTROCARDIOGRAM TRACING: CPT

## 2023-08-15 PROCEDURE — C1760 CLOSURE DEV, VASC: HCPCS | Performed by: INTERNAL MEDICINE

## 2023-08-15 PROCEDURE — 93325 DOPPLER ECHO COLOR FLOW MAPG: CPT

## 2023-08-15 PROCEDURE — 70498 CT ANGIOGRAPHY NECK: CPT

## 2023-08-15 PROCEDURE — 250N000009 HC RX 250: Performed by: NURSE PRACTITIONER

## 2023-08-15 PROCEDURE — 250N000011 HC RX IP 250 OP 636: Performed by: NURSE PRACTITIONER

## 2023-08-15 PROCEDURE — P9016 RBC LEUKOCYTES REDUCED: HCPCS | Performed by: INTERNAL MEDICINE

## 2023-08-15 PROCEDURE — 85610 PROTHROMBIN TIME: CPT | Performed by: NURSE PRACTITIONER

## 2023-08-15 PROCEDURE — 250N000013 HC RX MED GY IP 250 OP 250 PS 637: Performed by: PHYSICIAN ASSISTANT

## 2023-08-15 PROCEDURE — 93010 ELECTROCARDIOGRAM REPORT: CPT | Mod: XU | Performed by: INTERNAL MEDICINE

## 2023-08-15 PROCEDURE — 84443 ASSAY THYROID STIM HORMONE: CPT | Performed by: PHYSICIAN ASSISTANT

## 2023-08-15 PROCEDURE — 33361 REPLACE AORTIC VALVE PERQ: CPT | Mod: 62 | Performed by: STUDENT IN AN ORGANIZED HEALTH CARE EDUCATION/TRAINING PROGRAM

## 2023-08-15 PROCEDURE — C1898 LEAD, PMKR, OTHER THAN TRANS: HCPCS | Performed by: INTERNAL MEDICINE

## 2023-08-15 PROCEDURE — 80048 BASIC METABOLIC PNL TOTAL CA: CPT | Performed by: PHYSICIAN ASSISTANT

## 2023-08-15 PROCEDURE — 83735 ASSAY OF MAGNESIUM: CPT | Performed by: PHYSICIAN ASSISTANT

## 2023-08-15 PROCEDURE — 36415 COLL VENOUS BLD VENIPUNCTURE: CPT | Performed by: NURSE PRACTITIONER

## 2023-08-15 PROCEDURE — 02HK3JZ INSERTION OF PACEMAKER LEAD INTO RIGHT VENTRICLE, PERCUTANEOUS APPROACH: ICD-10-PCS | Performed by: INTERNAL MEDICINE

## 2023-08-15 PROCEDURE — 93321 DOPPLER ECHO F-UP/LMTD STD: CPT | Mod: 26 | Performed by: INTERNAL MEDICINE

## 2023-08-15 PROCEDURE — 33999 UNLISTED PX CARDIAC SURGERY: CPT | Performed by: INTERNAL MEDICINE

## 2023-08-15 PROCEDURE — 250N000011 HC RX IP 250 OP 636: Mod: JZ | Performed by: INTERNAL MEDICINE

## 2023-08-15 PROCEDURE — 02RF38Z REPLACEMENT OF AORTIC VALVE WITH ZOOPLASTIC TISSUE, PERCUTANEOUS APPROACH: ICD-10-PCS | Performed by: INTERNAL MEDICINE

## 2023-08-15 PROCEDURE — 99223 1ST HOSP IP/OBS HIGH 75: CPT | Performed by: PHYSICIAN ASSISTANT

## 2023-08-15 PROCEDURE — C1887 CATHETER, GUIDING: HCPCS | Performed by: INTERNAL MEDICINE

## 2023-08-15 PROCEDURE — 210N000001 HC R&B IMCU HEART CARE

## 2023-08-15 PROCEDURE — 258N000002 HC RX IP 258 OP 250: Performed by: NURSE PRACTITIONER

## 2023-08-15 PROCEDURE — 70450 CT HEAD/BRAIN W/O DYE: CPT

## 2023-08-15 PROCEDURE — 370N000017 HC ANESTHESIA TECHNICAL FEE, PER MIN: Performed by: INTERNAL MEDICINE

## 2023-08-15 PROCEDURE — 84702 CHORIONIC GONADOTROPIN TEST: CPT | Performed by: NURSE PRACTITIONER

## 2023-08-15 PROCEDURE — 99153 MOD SED SAME PHYS/QHP EA: CPT | Performed by: INTERNAL MEDICINE

## 2023-08-15 PROCEDURE — 99418 PROLNG IP/OBS E/M EA 15 MIN: CPT | Performed by: PHYSICIAN ASSISTANT

## 2023-08-15 PROCEDURE — 70496 CT ANGIOGRAPHY HEAD: CPT

## 2023-08-15 PROCEDURE — 99152 MOD SED SAME PHYS/QHP 5/>YRS: CPT | Performed by: INTERNAL MEDICINE

## 2023-08-15 PROCEDURE — 93325 DOPPLER ECHO COLOR FLOW MAPG: CPT | Mod: 26 | Performed by: INTERNAL MEDICINE

## 2023-08-15 DEVICE — PACEMAKER AZURE MRI XT DR: Type: IMPLANTABLE DEVICE | Status: FUNCTIONAL

## 2023-08-15 DEVICE — LEAD PACEMAKER SELECTSECURE 69CM MD  383069: Type: IMPLANTABLE DEVICE | Status: FUNCTIONAL

## 2023-08-15 DEVICE — IMP LEAD PACING BIPOLAR CAPSUREFIX NOVUS 52CM 5076-52: Type: IMPLANTABLE DEVICE | Status: FUNCTIONAL

## 2023-08-15 DEVICE — VALVE AORTIC SAPEIN 3 UTLRA TAVR 23MM 9750TFX23A: Type: IMPLANTABLE DEVICE | Status: FUNCTIONAL

## 2023-08-15 RX ORDER — CYCLOSPORINE 0.5 MG/ML
1 EMULSION OPHTHALMIC 2 TIMES DAILY
Status: DISCONTINUED | OUTPATIENT
Start: 2023-08-15 | End: 2023-08-15

## 2023-08-15 RX ORDER — FENTANYL CITRATE 50 UG/ML
50 INJECTION, SOLUTION INTRAMUSCULAR; INTRAVENOUS EVERY 5 MIN PRN
Status: DISCONTINUED | OUTPATIENT
Start: 2023-08-15 | End: 2023-08-15 | Stop reason: HOSPADM

## 2023-08-15 RX ORDER — PROPOFOL 10 MG/ML
INJECTION, EMULSION INTRAVENOUS CONTINUOUS PRN
Status: DISCONTINUED | OUTPATIENT
Start: 2023-08-15 | End: 2023-08-15

## 2023-08-15 RX ORDER — LIDOCAINE 40 MG/G
CREAM TOPICAL
Status: DISCONTINUED | OUTPATIENT
Start: 2023-08-15 | End: 2023-08-15 | Stop reason: HOSPADM

## 2023-08-15 RX ORDER — FENTANYL CITRATE 50 UG/ML
25 INJECTION, SOLUTION INTRAMUSCULAR; INTRAVENOUS EVERY 5 MIN PRN
Status: DISCONTINUED | OUTPATIENT
Start: 2023-08-15 | End: 2023-08-15 | Stop reason: HOSPADM

## 2023-08-15 RX ORDER — NITROGLYCERIN 20 MG/100ML
INJECTION INTRAVENOUS PRN
Status: DISCONTINUED | OUTPATIENT
Start: 2023-08-15 | End: 2023-08-15

## 2023-08-15 RX ORDER — NALOXONE HYDROCHLORIDE 0.4 MG/ML
0.4 INJECTION, SOLUTION INTRAMUSCULAR; INTRAVENOUS; SUBCUTANEOUS
Status: DISCONTINUED | OUTPATIENT
Start: 2023-08-15 | End: 2023-08-18 | Stop reason: HOSPADM

## 2023-08-15 RX ORDER — HEPARIN SODIUM 1000 [USP'U]/ML
INJECTION, SOLUTION INTRAVENOUS; SUBCUTANEOUS PRN
Status: DISCONTINUED | OUTPATIENT
Start: 2023-08-15 | End: 2023-08-15

## 2023-08-15 RX ORDER — NALOXONE HYDROCHLORIDE 0.4 MG/ML
0.2 INJECTION, SOLUTION INTRAMUSCULAR; INTRAVENOUS; SUBCUTANEOUS
Status: DISCONTINUED | OUTPATIENT
Start: 2023-08-15 | End: 2023-08-18 | Stop reason: HOSPADM

## 2023-08-15 RX ORDER — NICOTINE POLACRILEX 4 MG
15-30 LOZENGE BUCCAL
Status: DISCONTINUED | OUTPATIENT
Start: 2023-08-15 | End: 2023-08-18 | Stop reason: HOSPADM

## 2023-08-15 RX ORDER — ONDANSETRON 2 MG/ML
INJECTION INTRAMUSCULAR; INTRAVENOUS PRN
Status: DISCONTINUED | OUTPATIENT
Start: 2023-08-15 | End: 2023-08-15

## 2023-08-15 RX ORDER — OXYCODONE AND ACETAMINOPHEN 5; 325 MG/1; MG/1
1 TABLET ORAL EVERY 4 HOURS PRN
Status: DISCONTINUED | OUTPATIENT
Start: 2023-08-15 | End: 2023-08-18 | Stop reason: HOSPADM

## 2023-08-15 RX ORDER — SODIUM CHLORIDE 9 MG/ML
INJECTION, SOLUTION INTRAVENOUS CONTINUOUS
Status: DISCONTINUED | OUTPATIENT
Start: 2023-08-15 | End: 2023-08-15 | Stop reason: HOSPADM

## 2023-08-15 RX ORDER — DEXMEDETOMIDINE HYDROCHLORIDE 4 UG/ML
INJECTION, SOLUTION INTRAVENOUS CONTINUOUS PRN
Status: DISCONTINUED | OUTPATIENT
Start: 2023-08-15 | End: 2023-08-15

## 2023-08-15 RX ORDER — ASPIRIN 325 MG
325 TABLET ORAL ONCE
Status: COMPLETED | OUTPATIENT
Start: 2023-08-15 | End: 2023-08-15

## 2023-08-15 RX ORDER — PROTAMINE SULFATE 10 MG/ML
INJECTION, SOLUTION INTRAVENOUS PRN
Status: DISCONTINUED | OUTPATIENT
Start: 2023-08-15 | End: 2023-08-15

## 2023-08-15 RX ORDER — ONDANSETRON 2 MG/ML
4 INJECTION INTRAMUSCULAR; INTRAVENOUS EVERY 6 HOURS PRN
Status: DISCONTINUED | OUTPATIENT
Start: 2023-08-15 | End: 2023-08-18 | Stop reason: HOSPADM

## 2023-08-15 RX ORDER — ONDANSETRON 2 MG/ML
4 INJECTION INTRAMUSCULAR; INTRAVENOUS EVERY 30 MIN PRN
Status: DISCONTINUED | OUTPATIENT
Start: 2023-08-15 | End: 2023-08-15 | Stop reason: HOSPADM

## 2023-08-15 RX ORDER — HYDROMORPHONE HYDROCHLORIDE 1 MG/ML
0.2 INJECTION, SOLUTION INTRAMUSCULAR; INTRAVENOUS; SUBCUTANEOUS EVERY 5 MIN PRN
Status: DISCONTINUED | OUTPATIENT
Start: 2023-08-15 | End: 2023-08-15 | Stop reason: HOSPADM

## 2023-08-15 RX ORDER — ONDANSETRON 4 MG/1
4 TABLET, ORALLY DISINTEGRATING ORAL EVERY 6 HOURS PRN
Status: DISCONTINUED | OUTPATIENT
Start: 2023-08-15 | End: 2023-08-18 | Stop reason: HOSPADM

## 2023-08-15 RX ORDER — SODIUM CHLORIDE, SODIUM LACTATE, POTASSIUM CHLORIDE, CALCIUM CHLORIDE 600; 310; 30; 20 MG/100ML; MG/100ML; MG/100ML; MG/100ML
INJECTION, SOLUTION INTRAVENOUS CONTINUOUS
Status: DISCONTINUED | OUTPATIENT
Start: 2023-08-15 | End: 2023-08-15 | Stop reason: HOSPADM

## 2023-08-15 RX ORDER — NITROGLYCERIN 10 MG/100ML
INJECTION INTRAVENOUS PRN
Status: DISCONTINUED | OUTPATIENT
Start: 2023-08-15 | End: 2023-08-15

## 2023-08-15 RX ORDER — CEFAZOLIN SODIUM/WATER 2 G/20 ML
2 SYRINGE (ML) INTRAVENOUS
Status: COMPLETED | OUTPATIENT
Start: 2023-08-15 | End: 2023-08-15

## 2023-08-15 RX ORDER — SODIUM CHLORIDE 450 MG/100ML
INJECTION, SOLUTION INTRAVENOUS CONTINUOUS
Status: DISCONTINUED | OUTPATIENT
Start: 2023-08-15 | End: 2023-08-15 | Stop reason: HOSPADM

## 2023-08-15 RX ORDER — HYDRALAZINE HYDROCHLORIDE 20 MG/ML
10 INJECTION INTRAMUSCULAR; INTRAVENOUS
Status: DISCONTINUED | OUTPATIENT
Start: 2023-08-15 | End: 2023-08-18 | Stop reason: HOSPADM

## 2023-08-15 RX ORDER — DEXTROSE MONOHYDRATE 25 G/50ML
25-50 INJECTION, SOLUTION INTRAVENOUS
Status: DISCONTINUED | OUTPATIENT
Start: 2023-08-15 | End: 2023-08-18 | Stop reason: HOSPADM

## 2023-08-15 RX ORDER — IOPAMIDOL 755 MG/ML
125 INJECTION, SOLUTION INTRAVASCULAR ONCE
Status: COMPLETED | OUTPATIENT
Start: 2023-08-15 | End: 2023-08-15

## 2023-08-15 RX ORDER — FENTANYL CITRATE 50 UG/ML
INJECTION, SOLUTION INTRAMUSCULAR; INTRAVENOUS
Status: DISCONTINUED | OUTPATIENT
Start: 2023-08-15 | End: 2023-08-15 | Stop reason: HOSPADM

## 2023-08-15 RX ORDER — SODIUM CHLORIDE, SODIUM LACTATE, POTASSIUM CHLORIDE, CALCIUM CHLORIDE 600; 310; 30; 20 MG/100ML; MG/100ML; MG/100ML; MG/100ML
INJECTION, SOLUTION INTRAVENOUS CONTINUOUS PRN
Status: DISCONTINUED | OUTPATIENT
Start: 2023-08-15 | End: 2023-08-15

## 2023-08-15 RX ORDER — LIDOCAINE HYDROCHLORIDE 20 MG/ML
INJECTION, SOLUTION INFILTRATION; PERINEURAL PRN
Status: DISCONTINUED | OUTPATIENT
Start: 2023-08-15 | End: 2023-08-15

## 2023-08-15 RX ORDER — ONDANSETRON 4 MG/1
4 TABLET, ORALLY DISINTEGRATING ORAL EVERY 30 MIN PRN
Status: DISCONTINUED | OUTPATIENT
Start: 2023-08-15 | End: 2023-08-15 | Stop reason: HOSPADM

## 2023-08-15 RX ORDER — CEFAZOLIN SODIUM 2 G/100ML
2 INJECTION, SOLUTION INTRAVENOUS
Status: COMPLETED | OUTPATIENT
Start: 2023-08-15 | End: 2023-08-15

## 2023-08-15 RX ORDER — ASPIRIN 81 MG/1
81 TABLET, CHEWABLE ORAL DAILY
Status: DISCONTINUED | OUTPATIENT
Start: 2023-08-16 | End: 2023-08-16

## 2023-08-15 RX ORDER — BUPIVACAINE HYDROCHLORIDE 2.5 MG/ML
INJECTION, SOLUTION EPIDURAL; INFILTRATION; INTRACAUDAL
Status: DISCONTINUED | OUTPATIENT
Start: 2023-08-15 | End: 2023-08-15 | Stop reason: HOSPADM

## 2023-08-15 RX ORDER — FENTANYL CITRATE 50 UG/ML
INJECTION, SOLUTION INTRAMUSCULAR; INTRAVENOUS PRN
Status: DISCONTINUED | OUTPATIENT
Start: 2023-08-15 | End: 2023-08-15

## 2023-08-15 RX ORDER — PROPOFOL 10 MG/ML
INJECTION, EMULSION INTRAVENOUS PRN
Status: DISCONTINUED | OUTPATIENT
Start: 2023-08-15 | End: 2023-08-15

## 2023-08-15 RX ORDER — HYDROMORPHONE HYDROCHLORIDE 1 MG/ML
0.4 INJECTION, SOLUTION INTRAMUSCULAR; INTRAVENOUS; SUBCUTANEOUS EVERY 5 MIN PRN
Status: DISCONTINUED | OUTPATIENT
Start: 2023-08-15 | End: 2023-08-15 | Stop reason: HOSPADM

## 2023-08-15 RX ORDER — METOPROLOL SUCCINATE 25 MG/1
25 TABLET, EXTENDED RELEASE ORAL DAILY
Status: DISCONTINUED | OUTPATIENT
Start: 2023-08-16 | End: 2023-08-16

## 2023-08-15 RX ORDER — ACETAMINOPHEN 325 MG/1
650 TABLET ORAL EVERY 4 HOURS PRN
Status: DISCONTINUED | OUTPATIENT
Start: 2023-08-15 | End: 2023-08-18 | Stop reason: HOSPADM

## 2023-08-15 RX ORDER — CARBOXYMETHYLCELLULOSE SODIUM 5 MG/ML
1 SOLUTION/ DROPS OPHTHALMIC 2 TIMES DAILY
Status: DISCONTINUED | OUTPATIENT
Start: 2023-08-15 | End: 2023-08-18 | Stop reason: HOSPADM

## 2023-08-15 RX ORDER — ROSUVASTATIN CALCIUM 20 MG/1
20 TABLET, COATED ORAL DAILY
Status: DISCONTINUED | OUTPATIENT
Start: 2023-08-15 | End: 2023-08-18 | Stop reason: HOSPADM

## 2023-08-15 RX ORDER — IOPAMIDOL 755 MG/ML
INJECTION, SOLUTION INTRAVASCULAR
Status: DISCONTINUED | OUTPATIENT
Start: 2023-08-15 | End: 2023-08-15 | Stop reason: HOSPADM

## 2023-08-15 RX ORDER — NITROGLYCERIN 0.4 MG/1
0.4 TABLET SUBLINGUAL EVERY 5 MIN PRN
Status: DISCONTINUED | OUTPATIENT
Start: 2023-08-15 | End: 2023-08-18 | Stop reason: HOSPADM

## 2023-08-15 RX ADMIN — SODIUM CHLORIDE: 9 INJECTION, SOLUTION INTRAVENOUS at 07:09

## 2023-08-15 RX ADMIN — ASPIRIN 325 MG ORAL TABLET 325 MG: 325 PILL ORAL at 06:19

## 2023-08-15 RX ADMIN — IOPAMIDOL 125 ML: 755 INJECTION, SOLUTION INTRAVENOUS at 22:53

## 2023-08-15 RX ADMIN — CARBOXYMETHYLCELLULOSE SODIUM 1 DROP: 5 SOLUTION/ DROPS OPHTHALMIC at 23:35

## 2023-08-15 RX ADMIN — NITROGLYCERIN 20 MCG: 20 INJECTION INTRAVENOUS at 08:43

## 2023-08-15 RX ADMIN — PROPOFOL 20 MCG/KG/MIN: 10 INJECTION, EMULSION INTRAVENOUS at 07:53

## 2023-08-15 RX ADMIN — SODIUM CHLORIDE: 9 INJECTION, SOLUTION INTRAVENOUS at 09:06

## 2023-08-15 RX ADMIN — NITROGLYCERIN 40 MCG: 20 INJECTION INTRAVENOUS at 08:44

## 2023-08-15 RX ADMIN — NITROGLYCERIN 40 MCG: 10 INJECTION INTRAVENOUS at 09:00

## 2023-08-15 RX ADMIN — ROSUVASTATIN CALCIUM 20 MG: 20 TABLET, FILM COATED ORAL at 13:19

## 2023-08-15 RX ADMIN — PROTAMINE SULFATE 100 MG: 10 INJECTION, SOLUTION INTRAVENOUS at 08:50

## 2023-08-15 RX ADMIN — DEXMEDETOMIDINE HYDROCHLORIDE 0.7 MCG/KG/HR: 200 INJECTION INTRAVENOUS at 07:53

## 2023-08-15 RX ADMIN — MICONAZOLE NITRATE: 2 POWDER TOPICAL at 23:41

## 2023-08-15 RX ADMIN — SODIUM CHLORIDE, POTASSIUM CHLORIDE, SODIUM LACTATE AND CALCIUM CHLORIDE: 600; 310; 30; 20 INJECTION, SOLUTION INTRAVENOUS at 08:16

## 2023-08-15 RX ADMIN — NITROGLYCERIN 40 MCG: 20 INJECTION INTRAVENOUS at 08:46

## 2023-08-15 RX ADMIN — PHENYLEPHRINE HYDROCHLORIDE 100 MCG: 10 INJECTION INTRAVENOUS at 08:53

## 2023-08-15 RX ADMIN — CEFAZOLIN SODIUM 2 G: 2 INJECTION, SOLUTION INTRAVENOUS at 15:09

## 2023-08-15 RX ADMIN — HEPARIN SODIUM 13000 UNITS: 1000 INJECTION INTRAVENOUS; SUBCUTANEOUS at 08:28

## 2023-08-15 RX ADMIN — FENTANYL CITRATE 25 MCG: 50 INJECTION, SOLUTION INTRAMUSCULAR; INTRAVENOUS at 08:09

## 2023-08-15 RX ADMIN — SODIUM CHLORIDE 100 ML: 9 INJECTION, SOLUTION INTRAVENOUS at 22:53

## 2023-08-15 RX ADMIN — ONDANSETRON 4 MG: 2 INJECTION INTRAMUSCULAR; INTRAVENOUS at 07:52

## 2023-08-15 RX ADMIN — PROPOFOL 20 MG: 10 INJECTION, EMULSION INTRAVENOUS at 08:46

## 2023-08-15 RX ADMIN — Medication 2 G: at 07:48

## 2023-08-15 RX ADMIN — SODIUM CHLORIDE: 4.5 INJECTION, SOLUTION INTRAVENOUS at 13:16

## 2023-08-15 RX ADMIN — LIDOCAINE HYDROCHLORIDE 100 MG: 20 INJECTION, SOLUTION INFILTRATION; PERINEURAL at 07:52

## 2023-08-15 RX ADMIN — PHENYLEPHRINE HYDROCHLORIDE 0.2 MCG/KG/MIN: 10 INJECTION INTRAVENOUS at 07:57

## 2023-08-15 ASSESSMENT — ACTIVITIES OF DAILY LIVING (ADL)
ADLS_ACUITY_SCORE: 41
ADLS_ACUITY_SCORE: 33
ADLS_ACUITY_SCORE: 35
ADLS_ACUITY_SCORE: 33
ADLS_ACUITY_SCORE: 29
ADLS_ACUITY_SCORE: 41
ADLS_ACUITY_SCORE: 33
ADLS_ACUITY_SCORE: 41
ADLS_ACUITY_SCORE: 35

## 2023-08-15 NOTE — H&P
[facility]      Cardiology H&P  Erick Shahid MRN: [id number]  1940  Date of Admission:8/15/2023  Primary care provider: Minh Samuel      Assessment and Plan:     Erick Shahid is a pleasant 83 year old admitted on 8/15/2023 for elective TAVR.     # Severe aortic stenosis, now s/p Transcatheter Aortic Valve Replacement  Now s/p 23 mm Allison Maribel 3 valve. Sheath sites soft without hematoma. Patient had transient CHB post-operatively. Neuro's intact.  - Bedrest per protocol x 6 hours   - Neuro checks, per protocol  - Echocardiogram tomorrow  - Monitor groin sites  - EKG in morning   - ASA 81 mg daily for anti-platelet therapy  - Cardiac rehab/PT/OT  - Continuous telemetry monitoring  - Lifelong antibiotic prophylaxis prior to all dental procedures.      # Chronic systolic heart failure, NYHA class II   Likely 2/2 valvular disease with LVEF 30-35%. Euvolemic on exam. NTpBNP 2019, LVEDP 9.   - BB: hold PTA Toprol XL due to conduction abnormalities   - ACE inhibitor: none, consider ARNI prior to discharge   - SGLT2i: none   - MRA: none   - Diurese as needed/able  - Daily weights  - Strict intake/output    # Chronic LBBB/1st degree AV block   # Transient CHB post-operatively   - Monitor on telemetry   - Hold PTA Toprol XL   - EP consult for PPM evaluation     # CAD with  of RCA  Pre-TAVR coronary angiogram showed  of non-dominant RCA that is being medically managed.   - Continue PTA ASA 81 mg daily  - BB: hold PTA Toprol XL   - Statin: continue PTA rosuvastatin 20 mg daily     # Type II diabetes   PTA metformin. A1C 6.0.   - sliding scale insulin while inpatient   - Hypoglycemia protocol   - BG checks per protocol     # Cognitive dysfunction  - No acute issues    # Macular degeneration   - PTA eyedrops     FEN: Cardiac diet  Disposition: Home in 1-2 days with cardiac rehab pending stable post-op period  Code Status: FULL CODE    LESTER Hayward, CNP  [facility]  "Structural/Interventional Cardiology Team  Pager: [phone]    Clinically Significant Risk Factors Present on Admission                # Drug Induced Platelet Defect: home medication list includes an antiplatelet medication   # Overweight: Estimated body mass index is 28.18 kg/m  as calculated from the following:    Height as of this encounter: 1.702 m (5' 7\").    Weight as of this encounter: 81.6 kg (179 lb 14.4 oz).           Chief Complaint:   Planned TAVR         History of Present Illness:   Erick Shahid is a very pleasant 83-year-old gentleman with chronic systolic heart failure, coronary disease, dementia, and critical symptomatic aortic stenosis who presents today for planned transcatheter aortic valve replacement.    The patient had several unwitnessed falls/syncopal spells in the last few weeks. He was admitted to the hospital where repeat echocardiogram revealed progression of aortic stenosis which is now critical. His LV function is moderately reduced with an EF of 30 to 35%. His aortic valve area 0.6 cm  and his mean gradient 63 mmHg.     He lives at home with his wife independently.  He is not very active and and his main activity consisted of go downstairs to the hallway to  the mailbox.  His wife tries to take him out for activities but the patient prefers to stay home.  He is an avid baseball fan and prefers to stay home and watch baseball.  He was a  before retiring     Although he is physically robust, he has significant memory issues.  He also does have significant hearing impairment which makes conversation challenging.  Nonetheless he appears to have good quality of life and would like to live longer if possible.    Patient is now s/p 23 mm Allison Maribel 3 valve via FA. Procedure went well. He had transient complete heart block following deployment of valve. He remains in NSR with HR in the 50's. He is otherwise hemodynamically stable. Groins sites are soft " without hematoma. Neuro's intact.          Review of Systems:    10 point review of systems negative except for stated above in HPI.          Past Medical History:   Medical History reviewed.   History reviewed. No pertinent past medical history.          Past Surgical History:   Surgical History reviewed.   Past Surgical History:   Procedure Laterality Date    CV CORONARY ANGIOGRAM N/A 7/28/2023    Procedure: Coronary Angiogram;  Surgeon: Mando Field MD;  Location: [facility] CARDIAC CATH LAB    CV PCI N/A 7/28/2023    Procedure: Percutaneous Coronary Intervention;  Surgeon: Mando Field MD;  Location: [facility] CARDIAC CATH LAB             Social History:   Social History reviewed.  Social History     Tobacco Use    Smoking status: Never    Smokeless tobacco: Never   Substance Use Topics    Alcohol use: Never             Family History:   Family History reviewed.   History reviewed. No pertinent family history.          Allergies:   No Known Allergies          Medications:   Medications Reviewed.   Current Facility-Administered Medications   Medication    HOLD: metformin (GLUCOPHAGE, GLUMETZA, FORTAMET, RIOMET) and metformin containing medications: alogliptin/metformin (KAZANO), glipizide/metformin (METAGLIP), glyburide/metformin (GLUCOVANCE), rosiglitazone/metformin (AVANDAMET), dapagliflozin/metformin (XIGDUO XR), sitagliptin/metformin (JANUMET, JANUMET XR), linagliptin/metformin (JENTADUETO), repaglinide/metformin (PRANDIMET), saxagliptin/metformin (KOMBIGLYZE XR), canagliflozin/metformin (INVOKAMET), and pioglitazone/metformin (ACTOPLUS MET, ACTOPLUS MET XR) the morning of the procedure.    lidocaine (LMX4) cream    lidocaine 1 % 0.1-1 mL    lidocaine 1 % 0.1-1 mL    Patient is NOT allergic to contrast dye    sodium chloride (PF) 0.9% PF flush 3 mL    sodium chloride (PF) 0.9% PF flush 3 mL    sodium chloride (PF) 0.9% PF flush 3 mL    sodium chloride 0.9% infusion     Facility-Administered  "Medications Ordered in Other Encounters   Medication    dexmedeTOMIDine (PRECEDEX) 4 mcg/mL infusion    fentaNYL (PF) (SUBLIMAZE) injection    lactated ringers infusion    lidocaine 2% injection (MDV)    ondansetron (ZOFRAN) injection    phenylephrine 0.2 mg/mL (mcg/kg/min) drip    propofol (DIPRIVAN) infusion             Physical Exam:   Vitals were reviewed.  Blood pressure 129/58, pulse 58, temperature 97  F (36.1  C), temperature source Temporal, resp. rate 14, height 1.702 m (5' 7\"), weight 81.6 kg (179 lb 14.4 oz), SpO2 100 %.    General: AAOx3, NAD  Skin: Not jaundiced, no rash, no ecchymoses; incision site CDI, soft, non-tender, no signs of hematoma. No bruit  HEENT: MMM, PERRLA, EOM intact  CV: RRR, normal S1S2  Resp: Clear to auscultation bilaterally, no wheezes, rhonchi  Abd: Soft, non-tender, BS+, no masses appreciated  Extremities: warm and well perfused, palpable pulses, no edema  Neuro: No lateralizing symptoms or focal neurologic deficits        Labs:   Routine Labs:  No results found for: TROPI, TROPONIN, TROPR, TROPN  CMP  Recent Labs   Lab 08/15/23  0651   *     CBCNo lab results found in last 7 days.  INRNo lab results found in last 7 days.        Diagnostics:    EKG 12Lead: NSR with LBBB/1st degree AVB    Echo: 8/15/23  Interpretation Summary     PRE-TAVR ECHOCARDIOGRAM  1. Mild LV systolic dysfunction (EF 45-50%)  2. Severe aortic stenosis  3. Mild AI     POST-TAVR ECHOCARDIOGRAM  1. Status post successful TAVR with 23 mm Maribel 3 valve. Valve is well-  seated. Trace PVL and mean gradient of 8 mmHg  2. No pericardial effusion    Medical Decision Making       70 MINUTES SPENT BY ME on the date of service doing chart review, history, exam, documentation & further activities per the note.          " Dorsal Nasal Flap Text: The defect edges were debeveled with a #15 scalpel blade.  Given the location of the defect and the proximity to free margins a dorsal nasal flap was deemed most appropriate.  Using a sterile surgical marker, an appropriate dorsal nasal flap was drawn around the defect.    The area thus outlined was incised deep to adipose tissue with a #15 scalpel blade.  The skin margins were undermined to an appropriate distance in all directions utilizing iris scissors.

## 2023-08-15 NOTE — OP NOTE
Implant Name Type Inv. Item Serial No.  Lot No. LRB No. Used Action   VALVE AORTIC SAPEIN 3 UTLRA TAVR 23MM [id number] - [id number] Valve VALVE AORTIC SAPEIN 3 UTLRA TAVR 23MM [id number]  [email or web address] [id number]  1 Implanted     Date of Surgery: 8/15/2023  Preop Diagnosis: Severe, symptomatic aortic stenosis  Postop Diagnosis: same  Structural Heart Attending: Dr. Felipe  Surgeon : Michael S. Mulvihill, M.D.    Procedures:  1. Transcatheter aortic valve replacement    Findings of Procedure:  severe aortic stenosis    Estimated Blood Loss: 20 ml    Blood Products Administered: none    Disposition : ICU  Specimens : none    Procedure in Detail:    After informed consent was obtained, the patient was brought to the operating room and positioned supine on the hybrid OR table. Prophylactic antibiotics were administered preoperatively. Invasive monitoring lines were placed by the cardiothoracic anesthesia team. The patient was prepped and draped from the chin to mid thighs. Surgical time-out was performed. Using ultrasound guidance, the R common femoral artery was accessed and a #5-Dutch sheath placed. This was upsized. Next, a Coats wire was placed in the aorta. The sheath was removed and two Perclose devices deployed. Next, a #11-Dutch sheath was placed in the right common femoral artery. This is upsized for an Allison sheath over a Lunderquist stiff wire.    The left femoral artery is accessed percutaneously under fluoroscopic and US guidance using a micropuncture technique and a 5-Dutch sheath placed. The patient was systemically heparinized with 100 units per kg of IV heparin to goal ACT greater than 250. A marker pigtail catheter is passed up from the left over a Glidewire and positioned in the noncoronary sinus. The optimal view for visualization of the aortic root is then obtained in Cypriot angulation. The aortic valve is then crossed using an AL-1 catheter and a straight tip wire, which is  then exchanged for a 260 cm Coats wire. A 6-Greenlandic pigtail catheter was then passed over this wire and positioned in the LV apex. An amplatz extrastiff wire with a 1-inch floppy tip with a pre-fashioned curve is positioned in the L V apex under MORENO angulation and care is taken to avoid contact with the ventricular wall by the transition point of the wire to avoid perforation. A 23mm MARIBEL S3 ultra valve is passed up and positioned across the aortic valve. The ideal landing zone for the valve is identified based on the pigtail catheter in the coronary sinus, the calcification of the native valve, and repeated aortic root arteriograms.    The valve is deliberately deployed during cardiac pacing and seats in a good position at a depth of 4 mm on posterior aspect of the annulus assessed by TTE. TTE demonstrates no aortic regurgitation. The Maribel delivery catheter is removed through the access sheath. After deployment, transvalve gradient is measured and is acceptable.     Transthoracic echocardiography with Doppler color flow imaging demonstrates no paravalvular and no valvular aortic insufficiency. Thoracic aortography demonstrates no aortic insufficiency.    Satisfied with the valve position and function, we turn our attention to the access sites. The 16 Fr arterial sheath on the right is removed, and the Perclose sutures in the right femoral artery are utilized, along with manual compression to achieve hemostasis. An iliofemoral angiogram demonstrates good flow bilaterally without extravasation. The rhythm unchanged based on intraoperative telemetry and intraoperative recordings of the ECG. The femoral venous sheath is removed and hemostasis obtained with manual compression. The 6 Fr femoral arterial sheath is removed and hemostasis obtained with a closure device and manual compression. Protamine is administered and hemostasis is confirmed. All sponge, needle, and instrument counts are reported correct at the end  of the case. The patient is taken extubated to the CT ICU in stable condition at the end of procedure.    Michael Mulvihill, MD  8/16/2023 @ 3:14 PM

## 2023-08-15 NOTE — Clinical Note
Increase lantus from 24 to 28 units daily    Potential access sites were evaluated for patency using ultrasound.   The left femoral artery was selected. Access was obtained under with Sonosite guidance using a micropuncture 21 gauge needle with direct visualization of needle entry.

## 2023-08-15 NOTE — ANESTHESIA CARE TRANSFER NOTE
Patient: Erick Shahid    Procedure: Procedure(s):  Transcatheter Aortic Valve Replacement-Femoral Approach       Diagnosis: valvular disease  Diagnosis Additional Information: No value filed.    Anesthesia Type:   MAC     Note:    Oropharynx: oropharynx clear of all foreign objects and spontaneously breathing  Level of Consciousness: drowsy  Oxygen Supplementation: nasal cannula  Level of Supplemental Oxygen (L/min / FiO2): 3  Independent Airway: airway patency satisfactory and stable    Vital Signs Stable: post-procedure vital signs reviewed and stable  Report to RN Given: handoff report given  Patient transferred to: PACU    Handoff Report: Identifed the Patient, Identified the Reponsible Provider, Reviewed the pertinent medical history, Discussed the surgical course, Reviewed Intra-OP anesthesia mangement and issues during anesthesia, Set expectations for post-procedure period and Allowed opportunity for questions and acknowledgement of understanding      Vitals:  Vitals Value Taken Time   /54 08/15/23 0923   Temp 98    Pulse 56 08/15/23 0925   Resp 14 08/15/23 0925   SpO2 98 % 08/15/23 0925   Vitals shown include unvalidated device data.    Electronically Signed By: LESTER Uribe CRNA  August 15, 2023  9:26 AM

## 2023-08-15 NOTE — ANESTHESIA POSTPROCEDURE EVALUATION
Patient: Erick Shahid    Procedure: Procedure(s):  Transcatheter Aortic Valve Replacement-Femoral Approach       Anesthesia Type:  MAC    Note:  Disposition: Inpatient   Postop Pain Control: Uneventful            Sign Out: Well controlled pain   PONV:    Neuro/Psych: Uneventful            Sign Out: Acceptable/Baseline neuro status   Airway/Respiratory: Uneventful            Sign Out: Acceptable/Baseline resp. status   CV/Hemodynamics: Uneventful            Sign Out: Acceptable CV status   Other NRE: NONE   DID A NON-ROUTINE EVENT OCCUR? No           Last vitals:  Vitals Value Taken Time   /60 08/15/23 1045   Temp 36.4  C (97.6  F) 08/15/23 0923   Pulse 54 08/15/23 1100   Resp 11 08/15/23 1100   SpO2 99 % 08/15/23 1102       Electronically Signed By: Familia Leonard MD  August 15, 2023  12:02 PM

## 2023-08-15 NOTE — PLAN OF CARE
Bernardo had a TAVR and a PPM placed today. Neuros at baseline per wife: dementia, slight ataxia, garbled speech without his dentures in place. He is oriented, pleasant, cooperative. Bilateral groin sites soft, non-tender. Right groin slightly leaking. Distal CMS intact.   Declined getting out of bed after bedrest completed. Incontinent of large amount of urine. Denies discomfort, nausea, dyspnea. Bed alarm for safety.

## 2023-08-15 NOTE — INTERVAL H&P NOTE
"I have reviewed the surgical (or preoperative) H&P that is linked to this encounter, and examined the patient. There are no significant changes    Clinical Conditions Present on Arrival:  Clinically Significant Risk Factors Present on Admission                 # Drug Induced Platelet Defect: home medication list includes an antiplatelet medication  # Overweight: Estimated body mass index is 28.18 kg/m  as calculated from the following:    Height as of this encounter: 1.702 m (5' 7\").    Weight as of this encounter: 81.6 kg (179 lb 14.4 oz).       "

## 2023-08-15 NOTE — CONSULTS
[facility]  Consult Note - Hospitalist Service  Date of Admission:  8/15/2023  Consult Requested by:  Dr Felipe  Reason for Consult: s/p TAVR, DM2    Assessment & Plan   Erick Shahid is a 83 year old male with advanced cognitive impairment, DM2, symptomatic critical AS (falls, syncope), and AS-related NICM (EF 30-35%) who was admitted on 8/15/2023 for an elective TAVR with Dr Felipe.  S/p 23mm Allison Maribel 3 TAVR.  Post-operative course notable for transient CHB.      #Symptomatic critical AS s/p TAVR  Post-operative course notable for transient CHB (see below); patient to be evaluated by EP for PPM.    -- Management as per Cardiology  -- Echo in AM  -- ASA  -- Cardiac rehab    #Post-operative transient CHB in setting of chronic LBBB and 1st degree AVB  #Bradycardia with evolving pauses  Bradycardic into 40s on tele with occasional extended pause (appears < 2 seconds).  Despite this, hemodynamically stable with BP 130s, extremities WWP, and mentation appropriate.    -- Tele  -- Toprol on hold as per Cardiology  -- EP consult for possible PPM   -- RN to place pacer pads in case of recurrent CHB  -- Discussed with Structural Heart who will return to see patient and determine if needs temporary pacer  -- Add on mag, phos, and TSH/T4    #Single-vessel ASCVD  #HTN  #DLD   Cardiac cath 7/2023 showed single-vessel disease with 90% occlusion of pRCA which is being medically managed.    -- Toprol on hold, as above  -- Resume PTA statin    #NIDDM2  HgbA1c 6.0 in 7/2023.    -- Hold PTA metformin for now as is NPO  -- Moderate ISS    #Advanced cognitive impairment  High risk for delirium.  -- Minimize sedating medications   -- Redirect and reassure frequently  -- Delirium protocol    #Gluteal cleft and groin skin wounds  Noted upon admission 7/2023.  Seen by WOC.    -- Continue kenna cares  -- Low threshold to reconsult WOC if wounds look worse or if there are questions about management     The  "patient's care was discussed with the Attending Physician, Dr. Wallis, Bedside Nurse, Patient, and EP and Structural Heart Consultant(s).    Clinically Significant Risk Factors Present on Admission                # Drug Induced Platelet Defect: home medication list includes an antiplatelet medication    # Chronic heart failure with reduced ejection fraction: last echo with EF <40%     # Overweight: Estimated body mass index is 28.18 kg/m  as calculated from the following:    Height as of this encounter: 1.702 m (5' 7\").    Weight as of this encounter: 81.6 kg (179 lb 14.4 oz).              Rosa Isela Talbot PA-C  Hospitalist Service  Securely message with Jott (more info)  Text page via [facility] Paging/Directory   ______________________________________________________________________    History of Present Illness   Erick Shahid is a 83 year old male with advanced cognitive impairment, DM2, symptomatic critical AS (falls, syncope), and AS-related NICM (EF 30-35%) who was admitted on 8/15/2023 for an elective TAVR with Dr Felipe.  Hospitalists consulted for post-TAVR cares and DM2 management.    Patient has been followed for AS for several years.  TAVR recommended in 2021 but he did not proceed with it.  Presented to [facility] in late July 2023 with unwitnessed falls and syncope and was found to have progressive tightening of his SHALOM, now considered critical with an SHALOM 0.6 cm^2 and mean AV gradient 63 mmHg.  During this hospital stay, Echocardiogram revealed EF 30-35% with moderate-to-severe global LV hypokinesis, grade 1 LVDD, normal RVSF, and critical aortic valve stenosis (SHALOM 0.63 cm^2, mean AoV pressure 63 mmHg, peak AoV pressure 93.7 mmHg).  HFrEF felt related to his severe valvular pathology.  Cardiac cath 7/23/2023 showed single occlusive disease with 90% stenosis pRCA lesion and 25% stenosis pLAD to mLAD lesion.    TAVR discussed with patient, his wife, and his son by Cardiology and patient ultimately elected to " proceed.  He underwent TAVR today with a 23mm Allison Maribel 3 valve.  Post-operative course notable for transient CHB.  Patient is awaiting EP consult for consideration of PPM.     Patient seen in his hospital room where present time he is resting comfortably.  He is on bedrest due to the recent valve procedure.  He has been intermittently bradycardic into the high 40s but remains largely asymptomatic.  He has been laying down, per bedrest, but denies any headache, vision change, or difficulty with mentation.  Vitals are stable despite bradycardia.  Extremities are warm and well-perfused.  Patient reports that he has no pain at this time.  Denies chest pain, shortness of breath.  Lower extremity edema appears to be chronic.  He does report a recent fall and has a right second toe hematoma.  This appears to be largely painless.        Past Medical History    Symptomatic critical aortic stenosis (unwitnessed falls, near syncope).  SHALOM 0.6 cm^2 with mean AV gradient 63 mmHg.   NICM / HFrEF.  Thought related to severe AS.  Echo 7/2023 revealed EF 30-35% with moderate-to-severe global LV hypokinesis, grade 1 LVDD, normal RVSF, and critical aortic valve stenosis (SHALOM 0.63 cm^2, mean AoV pressure 63 mmHg, peak AoV pressure 93.7 mmHg).    ASCVD.  Cardiac cath 7/23/2023 showed single occlusive disease with 90% stenosis pRCA lesion and 25% stenosis pLAD to mLAD lesion.  NIDDM2 on metformin.  HgbA1c 6.0 in July 2023.   Deconditioning, high fall risk  HTN  DLD  Moderate-to-advanced cognitive impairment  BPH with LUTS  Gluteal cleft and groin skin wounds, noted upon admission 7/2023  Macular degeneration  History of hip fracture 2018 s/p ORIF    Past Surgical History   Past Surgical History:   Procedure Laterality Date    CV CORONARY ANGIOGRAM N/A 07/28/2023    Procedure: Coronary Angiogram;  Surgeon: Mando Field MD;  Location: [facility] CARDIAC CATH LAB    CV PCI N/A 07/28/2023    Procedure: Percutaneous Coronary  Intervention;  Surgeon: Mando Field MD;  Location:  HEART CARDIAC CATH LAB    CV TRANSCATHETER AORTIC VALVE REPLACEMENT  08/15/2023    ORIF HIP FRACTURE Right 2018       Medications   Medications Prior to Admission   Medication Sig Dispense Refill Last Dose    aspirin (ASA) 81 MG chewable tablet Take 81 mg by mouth daily   2023 at AM    cycloSPORINE (RESTASIS) 0.05 % ophthalmic emulsion Apply 1 drop to eye 2 times daily   8/15/2023 at PM    metFORMIN (GLUCOPHAGE) 1000 MG tablet Take 1 tablet by mouth daily (with dinner)   2023 at PM    methylcellulose (CITRUCEL) powder Take 1 teaspoonful by mouth daily   2023 at AM    metoprolol succinate ER (TOPROL XL) 25 MG 24 hr tablet Take 25 mg by mouth daily   2023 at AM    nystatin (MYCOSTATIN) 230682 UNIT/GM external powder Apply 1 strip topically 3 times daily as needed (jock itch)   2023 at PM    rosuvastatin (CRESTOR) 20 MG tablet Take 1 tablet by mouth daily   2023 at PM    vitamin B-12 (CYANOCOBALAMIN) 1000 MCG tablet Take 1,000 mcg by mouth daily   2023 at AM    vitamin C (ASCORBIC ACID) 500 MG tablet Take 500 mg by mouth daily   2023 at AM    Vitamin D3 (CHOLECALCIFEROL) 25 mcg (1000 units) tablet Take 25 mcg by mouth daily   2023          Review of Systems    A comprehensive 14 point review of systems was undertaken with pertinent positives and negatives as above and otherwise unremarkable.       Social History   I have reviewed this patient's social history and updated it with pertinent information if needed.    Lives at home with wife who acts as his caretaker.  Requires some assistance with ADLs.      Social History     Tobacco Use    Smoking status: Never    Smokeless tobacco: Never   Vaping Use    Vaping Use: Never used   Substance Use Topics    Alcohol use: Never    Drug use: Never         Family History   Father  of pneumonia in his 30s.  Mother  of heart failure in her 50s.    Allergies   No  Known Allergies     Physical Exam   Vital Signs: Temp: 97  F (36.1  C) Temp src: Temporal BP: 129/58 Pulse: 58   Resp: 14 SpO2: 100 % O2 Device: Nasal cannula    Weight: 179 lbs 14.4 oz    GENERAL:  Pleasant, cooperative, alert. Well developed, well nourished.  [place].  Edentulous.   HEENT: Normocephalic, atraumatic.  Extra occular mm intact.  Sclera clear. PERRL.  Mucous membranes moist.   PULMONOLOGY: Clear to auscultation bilaterally with good exchange at the bases   CARDIAC: Regular rhythm, slower rate.  No appreciated murmur.     ABDOMEN: Soft, nontender   MUSCULOSKELETAL:  Moving x 4 spontaneously with CMS intact x4. 2+ BLE without pitting.  Dystrophic toe nails.  Bruising and swelling of distal 2nd right toe; no pain with palpation of hematoma or movement of toe.  Feet WWP, pedal pulses strong.   NEURO: Alert and oriented x3.  CN II-XII grossly intact and symmetric. Nonfocal .    Medical Decision Making       90 MINUTES SPENT BY ME on the date of service doing chart review, history, exam, documentation & further activities per the note.      Data   No labs drawn today.  Prior labs reviewed.       Imaging results reviewed over the past 24 hrs:   Recent Results (from the past 24 hour(s))   Cardiac Catheterization    Narrative    1. Lifestyle-limiting severe aortic stenosis.  2. Successful transfemoral transcatheter aortic valve replacement with a   23mm Allison Maribel 3 valve.  3. Temporary pacemaker insertion.  4. Left heart catheterization with LVEDP of 15 mmHg.  6. Right and left common femoral arteriotomies successfully closed with   closure devices.       Echocardiogram Complete   Result Value    LVEF  45-50%    Narrative    [id number]  [id number]  [id number]  [id number]^CR^CHRISTINE^MAGALIS     [facility]  Echocardiography Laboratory  [address] [place] [address]     Name: BALTA SIMEON  MRN: [id number]  : 1940  Study Date: 08/15/2023 07:12 AM  Age: 83 yrs  Gender:  Male  Patient Location: [facility]  Reason For Study: Critical aortic valve stenosis, Abnormal CT scan of heart  Ordering Physician: CHRISTINE HANKINS  Referring Physician: Minh Samuel  Performed By: Rell Pitts RDCS     BSA: 1.9 m2  Height: 67 in  Weight: 179 lb  HR: 59  BP: 129/58 mmHg  ______________________________________________________________________________  Procedure  Complete Portable Echo Adult.  ______________________________________________________________________________  Interpretation Summary     PRE-TAVR ECHOCARDIOGRAM  1. Mild LV systolic dysfunction (EF 45-50%)  2. Severe aortic stenosis  3. Mild AI     POST-TAVR ECHOCARDIOGRAM  1. Status post successful TAVR with 23 mm Maribel 3 valve. Valve is well-  seated. Trace PVL and mean gradient of 8 mmHg  2. No pericardial effusion  ______________________________________________________________________________  Left Ventricle  The left ventricle is normal in size. The visual ejection fraction is 45-50%.  Mildly decreased left ventricular systolic function. There is mild global  hypokinesia of the left ventricle.     Right Ventricle  The right ventricle is normal size. The right ventricular systolic function is  normal.     Aortic Valve  There is mild (1+) aortic regurgitation. Severe valvular aortic stenosis.     Vessels  The inferior vena cava was normal in size with preserved respiratory  variability.     ______________________________________________________________________________  MMode/2D Measurements & Calculations  LVOT diam: 2.0 cm  LVOT area: 3.3 cm2     Doppler Measurements & Calculations  Ao V2 max: 191.0 cm/sec  Ao max PG: 15.0 mmHg  Ao V2 mean: 127.0 cm/sec  Ao mean P.0 mmHg  Ao V2 VTI: 40.5 cm  SHALOM(I,D): 2.2 cm2  SHALOM(V,D): 2.1 cm2  LV V1 max P.2 mmHg  LV V1 max: 124.0 cm/sec  LV V1 VTI: 27.4 cm  SV(LVOT): 90.0 ml  SI(LVOT): 46.7 ml/m2  AV Terrence Ratio (DI): 0.65  SHALOM Index (cm2/m2): 1.2      ______________________________________________________________________________  Report approved by: Katherine Vinson 08/15/2023 09:44 AM

## 2023-08-15 NOTE — OR NURSING
PACU sign-out received from Dr. Leonard.     Called patient's wife to notify of room number, no answer.

## 2023-08-15 NOTE — PROGRESS NOTES
Rcvd from PACU s/p TAVR. Drowsy, neurologically intact. VSS on RA, tele SB 1st degree AVB and BBB, pauses >2 seconds with HR in 40's at times. Pt asymptomatic. Bilateral groin sites intact. Voiding per urinal. Left message for wife Wilda. Dentures and glasses at bedside.

## 2023-08-16 ENCOUNTER — TELEPHONE (OUTPATIENT)
Dept: CARDIOLOGY | Facility: CLINIC | Age: 83
End: 2023-08-16

## 2023-08-16 ENCOUNTER — APPOINTMENT (OUTPATIENT)
Dept: OCCUPATIONAL THERAPY | Facility: CLINIC | Age: 83
DRG: 267 | End: 2023-08-16
Attending: NURSE PRACTITIONER
Payer: COMMERCIAL

## 2023-08-16 ENCOUNTER — APPOINTMENT (OUTPATIENT)
Dept: ULTRASOUND IMAGING | Facility: CLINIC | Age: 83
DRG: 267 | End: 2023-08-16
Attending: INTERNAL MEDICINE
Payer: COMMERCIAL

## 2023-08-16 ENCOUNTER — APPOINTMENT (OUTPATIENT)
Dept: GENERAL RADIOLOGY | Facility: CLINIC | Age: 83
DRG: 267 | End: 2023-08-16
Attending: INTERNAL MEDICINE
Payer: COMMERCIAL

## 2023-08-16 ENCOUNTER — APPOINTMENT (OUTPATIENT)
Dept: PHYSICAL THERAPY | Facility: CLINIC | Age: 83
DRG: 267 | End: 2023-08-16
Attending: NURSE PRACTITIONER
Payer: COMMERCIAL

## 2023-08-16 ENCOUNTER — APPOINTMENT (OUTPATIENT)
Dept: CARDIOLOGY | Facility: CLINIC | Age: 83
DRG: 267 | End: 2023-08-16
Attending: NURSE PRACTITIONER
Payer: COMMERCIAL

## 2023-08-16 DIAGNOSIS — Z95.0 CARDIAC PACEMAKER IN SITU: Primary | ICD-10-CM

## 2023-08-16 PROBLEM — I65.21 INTERNAL CAROTID ARTERY STENOSIS, RIGHT: Status: ACTIVE | Noted: 2023-08-16

## 2023-08-16 LAB
ANION GAP SERPL CALCULATED.3IONS-SCNC: 14 MMOL/L (ref 7–15)
BUN SERPL-MCNC: 10.4 MG/DL (ref 8–23)
CALCIUM SERPL-MCNC: 9.1 MG/DL (ref 8.8–10.2)
CHLORIDE SERPL-SCNC: 103 MMOL/L (ref 98–107)
CHOLEST SERPL-MCNC: 116 MG/DL
CREAT SERPL-MCNC: 0.66 MG/DL (ref 0.67–1.17)
DEPRECATED HCO3 PLAS-SCNC: 20 MMOL/L (ref 22–29)
ERYTHROCYTE [DISTWIDTH] IN BLOOD BY AUTOMATED COUNT: 13.8 % (ref 10–15)
GFR SERPL CREATININE-BSD FRML MDRD: >90 ML/MIN/1.73M2
GLUCOSE BLDC GLUCOMTR-MCNC: 122 MG/DL (ref 70–99)
GLUCOSE BLDC GLUCOMTR-MCNC: 124 MG/DL (ref 70–99)
GLUCOSE BLDC GLUCOMTR-MCNC: 131 MG/DL (ref 70–99)
GLUCOSE BLDC GLUCOMTR-MCNC: 133 MG/DL (ref 70–99)
GLUCOSE BLDC GLUCOMTR-MCNC: 167 MG/DL (ref 70–99)
GLUCOSE BLDC GLUCOMTR-MCNC: 93 MG/DL (ref 70–99)
GLUCOSE SERPL-MCNC: 123 MG/DL (ref 70–99)
HCT VFR BLD AUTO: 36.2 % (ref 40–53)
HDLC SERPL-MCNC: 51 MG/DL
HGB BLD-MCNC: 12.5 G/DL (ref 13.3–17.7)
LDLC SERPL CALC-MCNC: 48 MG/DL
LVEF ECHO: NORMAL
MAGNESIUM SERPL-MCNC: 1.9 MG/DL (ref 1.7–2.3)
MCH RBC QN AUTO: 32.7 PG (ref 26.5–33)
MCHC RBC AUTO-ENTMCNC: 34.5 G/DL (ref 31.5–36.5)
MCV RBC AUTO: 95 FL (ref 78–100)
NONHDLC SERPL-MCNC: 65 MG/DL
PHOSPHATE SERPL-MCNC: 2.6 MG/DL (ref 2.5–4.5)
PLATELET # BLD AUTO: 147 10E3/UL (ref 150–450)
POTASSIUM SERPL-SCNC: 3.7 MMOL/L (ref 3.4–5.3)
RBC # BLD AUTO: 3.82 10E6/UL (ref 4.4–5.9)
SODIUM SERPL-SCNC: 137 MMOL/L (ref 136–145)
TRIGL SERPL-MCNC: 84 MG/DL
TROPONIN T SERPL HS-MCNC: 55 NG/L
WBC # BLD AUTO: 7.5 10E3/UL (ref 4–11)

## 2023-08-16 PROCEDURE — 93880 EXTRACRANIAL BILAT STUDY: CPT

## 2023-08-16 PROCEDURE — 255N000002 HC RX 255 OP 636: Performed by: INTERNAL MEDICINE

## 2023-08-16 PROCEDURE — 99233 SBSQ HOSP IP/OBS HIGH 50: CPT | Mod: 24 | Performed by: NURSE PRACTITIONER

## 2023-08-16 PROCEDURE — 250N000013 HC RX MED GY IP 250 OP 250 PS 637: Performed by: INTERNAL MEDICINE

## 2023-08-16 PROCEDURE — 93321 DOPPLER ECHO F-UP/LMTD STD: CPT

## 2023-08-16 PROCEDURE — 210N000001 HC R&B IMCU HEART CARE

## 2023-08-16 PROCEDURE — 99223 1ST HOSP IP/OBS HIGH 75: CPT | Mod: FS | Performed by: PHYSICIAN ASSISTANT

## 2023-08-16 PROCEDURE — 83735 ASSAY OF MAGNESIUM: CPT | Performed by: NURSE PRACTITIONER

## 2023-08-16 PROCEDURE — 93005 ELECTROCARDIOGRAM TRACING: CPT

## 2023-08-16 PROCEDURE — 93321 DOPPLER ECHO F-UP/LMTD STD: CPT | Mod: 26 | Performed by: INTERNAL MEDICINE

## 2023-08-16 PROCEDURE — 97530 THERAPEUTIC ACTIVITIES: CPT | Mod: GP

## 2023-08-16 PROCEDURE — 84484 ASSAY OF TROPONIN QUANT: CPT | Performed by: NURSE PRACTITIONER

## 2023-08-16 PROCEDURE — 93010 ELECTROCARDIOGRAM REPORT: CPT | Performed by: INTERNAL MEDICINE

## 2023-08-16 PROCEDURE — 93308 TTE F-UP OR LMTD: CPT | Mod: 26 | Performed by: INTERNAL MEDICINE

## 2023-08-16 PROCEDURE — 82310 ASSAY OF CALCIUM: CPT | Performed by: NURSE PRACTITIONER

## 2023-08-16 PROCEDURE — 999N000065 XR CHEST 2 VIEWS

## 2023-08-16 PROCEDURE — 97535 SELF CARE MNGMENT TRAINING: CPT | Mod: GO | Performed by: OCCUPATIONAL THERAPIST

## 2023-08-16 PROCEDURE — 85027 COMPLETE CBC AUTOMATED: CPT | Performed by: NURSE PRACTITIONER

## 2023-08-16 PROCEDURE — 93325 DOPPLER ECHO COLOR FLOW MAPG: CPT | Mod: 26 | Performed by: INTERNAL MEDICINE

## 2023-08-16 PROCEDURE — 250N000013 HC RX MED GY IP 250 OP 250 PS 637: Performed by: NURSE PRACTITIONER

## 2023-08-16 PROCEDURE — 250N000011 HC RX IP 250 OP 636: Mod: JZ | Performed by: NURSE PRACTITIONER

## 2023-08-16 PROCEDURE — 97110 THERAPEUTIC EXERCISES: CPT | Mod: GO | Performed by: OCCUPATIONAL THERAPIST

## 2023-08-16 PROCEDURE — 97165 OT EVAL LOW COMPLEX 30 MIN: CPT | Mod: GO | Performed by: OCCUPATIONAL THERAPIST

## 2023-08-16 PROCEDURE — 99232 SBSQ HOSP IP/OBS MODERATE 35: CPT | Performed by: INTERNAL MEDICINE

## 2023-08-16 PROCEDURE — 97161 PT EVAL LOW COMPLEX 20 MIN: CPT | Mod: GP

## 2023-08-16 PROCEDURE — 999N000208 ECHOCARDIOGRAM LIMITED

## 2023-08-16 PROCEDURE — 999N000226 HC STATISTIC SLP IP EVAL DEFER: Performed by: SPEECH-LANGUAGE PATHOLOGIST

## 2023-08-16 PROCEDURE — 84100 ASSAY OF PHOSPHORUS: CPT | Performed by: NURSE PRACTITIONER

## 2023-08-16 PROCEDURE — 99024 POSTOP FOLLOW-UP VISIT: CPT | Performed by: NURSE PRACTITIONER

## 2023-08-16 PROCEDURE — 80061 LIPID PANEL: CPT | Performed by: NURSE PRACTITIONER

## 2023-08-16 PROCEDURE — 36415 COLL VENOUS BLD VENIPUNCTURE: CPT | Performed by: NURSE PRACTITIONER

## 2023-08-16 PROCEDURE — 99418 PROLNG IP/OBS E/M EA 15 MIN: CPT | Mod: FS | Performed by: PHYSICIAN ASSISTANT

## 2023-08-16 RX ORDER — ASPIRIN 325 MG
325 TABLET ORAL DAILY
Status: DISCONTINUED | OUTPATIENT
Start: 2023-08-16 | End: 2023-08-17

## 2023-08-16 RX ORDER — OLANZAPINE 2.5 MG/1
2.5 TABLET, FILM COATED ORAL AT BEDTIME
Status: DISCONTINUED | OUTPATIENT
Start: 2023-08-16 | End: 2023-08-18 | Stop reason: HOSPADM

## 2023-08-16 RX ORDER — OLANZAPINE 10 MG/2ML
2.5 INJECTION, POWDER, FOR SOLUTION INTRAMUSCULAR 2 TIMES DAILY PRN
Status: DISCONTINUED | OUTPATIENT
Start: 2023-08-16 | End: 2023-08-16

## 2023-08-16 RX ORDER — METOPROLOL SUCCINATE 25 MG/1
25 TABLET, EXTENDED RELEASE ORAL DAILY
Status: DISCONTINUED | OUTPATIENT
Start: 2023-08-16 | End: 2023-08-18 | Stop reason: HOSPADM

## 2023-08-16 RX ORDER — POTASSIUM CHLORIDE 1.5 G/1.58G
20 POWDER, FOR SOLUTION ORAL ONCE
Status: COMPLETED | OUTPATIENT
Start: 2023-08-16 | End: 2023-08-16

## 2023-08-16 RX ORDER — OLANZAPINE 10 MG/2ML
2.5 INJECTION, POWDER, FOR SOLUTION INTRAMUSCULAR 2 TIMES DAILY PRN
Status: DISCONTINUED | OUTPATIENT
Start: 2023-08-16 | End: 2023-08-18 | Stop reason: HOSPADM

## 2023-08-16 RX ORDER — ASPIRIN 325 MG
325 TABLET ORAL DAILY
Qty: 90 TABLET | Refills: 0 | Status: SHIPPED | OUTPATIENT
Start: 2023-08-17 | End: 2023-08-17

## 2023-08-16 RX ORDER — OLANZAPINE 2.5 MG/1
2.5 TABLET, FILM COATED ORAL AT BEDTIME
Status: DISCONTINUED | OUTPATIENT
Start: 2023-08-16 | End: 2023-08-16

## 2023-08-16 RX ORDER — OLANZAPINE 2.5 MG/1
2.5 TABLET, FILM COATED ORAL 2 TIMES DAILY PRN
Status: DISCONTINUED | OUTPATIENT
Start: 2023-08-16 | End: 2023-08-18 | Stop reason: HOSPADM

## 2023-08-16 RX ADMIN — ASPIRIN 325 MG ORAL TABLET 325 MG: 325 PILL ORAL at 08:39

## 2023-08-16 RX ADMIN — OLANZAPINE 2.5 MG: 2.5 TABLET, FILM COATED ORAL at 04:42

## 2023-08-16 RX ADMIN — OLANZAPINE 2.5 MG: 2.5 TABLET, FILM COATED ORAL at 18:49

## 2023-08-16 RX ADMIN — ROSUVASTATIN CALCIUM 20 MG: 20 TABLET, FILM COATED ORAL at 08:39

## 2023-08-16 RX ADMIN — CARBOXYMETHYLCELLULOSE SODIUM 1 DROP: 5 SOLUTION/ DROPS OPHTHALMIC at 22:09

## 2023-08-16 RX ADMIN — CARBOXYMETHYLCELLULOSE SODIUM 1 DROP: 5 SOLUTION/ DROPS OPHTHALMIC at 08:39

## 2023-08-16 RX ADMIN — POTASSIUM CHLORIDE 20 MEQ: 1.5 POWDER, FOR SOLUTION ORAL at 08:39

## 2023-08-16 RX ADMIN — HYDRALAZINE HYDROCHLORIDE 10 MG: 20 INJECTION INTRAMUSCULAR; INTRAVENOUS at 02:51

## 2023-08-16 RX ADMIN — HUMAN ALBUMIN MICROSPHERES AND PERFLUTREN 9 ML: 10; .22 INJECTION, SOLUTION INTRAVENOUS at 10:59

## 2023-08-16 RX ADMIN — OLANZAPINE 2.5 MG: 2.5 TABLET, FILM COATED ORAL at 22:12

## 2023-08-16 RX ADMIN — MICONAZOLE NITRATE: 2 POWDER TOPICAL at 22:09

## 2023-08-16 ASSESSMENT — ACTIVITIES OF DAILY LIVING (ADL)
ADLS_ACUITY_SCORE: 43
ADLS_ACUITY_SCORE: 43
ADLS_ACUITY_SCORE: 41
ADLS_ACUITY_SCORE: 43
ADLS_ACUITY_SCORE: 39
ADLS_ACUITY_SCORE: 43
ADLS_ACUITY_SCORE: 43
ADLS_ACUITY_SCORE: 39
ADLS_ACUITY_SCORE: 39
ADLS_ACUITY_SCORE: 41

## 2023-08-16 NOTE — DISCHARGE SUMMARY
[address] [address]  p: [phone]    Discharge Summary: Cardiology Service    Erick Shahid MRN# [id number]   YOB: 1940 Age: 83 year old       Admission Date: 8/15/2023  Discharge Date: 08/16/23      Brief HPI:  Erick Shahid is a very pleasant 83-year-old gentleman with chronic systolic heart failure, coronary disease, dementia, and critical symptomatic aortic stenosis who underwent transcatheter aortic valve replacement with 23 mm Allison Maribel 3 valve with Dr. 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 Overnight he had increased confusion. No other focal deficits. Code Stroke was called. CT demonstrated no acute findings. Patient remains stable with no focal deficits, his cognitive function is at baseline. Post-procedure echo showed well-seated bioprosthetic aortic valve with MG 15 mmHg, no AI.     Hospital Course by Diagnosis:  # Severe aortic stenosis, now s/p Transcatheter Aortic Valve Replacement  Now s/p 23 mm Allison Maribel 3 valve c/b CHB s/p dual chamber PPM. Groin sites remain soft without hematoma. No focal neurological deficits. Gradient remains within normal limits.   - ASA 81 mg daily for anti-platelet therapy  - Cardiac rehab/PT/OT  - Lifelong antibiotic prophylaxis prior to all dental procedures.  - Follow-up with structural clinic in 1 week,.       # Moderate R ICA stenosis noted on CT   - Continue ASA 81 mg daily  - Continue rosuvastatin 20 mg daily   - Follow-up with vascular, as recommended    # Transient CHB s/p dual chamber PPM on 8/15/23  CXR with no apparent pneumothorax. Interrogation stable. Pocket site intact without hematoma.   - Follow-up in device clinic.     # Chronic systolic heart failure with recovered EF (50-55%), NYHA class II   Likely 2/2 valvular disease with LVEF 30-35%, now 50-55% post TAVR. He appears euvolemic on exam. NTpBNP  2019, LVEDP 9.  - BB: continue  PTA Toprol XL 25 mg daily  - Will defer further titration of GDMT to outpatient setting      # CAD with  of RCA  Pre-TAVR coronary angiogram showed  of non-dominant RCA that is being medically managed.   - Continue ASA 81 mg daily [PTA ASA 81 mg daily]  - BB: continue PTA Toprol XL, as above   - Statin: continue PTA rosuvastatin 20 mg daily      # Type II diabetes   PTA metformin. A1C 6.0. sliding scale insulin while inpatient.   - Resume PTA metformin.      # Cognitive dysfunction: No acute issues  # Macular degeneration: PTA eyedrops       Pertinent Procedures:  1. TAVR       Medication Changes:  See below     Discharge medications:   Current Discharge Medication List        CONTINUE these medications which have CHANGED    Details   aspirin (ASA) 81 MG chewable tablet Take 1 tablet (81 mg) by mouth daily  Qty: 90 tablet, Refills: 3    Associated Diagnoses: S/P TAVR (transcatheter aortic valve replacement); Aortic stenosis, severe           CONTINUE these medications which have NOT CHANGED    Details   cycloSPORINE (RESTASIS) 0.05 % ophthalmic emulsion Apply 1 drop to eye 2 times daily      metFORMIN (GLUCOPHAGE) 1000 MG tablet Take 1 tablet by mouth daily (with dinner)      methylcellulose (CITRUCEL) powder Take 1 teaspoonful by mouth daily      metoprolol succinate ER (TOPROL XL) 25 MG 24 hr tablet Take 25 mg by mouth daily      nystatin (MYCOSTATIN) 755049 UNIT/GM external powder Apply 1 strip topically 3 times daily as needed (jock itch)      rosuvastatin (CRESTOR) 20 MG tablet Take 1 tablet by mouth daily      vitamin B-12 (CYANOCOBALAMIN) 1000 MCG tablet Take 1,000 mcg by mouth daily      vitamin C (ASCORBIC ACID) 500 MG tablet Take 500 mg by mouth daily      Vitamin D3 (CHOLECALCIFEROL) 25 mcg (1000 units) tablet Take 25 mcg by mouth daily             Follow-up:  - Structural clinic in 1 week     Labs or imaging requiring follow-up after discharge:  - CBC/BMP/echo in 1  month    Condition on discharge  Temp:  [97.5  F (36.4  C)-98.7  F (37.1  C)] 98.7  F (37.1  C)  Pulse:  [] 77  Resp:  [0-39] 16  BP: ()/() 127/49  SpO2:  [91 %-100 %] 98 %  General: Alert to self and place, interactive, NAD  Eyes: sclera anicteric, EOMI  Neck: JVP 0, carotid 2+ bilaterally  Cardiovascular: regular rate and rhythm, normal S1 and S2, grade I GABBIE   Resp: clear to auscultation bilaterally, no rales, wheezes, or rhonchi  GI: Soft, nontender, nondistended. +BS.  No HSM or masses, no rebound or guarding.  Extremities: no edema, no cyanosis or clubbing, dorsalis pedis and posterior tibialis pulses 2+ bilaterally  Skin: Warm and dry, no jaundice or rash  Neuro: CN 2-12 intact, moves all extremities equally  Psych: Alert to self and place    Imaging with results:  Echocardiogram: 8/16/23  Interpretation Summary     The visual ejection fraction is 50-55%.  No regional wall motion abnormalities noted.  There is a bioprosthetic aortic valve.  The prosthetic aortic valve is well-seated.  The gradient is normal for this size prosthetic aortic valve 15mmHg.    TAVR: 8/15/23  Conclusion    1. Lifestyle-limiting severe aortic stenosis.  2. Successful transfemoral transcatheter aortic valve replacement with a 23mm Allison Maribel 3 valve.  3. Temporary pacemaker insertion.  4. Left heart catheterization with LVEDP of 15 mmHg.  6. Right and left common femoral arteriotomies successfully closed with closure devices.                  Plan     Follow bedrest per protocol   Continued medical management and lifestyle modifications for cardiovascular risk factor optimizations.   Arterial sheath removed from femoral artery with closure device.   Admit to inpatient        Post antiplatelet therapy of   Aspirin; give 81 mg qd .  1. Aspirin 81 mg po daily lifelong.  2. Bedrest per protocol.  3. Admit to the primary inpatient team for further evaluation and management.  4. Echocardiogram tomorrow.   5. Consult  to EP due to transient block after valve deployment  6. Lifelong antibiotic prophylaxis prior to all dental procedures.     Other imaging studies:   CTA head/neck                                                            IMPRESSION:   HEAD CT:  1.  No CT finding of a mass, hemorrhage or focal area suggestive of acute infarct.  2.  Mild age-related changes.     HEAD CTA:   1.  No discrete vessel occlusion, significant stenosis, aneurysm or high flow vascular malformation involving the arteries of the [place] and     NECK CTA:  1.  Bovine arch configuration of the great vessels off the aortic arch with no significant stenosis of their origins.  2.  Approximately 80% stenosis of the origin of the right internal carotid artery by NASCET criteria.  3.  No significant stenosis or irregularity involving the origin of the left internal carotid artery.  4.  No radiographic evidence of dissection.  5.  5.  Pleural parenchymal scarring versus underlying mass right lung apex.  Recommend Ct chest for further evaluation.     CT PERFUSION:  1.  Fairly symmetric cerebral perfusion.    Patient Care Team:  Minh Samuel MD as PCP - General (Pediatrics)    Sheri Davis DNP, APRN, CNP  [facility]  [phone]    Time Spent on this Encounter   ISheri CNP, personally saw the patient today and spent greater than 30 minutes discharging this patient.

## 2023-08-16 NOTE — PROVIDER NOTIFICATION
Paged cardiology at [phone] F.D.   code stroke post TAVR and PPM today. MALDONADO Baptiste would like to discuss with you. Please call [phone]

## 2023-08-16 NOTE — PHARMACY
Pharmacy Consult to evaluate for medication related stroke core measures    Erick Shahid, 83 year old male admitted for TAVR on 8/15/2023.    Thrombolytic was not given because of Time from onset contraindications    VTE Prophylaxis SCDs /PCDs placed on Day1, as appropriate prior to end of hospital day 2.    Antithrombotic: aspirin started on Day 2, as appropriate by end of hospital day 2. Continue antithrombotic therapy on discharge to meet quality measures, unless contraindicated.    Anticoagulation if history of A-fib/flutter: Patient does not have history of A-fib/flutter - anticoagulation not required for medication related stroke core measures.     LDL Cholesterol Calculated   Date Value Ref Range Status   08/16/2023 48 <=100 mg/dL Final       Patient currently receiving Crestor (rosuvastatin) continue statin on discharge to meet quality measures, unless contraindicated.    Recommendations: None at this time    Thank you for the consult.    Halina Coats RPH 8/16/2023 8:57 AM

## 2023-08-16 NOTE — PROGRESS NOTES
MD Notification    Notified Person: DR. Wallis    Notification Date/Time:  8/16/2023  1:40 PM    Notification Interaction:   Vocera Messaging    Purpose of Notification:  Updating provider about pt continued confusion pulling at lines, and removing 2 iv's, no PRN's available.     Orders Received:   awaiting call back    MD to order Zyprexa

## 2023-08-16 NOTE — PLAN OF CARE
SLP Deferral note: Order received for swallow evaluation. Chart reviewed and consulted with RN. Spoke with patient/spouse. Patient is on a regular diet/thin liquids. RN reported patient took pills without issue this morning. Patient's wife reported patient ate breakfast including drinking thin liquids by straw without swallowing difficulty. Patient's wife also reported patient's face appears at baseline this morning. Formal swallow evaluation not indicated at this time. SLP to complete order in Epic. Please re-consult SLP if new swallowing concerns should arise.

## 2023-08-16 NOTE — PROGRESS NOTES
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  S/p 23mm Allison Maribel 3 TAVR.  Post-operative course notable for transient CHB.    Assessment & Plan        #Symptomatic critical AS s/p TAVR  Post-operative course notable for transient CHB (see below) status post pacemaker placement PPM.      Further management as per the cardiology     #Post-operative transient CHB in setting of chronic LBBB and 1st degree AVB  #Bradycardia with evolving pauses  Status post dual-chamber pacemaker placement    Management as per cardiology and EP     #Single-vessel ASCVD  #HTN  #DLD   Cardiac cath 7/2023 showed single-vessel disease with 90% occlusion of pRCA which is being medically managed.    -- Toprol on hold, as above  -- Resume PTA statin     #NIDDM2  HgbA1c 6.0 in 7/2023.    -- Hold PTA metformin for now as is NPO  -- He is on sliding scale insulin for correction hypoglycemia protocol.  Blood sugars in good control, resume metformin on discharge     #Dementia  High risk for delirium.  -- Minimize sedating medications   -- Redirect and reassure frequently  -- Delirium protocol  Has intermittent agitation, will put him on as needed Zyprexa     #Gluteal cleft and groin skin wounds  Noted upon admission 7/2023.  Seen by WOC.    -- Continue kenna cares  -- Low threshold to reconsult WOC if wounds look worse or if there are questions about management        Discussed with the patient and the nursing staff to include the patient.     DVT Prophylaxis: Pneumatic Compression Devices  Code Status: Full Code    Disposition: Expected discharge as per primary cardiology service.    Edison Wallis MD, MD  Text Page  (7am - 6pm)    Interval History   Patient seen and evaluated in his room, wife at bedside.   Offers no complaint.  Has baseline cognitive impairment, not able to give much history.  Hemodynamically remained stable    Other significant event overnight    -Data reviewed today: I reviewed all new labs and imaging results over the last 24 hours. I personally reviewed no images or EKG's today.    Physical Exam   Temp: 98.7  F (37.1  C) Temp src: Oral BP: 125/56 Pulse: 71   Resp: 11 SpO2: 97 % O2 Device: None (Room air)    Vitals:    08/15/23 0600 08/16/23 0609   Weight: 81.6 kg (179 lb 14.4 oz) 80.5 kg (177 lb 7.5 oz)     Vital Signs with Ranges  Temp:  [97.6  F (36.4  C)-98.7  F (37.1  C)] 98.7  F (37.1  C)  Pulse:  [] 71  Resp:  [0-38] 11  BP: (106-171)/() 125/56  SpO2:  [91 %-100 %] 97 %  I/O last 3 completed shifts:  In: 360 [P.O.:360]  Out: 1500 [Urine:1300; Emesis/NG output:200]    Constitutional: fatigued  Eyes: Lids and lashes normal, pupils equal, round and reactive to light, extra ocular muscles intact, sclera clear, conjunctiva normal  Respiratory: No increased work of breathing, good air exchange, clear to auscultation bilaterally, no crackles or wheezing  Cardiovascular: Normal apical impulse, regular rate and rhythm, normal S1 and S2, no S3 or S4, and no murmur noted  GI: No scars, normal bowel sounds, soft, non-distended, non-tender, no masses palpated, no hepatosplenomegally    Medications    - MEDICATION INSTRUCTIONS -        aspirin  325 mg Oral Daily    artificial tears  1 drop Both Eyes BID    insulin aspart  1-7 Units Subcutaneous TID AC    insulin aspart  1-5 Units Subcutaneous At Bedtime    metoprolol succinate ER  25 mg Oral Daily    miconazole   Topical BID    OLANZapine  2.5 mg Oral At Bedtime    OLANZapine  2.5 mg Oral At Bedtime    rosuvastatin  20 mg Oral Daily       Data   Recent Labs   Lab 08/16/23  1208 08/16/23  0604 08/16/23  0152 08/15/23  1855 08/15/23  1258 08/15/23  0946 08/15/23  0651   WBC  --  7.5  --   --  5.3  --   --    HGB  --  12.5*  --   --  11.4*  --    --    MCV  --  95  --   --  97  --   --    PLT  --  147*  --   --  145*  --   --    INR  --   --   --   --  1.17*  --   --    NA  --  137  --   --   --   --  139   POTASSIUM  --  3.7  --   --   --   --  4.3   CHLORIDE  --  103  --   --   --   --  104   CO2  --  20*  --   --   --   --  23   BUN  --  10.4  --   --   --   --  14.2   CR  --  0.66*  --   --   --   --  0.74   ANIONGAP  --  14  --   --   --   --  12   FERMIN  --  9.1  --   --   --   --  9.5   * 123* 131*   < >  --    < > 108*  110*    < > = values in this interval not displayed.       Recent Results (from the past 24 hour(s))   EP Device    Narrative    PROCEDURES PERFORMED:   1. Implantation of a dual-chamber pacemaker with the ventricular lead   fixed at the left bundle region  2. Conscious sedation.   3. Cardiac fluoroscopy    INDICATION: Sinus node dysfunction and syncope     HISTORY OF PRESENT ILLNESS: This is an 83 year-old patient with a history   of severe aortic stenosis with LV dysfunction and a LBBB. He was noted to   have transient CHB during TAVR procedure and continue to have intermittent   CHB on the floor. Given LBBB and LV dysfunction patient was referred for a   permanent pacemaker with direct left bundle pacing. Risks of the procedure   was discussed before the procedure including but not limited to vascular   injury, infection, pneumothorax, and cardiac perforation.    METHOD: I determined this patient to be an appropriate candidate for the   planned sedation and procedure and have reassessed the patient immediately   prior to sedation and procedure. The patient was prepped and draped in the   usual manner. Sedation was given by the nursing staff under my direct   supervision.  Intravenous Ancef was given. 1% lidocaine was infiltrated   into the the left axillary vein area. This vein was accessed using the   modified Seldinger technique with ultrasound guidance and micropuncture.   An incision was then made with a #15 blade. A lead  was placed in the RV   for backup pacing.    A C135 His bundle sheath was then glide over the wire into the vein. A   lead was then advanced into the heart and was fixed into the left bundle   region after mapping for optimal sensing and threshold . Appropriate   sensing and threshold were obtained. The lead which was placed in the RV   was withdrawn into the right atrium and fixed into the right atrial   appendage. There was no diaphragmatic stimulation with high output pacing.   The 7-F sheath was peeled away and the His-bundle sheath was then cut   away. The leads were then secured to the pectoralis muscle fascia using   O-Ethibond sutures.     A pocket was then fashioned and was irrigated with bacitracin solution.   The leads were then attached to the device and placed in the pocket.  The   pocket was then closed with 2-0 and 4-0 Vicryl sutures. Steri-Strips and   an OpSite dressing were then placed over the incision.  The patient was   then transferred back to the CICU in stable condition.     DEVICE GENERATOR: Company: [employer], Model number: [id number], Serial number:   [id number]     LEAD INFORMATION:   RA: Company: [employer], Model: 5076-52, serial number: [id number]  RV: Company: [employer], Model: [id number], serial number: [id number]    DEVICE MEASUREMENT:   RA: Sensing: 3.0 millivolts, Threshold: 1075v at 0.4 milliseconds, 798   ohms.  RV: Sensing: 15.1 millivolts, Threshold: 0.5 volts at 0.4 milliseconds,   969 ohms.    COMPLICATIONS: None.      CONCLUSION:  1. Uneventful implantation of a dual-chamber pacemaker with the right lead   fixed into the left bundle region.          CT Head w/o Contrast    Narrative    EXAM: CT HEAD W/O CONTRAST, CTA HEAD NECK W CONTRAST, CT HEAD PERFUSION W CONTRAST  LOCATION: [facility]  DATE: 8/15/2023    INDICATION: Left facial droop.  COMPARISON: 07/23/2023.  TECHNIQUE: Head and neck CT angiogram with IV contrast. Noncontrast head CT followed by axial  helical CT images of the head and neck vessels obtained during the arterial phase of intravenous contrast administration. Axial 2D reconstructed images and   multiplanar 3D MIP reconstructed images of the head and neck vessels were performed by the technologist. Additional CT cerebral perfusion was performed utilizing a second contrast bolus. Perfusion data were post processed with generation of standard   perfusion maps and estimation of ischemic/infarcted volumes utilizing standard threshold values. Dose reduction techniques were used. All stenosis measurements made according to NASCET criteria unless otherwise specified.  CONTRAST: 75 mL Isovue 370 (accession [id number]), 50 mL Isovue 370 (accession [id number]), 75 mL Isovue 370 (accession [id number])    FINDINGS:   NONCONTRAST HEAD CT:   INTRACRANIAL CONTENTS: No intracranial hemorrhage, extraaxial collection, or mass effect.  No CT evidence of acute infarct. There is scattered diffuse low attenuation within the periventricular and subcortical white matter consistent with diffuse small   vessel ischemic disease. The ventricular system, basal cisterns and the cortical sulci are consistent with diffuse volume loss.     VISUALIZED ORBITS/SINUSES/MASTOIDS: No intraorbital abnormality. No paranasal sinus mucosal disease. No middle ear or mastoid effusion.    BONES/SOFT TISSUES: No acute abnormality.    HEAD CTA:  ANTERIOR CIRCULATION: No stenosis/occlusion, aneurysm, or high flow vascular malformation. There is a patent anterior communicating artery.    POSTERIOR CIRCULATION: No stenosis/occlusion, aneurysm, or high flow vascular malformation. The left vertebral artery is dominant but both vertebral arteries connect up to the basilar artery.     DURAL VENOUS SINUSES: Expected enhancement of the major dural venous sinuses.    NECK CTA: There is prominent vascular calcification involving carotid artery bifurcations bilaterally.    RIGHT CAROTID: There is approximately 80%  stenosis of the origin of the right internal carotid artery by NASCET criteria as visualized on axial source image #232.    LEFT CAROTID: No measurable stenosis or dissection.    VERTEBRAL ARTERIES: No focal stenosis or dissection. Left vertebral artery dominant but both vertebral arteries are patent throughout the neck region.    AORTIC ARCH: Bovine arch configuration of the great vessels off the aortic arch with no significant stenosis of their origins.    NONVASCULAR STRUCTURES: There are diffuse degenerative changes of the cervical spine. There are pleural calcifications in the lung apices along with a calcified granuloma with surrounding soft tissue in the right lung apex. Recommend clinical   correlation. Pacemaker wires are visualized.    CT PERFUSION:  PERFUSION MAPS: Fairly ymmetrical cerebral perfusion. No focal deficits in cerebral blood flow or volume to suggest ischemia/oligemia.    RAPID ANALYSIS:  CBF<30%: 0 ml  Tmax>6sec: 0 ml  Mismatch volume: 0 ml  Mismatch ratio: none      Impression    IMPRESSION:   HEAD CT:  1.  No CT finding of a mass, hemorrhage or focal area suggestive of acute infarct.  2.  Mild age-related changes.    HEAD CTA:   1.  No discrete vessel occlusion, significant stenosis, aneurysm or high flow vascular malformation involving the arteries of the [place] and    NECK CTA:  1.  Bovine arch configuration of the great vessels off the aortic arch with no significant stenosis of their origins.  2.  Approximately 80% stenosis of the origin of the right internal carotid artery by NASCET criteria.  3.  No significant stenosis or irregularity involving the origin of the left internal carotid artery.  4.  No radiographic evidence of dissection.  5.  5.  Pleural parenchymal scarring versus underlying mass right lung apex.  Recommend Ct chest for further evaluation.    CT PERFUSION:  1.  Fairly symmetric cerebral perfusion.    All images were sent and received at once. Results were  communicated by phone to Dr. Batres at 11:20 PM on 5 09/15/2023.   CTA Head Neck w Contrast    Narrative    EXAM: CT HEAD W/O CONTRAST, CTA HEAD NECK W CONTRAST, CT HEAD PERFUSION W CONTRAST  LOCATION: [facility]  DATE: 8/15/2023    INDICATION: Left facial droop.  COMPARISON: 07/23/2023.  TECHNIQUE: Head and neck CT angiogram with IV contrast. Noncontrast head CT followed by axial helical CT images of the head and neck vessels obtained during the arterial phase of intravenous contrast administration. Axial 2D reconstructed images and   multiplanar 3D MIP reconstructed images of the head and neck vessels were performed by the technologist. Additional CT cerebral perfusion was performed utilizing a second contrast bolus. Perfusion data were post processed with generation of standard   perfusion maps and estimation of ischemic/infarcted volumes utilizing standard threshold values. Dose reduction techniques were used. All stenosis measurements made according to NASCET criteria unless otherwise specified.  CONTRAST: 75 mL Isovue 370 (accession [id number]), 50 mL Isovue 370 (accession [id number]), 75 mL Isovue 370 (accession [id number])    FINDINGS:   NONCONTRAST HEAD CT:   INTRACRANIAL CONTENTS: No intracranial hemorrhage, extraaxial collection, or mass effect.  No CT evidence of acute infarct. There is scattered diffuse low attenuation within the periventricular and subcortical white matter consistent with diffuse small   vessel ischemic disease. The ventricular system, basal cisterns and the cortical sulci are consistent with diffuse volume loss.     VISUALIZED ORBITS/SINUSES/MASTOIDS: No intraorbital abnormality. No paranasal sinus mucosal disease. No middle ear or mastoid effusion.    BONES/SOFT TISSUES: No acute abnormality.    HEAD CTA:  ANTERIOR CIRCULATION: No stenosis/occlusion, aneurysm, or high flow vascular malformation. There is a patent anterior communicating artery.    POSTERIOR  CIRCULATION: No stenosis/occlusion, aneurysm, or high flow vascular malformation. The left vertebral artery is dominant but both vertebral arteries connect up to the basilar artery.     DURAL VENOUS SINUSES: Expected enhancement of the major dural venous sinuses.    NECK CTA: There is prominent vascular calcification involving carotid artery bifurcations bilaterally.    RIGHT CAROTID: There is approximately 80% stenosis of the origin of the right internal carotid artery by NASCET criteria as visualized on axial source image #232.    LEFT CAROTID: No measurable stenosis or dissection.    VERTEBRAL ARTERIES: No focal stenosis or dissection. Left vertebral artery dominant but both vertebral arteries are patent throughout the neck region.    AORTIC ARCH: Bovine arch configuration of the great vessels off the aortic arch with no significant stenosis of their origins.    NONVASCULAR STRUCTURES: There are diffuse degenerative changes of the cervical spine. There are pleural calcifications in the lung apices along with a calcified granuloma with surrounding soft tissue in the right lung apex. Recommend clinical   correlation. Pacemaker wires are visualized.    CT PERFUSION:  PERFUSION MAPS: Fairly ymmetrical cerebral perfusion. No focal deficits in cerebral blood flow or volume to suggest ischemia/oligemia.    RAPID ANALYSIS:  CBF<30%: 0 ml  Tmax>6sec: 0 ml  Mismatch volume: 0 ml  Mismatch ratio: none      Impression    IMPRESSION:   HEAD CT:  1.  No CT finding of a mass, hemorrhage or focal area suggestive of acute infarct.  2.  Mild age-related changes.    HEAD CTA:   1.  No discrete vessel occlusion, significant stenosis, aneurysm or high flow vascular malformation involving the arteries of the [place] and    NECK CTA:  1.  Bovine arch configuration of the great vessels off the aortic arch with no significant stenosis of their origins.  2.  Approximately 80% stenosis of the origin of the right internal carotid  artery by NASCET criteria.  3.  No significant stenosis or irregularity involving the origin of the left internal carotid artery.  4.  No radiographic evidence of dissection.  5.  5.  Pleural parenchymal scarring versus underlying mass right lung apex.  Recommend Ct chest for further evaluation.    CT PERFUSION:  1.  Fairly symmetric cerebral perfusion.    All images were sent and received at once. Results were communicated by phone to Dr. Batres at 11:20 PM on 5 09/15/2023.   CT Head Perfusion w Contrast    Narrative    EXAM: CT HEAD W/O CONTRAST, CTA HEAD NECK W CONTRAST, CT HEAD PERFUSION W CONTRAST  LOCATION: [facility]  DATE: 8/15/2023    INDICATION: Left facial droop.  COMPARISON: 07/23/2023.  TECHNIQUE: Head and neck CT angiogram with IV contrast. Noncontrast head CT followed by axial helical CT images of the head and neck vessels obtained during the arterial phase of intravenous contrast administration. Axial 2D reconstructed images and   multiplanar 3D MIP reconstructed images of the head and neck vessels were performed by the technologist. Additional CT cerebral perfusion was performed utilizing a second contrast bolus. Perfusion data were post processed with generation of standard   perfusion maps and estimation of ischemic/infarcted volumes utilizing standard threshold values. Dose reduction techniques were used. All stenosis measurements made according to NASCET criteria unless otherwise specified.  CONTRAST: 75 mL Isovue 370 (accession [id number]), 50 mL Isovue 370 (accession [id number]), 75 mL Isovue 370 (accession [id number])    FINDINGS:   NONCONTRAST HEAD CT:   INTRACRANIAL CONTENTS: No intracranial hemorrhage, extraaxial collection, or mass effect.  No CT evidence of acute infarct. There is scattered diffuse low attenuation within the periventricular and subcortical white matter consistent with diffuse small   vessel ischemic disease. The ventricular system, basal cisterns  and the cortical sulci are consistent with diffuse volume loss.     VISUALIZED ORBITS/SINUSES/MASTOIDS: No intraorbital abnormality. No paranasal sinus mucosal disease. No middle ear or mastoid effusion.    BONES/SOFT TISSUES: No acute abnormality.    HEAD CTA:  ANTERIOR CIRCULATION: No stenosis/occlusion, aneurysm, or high flow vascular malformation. There is a patent anterior communicating artery.    POSTERIOR CIRCULATION: No stenosis/occlusion, aneurysm, or high flow vascular malformation. The left vertebral artery is dominant but both vertebral arteries connect up to the basilar artery.     DURAL VENOUS SINUSES: Expected enhancement of the major dural venous sinuses.    NECK CTA: There is prominent vascular calcification involving carotid artery bifurcations bilaterally.    RIGHT CAROTID: There is approximately 80% stenosis of the origin of the right internal carotid artery by NASCET criteria as visualized on axial source image #232.    LEFT CAROTID: No measurable stenosis or dissection.    VERTEBRAL ARTERIES: No focal stenosis or dissection. Left vertebral artery dominant but both vertebral arteries are patent throughout the neck region.    AORTIC ARCH: Bovine arch configuration of the great vessels off the aortic arch with no significant stenosis of their origins.    NONVASCULAR STRUCTURES: There are diffuse degenerative changes of the cervical spine. There are pleural calcifications in the lung apices along with a calcified granuloma with surrounding soft tissue in the right lung apex. Recommend clinical   correlation. Pacemaker wires are visualized.    CT PERFUSION:  PERFUSION MAPS: Fairly ymmetrical cerebral perfusion. No focal deficits in cerebral blood flow or volume to suggest ischemia/oligemia.    RAPID ANALYSIS:  CBF<30%: 0 ml  Tmax>6sec: 0 ml  Mismatch volume: 0 ml  Mismatch ratio: none      Impression    IMPRESSION:   HEAD CT:  1.  No CT finding of a mass, hemorrhage or focal area suggestive of acute  infarct.  2.  Mild age-related changes.    HEAD CTA:   1.  No discrete vessel occlusion, significant stenosis, aneurysm or high flow vascular malformation involving the arteries of the [place] and    NECK CTA:  1.  Bovine arch configuration of the great vessels off the aortic arch with no significant stenosis of their origins.  2.  Approximately 80% stenosis of the origin of the right internal carotid artery by NASCET criteria.  3.  No significant stenosis or irregularity involving the origin of the left internal carotid artery.  4.  No radiographic evidence of dissection.  5.  5.  Pleural parenchymal scarring versus underlying mass right lung apex.  Recommend Ct chest for further evaluation.    CT PERFUSION:  1.  Fairly symmetric cerebral perfusion.    All images were sent and received at once. Results were communicated by phone to Dr. Batres at 11:20 PM on 5 09/15/2023.   X-ray Chest 2 vws*    Narrative    CHEST TWO VIEWS 2023 9:01 AM     HISTORY: Status post pacer/ICD.    COMPARISON: Chest radiograph 2023. CT TAVR 2023.       Impression    IMPRESSION: Left chest wall cardiac device with leads projecting over  the right atrium and right ventricle. TAVR device. No convincing  pneumothorax. Left basilar atelectasis. Trace pleural effusions.  Calcified granulomas. Diffuse osseous demineralization.     WILLAM ZARATE MD         SYSTEM ID:  [id number]   Echocardiogram Limited   Result Value    LVEF  50-55%    Narrative    [id number]  [id number]  [id number]  [id number]^ASHA^NIKOLAS^SUSAN     [facility]  Echocardiography Laboratory  [address] [place] [address]     Name: BALTA SIMEON  MRN: [id number]  : 1940  Study Date: 2023 10:47 AM  Age: 83 yrs  Gender: Male  Patient [facility]: [facility]  Reason For Study: Aortic Valve Replacement  Ordering Physician: NIKOLAS BARRY  Referring Physician: Minh Samuel  Performed By: Rell Pitts RDCS     BSA: 1.9  m2  Height: 67 in  Weight: 177 lb  HR: 73  BP: 165/85 mmHg  ______________________________________________________________________________  Procedure  Limited Portable Echo Adult. Optison (NDC #2995-4694) given intravenously.  ______________________________________________________________________________  Interpretation Summary     The visual ejection fraction is 50-55%.  No regional wall motion abnormalities noted.  There is a bioprosthetic aortic valve.  The prosthetic aortic valve is well-seated.  The gradient is normal for this size prosthetic aortic valve 15mmHg.  ______________________________________________________________________________  Left Ventricle  The visual ejection fraction is 50-55%. No regional wall motion abnormalities  noted.     Atria  Right atrial catheter.     Mitral Valve  There is mild to moderate mitral annular calcification. The mitral valve  leaflets are moderately thickened. There is no mitral regurgitation noted.     Tricuspid Valve  The right ventricular systolic pressure is approximated at 24.8 mmHg plus the  right atrial pressure.     Aortic Valve  No aortic regurgitation is present. The mean AoV pressure gradient is 15.1  mmHg. The prosthetic aortic valve is well-seated. There is a bioprosthetic  aortic valve. The prosthetic aortic valve appears to open well. The gradient  is normal for this prosthetic aortic valve.     Pericardium  There is no pericardial effusion.  ______________________________________________________________________________  MMode/2D Measurements & Calculations  LVOT diam: 2.1 cm  LVOT area: 3.5 cm2     Doppler Measurements & Calculations  Ao V2 max: 263.7 cm/sec  Ao max P.8 mmHg  Ao V2 mean: 181.7 cm/sec  Ao mean PG: 15.1 mmHg  Ao V2 VTI: 48.6 cm  SHALOM(I,D): 1.5 cm2  SHALOM(V,D): 1.5 cm2  Ao acc time: 0.10 sec  LV V1 max P.5 mmHg  LV V1 max: 117.0 cm/sec  LV V1 VTI: 21.2 cm  SV(LVOT): 73.4 ml  SI(LVOT): 38.3 ml/m2  TR max leesa: 249.0 cm/sec  TR max PG:  24.8 mmHg  AV Terrence Ratio (DI): 0.44  SHALOM Index (cm2/m2): 0.79     ______________________________________________________________________________  Report approved by: Katherine Martinez 08/16/2023 11:36 AM

## 2023-08-16 NOTE — PROGRESS NOTES
[facility]     Structural Cardiology Progress Note  Erick Shahid MRN: [id number]  1940  Date of Admission:8/15/2023  Primary care provider: Minh Samuel        Assessment and Plan:     Erick Shahid is a pleasant 83 year old admitted on 8/15/2023 for elective TAVR.    # Severe aortic stenosis s/p Transcatheter Aortic Valve Replacement  Now s/p 23 mm Allison Maribel 3 valve c/b CHB s/p dual chamber PPM. Groin sites remain soft without hematoma. No focal neurological deficits.  Postprocedure echocardiogram showed well-seated bioprosthetic aortic valve with a mean gradient of 15 mmHg, no PVL.  His LV function improved with EF 50 to 55%, previously 30 to 35%.  - Per neurology recs,  mg daily for anti-platelet therapy  - Cardiac rehab/PT/OT  - Continue to monitor on telemetry  - Lifelong antibiotic prophylaxis prior to all dental procedures.      # Probable TIA  CODE stroke called 8/15 due to concerns of left facial droop and increased confusion. CTA with no acute findings. Patient appears at baseline today with no focal deficits. Allowing for permissive hypertension.  - Start  mg daily  - Continue rosuvastatin 20 mg daily   - Neurology following     # Transient CHB s/p dual chamber PPM  Patient had underlying LBBB and first-degree AV block prior to TAVR.  He developed transient complete heart block following valve deployment with heart rates in the 40s.  He underwent uneventful pacemaker implantation on 8/15/2023.  CXR today with no apparent pneumothorax. Interrogation stable. Pocket site intact without hematoma.   -Continue to monitor     # Chronic systolic heart failure with recovered EF to 50 to 55%, NYHA class II   Likely 2/2 valvular disease with LVEF 30-35%, now 50 to 55% post TAVR.  Euvolemic on exam. NTpBNP 2019, LVEDP 9.  GDMT for heart failure has been limited in the setting of permissive hypertension 2/2 probable TIA.   - BB: continue  PTA Toprol XL 25 mg daily  -  Defer further initiation of GDMT to outpatient setting      # CAD with  of RCA  Pre-TAVR coronary angiogram showed  of non-dominant RCA that is being medically managed.   - Continue  mg daily [PTA ASA 81 mg daily]  - BB: continue PTA Toprol XL, as above   - Statin: continue PTA rosuvastatin 20 mg daily      # Type II diabetes   PTA metformin. A1C 6.0. sliding scale insulin while inpatient.   - Resume PTA metformin.      # Cognitive dysfunction: No acute issues  # Macular degeneration: PTA eyedrops     FEN: Cardiac diet  Disposition: Home in 1-2 days with cardiac rehab pending stable post-op period  Code Status: FULL CODE      Sheri Davis, APRN, CNP  [facility] Structural/Interventional Cardiology   Pager: [phone]         Interval History:   Code stroke called overnight for concerns of left facial droop, CT head negative. No focal deficits today. He remains pleasantly confused at baseline.  - s/p 23 mm Allison Maribel 3 valve  - Groin site CDI, soft, non-tender, no evidence of hematoma  - No arrhythmias overnight  - Pocket site intact without hematoma         Review of Systems:    10 point review of systems negative except for stated above in HPI.          Past Medical History:   Medical History reviewed.   Past Medical History:   Diagnosis Date    Benign essential hypertension     BPH (benign prostatic hyperplasia)     Chronic heart failure with reduced ejection fraction and diastolic dysfunction (H)     Cognitive impairment     Critical aortic valve stenosis     Diabetes mellitus, type 2 (H)     on metformin    Dyslipidemia     First degree atrioventricular block     LBBB (left bundle branch block)     Macular degeneration (senile) of retina              Past Surgical History:   Surgical History reviewed.   Past Surgical History:   Procedure Laterality Date    CV CORONARY ANGIOGRAM N/A 07/28/2023    Procedure: Coronary Angiogram;  Surgeon: Mando Field MD;  Location: [facility] CARDIAC CATH  LAB    CV PCI N/A 07/28/2023    Procedure: Percutaneous Coronary Intervention;  Surgeon: Mando Field MD;  Location:  HEART CARDIAC CATH LAB    CV TRANSCATHETER AORTIC VALVE REPLACEMENT  08/15/2023    EP PACEMAKER DEVICE & LEAD IMPLANT- RIGHT ATRIAL & LEFT VENTRICULAR N/A 8/15/2023    Procedure: Pacemaker Device & Lead Implant- Right Atrial & Left Ventricular;  Surgeon: Oscar Mendoza MD;  Location:  HEART CARDIAC CATH LAB    ORIF HIP FRACTURE Right 2018             Social History:   Social History reviewed.  Social History     Tobacco Use    Smoking status: Never    Smokeless tobacco: Never   Substance Use Topics    Alcohol use: Never             Family History:   Family History reviewed.   History reviewed. No pertinent family history.          Allergies:   No Known Allergies          Medications:   Medications Reviewed.   Current Facility-Administered Medications   Medication    acetaminophen (TYLENOL) tablet 650 mg    aspirin (ASA) tablet 325 mg    carboxymethylcellulose PF (REFRESH PLUS) 0.5 % ophthalmic solution 1 drop    glucose gel 15-30 g    Or    dextrose 50 % injection 25-50 mL    Or    glucagon injection 1 mg    HOLD:  Metformin and metformin containing medications if patient received IV contrast with acute kidney injury or severe chronic kidney disease (stage IV or stage V; i.e., eGFR less than 30)    HOLD: enoxaparin (LOVENOX) Post Procedure    HOLD: heparin (IV or Subcutaneous) Post Procedure    HOLD: metformin and metformin containing medications on day of procedure and 48 hours after IV contrast given    hydrALAZINE (APRESOLINE) injection 10 mg    insulin aspart (NovoLOG) injection (RAPID ACTING)    insulin aspart (NovoLOG) injection (RAPID ACTING)    Medication Instructions - Avoid dextrose in IV solutions.    metoprolol succinate ER (TOPROL XL) 24 hr tablet 25 mg    miconazole (MICATIN) 2 % powder    naloxone (NARCAN) injection 0.2 mg    Or    naloxone (NARCAN) injection 0.4 mg     "Or    naloxone (NARCAN) injection 0.2 mg    Or    naloxone (NARCAN) injection 0.4 mg    nitroGLYcerin (NITROSTAT) sublingual tablet 0.4 mg    OLANZapine (zyPREXA) injection 2.5 mg    OLANZapine (zyPREXA) tablet 2.5 mg    Or    OLANZapine (zyPREXA) injection 2.5 mg    OLANZapine (zyPREXA) tablet 2.5 mg    OLANZapine (zyPREXA) tablet 2.5 mg    ondansetron (ZOFRAN ODT) ODT tab 4 mg    Or    ondansetron (ZOFRAN) injection 4 mg    oxyCODONE-acetaminophen (PERCOCET) 5-325 MG per tablet 1 tablet    rosuvastatin (CRESTOR) tablet 20 mg             Physical Exam:   Vitals were reviewed.  Blood pressure 125/56, pulse 71, temperature 98.7  F (37.1  C), temperature source Oral, resp. rate 11, height 1.702 m (5' 7\"), weight 80.5 kg (177 lb 7.5 oz), SpO2 97 %.    General: AAOx3, NAD  Skin: Not jaundiced, no rash, no ecchymoses. Groin site CDI, soft, non-tender, no signs of hematoma. No bruits.   HEENT: MMM, PERRLA, EOM intact  CV: RRR, normal S1S2, grade I GABBIE, clicks, rubs  Resp: Clear to auscultation bilaterally, no wheezes, rhonchi  Abd: Soft, non-tender, BS+, no masses appreciated  Extremities: warm and well perfused, palpable pulses, no edema  Neuro: No lateralizing symptoms or focal neurologic deficits        Labs:   Routine Labs:  No results found for: TROPI, TROPONIN, TROPR, TROPN  CMP  Recent Labs   Lab 08/16/23  1208 08/16/23  0604 08/16/23  0152 08/15/23  2226 08/15/23  0946 08/15/23  0651   NA  --  137  --   --   --  139   POTASSIUM  --  3.7  --   --   --  4.3   CHLORIDE  --  103  --   --   --  104   CO2  --  20*  --   --   --  23   ANIONGAP  --  14  --   --   --  12   * 123* 131* 124*   < > 108*  110*   BUN  --  10.4  --   --   --  14.2   CR  --  0.66*  --   --   --  0.74   GFRESTIMATED  --  >90  --   --   --  89   FERMIN  --  9.1  --   --   --  9.5   MAG  --  1.9  --   --   --  2.2   PHOS  --  2.6  --   --   --  3.8    < > = values in this interval not displayed.     CBC  Recent Labs   Lab 08/16/23  0604 " 08/15/23  1258   WBC 7.5 5.3   RBC 3.82* 3.53*   HGB 12.5* 11.4*   HCT 36.2* 34.3*   MCV 95 97   MCH 32.7 32.3   MCHC 34.5 33.2   RDW 13.8 14.3   * 145*     INR  Recent Labs   Lab 08/15/23  1258   INR 1.17*           Diagnostics:    EKG 12Lead: AV paced rhythm    Echo: 8/16/2023  Interpretation Summary     The visual ejection fraction is 50-55%.  No regional wall motion abnormalities noted.  There is a bioprosthetic aortic valve.  The prosthetic aortic valve is well-seated.  The gradient is normal for this size prosthetic aortic valve 15mmHg.    TAVR: 8/15/23  Conclusion    1. Lifestyle-limiting severe aortic stenosis.  2. Successful transfemoral transcatheter aortic valve replacement with a 23mm Allison Maribel 3 valve.  3. Temporary pacemaker insertion.  4. Left heart catheterization with LVEDP of 15 mmHg.  6. Right and left common femoral arteriotomies successfully closed with closure devices.                  Plan     Follow bedrest per protocol   Continued medical management and lifestyle modifications for cardiovascular risk factor optimizations.   Arterial sheath removed from femoral artery with closure device.   Admit to inpatient        Post antiplatelet therapy of   Aspirin; give 81 mg qd .  1. Aspirin 81 mg po daily lifelong.  2. Bedrest per protocol.  3. Admit to the primary inpatient team for further evaluation and management.  4. Echocardiogram tomorrow.   5. Consult to EP due to transient block after valve deployment  6. Lifelong antibiotic prophylaxis prior to all dental procedures.     Medical Decision Making       70 MINUTES SPENT BY ME on the date of service doing chart review, history, exam, documentation & further activities per the note.

## 2023-08-16 NOTE — CONSULTS
[facility]    Stroke Consult Note    Reason for Consult: L facial droop, concern for stroke    Chief Complaint: Admitted for TAVR       HPI  Erick Shahid is a 83 year old male with history of cognitive impairment, type 2 DM, chronic systolic HF, HTN, HLD, CAD, and symptomatic aortic stenosis. In addition to cognitive impairment he does have some dysarthria at baseline. Admitted for elective TAVR which was done 8/15 morning, later in the day noted to have increased confusion and possible L facial droop. Patient's wife reported that she left the hospital around 5:30 PM and he was more confused than his baseline but otherwise seemed OK. Later tin the evening the RN noted L facial droop at 10:25 PM prompting RRT and then stroke code. No other new acute focal neuro deficits noted. Head CT with no acute findings.     Today he has remained more confused than baseline with intermittent agitation. He does have L lower facial mild weakness and mild dysarthria which his wife who is present at the time of my exam reports is his baseline. No new focal neuro deficits suggestive of stroke.       Stroke Evaluation Summarized    MRI/Head CT CT 8/15: no mass, hemorrhage or focal area suggestive of acute infarct    Unable to obtain MRI given recent pacemaker placement   Intracranial Vasculature CTA head 8/15: no discrete vessel occlusion, significant stenosis, aneurysm or high flow vascular malformation    CT perfusion 8/15: fairly symmetric cerebral perfusion    Cervical Vasculature CTA neck 8/15: bovine arch configuration of great vessels off of aortic arch with no significant stenosis, approximately 80% stenosis of R ICA origin, no significant stenosis or irregularity of L ICA     Echocardiogram Pre-TAVR: mild LV systolic dysfunction (EF 45-50%), severe aortic stenosis, mild AI    POST-TAVR: successful TAVR, valve well-seated, no pericardial effusion   EKG/Telemetry 8/15: wide QRS rhythm, L  "axis deviation, L BBB   Other Testing U/S carotid bilateral: 50-69% R ICA stenosis, <50% L ICA stenosis     LDL  8/16/2023: 48 mg/dL   A1C  7/30/2023: 6.0 %   Troponin 8/16/2023: 55 ng/L       Impression  L facial droop and dysarthria noted by nursing and prompting stroke code but today clarified with wife that this is his baseline. No other exam findings suggestive of stroke and CT with no acute findings.   Fluctuating confusion in patient with baseline cognitive impairment - delirium in the setting of hospitalization, surgery/medications, lack of sleep, etc  Asymptomatic R ICA stenosis - read as 80% on CTA but appears to be closer to 50% by my measurement. CUS shows 50-69% stenosis.      Recommendations   - Avoid hypotension given R ICA stenosis, BP goals per cardiology  - Continue antiplatelet therapy as per cardiology recommendations  - Continue rosuvastatin 20 mg, maintain LDL 40-70 (recent LDL 48)  - Telemetry      Patient Follow-up    - Refer to vascular surgery outpatient for surveillance of asymptomatic R ICA stenosis (ordered)      Thank you for this consult. No further stroke evaluation is recommended, so we will sign off. Please contact us with any additional questions.    Adriana Slef PA-C  Vascular Neurology    To page me or covering stroke neurology team member, click here: [facility]  Choose \"On Call\" tab at top, then select \"NEUROLOGY/ALL SITES\" from middle drop-down box, press Enter, then look for \"stroke\" or \"telestroke\" for your site.  _____________________________________________________    Clinically Significant Risk Factors               # Coagulation Defect: INR = 1.17 (Ref range: 0.85 - 1.15) and/or PTT = N/A, will monitor for bleeding           # Overweight: Estimated body mass index is 27.8 kg/m  as calculated from the following:    Height as of this encounter: 1.702 m (5' 7\").    Weight as of this encounter: 80.5 kg (177 lb 7.5 oz)., PRESENT ON ADMISSION      # Pacemaker present         Past " Medical History    Past Medical History:   Diagnosis Date    Benign essential hypertension     BPH (benign prostatic hyperplasia)     Chronic heart failure with reduced ejection fraction and diastolic dysfunction (H)     Cognitive impairment     Critical aortic valve stenosis     Diabetes mellitus, type 2 (H)     on metformin    Dyslipidemia     First degree atrioventricular block     LBBB (left bundle branch block)     Macular degeneration (senile) of retina      Medications   Home Meds  Prior to Admission medications    Medication Sig Start Date End Date Taking? Authorizing Provider   aspirin (ASA) 81 MG chewable tablet Take 81 mg by mouth daily   Yes Unknown, Entered By History   cycloSPORINE (RESTASIS) 0.05 % ophthalmic emulsion Apply 1 drop to eye 2 times daily 6/16/22  Yes Unknown, Entered By History   metFORMIN (GLUCOPHAGE) 1000 MG tablet Take 1 tablet by mouth daily (with dinner) 6/21/23  Yes Unknown, Entered By History   methylcellulose (CITRUCEL) powder Take 1 teaspoonful by mouth daily   Yes Unknown, Entered By History   metoprolol succinate ER (TOPROL XL) 25 MG 24 hr tablet Take 25 mg by mouth daily   Yes Reported, Patient   nystatin (MYCOSTATIN) 254620 UNIT/GM external powder Apply 1 strip topically 3 times daily as needed (jock itch) 7/17/23  Yes Unknown, Entered By History   rosuvastatin (CRESTOR) 20 MG tablet Take 1 tablet by mouth daily 6/21/23  Yes Unknown, Entered By History   vitamin B-12 (CYANOCOBALAMIN) 1000 MCG tablet Take 1,000 mcg by mouth daily   Yes Reported, Patient   vitamin C (ASCORBIC ACID) 500 MG tablet Take 500 mg by mouth daily   Yes Reported, Patient   Vitamin D3 (CHOLECALCIFEROL) 25 mcg (1000 units) tablet Take 25 mcg by mouth daily   Yes Unknown, Entered By History       Scheduled Meds   aspirin  325 mg Oral Daily    artificial tears  1 drop Both Eyes BID    insulin aspart  1-7 Units Subcutaneous TID AC    insulin aspart  1-5 Units Subcutaneous At Bedtime    metoprolol succinate  ER  25 mg Oral Daily    miconazole   Topical BID    OLANZapine  2.5 mg Oral At Bedtime    rosuvastatin  20 mg Oral Daily       Infusion Meds   - MEDICATION INSTRUCTIONS -         Allergies   No Known Allergies       PHYSICAL EXAMINATION   Temp:  [97.5  F (36.4  C)-98.7  F (37.1  C)] 98.7  F (37.1  C)  Pulse:  [] 77  Resp:  [0-39] 16  BP: ()/() 127/49  SpO2:  [91 %-100 %] 98 %    Neurologic  Mental Status:  alert, oriented only to self and recognizes wife, mostly follows commands but often slow or requires repeating of command, mildly dysarthric but baseline per wife, naming and repetition intact  Cranial Nerves:  visual fields intact, PERRL, EOMI with normal smooth pursuit, facial sensation intact and symmetric, mild facial asymmetry with L lower facial weakness present but baseline per wife, [place], tongue protrusion midline  Motor:  normal muscle tone and bulk, mild intention tremor in RUE but baseline per wife - no other abnormal movements, able to move all limbs spontaneously, strength 5/5 throughout upper and lower extremities, no pronator drift  Reflexes:  toes down-going  Sensory:  light touch sensation intact and symmetric throughout upper and lower extremities, no extinction on double simultaneous stimulation   Coordination:  limited ability to assess coordination d/t inconsistency in following commands - normal finger-to-nose bilaterally, unable to assess heel-to-shin bilaterally but no obvious incoordination  Station/Gait:  deferred    Stroke Scales    NIHSS  1a. Level of Consciousness 0-->Alert, keenly responsive   1b. LOC Questions 2-->Answers neither question correctly   1c. LOC Commands 0-->Performs both tasks correctly   2.   Best Gaze 0-->Normal   3.   Visual 0-->No visual loss   4.   Facial Palsy 1-->Minor paralysis (flattened nasolabial fold, asymmetry on smiling)   5a. Motor Arm, Left 0-->No drift, limb holds 90 (or 45) degrees for full 10 secs   5b. Motor Arm, Right 0-->No drift,  limb holds 90 (or 45) degrees for full 10 secs   6a. Motor Leg, Left 0-->No drift, leg holds 30 degree position for full 5 secs   6b. Motor Leg, right 0-->No drift, leg holds 30 degree position for full 5 secs   7.   Limb Ataxia 0-->Absent   8.   Sensory 0-->Normal, no sensory loss   9.   Best Language 0-->No aphasia, normal   10. Dysarthria 1-->Mild-to-moderate dysarthria, patient slurs at least some words and, at worst, can be understood with some difficulty   11. Extinction and Inattention  0-->No abnormality   Total 4 (08/16/23 1515)       Imaging  I personally reviewed all imaging; relevant findings per HPI.    Labs Data   CBC  Recent Labs   Lab 08/16/23  0604 08/15/23  1258   WBC 7.5 5.3   RBC 3.82* 3.53*   HGB 12.5* 11.4*   HCT 36.2* 34.3*   * 145*     Basic Metabolic Panel   Recent Labs   Lab 08/16/23  0604 08/16/23  0152 08/15/23  2226 08/15/23  0946 08/15/23  0651     --   --   --  139   POTASSIUM 3.7  --   --   --  4.3   CHLORIDE 103  --   --   --  104   CO2 20*  --   --   --  23   BUN 10.4  --   --   --  14.2   CR 0.66*  --   --   --  0.74   * 131* 124*   < > 108*  110*   FERMIN 9.1  --   --   --  9.5    < > = values in this interval not displayed.     Liver Panel  No results for input(s): PROTTOTAL, ALBUMIN, BILITOTAL, ALKPHOS, AST, ALT, BILIDIRECT in the last 168 hours.  INR    Recent Labs   Lab Test 08/15/23  1258 07/27/23  1827   INR 1.17* 1.07           Stroke Consult Data Data   This was a non-emergent, non-telestroke consult.  I have personally spent a total of 60 minutes providing care today, time spent in reviewing medical records and reviewing tests, examining the patient and obtaining history, coordination of care, and discussion with the patient and/or family regarding diagnostic results, prognosis, symptom management, risks and benefits of management options, and development of plan of care. Greater than 50% was spent in counseling and coordination of care.

## 2023-08-16 NOTE — PROGRESS NOTES
"EP Progress Note          Assessment and Plan:   Erick Shahid is an 83 year-old patient with a history of severe aortic stenosis with LV dysfunction and a LBBB. He was noted to have transient CHB during TAVR procedure and continue to have intermittent CHB on the floor. Patient was referred for pacemaker implantation post TAVR.    Paroxsymal CHB  Dual-chamber Medtronic pacemaker implantation with right lead fixed into the left bundle region  Interrogation stable  CXR no apparent pneumothorax   Device RN education to be completed prior to discharge home.    2. TAVR 8/15/2023  TAVR team following  Confusion and left facial droop noted 8/15 and neurology following     EP follow-up will be arranged by our device RN.  EP signing off.      Lucila Aponte, NP, APRN CNP  [facility] [phone]              Interval History:   Pt slightly confused during the night and tore off his pressure dressing. Otherwise, dressing dry and intact. Mild swelling noted. Confused, but appears comfortable. Wife at bedside. VSSA       Medications:      aspirin  325 mg Oral Daily    artificial tears  1 drop Both Eyes BID    insulin aspart  1-7 Units Subcutaneous TID AC    insulin aspart  1-5 Units Subcutaneous At Bedtime    metoprolol succinate ER  25 mg Oral Daily    miconazole   Topical BID    OLANZapine  2.5 mg Oral At Bedtime    rosuvastatin  20 mg Oral Daily             Review of Systems: If done, described below  The Review of Systems is negative other than noted in the HPI             Physical Exam:   Blood pressure 127/49, pulse 77, temperature 98.7  F (37.1  C), temperature source Oral, resp. rate 16, height 1.702 m (5' 7\"), weight 80.5 kg (177 lb 7.5 oz), SpO2 98 %.      Vital Sign Ranges  Temperature Temp  Av  F (36.7  C)  Min: 97.5  F (36.4  C)  Max: 98.7  F (37.1  C)   Blood pressure Systolic (24hrs), Av , Min:96 , Max:171        Diastolic (24hrs), Av, Min:44, Max:113      Pulse Pulse  Av.9  Min: 45  Max: 108 "   Respirations Resp  Av.6  Min: 0  Max: 39   Pulse oximetry SpO2  Av.9 %  Min: 91 %  Max: 100 %         Intake/Output Summary (Last 24 hours) at 2023 0824  Last data filed at 2023 0639  Gross per 24 hour   Intake 1540 ml   Output 2250 ml   Net -710 ml       Constitutional:   in no apparent distress, but speech slightly garbled. Wife at bedside      Neck:   no JVD     Chest:   Left pectoral dressing dry and intact. Mild swelling noted. No hematoma      Lungs:   symmetric, anterior clear to auscultation     Cardiovascular:   regular rate and rhythm, normal S1 and S2, and no murmur noted     Extremities and Back:   No edema              Data:     Lab Results   Component Value Date    WBC 7.5 2023    HGB 12.5 (L) 2023    HCT 36.2 (L) 2023     (L) 2023     2023    POTASSIUM 3.7 2023    CHLORIDE 103 2023    CO2 20 (L) 2023    BUN 10.4 2023    CR 0.66 (L) 2023     (H) 2023    NTBNP 2,019 (H) 2023    AST 22 2023    ALT 11 2023    ALKPHOS 75 2023    BILITOTAL 1.1 2023    INR 1.17 (H) 08/15/2023

## 2023-08-16 NOTE — CODE/RAPID RESPONSE
[facility]    Code Stroke/ House Officer Note  Rrt called at 2220   Code stroke called 2239hrs  Deescalate code stoke at 2326    ////////////////////////////////////////////////////////////////////////////////////////////////////////////////////////////////  Acute stroke evaluation:  Time of arrival: 2223   Time called: 2220   Glucose level 124   Time patient seen by neurology: N/a   Time onset of stroke symptoms / witnessed onset: Approx 2215hrs   Time last known well: 2000hrs   IV tPA admistered: no   IA / Thrombolectomy no   Stroke Presentation Type Inhouse Stroke   Stroke Thrombolytic Ineligible Reason Mild deficits   Stroke Thrombolytic Treatments none   Neurologists Dr Donnelly   Stroke Type of Vascular Event TIA. Possible   Pre TPa Wts entered? Yes   Post TPa VS Neuro Checks N./a      IV tPA:  Patient was not treated with rt-TP due to the following reason(s):  Mild stroke symptoms ( NIHSS < 4 and not globally aphasic)  Isolated mild neurological deficits such as ataxia alone, sensory loss alone, dysarthria alone or minimal weakness ( NIHSS < 4 AND normal language AND visual fields )     IA thrombolectomy:  No IA intervention was performed due to lack of large vessel occlusion      National Institutes of Health Stroke Scale  Exam Interval: Baseline   Score    Level of consciousness: (0)   Alert, keenly responsive    LOC questions: (0)   Answers both questions correctly    LOC commands: (0)   Performs both tasks correctly    Best gaze: (0)   Normal    Visual: (0)   No visual loss    Facial palsy: (0)   Normal symmetrical movements, transient L facial orbicular droop    Motor arm (left): (0)   No drift    Motor arm (right): (0)   No drift    Motor leg (left): (0)   No drift    Motor leg (right): (0)   No drift    Limb ataxia: (0)   Absent    Sensory: (0)   Normal- no sensory loss    Best language: (0)   Normal- no aphasia    Dysarthria: (1)   Sl garbled speech (reportedly is baseline)     Extinction and inattention: (0)   No abnormality        Total Score:  2     Vital Signs with Ranges:  Vitals:    08/15/23 1800 08/15/23 1815 08/15/23 1830 08/15/23 1900   BP: (!) 139/92  (!) 160/74 (!) 156/77   BP Location:    Right arm   Pulse: 65 67 66 67   Resp: 16 (!) 9 11 26   Temp:       TempSrc:       SpO2: 96% 97% 98% 98%   Weight:       Height:         Temp:  [97  F (36.1  C)-97.6  F (36.4  C)] 97.6  F (36.4  C)  Pulse:  [45-67] 67  Resp:  [9-39] 26  BP: ()/(44-92) 156/77  SpO2:  [95 %-100 %] 98 %    Imaging:  Recent Results (from the past 24 hour(s))   Cardiac Catheterization    Narrative    1. Lifestyle-limiting severe aortic stenosis.  2. Successful transfemoral transcatheter aortic valve replacement with a   23mm Allison Maribel 3 valve.  3. Temporary pacemaker insertion.  4. Left heart catheterization with LVEDP of 15 mmHg.  6. Right and left common femoral arteriotomies successfully closed with   closure devices.       Echocardiogram Complete   Result Value    LVEF  45-50%    Narrative    [id number]  [id number]  [id number]  [id number]^CR^CHRISTINE^MAGALIS     [facility]  Echocardiography Laboratory  [address] [place] [address]     Name: BALTA SIMEON  MRN: [id number]  : 1940  Study Date: 08/15/2023 07:12 AM  Age: 83 yrs  Gender: Male  Patient Location: [facility]  Reason For Study: Critical aortic valve stenosis, Abnormal CT scan of heart  Ordering Physician: CHRISTINE HANKINS  Referring Physician: Minh Samuel  Performed By: Rell Pitts RDCS     BSA: 1.9 m2  Height: 67 in  Weight: 179 lb  HR: 59  BP: 129/58 mmHg  ______________________________________________________________________________  Procedure  Complete Portable Echo Adult.  ______________________________________________________________________________  Interpretation Summary     PRE-TAVR ECHOCARDIOGRAM  1. Mild LV systolic dysfunction (EF 45-50%)  2. Severe aortic stenosis  3. Mild AI     POST-TAVR  ECHOCARDIOGRAM  1. Status post successful TAVR with 23 mm Maribel 3 valve. Valve is well-  seated. Trace PVL and mean gradient of 8 mmHg  2. No pericardial effusion  ______________________________________________________________________________  Left Ventricle  The left ventricle is normal in size. The visual ejection fraction is 45-50%.  Mildly decreased left ventricular systolic function. There is mild global  hypokinesia of the left ventricle.     Right Ventricle  The right ventricle is normal size. The right ventricular systolic function is  normal.     Aortic Valve  There is mild (1+) aortic regurgitation. Severe valvular aortic stenosis.     Vessels  The inferior vena cava was normal in size with preserved respiratory  variability.     ______________________________________________________________________________  MMode/2D Measurements & Calculations  LVOT diam: 2.0 cm  LVOT area: 3.3 cm2     Doppler Measurements & Calculations  Ao V2 max: 191.0 cm/sec  Ao max PG: 15.0 mmHg  Ao V2 mean: 127.0 cm/sec  Ao mean P.0 mmHg  Ao V2 VTI: 40.5 cm  SHALOM(I,D): 2.2 cm2  SHALOM(V,D): 2.1 cm2  LV V1 max P.2 mmHg  LV V1 max: 124.0 cm/sec  LV V1 VTI: 27.4 cm  SV(LVOT): 90.0 ml  SI(LVOT): 46.7 ml/m2  AV Terrence Ratio (DI): 0.65  SHALOM Index (cm2/m2): 1.2     ______________________________________________________________________________  Report approved by: Katherine Vinson 08/15/2023 09:44 AM         EP Device    Narrative    PROCEDURES PERFORMED:   1. Implantation of a dual-chamber pacemaker with the ventricular lead   fixed at the left bundle region  2. Conscious sedation.   3. Cardiac fluoroscopy    INDICATION: Sinus node dysfunction and syncope     HISTORY OF PRESENT ILLNESS: This is an 83 year-old patient with a history   of severe aortic stenosis with LV dysfunction and a LBBB. He was noted to   have transient CHB during TAVR procedure and continue to have intermittent   CHB on the floor. Given LBBB and LV dysfunction patient  was referred for a   permanent pacemaker with direct left bundle pacing. Risks of the procedure   was discussed before the procedure including but not limited to vascular   injury, infection, pneumothorax, and cardiac perforation.    METHOD: I determined this patient to be an appropriate candidate for the   planned sedation and procedure and have reassessed the patient immediately   prior to sedation and procedure. The patient was prepped and draped in the   usual manner. Sedation was given by the nursing staff under my direct   supervision.  Intravenous Ancef was given. 1% lidocaine was infiltrated   into the the left axillary vein area. This vein was accessed using the   modified Seldinger technique with ultrasound guidance and micropuncture.   An incision was then made with a #15 blade. A lead was placed in the RV   for backup pacing.    A C135 His bundle sheath was then glide over the wire into the vein. A   lead was then advanced into the heart and was fixed into the left bundle   region after mapping for optimal sensing and threshold . Appropriate   sensing and threshold were obtained. The lead which was placed in the RV   was withdrawn into the right atrium and fixed into the right atrial   appendage. There was no diaphragmatic stimulation with high output pacing.   The 7-F sheath was peeled away and the His-bundle sheath was then cut   away. The leads were then secured to the pectoralis muscle fascia using   O-Ethibond sutures.     A pocket was then fashioned and was irrigated with bacitracin solution.   The leads were then attached to the device and placed in the pocket.  The   pocket was then closed with 2-0 and 4-0 Vicryl sutures. Steri-Strips and   an OpSite dressing were then placed over the incision.  The patient was   then transferred back to the CICU in stable condition.     DEVICE GENERATOR: Company: [employer], Model number: [id number], Serial number:   [id number]     LEAD INFORMATION:   RA: Company:  [employer], Model: 5076-52, serial number: [id number]  RV: Company: [employer], Model: [id number], serial number: [id number]    DEVICE MEASUREMENT:   RA: Sensing: 3.0 millivolts, Threshold: 1075v at 0.4 milliseconds, 798   ohms.  RV: Sensing: 15.1 millivolts, Threshold: 0.5 volts at 0.4 milliseconds,   969 ohms.    COMPLICATIONS: None.      CONCLUSION:  1. Uneventful implantation of a dual-chamber pacemaker with the right lead   fixed into the left bundle region.            Assessment/Plan:  Assessment & Plan   Mild L facial/mouth droop, transient and resolved. Possible TIA vs small CVA  R ICA stenosis-   Symptomatic critical AS, s/p TAVR 8/15  Postop transient CHB and bradycardia, s/p PPM implant 8/15  Hx (Adv) Cog Impariment  Hx DM2, HTN, HLD    Stroke risk factors  DM2, HTN, HLD    INTERVENTIONS:  -Bedside eval, called code stroke at 2240  -Stroke Neurology consulted, d/w Dr Batres in detail,  -Stroke imaging  - Stroke labs  - CT head- no hemorrhage.   - CT Brain perfusion- no Large vessel occlusion  - CT cerv- marked R ICS stenosis--> - Neurology asks goal SBP>110   - MRI Brain recommended, but unable to perform until 6 wks s/p PPM, so deferred for now  - Head CT noncon to be repeats 8/17 am  - repeat neuro checks remain reassuring, scoring NIH 1 (garbled/dysarthic speech, L droop not visible at present)  - is s/p heparin from TAVR  - PTA ASA 81 continues--> Neuro prefers 325mg Qd--- Cards to decide tomorrow re  increase  - PTA Crestor 20mg QD  - Freq neuro checks q 4 hrs per set  - Dr Alejandro Frias cardiology updated at 2340 hrs  - swallow eval, ADAT to coronary   - PT OT Speech orders for am  - Wife updated via phone at 2335hrs    See H&P and Cardiolgy days notes for additional plan re other dx.    Code Status: Full Code    Discussed with Stroke neurology, Md, Cardiology, Rns, family in detail  Defer further cares to day neurology, hospitalist and cardiology teams    Subjective:  Interval History     Erick DAVIS  "Zehra is a 83 year old male, PMH  advanced cognitive impairment, DM2, symptomatic critical AS (falls, syncope), and AS-related NICM (EF 30-35%) who was admitted on 8/15/2023 for an elective TAVR with Dr Felipe  who was admitted on 8/15/2023 for elective TAVR.  Per notes, S/p 23mm Allison Maribel 3 TAVR. Post-operative course notable for transient CHB  (see cards notes). Toprol held and PPM placed by Dr Mendoza 8/15.     In ICU, RN reported he was last 2200hrs, but noted a mild L facial droop at the corner of the mouth at 2215,. Pt also with slightly garbled speech which pt reporting is his baseline. No other new neuro/CVA sx reported, or new signs. Pt states repeatedly he feels fine, denies any sx, and thinks that \"this is all for nothing\".  Pt sleeps in a recliner chronically, and is unable to lie flat d/t pain w supine positioning.      Physical Exam   Constitutional: in bed, w.o   ENT: Visual unremarkable,   Neck: no JV  Pulmonary: clr lungs, chest rise =. On Roomair  Cardiovascular: S1 S2 w/o rubs/murmurs, reg. Paced on bedside tele  GI: soft  Skin/Integumen: no visible cellulitis  Psych:  alert and interactive/   Extremities: MAEW, no tremor seen  Neuro: Subtle dysarthric/garbled speech at times (bit hard to differentiate from [place] accent),  L droop at corner of mouth noted by RN prior to our exam, very trace droop at L corner can be seen for our exam, but not consistently (at times symmetric). Orbicularis strength is normal for us w/o air leak on the Left. CN II-XII is otherwise normal. UE and LE strength 5/5, no drift. No focal weakness, or sensory loss appreciable     Data   Recent Labs   Lab 08/15/23  1258   WBC 5.3   HGB 11.4*   MCV 97   *   INR 1.17*       Time Spent on this Encounter   I spent 70 minutes of critical care time (3751-4117) on the unit/floor managing the care of Erick Shahid. Upon evaluation, this patient had a high probability of imminent or life-threatening " deterioration due to possible acute stroke, which required my direct attention, intervention, and personal management. 100% of my time was spent at the bedside counseling the patient and/or coordinating care regarding services listed in this note.    Pt seen w Mila Durham NP as part of RRT/Charanjit dye, who helps direct/concurs w above cares, dx and plan.     Victorino Blood PA-C  [facility]  Securely message with the [employer] Web Console (learn more here)  Text page via [facility] Paging/Directory      .

## 2023-08-16 NOTE — DISCHARGE INSTRUCTIONS
Discharge Instructions for Pacemaker Implantation  You have had a procedure to insert a pacemaker. Once inside your body, this small electronic device helps keep your heart from beating too slowly. A pacemaker can t fix existing heart problems. But it can help you feel better and have more energy. As you recover, follow all of the instructions you are given, including those below.  Activity  Follow the instructions you are given about limiting your activity.  Do not raise your left arm above shoulder level for 3 weeks (until 9/5/23). This gives the device lead wires time to attach securely inside your heart.  Ask your doctor when you can expect to return to work.  You can still exercise. It s good for your body and your heart. Talk with your doctor about an exercise plan.  Other Precautions  Follow your doctor's directions carefully for wound care. You may remove the outer dressing in 3 days (on 8/18/23). Leave the steri-strips in place; these will be removed at your 1 week follow-up. Never put any creams, lotions, or products like peroxide on an incision unless your doctor tells you to.   You may shower once the outer dressing has been removed.   Before you receive any treatment, tell all health care providers (including your dentist) that you have a pacemaker.  You will be given an ID card that contains information about your pacemaker. Always carry this card with you. You can show this card if your pacemaker sets off a metal detector. You should also show it to avoid screening with a hand-held security wand.  Keep your cell phone away from your pacemaker. Don t carry the phone in your shirt pocket, even when it s turned off.  Avoid strong magnets. Examples are those used in MRIs or in hand-held security wands.  Avoid strong electrical fields. Examples are those made by radio transmitting towers,  ham  radios, and heavy-duty electrical equipment.  Avoid leaning over the open costa of a running car. A running engine  creates an electrical field. Most household and yard appliances will not cause any problems. If you use any large power tools, such as an industrial , talk with your doctor.   Follow-Up  Follow up in the device clinic as scheduled.   You have an appointment scheduled on 8/23 and 9/27 at [place] [facility].   Make regular follow-up appointments with your device clinic. They will check the pacemaker to make sure it s working properly.  When to Call Your Device Clinic [phone]  Call your Device Clinic if you have any of the following:  Dizziness  Chest pain  Lack of energy  Fainting spells  Rapid pulse or pounding heartbeat  Shortness of breath  Increased pain around your pacemaker  Fever above 100.4 F (38 C) or other signs of infection (redness, swelling, drainage, or warmth at the incision site)     6576-1797 [employer]. [address] [place] [address]. All rights reserved. This information is not intended as a substitute for professional medical care. Always follow your healthcare professional's instructions.    [facility] in [place]: [phone]  Device Clinic (Monday to Friday, 8am-4pm): [phone]  *The device clinic is closed on weekends and holidays.  Any calls received during this time will be answered on the next business  day. For any urgent questions after hours, please call the main clinic number and you will be put in contact with the cardiologist on call.

## 2023-08-16 NOTE — PROGRESS NOTES
08/16/23 2304   Appointment Info   Signing Clinician's Name / Credentials (PT) Brittany Dressler, PT   Living Environment   People in Home spouse   Current Living Arrangements condominium   Home Accessibility stairs to enter home;stairs within home   Number of Stairs, Main Entrance none   Number of Stairs, Within Home, Primary one   Living Environment Comments [facility] living room, pt uses RW.   Self-Care   Usual Activity Tolerance poor   Current Activity Tolerance poor   Equipment Currently Used at Home walker, rolling;shower chair   Fall history within last six months yes   Number of times patient has fallen within last six months 2  (per chart)   Activity/Exercise/Self-Care Comment Son reports carrying pt out of home to bring him here, reports pt doesn't rehab but if it's incentive to return home he responds well to it.   General Information   Onset of Illness/Injury or Date of Surgery 08/15/23   Referring Physician Bossman Felipe MD   Patient/Family Therapy Goals Statement (PT) To get better, to go home.   Pertinent History of Current Problem (include personal factors and/or comorbidities that impact the POC) Erick Shahid is a 83 year old  male with medical history of cognitive impairment, hyperlipidemia, diabetes mellitus, coronary artery disease, aortic stenosis brought into the ED by wife and son with falls.90% pRCA occlusion on meds, TAVRx3, EF30%, dementia, transient CHB, L BBB, 1st degree AVB, tor with pauses, dual-pacer.   Existing Precautions/Restrictions fall;pacemaker   Cognition   Affect/Mental Status (Cognition) confused   Follows Commands (Cognition) follows one-step commands   Cognitive Status Comments Yocha Dehe   Pain Assessment   Patient Currently in Pain No   Range of Motion (ROM)   ROM Comment Flexed functionally.   Strength (Manual Muscle Testing)   Strength Comments Grossly deconditioned wtih UE dependence to power to stand, poor activity tolerance, weak core to roll wtih assist needed.    Bed Mobility   Comment, (Bed Mobility) Roll Deyanira - see rx, sidelying-sit ModA pt breaking pacer precautions.   Transfers   Comment, (Transfers) Deyanira posterior lean pulling on walker, see rx.   Gait/Stairs (Locomotion)   Distance in Feet 5' RW Deyanira post lean, flexed posture, slow, unsteady.   Distance in Feet (Gait) 100' 2WW CG-Deyanira posterior tipping, flexed, discontinuous.   Balance   Balance Comments Hx falls, weak, usnteady, AMS - high falls risk.   Coordination   Coordination Comments WFL   Muscle Tone   Muscle Tone Comments WFL   Clinical Impression   Criteria for Skilled Therapeutic Intervention Yes, treatment indicated   PT Diagnosis (PT) IMpaired mobility   Influenced by the following impairments Impaired strength, balance, activity tolerance, self-management   Functional limitations due to impairments Impaired independent mobility & living   Clinical Presentation (PT Evaluation Complexity) Evolving/Changing   Clinical Decision Making (Complexity) low complexity   Planned Therapy Interventions (PT) balance training;bed mobility training;gait training;home exercise program;neuromuscular re-education;patient/family education;postural re-education;stair training;strengthening;transfer training;progressive activity/exercise;risk factor education;home program guidelines   Anticipated Equipment Needs at Discharge (PT) walker, rolling;gait belt;shower chair   Risk & Benefits of therapy have been explained evaluation/treatment results reviewed;care plan/treatment goals reviewed;current/potential barriers reviewed;risks/benefits reviewed;participants voiced agreement with care plan;participants included;spouse/significant other;son;caregiver   PT Total Evaluation Time   PT Eval, Low Complexity Minutes (88990) 5   Physical Therapy Goals   PT Frequency 6x/week   PT Predicted Duration/Target Date for Goal Attainment 08/22/23   PT Goals Stairs   PT: Bed Mobility Supervision/stand-by assist;Supine to/from sit;Rolling    PT: Transfers Supervision/stand-by assist;Sit to/from stand;Bed to/from chair;Assistive device   PT: Gait Supervision/stand-by assist;Greater than 200 feet;Rolling walker   PT: Stairs 1 stair;Supervision/stand-by assist  (RW - platform step.)   Therapeutic Activity   Therapeutic Activities: dynamic activities to improve functional performance Minutes (72942) 10   Symptoms Noted During/After Treatment Fatigue;Shortness of breath   Treatment Detail/Skilled Intervention PT: Deyanira L roll for power assist to reach reach, safety spotting to rise to sitting to maintain precautions, SBA sit balance safety spotting, Deyanira sit-stands / pivots and amb given posterior tipping for stability and control assist, finished in chair alarm armed.   Gait Training   Symptoms Noted During/After Treatment (Gait Training) fatigue;shortness of breath   Treatment Detail/Skilled Intervention 100' no AD Deyanira unsteady   PT Discharge Planning   PT Plan PT: Functional strength, balance, activity tolerance progressions.   PT Discharge Recommendation (DC Rec) Transitional Care Facility   PT Rationale for DC Rec Pt returning with repeat falls, poor function, wife not able to care for pt at this level. TCU warranted to progress pt towards Jassi RW PLOF. Following during stay to see if pt progresses to be able to update.   PT Brief overview of current status Deyanira 150' RW/no AD - unsteady & weak.   Total Session Time   Timed Code Treatment Minutes 10   Total Session Time (sum of timed and untimed services) 15

## 2023-08-16 NOTE — TELEPHONE ENCOUNTER
Post Device Implant Instructions for dual chamber ppm with RV lead fixed in the LBB region.     Dressing:  Clean, dry, and intact  Chest XR reviewed:  Yes  Pneumo present?:  No  Lead Measurements per Device Rep:  WNL    Education Provided:  - Avoid raising left arm above the level of the shoulder for 3 weeks.  - Do not shower until outer occlusive dressing has been removed.  - Remove outer dressing in 3 - 4 days.  - Leave steri-strips in place, these will be removed at the 1 week check.   - Call Device Clinic with increased swelling, drainage, or fever > 101 degrees.   - Follow-up with the Device Clinic as scheduled.     Pt and wife verbalized understanding of instructions and AVS updated.

## 2023-08-16 NOTE — PLAN OF CARE
Goal Outcome Evaluation:    Pt here s/p TAVR s/p PPM. Neuros A&0 3 to 4, waxes and wanes, increasingly restless overnight. Pt w history of cognitive decline. All other neuros intact.  RRT called for left facial droop f/b code stroke at approx., 2230.  Pt found to have stroke r/t R-carotid artery stenosis (80%).,  VSS on RA, afebrile ex HTN. Hydralazine given for BP >140 with relief. BP >110 maintained for R-carotid perfusion.   Voiding spontaneously. Some incontinence. Bladder Scan at 111 ml.  No BM this shift. BS audible.    Pacemaker incision dressing clean, dry, intact.  Left groin site with old leakage, non-tender and soft. Right groin site is non-tender and soft. Plan to monitor neuros/VS and f/up stroke workup.

## 2023-08-16 NOTE — DISCHARGE INSTRUCTIONS
TAVR Discharge Instructions  You have just had your aortic valve replaced with a new biological tissue valve. You should feel better after your surgery, but complete recovery may take several weeks. Please follow these instructions carefully and please call your valve coordinator with any questions or concerns.      CONTACT INFORMATION:   [facility]:  [phone] (7 days a week)  Scheduling and general questions - Nely ([phone])  Valve Coordinators - Catina RN ([phone]), Yessy RN ([phone]), and Tonya RN ([phone])    AFTER YOU GO HOME:  Drink extra fluids for 2 days.  You may resume your normal diet.  Do not smoke.  Do not drink alcohol.  Relax and take it easy.  Do not make any important or legal decisions.    FOR 1 WEEK AFTER TAVR:  Do not drive or operate machines at home or at work. No driving until you return for your 1 week follow-up appointment. You and the provider will discuss this at the appointment.   Refrain from sexual intercourse for 1 week.  You may shower the day after your procedure. Do NOT take a bath, or use a hot tub or pool for at least 1 week. Do NOT scrub the site. Do not use lotion or powder near the puncture site.  Do not lift more than 10 pounds.   Do not do any heavy activity such as exercise, lifting, or straining.  No housework, yard work, or any activities that make you sweat.    CARE OF WRIST AND GROIN SITES:  For 2 days, when you cough, sneeze, laugh or move your bowels, hold your hand over the puncture site and press firmly on/above the site.  Remove the bandage after 24 hours. If there is minor oozing, apply another bandage and remove it after 12 hours.  It is normal to have bruising or pea size lump at the site.  If you have a wrist site puncture: Do not use your hand or arm to support your weight (such as rising from a chair) or bend your wrist (such as lifting a garage door) for 1 week.  No stooping or squatting for 1 week.      BLEEDING:  If you start bleeding from the site in your wrist:  Sit down and press firmly on/above the site with your fingers for 10 minutes.   Once bleeding stops, keep your arm still for 2 hours.   Call [facility] or valve coordinator as soon as you can.  If you start bleeding from the site in your groin:  Lie down flat and press firmly on/above the site for 10 minutes.  Once bleeding stops lay flat for 2 hours.   Call [facility] or valve coordinator as soon as you can.  Call 911 right away if you have heavy bleeding or bleeding that does not stop.    MEDICINES:  You may be started on an anti-platelet medication, such as Plavix or aspirin. Please call the [facility] with any questions regarding this medication.  If you have stopped any medicines, check with your provider about when to restart them.  You will require lifetime prophylactic antibiotics for dental cleanings and dental work after your TAVR, please inquire with the Heart Clinic prior to your scheduled cleanings.     FOLLOW-UP APPOINTMENTS:  Follow-up with a Structural Heart advanced practice provider (NP or PA) at [facility] in 7-10 days after your procedure.  You will also follow-up at [facility] 1 month after your procedure which will include a visit with an advanced practice provider an echocardiogram (echo), an electrocardiogram (ECG), and lab work. This follow-up should be scheduled for you prior to you leaving the hospital.  [facility] will contact you for follow up care. You can enroll at a site convenient to you.  We will have you see your primary cardiologist about 6 months after your TAVR.  We will see you 1 year after your TAVR with a visit with an advanced practice provider (NP or PA) an echocardiogram (echo), electrocardiogram (ECG), and lab work.     CALL THE CLINIC IF:   You have increased pain or a large or growing hard lump  around the site.  The site is red, swollen, hot, or tender.  Blood or fluid is draining from the site.  You have chills or a fever greater than 101 F (38 C).  Your arm or leg feels numb, cool, or changes color.  You have hives, a rash, or unusual itching.  New pain in the back or belly that you cannot control with Tylenol.  Any questions or concerns.    OTHER INSTRUCTIONS:  You will receive a card with your new valve information in the mail, directly from the . Retain this card with your health care records.    DENTAL WORK:  You must have antibiotics before all dental work to prevent endocarditis, or an infection on your new heart valve. Please call the valve coordinators to get a prescription for this. Please do not schedule any dental cleanings for at least 3-6 months after your TAVR.

## 2023-08-16 NOTE — CONSULTS
"[facility]    Stroke Telephone Note    I was called by Bossman Felipe Md on 08/15/23 regarding patient Erick Shahid. The patient is a 83 year old male with h/o DM, HTN who underwent TAVR today. At 8 PM RN check was the LKW. At 10:25 PM, noted to have a mild L facial droop. Exam her rapid response team is only notable for mild L facial droop and no other deficits.    PRE-TAVR ECHOCARDIOGRAM 8/15 AM  1. Mild LV systolic dysfunction (EF 45-50%)  2. Severe aortic stenosis  3. mild global hypokinesia of the left ventricle.     POST-TAVR ECHOCARDIOGRAM 8/15 AM  1. Status post successful TAVR with 23 mm Maribel 3 valve. Valve is well-  seated. Trace PVL and mean gradient of 8 mmHg  2. No pericardial effusion     BP (!) 156/77 (BP Location: Right arm)   Pulse 67   Temp 97.6  F (36.4  C) (Oral)   Resp 26   Ht 1.702 m (5' 7\")   Wt 81.6 kg (179 lb 14.4 oz)   SpO2 98%   BMI 28.18 kg/m       Stroke Code Data (for stroke code without tele)  Stroke code activated 08/15/23   2240   Stroke provider first response  08/15/23   2240            Last known normal 08/15/23   2000        Time of discovery   (or onset of symptoms) 08/15/23   2225   Head CT read by Stroke Neuro Dr/Provider 08/15/23       Was stroke code de-escalated? Yes 08/15/23            Imaging Findings  CT head: no acute ischemia/hemorrhage.  CTA head/neck: 80% (~54% by my calculation) stenosis of proximal R ICA, b/l V athero (L > R), R M2 distal stenotic    Intravenous Thrombolysis  Not given due to:   - minor/isolated/quickly resolving symptoms    Endovascular Treatment  Not initiated due to absence of proximal vessel occlusion    Impression  New L facial droop (8/15)  S/p TAVR and temporary pacemaker placement (8/15)  Mod-severe stenosis of R ICA    Likely small stroke/TIA from an atheroembolic event post TAVR procedure.     Recommendations   - Neurochecks and Vital Signs every 4 hours   - Permissive HTN; goal SBP < 220 " "mmHg  - Given significant R cervical ICA, please do not drop BP precipitously; maintain SBP > 110  - Can continue Aspirin 325 mg daily  - Carotid USG ordered to assess R cervical ICA stenosis  - Statin: per Lipid profile (LDL < 70)  - Since MRI is not an option due to the recently placed pacemaker, please get a CT after 24 hours from initial CT to assess for any evolution of stroke. Small acute strokes especially those in the brainstem might be difficult to discern on CT.  - Telemetry, EKG  - Bedside Glucose Monitoring  - A1c, Lipid Panel, Troponin x 3  - PT/OT/SLP  - Stroke Education  - Euthermia, Euglycemia    Case discussed with vascular neurology attending Dr. Henderson    My recommendations are based on the information provided over the phone by Erick Shahid's in-person providers. They are not intended to replace the clinical judgment of his in-person providers. I was not requested to personally see or examine the patient at this time.    The Stroke Staff is Dr. Henderson.    George Batres MD  Vascular Neurology Fellow    To page me or covering stroke neurology team member, click here: [facility]  Choose \"On Call\" tab at top, then select \"NEUROLOGY/ALL SITES\" from middle drop-down box, press Enter, then look for \"stroke\" or \"telestroke\" for your site.   "

## 2023-08-17 ENCOUNTER — TELEPHONE (OUTPATIENT)
Dept: OTHER | Facility: CLINIC | Age: 83
End: 2023-08-17
Payer: COMMERCIAL

## 2023-08-17 ENCOUNTER — APPOINTMENT (OUTPATIENT)
Dept: PHYSICAL THERAPY | Facility: CLINIC | Age: 83
DRG: 267 | End: 2023-08-17
Attending: INTERNAL MEDICINE
Payer: COMMERCIAL

## 2023-08-17 LAB
ANION GAP SERPL CALCULATED.3IONS-SCNC: 15 MMOL/L (ref 7–15)
BUN SERPL-MCNC: 9.3 MG/DL (ref 8–23)
CALCIUM SERPL-MCNC: 9.4 MG/DL (ref 8.8–10.2)
CHLORIDE SERPL-SCNC: 103 MMOL/L (ref 98–107)
CREAT SERPL-MCNC: 0.6 MG/DL (ref 0.67–1.17)
DEPRECATED HCO3 PLAS-SCNC: 22 MMOL/L (ref 22–29)
ERYTHROCYTE [DISTWIDTH] IN BLOOD BY AUTOMATED COUNT: 13.7 % (ref 10–15)
GFR SERPL CREATININE-BSD FRML MDRD: >90 ML/MIN/1.73M2
GLUCOSE BLDC GLUCOMTR-MCNC: 110 MG/DL (ref 70–99)
GLUCOSE BLDC GLUCOMTR-MCNC: 121 MG/DL (ref 70–99)
GLUCOSE BLDC GLUCOMTR-MCNC: 130 MG/DL (ref 70–99)
GLUCOSE BLDC GLUCOMTR-MCNC: 135 MG/DL (ref 70–99)
GLUCOSE SERPL-MCNC: 119 MG/DL (ref 70–99)
HCT VFR BLD AUTO: 38.2 % (ref 40–53)
HGB BLD-MCNC: 13 G/DL (ref 13.3–17.7)
MAGNESIUM SERPL-MCNC: 2 MG/DL (ref 1.7–2.3)
MCH RBC QN AUTO: 32.7 PG (ref 26.5–33)
MCHC RBC AUTO-ENTMCNC: 34 G/DL (ref 31.5–36.5)
MCV RBC AUTO: 96 FL (ref 78–100)
PHOSPHATE SERPL-MCNC: 2.5 MG/DL (ref 2.5–4.5)
PLATELET # BLD AUTO: 123 10E3/UL (ref 150–450)
POTASSIUM SERPL-SCNC: 3.9 MMOL/L (ref 3.4–5.3)
RBC # BLD AUTO: 3.97 10E6/UL (ref 4.4–5.9)
SODIUM SERPL-SCNC: 140 MMOL/L (ref 136–145)
WBC # BLD AUTO: 7 10E3/UL (ref 4–11)

## 2023-08-17 PROCEDURE — 210N000001 HC R&B IMCU HEART CARE

## 2023-08-17 PROCEDURE — 99232 SBSQ HOSP IP/OBS MODERATE 35: CPT | Performed by: INTERNAL MEDICINE

## 2023-08-17 PROCEDURE — 97530 THERAPEUTIC ACTIVITIES: CPT | Mod: GP

## 2023-08-17 PROCEDURE — 83735 ASSAY OF MAGNESIUM: CPT | Performed by: NURSE PRACTITIONER

## 2023-08-17 PROCEDURE — 85027 COMPLETE CBC AUTOMATED: CPT | Performed by: NURSE PRACTITIONER

## 2023-08-17 PROCEDURE — 93005 ELECTROCARDIOGRAM TRACING: CPT

## 2023-08-17 PROCEDURE — 36415 COLL VENOUS BLD VENIPUNCTURE: CPT | Performed by: NURSE PRACTITIONER

## 2023-08-17 PROCEDURE — 80048 BASIC METABOLIC PNL TOTAL CA: CPT | Performed by: NURSE PRACTITIONER

## 2023-08-17 PROCEDURE — 250N000013 HC RX MED GY IP 250 OP 250 PS 637: Performed by: INTERNAL MEDICINE

## 2023-08-17 PROCEDURE — 93010 ELECTROCARDIOGRAM REPORT: CPT | Performed by: INTERNAL MEDICINE

## 2023-08-17 PROCEDURE — 84100 ASSAY OF PHOSPHORUS: CPT | Performed by: NURSE PRACTITIONER

## 2023-08-17 PROCEDURE — 250N000013 HC RX MED GY IP 250 OP 250 PS 637: Performed by: NURSE PRACTITIONER

## 2023-08-17 PROCEDURE — 97116 GAIT TRAINING THERAPY: CPT | Mod: GP

## 2023-08-17 RX ORDER — ASPIRIN 81 MG/1
81 TABLET, CHEWABLE ORAL DAILY
Status: DISCONTINUED | OUTPATIENT
Start: 2023-08-18 | End: 2023-08-18 | Stop reason: HOSPADM

## 2023-08-17 RX ORDER — ASPIRIN 81 MG/1
81 TABLET, CHEWABLE ORAL DAILY
Qty: 90 TABLET | Refills: 3 | Status: SHIPPED | OUTPATIENT
Start: 2023-08-18 | End: 2024-01-01

## 2023-08-17 RX ADMIN — MICONAZOLE NITRATE: 2 POWDER TOPICAL at 10:15

## 2023-08-17 RX ADMIN — ROSUVASTATIN CALCIUM 20 MG: 20 TABLET, FILM COATED ORAL at 10:13

## 2023-08-17 RX ADMIN — METOPROLOL SUCCINATE 25 MG: 25 TABLET, EXTENDED RELEASE ORAL at 10:14

## 2023-08-17 RX ADMIN — MICONAZOLE NITRATE: 2 POWDER TOPICAL at 21:21

## 2023-08-17 RX ADMIN — CARBOXYMETHYLCELLULOSE SODIUM 1 DROP: 5 SOLUTION/ DROPS OPHTHALMIC at 21:21

## 2023-08-17 RX ADMIN — ASPIRIN 325 MG ORAL TABLET 325 MG: 325 PILL ORAL at 10:13

## 2023-08-17 RX ADMIN — CARBOXYMETHYLCELLULOSE SODIUM 1 DROP: 5 SOLUTION/ DROPS OPHTHALMIC at 10:14

## 2023-08-17 RX ADMIN — OLANZAPINE 2.5 MG: 2.5 TABLET, FILM COATED ORAL at 21:21

## 2023-08-17 ASSESSMENT — ACTIVITIES OF DAILY LIVING (ADL)
ADLS_ACUITY_SCORE: 42
ADLS_ACUITY_SCORE: 39
ADLS_ACUITY_SCORE: 42
ADLS_ACUITY_SCORE: 39
ADLS_ACUITY_SCORE: 39
ADLS_ACUITY_SCORE: 42
ADLS_ACUITY_SCORE: 42

## 2023-08-17 NOTE — CONSULTS
Please see consult completed on 8/16/23 by Margareth Talobt. TCU is recommended. SW is following for discharge planning.

## 2023-08-17 NOTE — TELEPHONE ENCOUNTER
Pt referred to [facility] by Adriana Self PA-C for carotid stenosis    Pt needs to be scheduled for  NEW VASCULAR PATIENT consult with vascular surgery.  Will route to scheduling to coordinate an appointment next week    Appt note:  Pt referred to [facility] by Adriana Self PA-C for carotid stenosis. CTA head neck and bilateral carotid US in [facility].     Laura SHIN, RN    [facility]  Office: [phone]  Fax: [phone]

## 2023-08-17 NOTE — PROGRESS NOTES
Care plan note:      Recent Vitals:  Temp: 98.5  F (36.9  C) Temp src: Axillary BP: 110/64 Pulse: 70   Resp: 16 SpO2: 97 % O2 Device: None (Room air)      Orientation/Neuro: Alert,  confusion, worsening in the evening, pulled out IV, and tele, impulsive, slow moving, repetitive, not easily re-directable.   Pain: The patient is not having any pain.   Tele: Paced   IV medications: Pt pulled out 2 IV, Hospitalist updated on lack of IV access.    Mobility: Assist of 2, Gait belt, and Walker   Skin:  PPM - ecchymotic, no hematoma, red-groin/coccyx, ecchymotic rt toe , Bilateral groin site CDI.    GI: WDL  : incontinence     Diet: Tolerating diet:   Well  Orders Placed This Encounter      Combination Diet Regular Diet; Low Saturated Fat Na <2400mg Diet      Diet      Safety/Concerns:  Fall Risk and Aj Risk  Aggression Color: Yellow    Plan: Cards and hospitalist following, increased confusion/delirium, Provider aware, received Zyprexa -no noted improvement. K+ protocol, replaced, recheck in the AM. Echo and c-xray completed today. PPM was interrogated. Planning discharge to TCU when medical stable. Neuro signed off, not concerned for stroke. Needs F/U w/ vascular outpt.    Continue to monitor.      Kymberly Oseguera RN

## 2023-08-17 NOTE — PROGRESS NOTES
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  S/p 23mm Allison Maribel 3 TAVR.  Post-operative course notable for transient CHB.    Assessment & Plan        #Symptomatic critical AS s/p TAVR  Post-operative course notable for transient CHB (see below) status post pacemaker placement PPM.      Further management as per the cardiology     #Post-operative transient CHB in setting of chronic LBBB and 1st degree AVB  #Bradycardia with evolving pauses  Status post dual-chamber pacemaker placement    Management as per cardiology and EP     #Single-vessel ASCVD  #HTN  #DLD   Cardiac cath 7/2023 showed single-vessel disease with 90% occlusion of pRCA which is being medically managed.    -- Toprol on hold, as above  -- Resume PTA statin     #NIDDM2  HgbA1c 6.0 in 7/2023.    -- Hold PTA metformin for now as is NPO  -- He is on sliding scale insulin for correction hypoglycemia protocol.  Blood sugars in good control, resume metformin on discharge     #Dementia  High risk for delirium.  -- Minimize sedating medications   -- Redirect and reassure frequently  -- Delirium protocol  Has intermittent agitation, will put him on as needed Zyprexa, and Zyprexa 2.5 mg at at bedtime.     #Gluteal cleft and groin skin wounds  Noted upon admission 7/2023.  Seen by WOC.    -- Continue kenna cares  -- Low threshold to reconsult WOC if wounds look worse or if there are questions about management        Discussed with the nursing staff during care with the patient.    DVT Prophylaxis: Pneumatic Compression Devices  Code Status: Full Code    Disposition: Expected discharge as per primary cardiology service.    Edison Wallis MD, MD  Text Page  (7am - 6pm)    Interval History   Noticeable overnight, did receive the  dose of Zyprexa small dose 2.5 mg.  Quite fatigued and sleepy this morning.  Offers no complaints    Overnight events reviewed with the nursing staff taking care of the patient.    -Data reviewed today: I reviewed all new labs and imaging results over the last 24 hours. I personally reviewed no images or EKG's today.    Physical Exam   Temp: 97.7  F (36.5  C) Temp src: Oral BP: 98/49 Pulse: 60   Resp: 16 SpO2: 99 % O2 Device: None (Room air)    Vitals:    08/15/23 0600 08/16/23 0609 08/17/23 0500   Weight: 81.6 kg (179 lb 14.4 oz) 80.5 kg (177 lb 7.5 oz) 80.2 kg (176 lb 12.9 oz)     Vital Signs with Ranges  Temp:  [97.7  F (36.5  C)-98.9  F (37.2  C)] 97.7  F (36.5  C)  Pulse:  [60-95] 60  Resp:  [11-22] 16  BP: ()/(49-75) 98/49  SpO2:  [95 %-99 %] 99 %  I/O last 3 completed shifts:  In: 420 [P.O.:420]  Out: 475 [Urine:475]    Constitutional: fatigued, in no acute distress  Eyes: Lids and lashes normal, pupils equal, round and reactive to light, extra ocular muscles intact, sclera clear, conjunctiva normal  Respiratory: No increased work of breathing, good air exchange, clear to auscultation bilaterally, no crackles or wheezing  Cardiovascular: Normal apical impulse, regular rate and rhythm, normal S1 and S2, no S3 or S4, and no murmur noted  GI: No scars, normal bowel sounds, soft, non-distended, non-tender, no masses palpated, no hepatosplenomegally    Medications    - MEDICATION INSTRUCTIONS -        aspirin  325 mg Oral Daily    artificial tears  1 drop Both Eyes BID    insulin aspart  1-7 Units Subcutaneous TID AC    insulin aspart  1-5 Units Subcutaneous At Bedtime    metoprolol succinate ER  25 mg Oral Daily    miconazole   Topical BID    OLANZapine  2.5 mg Oral At Bedtime    rosuvastatin  20 mg Oral Daily       Data   Recent Labs   Lab 08/17/23  1105 08/17/23  0603 08/17/23  0207 08/16/23  1208 08/16/23  0604 08/15/23  1855 08/15/23  1258 08/15/23  0946 08/15/23  0651   WBC  --  7.0  --   --  7.5  --   5.3  --   --    HGB  --  13.0*  --   --  12.5*  --  11.4*  --   --    MCV  --  96  --   --  95  --  97  --   --    PLT  --  123*  --   --  147*  --  145*  --   --    INR  --   --   --   --   --   --  1.17*  --   --    NA  --  140  --   --  137  --   --   --  139   POTASSIUM  --  3.9  --   --  3.7  --   --   --  4.3   CHLORIDE  --  103  --   --  103  --   --   --  104   CO2  --  22  --   --  20*  --   --   --  23   BUN  --  9.3  --   --  10.4  --   --   --  14.2   CR  --  0.60*  --   --  0.66*  --   --   --  0.74   ANIONGAP  --  15  --   --  14  --   --   --  12   FERMIN  --  9.4  --   --  9.1  --   --   --  9.5   * 119* 121*   < > 123*   < >  --    < > 108*  110*    < > = values in this interval not displayed.         Recent Results (from the past 24 hour(s))   US Carotid Bilateral    Narrative    US CAROTID BILATERAL 8/16/2023 2:59 PM    HISTORY: Severe stenosis on CT angiography in the right internal  carotid artery with concern for stroke.    COMPARISON: CT angiogram of the neck dated 8/15/2023    FINDINGS:     Right side:   On the grayscale images, there is shadowing calcified plaque at the  carotid bifurcation..  Spectral waveform analysis was performed. Peak systolic and diastolic  velocities in the right internal carotid artery are 162 and 24 cm/s.  Per sonographic criteria, degree of stenosis in the right internal  carotid artery is 50-69%.  Flow in the right vertebral artery is antegrade.     Left side:   On the grayscale images, there is shadowing calcified plaque at the  carotid bifurcation.  Spectral waveform analysis was performed. Peak systolic and diastolic   velocities in the left internal carotid artery are 124 and 23 cm/s.  Per sonographic criteria, degree of stenosis in the left internal  carotid artery is less than 50%.  Flow in the left vertebral artery is antegrade.       Impression    IMPRESSION: Per sonographic criteria, there is a 50-69% stenosis in  the right internal carotid artery  and a less than 50% stenosis in the  left internal carotid artery.    Degree of stenosis measured relative to the diameter of the normal  internal carotid artery per NASCET or NASCET type criteria    LUIZ SIMON MD         SYSTEM ID:  [id number]

## 2023-08-17 NOTE — PLAN OF CARE
Texted cardiology. Per Sheri Davis, Ok to have tele off.    Alert to self but otherwise thought it was winter outside. VS stable, on RA and no complaints of pain. Neuros are intact but he is inconsistent with following commands.

## 2023-08-17 NOTE — CONSULTS
Class not needed- patient did not have a stroke.    Vida Toledo RN  [facility]  [phone]

## 2023-08-17 NOTE — TELEPHONE ENCOUNTER
Current inpatient. Advised spouse to contact [facility] when patient is ready to be discharged.

## 2023-08-17 NOTE — PROGRESS NOTES
"[id number]  VSS. Room air. Alert, confused/disoriented. Restless for most of the shift, agitated initially. Received Zyprexa previous shift. Tele has been V-Paced. No changes to neuro status, not always following commands. Incontinent of urine. Plan for TCU when medically stable. Will continue to monitor.    /74   Pulse 83   Temp 98.9  F (37.2  C)   Resp 18   Ht 1.702 m (5' 7\")   Wt 80.5 kg (177 lb 7.5 oz)   SpO2 95%   BMI 27.80 kg/m      "

## 2023-08-17 NOTE — CONSULTS
Care Management Initial Consult    General Information  Assessment completed with: Spouse or significant other, Zainab  Type of CM/SW Visit: Initial Assessment    Primary Care Provider verified and updated as needed: Yes   Readmission within the last 30 days: planned readmission   Return Category: Planned Surgery  Reason for Consult: discharge planning  Advance Care Planning: Advance Care Planning Reviewed:  (no ACP documents)          Communication Assessment  Patient's communication style: spoken language (English or Bilingual)    Hearing Difficulty or Deaf: yes   Wear Glasses or Blind: yes    Cognitive  Cognitive/Neuro/Behavioral: .WDL except  Level of Consciousness: confused, alert  Arousal Level: opens eyes spontaneously  Orientation: disoriented to, situation, time  Mood/Behavior: calm, cooperative, behavior appropriate to situation  Best Language: 0 - No aphasia  Speech: slow, garbled (pts baseline)    Living Environment:   People in home: spouse  Zainab  Current living Arrangements: apartment      Able to return to prior arrangements: no       Family/Social Support:  Care provided by: self, spouse/significant other  Provides care for: no one  Marital Status:   Wife  Zainab       Description of Support System: Supportive, Involved    Support Assessment: Adequate family and caregiver support, Adequate social supports    Current Resources:   Patient receiving home care services: Yes  Skilled Home Care Services: Physical Therapy, Skilled Nursing ([facility])  Community Resources: None  Equipment currently used at home: walker, rolling, shower chair  Supplies currently used at home: None    Employment/Financial:  Employment Status: retired        Financial Concerns: No concerns identified   Referral to Financial Worker: No       Does the patient's insurance plan have a 3 day qualifying hospital stay waiver?  No    Lifestyle & Psychosocial Needs:  Social Determinants of Health     Tobacco Use: Low Risk   (8/16/2023)    Patient History     Smoking Tobacco Use: Never     Smokeless Tobacco Use: Never     Passive Exposure: Not on file   Alcohol Use: Not on file   Financial Resource Strain: Not on file   Food Insecurity: Not on file   Transportation Needs: Not on file   Physical Activity: Not on file   Stress: Not on file   Social Connections: Not on file   Intimate Partner Violence: Not on file   Depression: Not on file   Housing Stability: Not on file       Functional Status:  Prior to admission patient needed assistance:              Mental Health Status:          Chemical Dependency Status:                Values/Beliefs:  Spiritual, Cultural Beliefs, Sikh Practices, Values that affect care: no               Additional Information:  Per care management/social work consult for discharge planning.  Patient was admitted on 8-15-23 for an elective TAVR.  The tentative date of discharge is 8-17-23 or 8-18-23.  Reviewed chart and spoke with patient's wife regarding discharge plans.  Per patient's wife's report, they live in a condo with no steps to enter and 1 step inside.  Patient uses a rolling walker at baseline.  There is a shower chair in the bathroom.  Patient's wife states that patient is currently received home nursing and PT services from [facility].  Patient's wife states that she does not feel that that will be enough help for patient.  Patient's wife states that she feels that patient will need to go TCU on discharge.  Patient's wife is asking for a referral to be sent to [facility].  If they do not have a bed other possible options may be [facility] or [facility].  Referral sent, via the discharge navigator, to check bed availability.  Patient has [employer] Medicare Advantage so he will need pre-auth prior to discharge.    Will continue to follow.        MARII Tripp, [facility]    [phone]  [facility]

## 2023-08-17 NOTE — PROGRESS NOTES
MD Notification    Notified Person: DR. Wallis    Notification Date/Time:  8/17/2023  3:04 PM    Notification Interaction:   in person, MD on the Floor    Purpose of Notification:  Discussed w/ provider  regarding pt delirium, unable to keep IV in or tele on.     Orders Received:    Md ok not to be on tele or have IV as long as cards is ok with it also.

## 2023-08-17 NOTE — PLAN OF CARE
Care plan note:      Recent Vitals:  Temp: 97.7  F (36.5  C) Temp src: Oral BP: 98/49 Pulse: 60   Resp: 16 SpO2: 99 % O2 Device: None (Room air)     Orientation/Neuro: Alert,  confusion, pulled out tele, impulsive, slow moving, repetitive, not easily re-directable.   Pain: The patient is not having any pain.              Tele: Paced              IV medications: No IV access r/t delirium.               Mobility: Assist of 2, Gait belt, and Walker              Skin:  PPM - ecchymotic, no hematoma, red-groin/coccyx, ecchymotic rt toe , Bilateral groin site CDI.               GI: WDL  : incontinence                 Diet: Tolerating diet:   Well  Orders Placed This Encounter      Combination Diet Regular Diet; Low Saturated Fat Na <2400mg Diet      Diet                 Safety/Concerns:  Fall Risk and Aj Risk  Aggression Color: Yellow     Plan: Cards and hospitalist following, increased confusion/delirium, no Zyprexa needed yet today. Planning discharge to TCU when bed available. Wife at bedside.  On k+ protocol, recheck in the AM. Needs F/U w/ vascular outpt.               Continue to monitor.        Kymberly Oseguera RN

## 2023-08-17 NOTE — PROGRESS NOTES
[facility]     Structural Cardiology Progress Note  Erick Shahid MRN: [id number]  1940  Date of Admission:8/15/2023  Primary care provider: Minh Samuel        Assessment and Plan:     Erick Shahid is a pleasant 83 year old admitted on 8/15/2023 for elective TAVR.    # Severe aortic stenosis s/p Transcatheter Aortic Valve Replacement  Now s/p 23 mm Allison Maribel 3 valve c/b CHB s/p dual chamber PPM. Groin sites remain soft without hematoma. No focal neurological deficits.  Postprocedure echocardiogram showed well-seated bioprosthetic aortic valve with a mean gradient of 15 mmHg, no PVL.  His LV function improved with EF 50 to 55%, previously 30 to 35%.  - Transition to ASA 81 mg daily  - Cardiac rehab/PT/OT  - Continue to monitor on telemetry  - Lifelong antibiotic prophylaxis prior to all dental procedures.     # Transient CHB s/p dual chamber PPM  Patient had underlying LBBB and first-degree AV block prior to TAVR.  He developed transient complete heart block following valve deployment with heart rates in the 40s.  He underwent uneventful pacemaker implantation on 8/15/2023.  CXR with no pneumothorax. Interrogation stable. Pocket site intact without hematoma.   -Continue to monitor     # Chronic systolic heart failure with recovered EF to 50 to 55%, NYHA class II   Likely 2/2 valvular disease with LVEF 30-35%, now 50 to 55% post TAVR.  Euvolemic on exam. NTpBNP 2019, LVEDP 9.  GDMT for heart failure has been limited in the setting of permissive hypertension.  - BB: continue PTA Toprol XL 25 mg daily  - Defer further initiation of GDMT to outpatient setting      # CAD with  of RCA  Pre-TAVR coronary angiogram showed  of non-dominant RCA that is being medically managed.   - Continue  mg daily [PTA ASA 81 mg daily]  - BB: continue PTA Toprol XL, as above   - Statin: continue PTA rosuvastatin 20 mg daily      # Type II diabetes   PTA metformin. A1C 6.0. sliding scale  insulin while inpatient.   - Resume PTA metformin.      # Dementia  - Delirium protocol  - PRN zyprexa     FEN: Cardiac diet  Code Status: FULL CODE    Dispo: Awaiting TCU placement, otherwise stable from a cardiology standpoint.    LESTER Hayward, CNP  [facility] Structural/Interventional Cardiology   Pager: [phone]         Interval History:   No acute events overnight. 100% AV paced on telemetry. Remains pleasantly confused, alert to self and place, at baseline.   - s/p 23 mm Allison Maribel 3 valve  - Groin site CDI, soft, non-tender, no evidence of hematoma  - Pocket site intact without hematoma  - Cr 0.60, Hgb 13.0          Review of Systems:    10 point review of systems negative except for stated above in HPI.          Past Medical History:   Medical History reviewed.   Past Medical History:   Diagnosis Date    Benign essential hypertension     BPH (benign prostatic hyperplasia)     Chronic heart failure with reduced ejection fraction and diastolic dysfunction (H)     Cognitive impairment     Critical aortic valve stenosis     Diabetes mellitus, type 2 (H)     on metformin    Dyslipidemia     First degree atrioventricular block     LBBB (left bundle branch block)     Macular degeneration (senile) of retina              Past Surgical History:   Surgical History reviewed.   Past Surgical History:   Procedure Laterality Date    CV CORONARY ANGIOGRAM N/A 07/28/2023    Procedure: Coronary Angiogram;  Surgeon: Mando Field MD;  Location: [facility] CARDIAC CATH LAB    CV PCI N/A 07/28/2023    Procedure: Percutaneous Coronary Intervention;  Surgeon: Mando Field MD;  Location: [facility] CARDIAC CATH LAB    CV TRANSCATHETER AORTIC VALVE REPLACEMENT  08/15/2023    CV TRANSCATHETER AORTIC VALVE REPLACEMENT-FEMORAL APPROACH N/A 8/15/2023    Procedure: Transcatheter Aortic Valve Replacement-Femoral Approach;  Surgeon: Bossman Felipe MD;  Location: [facility] CARDIAC CATH LAB    EP PACEMAKER DEVICE  & LEAD IMPLANT- RIGHT ATRIAL & LEFT VENTRICULAR N/A 8/15/2023    Procedure: Pacemaker Device & Lead Implant- Right Atrial & Left Ventricular;  Surgeon: Oscar Mendoza MD;  Location:  [facility] CARDIAC CATH LAB    ORIF HIP FRACTURE Right 2018             Social History:   Social History reviewed.  Social History     Tobacco Use    Smoking status: Never    Smokeless tobacco: Never   Substance Use Topics    Alcohol use: Never             Family History:   Family History reviewed.   History reviewed. No pertinent family history.          Allergies:   No Known Allergies          Medications:   Medications Reviewed.   Current Facility-Administered Medications   Medication    acetaminophen (TYLENOL) tablet 650 mg    aspirin (ASA) tablet 325 mg    carboxymethylcellulose PF (REFRESH PLUS) 0.5 % ophthalmic solution 1 drop    glucose gel 15-30 g    Or    dextrose 50 % injection 25-50 mL    Or    glucagon injection 1 mg    HOLD:  Metformin and metformin containing medications if patient received IV contrast with acute kidney injury or severe chronic kidney disease (stage IV or stage V; i.e., eGFR less than 30)    HOLD: enoxaparin (LOVENOX) Post Procedure    HOLD: heparin (IV or Subcutaneous) Post Procedure    HOLD: metformin and metformin containing medications on day of procedure and 48 hours after IV contrast given    hydrALAZINE (APRESOLINE) injection 10 mg    insulin aspart (NovoLOG) injection (RAPID ACTING)    insulin aspart (NovoLOG) injection (RAPID ACTING)    Medication Instructions - Avoid dextrose in IV solutions.    metoprolol succinate ER (TOPROL XL) 24 hr tablet 25 mg    miconazole (MICATIN) 2 % powder    naloxone (NARCAN) injection 0.2 mg    Or    naloxone (NARCAN) injection 0.4 mg    Or    naloxone (NARCAN) injection 0.2 mg    Or    naloxone (NARCAN) injection 0.4 mg    nitroGLYcerin (NITROSTAT) sublingual tablet 0.4 mg    OLANZapine (zyPREXA) tablet 2.5 mg    Or    OLANZapine (zyPREXA) injection 2.5 mg     "OLANZapine (zyPREXA) tablet 2.5 mg    ondansetron (ZOFRAN ODT) ODT tab 4 mg    Or    ondansetron (ZOFRAN) injection 4 mg    oxyCODONE-acetaminophen (PERCOCET) 5-325 MG per tablet 1 tablet    rosuvastatin (CRESTOR) tablet 20 mg             Physical Exam:   Vitals were reviewed.  Blood pressure 138/74, pulse 83, temperature 98.9  F (37.2  C), resp. rate 18, height 1.702 m (5' 7\"), weight 80.2 kg (176 lb 12.9 oz), SpO2 95 %.    General: AAOx3, NAD  Skin: Not jaundiced, no rash, no ecchymoses. Groin site CDI, soft, non-tender, no signs of hematoma. No bruits.   HEENT: MMM, PERRLA, EOM intact  CV: RRR, normal S1S2, grade I GABBIE, clicks, rubs  Resp: Clear to auscultation bilaterally, no wheezes, rhonchi  Abd: Soft, non-tender, BS+, no masses appreciated  Extremities: warm and well perfused, palpable pulses, no edema  Neuro: No lateralizing symptoms or focal neurologic deficits        Labs:   Routine Labs:  No results found for: TROPI, TROPONIN, TROPR, TROPN  CMP  Recent Labs   Lab 08/17/23  0603 08/17/23  0207 08/16/23  2155 08/16/23  1549 08/16/23  1208 08/16/23  0604 08/15/23  0946 08/15/23  0651     --   --   --   --  137  --  139   POTASSIUM 3.9  --   --   --   --  3.7  --  4.3   CHLORIDE 103  --   --   --   --  103  --  104   CO2 22  --   --   --   --  20*  --  23   ANIONGAP 15  --   --   --   --  14  --  12   * 121* 122* 93   < > 123*   < > 108*  110*   BUN 9.3  --   --   --   --  10.4  --  14.2   CR 0.60*  --   --   --   --  0.66*  --  0.74   GFRESTIMATED >90  --   --   --   --  >90  --  89   FERMIN 9.4  --   --   --   --  9.1  --  9.5   MAG 2.0  --   --   --   --  1.9  --  2.2   PHOS 2.5  --   --   --   --  2.6  --  3.8    < > = values in this interval not displayed.     CBC  Recent Labs   Lab 08/17/23  0603 08/16/23  0604 08/15/23  1258   WBC 7.0 7.5 5.3   RBC 3.97* 3.82* 3.53*   HGB 13.0* 12.5* 11.4*   HCT 38.2* 36.2* 34.3*   MCV 96 95 97   MCH 32.7 32.7 32.3   MCHC 34.0 34.5 33.2   RDW 13.7 13.8 14.3 "   * 147* 145*     INR  Recent Labs   Lab 08/15/23  1258   INR 1.17*           Diagnostics:    EKG 12Lead: AV paced rhythm    Echo: 8/16/2023  Interpretation Summary     The visual ejection fraction is 50-55%.  No regional wall motion abnormalities noted.  There is a bioprosthetic aortic valve.  The prosthetic aortic valve is well-seated.  The gradient is normal for this size prosthetic aortic valve 15mmHg.    TAVR: 8/15/23  Conclusion    1. Lifestyle-limiting severe aortic stenosis.  2. Successful transfemoral transcatheter aortic valve replacement with a 23mm Allison Maribel 3 valve.  3. Temporary pacemaker insertion.  4. Left heart catheterization with LVEDP of 15 mmHg.  6. Right and left common femoral arteriotomies successfully closed with closure devices.                  Plan     Follow bedrest per protocol   Continued medical management and lifestyle modifications for cardiovascular risk factor optimizations.   Arterial sheath removed from femoral artery with closure device.   Admit to inpatient        Post antiplatelet therapy of   Aspirin; give 81 mg qd .  1. Aspirin 81 mg po daily lifelong.  2. Bedrest per protocol.  3. Admit to the primary inpatient team for further evaluation and management.  4. Echocardiogram tomorrow.   5. Consult to EP due to transient block after valve deployment  6. Lifelong antibiotic prophylaxis prior to all dental procedures.       Medical Decision Making       70 MINUTES SPENT BY ME on the date of service doing chart review, history, exam, documentation & further activities per the note.

## 2023-08-18 ENCOUNTER — APPOINTMENT (OUTPATIENT)
Dept: PHYSICAL THERAPY | Facility: CLINIC | Age: 83
DRG: 267 | End: 2023-08-18
Attending: INTERNAL MEDICINE
Payer: COMMERCIAL

## 2023-08-18 VITALS
WEIGHT: 176.81 LBS | OXYGEN SATURATION: 100 % | SYSTOLIC BLOOD PRESSURE: 96 MMHG | DIASTOLIC BLOOD PRESSURE: 62 MMHG | HEIGHT: 67 IN | BODY MASS INDEX: 27.75 KG/M2 | RESPIRATION RATE: 15 BRPM | HEART RATE: 64 BPM | TEMPERATURE: 98.6 F

## 2023-08-18 LAB
ANION GAP SERPL CALCULATED.3IONS-SCNC: 12 MMOL/L (ref 7–15)
ATRIAL RATE - MUSE: 52 BPM
ATRIAL RATE - MUSE: 54 BPM
ATRIAL RATE - MUSE: 99 BPM
BUN SERPL-MCNC: 18.2 MG/DL (ref 8–23)
CALCIUM SERPL-MCNC: 9.2 MG/DL (ref 8.8–10.2)
CHLORIDE SERPL-SCNC: 104 MMOL/L (ref 98–107)
CREAT SERPL-MCNC: 0.69 MG/DL (ref 0.67–1.17)
DEPRECATED HCO3 PLAS-SCNC: 23 MMOL/L (ref 22–29)
DIASTOLIC BLOOD PRESSURE - MUSE: NORMAL MMHG
ERYTHROCYTE [DISTWIDTH] IN BLOOD BY AUTOMATED COUNT: 13.9 % (ref 10–15)
GFR SERPL CREATININE-BSD FRML MDRD: >90 ML/MIN/1.73M2
GLUCOSE BLDC GLUCOMTR-MCNC: 128 MG/DL (ref 70–99)
GLUCOSE BLDC GLUCOMTR-MCNC: 158 MG/DL (ref 70–99)
GLUCOSE SERPL-MCNC: 107 MG/DL (ref 70–99)
HCT VFR BLD AUTO: 35.8 % (ref 40–53)
HGB BLD-MCNC: 12.1 G/DL (ref 13.3–17.7)
INTERPRETATION ECG - MUSE: NORMAL
MAGNESIUM SERPL-MCNC: 2 MG/DL (ref 1.7–2.3)
MCH RBC QN AUTO: 32.2 PG (ref 26.5–33)
MCHC RBC AUTO-ENTMCNC: 33.8 G/DL (ref 31.5–36.5)
MCV RBC AUTO: 95 FL (ref 78–100)
P AXIS - MUSE: 61 DEGREES
P AXIS - MUSE: 64 DEGREES
P AXIS - MUSE: NORMAL DEGREES
PHOSPHATE SERPL-MCNC: 2.9 MG/DL (ref 2.5–4.5)
PLATELET # BLD AUTO: 104 10E3/UL (ref 150–450)
POTASSIUM SERPL-SCNC: 3.5 MMOL/L (ref 3.4–5.3)
PR INTERVAL - MUSE: 210 MS
PR INTERVAL - MUSE: 384 MS
PR INTERVAL - MUSE: NORMAL MS
QRS DURATION - MUSE: 116 MS
QRS DURATION - MUSE: 148 MS
QRS DURATION - MUSE: 148 MS
QT - MUSE: 416 MS
QT - MUSE: 554 MS
QT - MUSE: 570 MS
QTC - MUSE: 525 MS
QTC - MUSE: 533 MS
QTC - MUSE: 540 MS
R AXIS - MUSE: -33 DEGREES
R AXIS - MUSE: -37 DEGREES
R AXIS - MUSE: 74 DEGREES
RBC # BLD AUTO: 3.76 10E6/UL (ref 4.4–5.9)
SODIUM SERPL-SCNC: 139 MMOL/L (ref 136–145)
SYSTOLIC BLOOD PRESSURE - MUSE: NORMAL MMHG
T AXIS - MUSE: -54 DEGREES
T AXIS - MUSE: -63 DEGREES
T AXIS - MUSE: 264 DEGREES
VENTRICULAR RATE- MUSE: 54 BPM
VENTRICULAR RATE- MUSE: 54 BPM
VENTRICULAR RATE- MUSE: 99 BPM
WBC # BLD AUTO: 6.6 10E3/UL (ref 4–11)

## 2023-08-18 PROCEDURE — 99232 SBSQ HOSP IP/OBS MODERATE 35: CPT | Performed by: INTERNAL MEDICINE

## 2023-08-18 PROCEDURE — 80048 BASIC METABOLIC PNL TOTAL CA: CPT | Performed by: NURSE PRACTITIONER

## 2023-08-18 PROCEDURE — 97530 THERAPEUTIC ACTIVITIES: CPT | Mod: GP

## 2023-08-18 PROCEDURE — 99239 HOSP IP/OBS DSCHRG MGMT >30: CPT | Performed by: NURSE PRACTITIONER

## 2023-08-18 PROCEDURE — 93005 ELECTROCARDIOGRAM TRACING: CPT

## 2023-08-18 PROCEDURE — 97116 GAIT TRAINING THERAPY: CPT | Mod: GP

## 2023-08-18 PROCEDURE — 85014 HEMATOCRIT: CPT | Performed by: NURSE PRACTITIONER

## 2023-08-18 PROCEDURE — 84100 ASSAY OF PHOSPHORUS: CPT | Performed by: NURSE PRACTITIONER

## 2023-08-18 PROCEDURE — 250N000013 HC RX MED GY IP 250 OP 250 PS 637: Performed by: INTERNAL MEDICINE

## 2023-08-18 PROCEDURE — 83735 ASSAY OF MAGNESIUM: CPT | Performed by: NURSE PRACTITIONER

## 2023-08-18 PROCEDURE — 36415 COLL VENOUS BLD VENIPUNCTURE: CPT | Performed by: NURSE PRACTITIONER

## 2023-08-18 PROCEDURE — 250N000013 HC RX MED GY IP 250 OP 250 PS 637: Performed by: NURSE PRACTITIONER

## 2023-08-18 PROCEDURE — 93010 ELECTROCARDIOGRAM REPORT: CPT | Performed by: INTERNAL MEDICINE

## 2023-08-18 RX ADMIN — ROSUVASTATIN CALCIUM 20 MG: 20 TABLET, FILM COATED ORAL at 09:58

## 2023-08-18 RX ADMIN — CARBOXYMETHYLCELLULOSE SODIUM 1 DROP: 5 SOLUTION/ DROPS OPHTHALMIC at 09:59

## 2023-08-18 RX ADMIN — ASPIRIN 81 MG 81 MG: 81 TABLET ORAL at 09:59

## 2023-08-18 RX ADMIN — METOPROLOL SUCCINATE 25 MG: 25 TABLET, EXTENDED RELEASE ORAL at 09:59

## 2023-08-18 ASSESSMENT — ACTIVITIES OF DAILY LIVING (ADL)
ADLS_ACUITY_SCORE: 42

## 2023-08-18 NOTE — PLAN OF CARE
Occupational Therapy Discharge Summary    Reason for therapy discharge:    Discharged to transitional care facility.    Progress towards therapy goal(s). See goals on Care Plan in [facility] electronic health record for goal details.  Goals not met.  Barriers to achieving goals:   discharge from facility.    Therapy recommendation(s):    Continued therapy is recommended.  Rationale/Recommendations:  To address safety and IND in I/ADLs.

## 2023-08-18 NOTE — PROGRESS NOTES
Care Management Discharge Note    Discharge Date: 08/18/2023       Discharge Disposition: Transitional Care    Discharge Services: Other (see comment) (therapy)    Discharge DME:      Discharge Transportation: agency    Private pay costs discussed: Not applicable    Does the patient's insurance plan have a 3 day qualifying hospital stay waiver?  No    PAS Confirmation Code: [id number]  Patient/family educated on Medicare website which has current facility and service quality ratings: no    Education Provided on the Discharge Plan:    Persons Notified of Discharge Plans: yes  Patient/Family in Agreement with the Plan: yes    Handoff Referral Completed: No    Additional Information:  Patient discharging to [facility] today via family transport at 1700. Patient and family aware and in agreement. PAS completed. Discharge orders completed and sent to facility.     PAS-RR    D: Per DHS regulation, SW completed and submitted PAS-RR to [place] [employer] Direct Connect via the Senior LinkAge Line.  PAS-RR confirmation # is : [id number]    I: SW spoke with patient and they are aware a PAS-RR has been submitted.  SW reviewed with patient that they may be contacted for a follow up appointment within 10 days of hospital discharge if their SNF stay is < 30 days.  Contact information for Senior Link[employer] Line was also provided.    A: Patient verbalized understanding.    P: Further questions may be directed to [facility] LinkAge Line at #[phone], option #4 for PAS-RR staff.      EMIR Arguello

## 2023-08-18 NOTE — PROGRESS NOTES
[facility]    Hospitalist Progress Note    Brief Summary:  Erick Shahid is a 83 year old male with nausea, DM2, symptomatic critical AS (falls, syncope), and AS-related NICM (EF 30-35%) who was admitted on 8/15/2023 for an elective TAVR with Dr Felipe.  S/p 23mm Allison Maribel 3 TAVR.  Post-operative course notable for transient CHB.    Assessment & Plan        #Symptomatic critical AS s/p TAVR  Post-operative course notable for transient CHB (see below) status post pacemaker placement PPM.      Further management as per the cardiology     #Post-operative transient CHB in setting of chronic LBBB and 1st degree AVB  #Bradycardia with evolving pauses  Status post dual-chamber pacemaker placement    Management as per cardiology and EP     #Single-vessel ASCVD  #HTN  #DLD   Cardiac cath 7/2023 showed single-vessel disease with 90% occlusion of pRCA which is being medically managed.    -- Toprol on hold, as above  -- Resume PTA statin     #NIDDM2  HgbA1c 6.0 in 7/2023.    -- Hold PTA metformin for now as is NPO  -- He is on sliding scale insulin for correction hypoglycemia protocol.  Blood sugars in good control, resume metformin on discharge     #Dementia  High risk for delirium.  -- Minimize sedating medications   -- Redirect and reassure frequently  -- Delirium protocol  Has intermittent agitation, put him on as needed Zyprexa, and Zyprexa 2.5 mg at at bedtime.  Overall improved at this time, continue Zyprexa 2.5 mg at bedtime for total 5 days.     #Gluteal cleft and groin skin wounds  Noted upon admission 7/2023.  Seen by WOC.    -- Continue kenna cares  -- Low threshold to reconsult WOC if wounds look worse or if there are questions about management        Discussed with the nursing staff during care with the patient.    DVT Prophylaxis: Pneumatic Compression Devices  Code Status: Full Code    Disposition: Expected discharge as per primary cardiology service.    Edison Wallis MD,  MD  Text Page  (7am - 6pm)    Interval History   Patient seen and evaluated in his room today, sitting on a chair.  Much more awake and alert, and less confused.  Denies any pain, shortness of breath, headache dizziness or lightheadedness.    No other significant event overnight        -Data reviewed today: I reviewed all new labs and imaging results over the last 24 hours. I personally reviewed no images or EKG's today.    Physical Exam   Temp: 98.6  F (37  C) Temp src: Oral BP: 96/62 Pulse: 66   Resp: 15 SpO2: 100 % O2 Device: None (Room air)    Vitals:    08/15/23 0600 08/16/23 0609 08/17/23 0500   Weight: 81.6 kg (179 lb 14.4 oz) 80.5 kg (177 lb 7.5 oz) 80.2 kg (176 lb 12.9 oz)     Vital Signs with Ranges  Temp:  [97.7  F (36.5  C)-98.6  F (37  C)] 98.6  F (37  C)  Pulse:  [60-68] 66  Resp:  [15-16] 15  BP: ()/(49-62) 96/62  SpO2:  [97 %-100 %] 100 %  I/O last 3 completed shifts:  In: 250 [P.O.:250]  Out: 250 [Urine:250]    Constitutional: fatigued, in no acute distress  Eyes: Lids and lashes normal, pupils equal, round and reactive to light, extra ocular muscles intact, sclera clear, conjunctiva normal  Respiratory: No increased work of breathing, good air exchange, clear to auscultation bilaterally, no crackles or wheezing  Cardiovascular: Normal apical impulse, regular rate and rhythm, normal S1 and S2, no S3 or S4, and no murmur noted  GI: No scars, normal bowel sounds, soft, non-distended, non-tender, no masses palpated, no hepatosplenomegally    Medications    - MEDICATION INSTRUCTIONS -        aspirin  81 mg Oral Daily    artificial tears  1 drop Both Eyes BID    insulin aspart  1-7 Units Subcutaneous TID AC    insulin aspart  1-5 Units Subcutaneous At Bedtime    metoprolol succinate ER  25 mg Oral Daily    miconazole   Topical BID    OLANZapine  2.5 mg Oral At Bedtime    rosuvastatin  20 mg Oral Daily       Data   Recent Labs   Lab 08/18/23  1008 08/18/23  0549 08/17/23  2155 08/17/23  1105  08/17/23  0603 08/16/23  1208 08/16/23  0604 08/15/23  1855 08/15/23  1258   WBC  --  6.6  --   --  7.0  --  7.5  --  5.3   HGB  --  12.1*  --   --  13.0*  --  12.5*  --  11.4*   MCV  --  95  --   --  96  --  95  --  97   PLT  --  104*  --   --  123*  --  147*  --  145*   INR  --   --   --   --   --   --   --   --  1.17*   NA  --  139  --   --  140  --  137  --   --    POTASSIUM  --  3.5  --   --  3.9  --  3.7  --   --    CHLORIDE  --  104  --   --  103  --  103  --   --    CO2  --  23  --   --  22  --  20*  --   --    BUN  --  18.2  --   --  9.3  --  10.4  --   --    CR  --  0.69  --   --  0.60*  --  0.66*  --   --    ANIONGAP  --  12  --   --  15  --  14  --   --    FERMIN  --  9.2  --   --  9.4  --  9.1  --   --    * 107* 135*   < > 119*   < > 123*   < >  --     < > = values in this interval not displayed.         No results found for this or any previous visit (from the past 24 hour(s)).

## 2023-08-18 NOTE — PROGRESS NOTES
Care Management Follow Up    Length of Stay (days): 3    Expected Discharge Date: 08/18/2023     Concerns to be Addressed:       Patient plan of care discussed at interdisciplinary rounds: Yes    Anticipated Discharge Disposition: Skilled Nursing Facility     Anticipated Discharge Services: Other (see comment) (Therapy)  Anticipated Discharge DME:      Patient/family educated on Medicare website which has current facility and service quality ratings: no  Education Provided on the Discharge Plan:    Patient/Family in Agreement with the Plan: yes    Referrals Placed by CM/SW: Post Acute Facilities  Private pay costs discussed: Not applicable    Additional Information:  Patient has been accepted at [facility] and they can take patient today. Lian salazar has been approved. Auth #: [id number] and authorized from 8/18 - 8/24. NP working on discharge orders.     SW will continue to follow.     EMIR Arguello

## 2023-08-18 NOTE — PLAN OF CARE
Goal Outcome Evaluation:    VSS ex BP soft.  Up with 1 Gait belt and walker.  Neuros intact except intermittent mild confusion per wife, difficulty with commands and very forgetful.  A&O x 3, difficulty remembering exact date but gets close, hospital name,  situation and very forgetful.    Here for TAVR and had  pacemaker placed.  No PIV, patient pulled refusing. Groin sites good.  Fair appetite on cardiac diet.  Green for agression stp light tool.  Plan TCU at discharge.

## 2023-08-18 NOTE — PLAN OF CARE
Pt here for TAVR. A&Ox4 at time of assessment. Neuros intact, unable to perform shin to toe. VSS on RA. Cardiac diet. Up with A1-2 GBW. Denies pain. Pt scoring green on the Aggression Stop Light Tool. Plan for TCU at discharge.

## 2023-08-18 NOTE — PLAN OF CARE
Pt discharged to [place] with family whom provided wheelchair transportation. PIV was pulled by pt on an earlier shift. Cardiac telemetry was removed prior to discharge. Pt declines pain at the time of discharge. TCU packet sent with significant other.

## 2023-08-18 NOTE — PROGRESS NOTES
[facility]     Structural Cardiology Progress Note  Erick Shahid MRN: [id number]  1940  Date of Admission:8/15/2023  Primary care provider: Minh Samuel        Assessment and Plan:     rEick Shahid is a pleasant 83 year old admitted on 8/15/2023 for elective TAVR.    # Severe aortic stenosis s/p Transcatheter Aortic Valve Replacement  Now s/p 23 mm Allison Maribel 3 valve c/b CHB s/p dual chamber PPM. Groin sites remain soft without hematoma. No focal neurological deficits.  Postprocedure echocardiogram showed well-seated bioprosthetic aortic valve with a mean gradient of 15 mmHg, no PVL.  His LV function improved with EF 50 to 55%, previously 30 to 35%.  - Continue ASA 81 mg daily  - Cardiac rehab/PT/OT  - Continue to monitor on telemetry  - Lifelong antibiotic prophylaxis prior to all dental procedures.     # Transient CHB s/p dual chamber PPM  Patient had underlying LBBB and first-degree AV block prior to TAVR.  He developed transient complete heart block following valve deployment with heart rates in the 40s.  He underwent uneventful pacemaker implantation on 8/15/2023.  CXR with no pneumothorax. Interrogation stable. Pocket site intact without hematoma.   -Continue to monitor     # Chronic systolic heart failure with recovered EF to 50 to 55%, NYHA class II   Likely 2/2 valvular disease with LVEF 30-35%, now 50 to 55% post TAVR.  Euvolemic on exam. NTpBNP 2019, LVEDP 9.    - BB: continue PTA Toprol XL 25 mg daily  - Defer further initiation of GDMT to outpatient setting      # CAD with  of RCA  Pre-TAVR coronary angiogram showed  of non-dominant RCA that is being medically managed.   - Continue  mg daily [PTA ASA 81 mg daily]  - BB: continue PTA Toprol XL, as above   - Statin: continue PTA rosuvastatin 20 mg daily      # Type II diabetes   PTA metformin. A1C 6.0. sliding scale insulin while inpatient.   - Resume PTA metformin.      # Dementia  # Delirium  - Delirium  protocol  - PRN zyprexa     # Deconditioning  - PT/OT to eval for home therapy vs TCU, home with PT/OT would be favorable given delirium     FEN: Cardiac diet  Code Status: FULL CODE    Dispo: Awaiting TCU placement vs home with PT/OT, otherwise stable from a cardiology standpoint.     LESTER Hayward, CNP  [facility] Structural/Interventional Cardiology   Pager: [phone]         Interval History:   No acute events overnight. Remains pleasantly confused, alert to self and place, at baseline.   - s/p 23 mm Allison Maribel 3 valve  - Groin site CDI, soft, non-tender, no evidence of hematoma  - Pocket site intact without hematoma  - Cr 0.69, GFR > 90  - Hgb 12.1         Review of Systems:    10 point review of systems negative except for stated above in HPI.          Past Medical History:   Medical History reviewed.   Past Medical History:   Diagnosis Date    Benign essential hypertension     BPH (benign prostatic hyperplasia)     Chronic heart failure with reduced ejection fraction and diastolic dysfunction (H)     Cognitive impairment     Critical aortic valve stenosis     Diabetes mellitus, type 2 (H)     on metformin    Dyslipidemia     First degree atrioventricular block     LBBB (left bundle branch block)     Macular degeneration (senile) of retina              Past Surgical History:   Surgical History reviewed.   Past Surgical History:   Procedure Laterality Date    CV CORONARY ANGIOGRAM N/A 07/28/2023    Procedure: Coronary Angiogram;  Surgeon: Mando Field MD;  Location: [facility] CARDIAC CATH LAB    CV PCI N/A 07/28/2023    Procedure: Percutaneous Coronary Intervention;  Surgeon: Mando Field MD;  Location: [facility] CARDIAC CATH LAB    CV TRANSCATHETER AORTIC VALVE REPLACEMENT  08/15/2023    CV TRANSCATHETER AORTIC VALVE REPLACEMENT-FEMORAL APPROACH N/A 8/15/2023    Procedure: Transcatheter Aortic Valve Replacement-Femoral Approach;  Surgeon: Bossman Felipe MD;  Location: [facility]  CARDIAC CATH LAB    EP PACEMAKER DEVICE & LEAD IMPLANT- RIGHT ATRIAL & LEFT VENTRICULAR N/A 8/15/2023    Procedure: Pacemaker Device & Lead Implant- Right Atrial & Left Ventricular;  Surgeon: Oscar Mendoza MD;  Location:  HEART CARDIAC CATH LAB    ORIF HIP FRACTURE Right 2018             Social History:   Social History reviewed.  Social History     Tobacco Use    Smoking status: Never    Smokeless tobacco: Never   Substance Use Topics    Alcohol use: Never             Family History:   Family History reviewed.   History reviewed. No pertinent family history.          Allergies:   No Known Allergies          Medications:   Medications Reviewed.   Current Facility-Administered Medications   Medication    acetaminophen (TYLENOL) tablet 650 mg    aspirin (ASA) chewable tablet 81 mg    carboxymethylcellulose PF (REFRESH PLUS) 0.5 % ophthalmic solution 1 drop    glucose gel 15-30 g    Or    dextrose 50 % injection 25-50 mL    Or    glucagon injection 1 mg    HOLD:  Metformin and metformin containing medications if patient received IV contrast with acute kidney injury or severe chronic kidney disease (stage IV or stage V; i.e., eGFR less than 30)    HOLD: enoxaparin (LOVENOX) Post Procedure    HOLD: heparin (IV or Subcutaneous) Post Procedure    HOLD: metformin and metformin containing medications on day of procedure and 48 hours after IV contrast given    hydrALAZINE (APRESOLINE) injection 10 mg    insulin aspart (NovoLOG) injection (RAPID ACTING)    insulin aspart (NovoLOG) injection (RAPID ACTING)    Medication Instructions - Avoid dextrose in IV solutions.    metoprolol succinate ER (TOPROL XL) 24 hr tablet 25 mg    miconazole (MICATIN) 2 % powder    naloxone (NARCAN) injection 0.2 mg    Or    naloxone (NARCAN) injection 0.4 mg    Or    naloxone (NARCAN) injection 0.2 mg    Or    naloxone (NARCAN) injection 0.4 mg    nitroGLYcerin (NITROSTAT) sublingual tablet 0.4 mg    OLANZapine (zyPREXA) tablet 2.5 mg    Or     "OLANZapine (zyPREXA) injection 2.5 mg    OLANZapine (zyPREXA) tablet 2.5 mg    ondansetron (ZOFRAN ODT) ODT tab 4 mg    Or    ondansetron (ZOFRAN) injection 4 mg    oxyCODONE-acetaminophen (PERCOCET) 5-325 MG per tablet 1 tablet    rosuvastatin (CRESTOR) tablet 20 mg             Physical Exam:   Vitals were reviewed.  Blood pressure 111/59, pulse 68, temperature 98.3  F (36.8  C), temperature source Oral, resp. rate 16, height 1.702 m (5' 7\"), weight 80.2 kg (176 lb 12.9 oz), SpO2 99 %.    General: AAOx3, NAD  Skin: Not jaundiced, no rash, no ecchymoses. Groin site CDI, soft, non-tender, no signs of hematoma. No bruits.   HEENT: MMM, PERRLA, EOM intact  CV: RRR, normal S1S2, grade I GABBIE, clicks, rubs  Resp: Clear to auscultation bilaterally, no wheezes, rhonchi  Abd: Soft, non-tender, BS+, no masses appreciated  Extremities: warm and well perfused, palpable pulses, no edema  Neuro: No lateralizing symptoms or focal neurologic deficits        Labs:   Routine Labs:  No results found for: TROPI, TROPONIN, TROPR, TROPN  CMP  Recent Labs   Lab 08/18/23  0549 08/17/23  2155 08/17/23  1848 08/17/23  1105 08/17/23  0603 08/16/23  1208 08/16/23  0604 08/15/23  0946 08/15/23  0651     --   --   --  140  --  137  --  139   POTASSIUM 3.5  --   --   --  3.9  --  3.7  --  4.3   CHLORIDE 104  --   --   --  103  --  103  --  104   CO2 23  --   --   --  22  --  20*  --  23   ANIONGAP 12  --   --   --  15  --  14  --  12   * 135* 130* 110* 119*   < > 123*   < > 108*  110*   BUN 18.2  --   --   --  9.3  --  10.4  --  14.2   CR 0.69  --   --   --  0.60*  --  0.66*  --  0.74   GFRESTIMATED >90  --   --   --  >90  --  >90  --  89   FERMIN 9.2  --   --   --  9.4  --  9.1  --  9.5   MAG 2.0  --   --   --  2.0  --  1.9  --  2.2   PHOS 2.9  --   --   --  2.5  --  2.6  --  3.8    < > = values in this interval not displayed.     CBC  Recent Labs   Lab 08/18/23  0549 08/17/23  0603 08/16/23  0604 08/15/23  1258   WBC 6.6 7.0 7.5 5.3 "   RBC 3.76* 3.97* 3.82* 3.53*   HGB 12.1* 13.0* 12.5* 11.4*   HCT 35.8* 38.2* 36.2* 34.3*   MCV 95 96 95 97   MCH 32.2 32.7 32.7 32.3   MCHC 33.8 34.0 34.5 33.2   RDW 13.9 13.7 13.8 14.3   * 123* 147* 145*     INR  Recent Labs   Lab 08/15/23  1258   INR 1.17*           Diagnostics:    EKG 12Lead: AV paced rhythm    Echo: 8/16/2023  Interpretation Summary     The visual ejection fraction is 50-55%.  No regional wall motion abnormalities noted.  There is a bioprosthetic aortic valve.  The prosthetic aortic valve is well-seated.  The gradient is normal for this size prosthetic aortic valve 15mmHg.    TAVR: 8/15/23  Conclusion    1. Lifestyle-limiting severe aortic stenosis.  2. Successful transfemoral transcatheter aortic valve replacement with a 23mm Allison Maribel 3 valve.  3. Temporary pacemaker insertion.  4. Left heart catheterization with LVEDP of 15 mmHg.  6. Right and left common femoral arteriotomies successfully closed with closure devices.                  Plan     Follow bedrest per protocol   Continued medical management and lifestyle modifications for cardiovascular risk factor optimizations.   Arterial sheath removed from femoral artery with closure device.   Admit to inpatient        Post antiplatelet therapy of   Aspirin; give 81 mg qd .  1. Aspirin 81 mg po daily lifelong.  2. Bedrest per protocol.  3. Admit to the primary inpatient team for further evaluation and management.  4. Echocardiogram tomorrow.   5. Consult to EP due to transient block after valve deployment  6. Lifelong antibiotic prophylaxis prior to all dental procedures.       Medical Decision Making       70 MINUTES SPENT BY ME on the date of service doing chart review, history, exam, documentation & further activities per the note.

## 2023-08-19 NOTE — PLAN OF CARE
Physical Therapy Discharge Summary    Reason for therapy discharge:    Discharged to transitional care facility.    Progress towards therapy goal(s). See goals on Care Plan in [facility] electronic health record for goal details.  Goals not met.  Barriers to achieving goals:   discharge from facility and Progress toward goals, still with need for A x 1 for bed mobility and transfers, close SBA to CGA with gait.    Therapy recommendation(s):    Continued therapy is recommended.  Rationale/Recommendations:  Pt will benefit from continued skilled rehab services to progress independence and safety with mobility and continue education for optimal heart health..

## 2023-08-20 ENCOUNTER — LAB REQUISITION (OUTPATIENT)
Dept: LAB | Facility: CLINIC | Age: 83
End: 2023-08-20

## 2023-08-20 VITALS
RESPIRATION RATE: 16 BRPM | SYSTOLIC BLOOD PRESSURE: 120 MMHG | OXYGEN SATURATION: 99 % | WEIGHT: 172.6 LBS | BODY MASS INDEX: 27.03 KG/M2 | DIASTOLIC BLOOD PRESSURE: 60 MMHG | HEART RATE: 60 BPM | TEMPERATURE: 98.2 F

## 2023-08-20 DIAGNOSIS — Z00.01 ENCOUNTER FOR GENERAL ADULT MEDICAL EXAMINATION WITH ABNORMAL FINDINGS: ICD-10-CM

## 2023-08-20 LAB
ATRIAL RATE - MUSE: 80 BPM
DIASTOLIC BLOOD PRESSURE - MUSE: NORMAL MMHG
INTERPRETATION ECG - MUSE: NORMAL
P AXIS - MUSE: 61 DEGREES
PR INTERVAL - MUSE: 238 MS
QRS DURATION - MUSE: 112 MS
QT - MUSE: 398 MS
QTC - MUSE: 459 MS
R AXIS - MUSE: 56 DEGREES
SYSTOLIC BLOOD PRESSURE - MUSE: NORMAL MMHG
T AXIS - MUSE: 269 DEGREES
VENTRICULAR RATE- MUSE: 80 BPM

## 2023-08-21 ENCOUNTER — TRANSITIONAL CARE UNIT VISIT (OUTPATIENT)
Dept: GERIATRICS | Facility: CLINIC | Age: 83
End: 2023-08-21
Payer: COMMERCIAL

## 2023-08-21 DIAGNOSIS — D62 ACUTE BLOOD LOSS AS CAUSE OF POSTOPERATIVE ANEMIA: ICD-10-CM

## 2023-08-21 DIAGNOSIS — I35.0 SEVERE AORTIC STENOSIS: Primary | ICD-10-CM

## 2023-08-21 DIAGNOSIS — I10 ESSENTIAL HYPERTENSION: ICD-10-CM

## 2023-08-21 DIAGNOSIS — Z95.0 CARDIAC PACEMAKER IN SITU: ICD-10-CM

## 2023-08-21 DIAGNOSIS — E11.9 TYPE 2 DIABETES MELLITUS WITHOUT COMPLICATION, WITHOUT LONG-TERM CURRENT USE OF INSULIN (H): ICD-10-CM

## 2023-08-21 DIAGNOSIS — R53.81 PHYSICAL DECONDITIONING: ICD-10-CM

## 2023-08-21 DIAGNOSIS — K59.01 SLOW TRANSIT CONSTIPATION: ICD-10-CM

## 2023-08-21 DIAGNOSIS — I44.2 COMPLETE HEART BLOCK (H): ICD-10-CM

## 2023-08-21 DIAGNOSIS — E78.5 HYPERLIPIDEMIA, UNSPECIFIED HYPERLIPIDEMIA TYPE: ICD-10-CM

## 2023-08-21 DIAGNOSIS — Z95.2 S/P TAVR (TRANSCATHETER AORTIC VALVE REPLACEMENT): ICD-10-CM

## 2023-08-21 DIAGNOSIS — I25.10 CORONARY ARTERY DISEASE INVOLVING NATIVE CORONARY ARTERY OF NATIVE HEART WITHOUT ANGINA PECTORIS: ICD-10-CM

## 2023-08-21 DIAGNOSIS — I50.32 CHRONIC HEART FAILURE WITH PRESERVED EJECTION FRACTION (H): ICD-10-CM

## 2023-08-21 DIAGNOSIS — F03.A0 MILD DEMENTIA WITHOUT BEHAVIORAL DISTURBANCE, PSYCHOTIC DISTURBANCE, MOOD DISTURBANCE, OR ANXIETY, UNSPECIFIED DEMENTIA TYPE (H): ICD-10-CM

## 2023-08-21 DIAGNOSIS — Z71.89 ADVANCED DIRECTIVES, COUNSELING/DISCUSSION: ICD-10-CM

## 2023-08-21 PROCEDURE — 36415 COLL VENOUS BLD VENIPUNCTURE: CPT | Performed by: NURSE PRACTITIONER

## 2023-08-21 PROCEDURE — 99309 SBSQ NF CARE MODERATE MDM 30: CPT | Performed by: NURSE PRACTITIONER

## 2023-08-21 PROCEDURE — 86481 TB AG RESPONSE T-CELL SUSP: CPT | Performed by: NURSE PRACTITIONER

## 2023-08-21 PROCEDURE — P9604 ONE-WAY ALLOW PRORATED TRIP: HCPCS | Performed by: NURSE PRACTITIONER

## 2023-08-21 NOTE — PROGRESS NOTES
[facility] GERIATRICS    PRIMARY CARE PROVIDER AND CLINIC:  Minh Samuel MD, [address] [place] [address] / [facility] [id number]  Chief Complaint   Patient presents with    Hospital F/U      [place] Medical Record Number:  [id number]  Place of Service where encounter took place:  [facility] ([facility]) [[id number]]    Erick Shahid  is a 83 year old  (1940), admitted to the above facility from  [facility]. Hospital stay 8/15/23 through 8/18/23..   HPI:    83-year-old male PMH sCHF with EF 50-55% on Toprol XL, CAD, dementia, and critical symptomatic aortic stenosis now s/p aortic valve replacement with 23 mm Allison Maribel 3 valve with Dr. 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 Confused - CT head negative. On ASA daily. Moderate R ICA stenosis: on asa and statin. DM2 on metformin. Macular degeneration and on drops. To TCU for rehab.     Seen for initial TCU visit. Asks to be DNR/DNI. No headaches, dizziness, chest pain, bladder issues. Denies pain or dyspnea. Reports constipated. BP range 107-155/57-77 and sats 100% room air.     CODE STATUS/ADVANCE DIRECTIVES DISCUSSION:  No CPR- Do NOT Intubate  DNR/DNI  ALLERGIES: No Known Allergies   PAST MEDICAL HISTORY:   Past Medical History:   Diagnosis Date    Benign essential hypertension     BPH (benign prostatic hyperplasia)     Chronic heart failure with reduced ejection fraction and diastolic dysfunction (H)     Cognitive impairment     Critical aortic valve stenosis     Diabetes mellitus, type 2 (H)     on metformin    Dyslipidemia     First degree atrioventricular block     LBBB (left bundle branch block)     Macular degeneration (senile) of retina       PAST SURGICAL HISTORY:   has a past surgical history that includes Coronary Angiogram (N/A, 07/28/2023); Percutaneous Coronary Intervention (N/A, 07/28/2023); Transcatheter Aortic Valve Replacement (08/15/2023); Orif Hip Fracture (Right,  2018); Pacemaker Device & Lead Implant- Right Atrial & Left Ventricular (N/A, 8/15/2023); and Transcatheter Aortic Valve Replacement-Femoral Approach (N/A, 8/15/2023).  FAMILY HISTORY: family history is not on file.  SOCIAL HISTORY:   reports that he has never smoked. He has never used smokeless tobacco. He reports that he does not drink alcohol and does not use drugs.  Patient's living condition: lives with spouse    Post Discharge Medication Reconciliation Status:   MED REC REQUIRED  Post Medication Reconciliation Status:  Discharge medications reconciled, continue medications without change       Current Outpatient Medications   Medication Sig    aspirin (ASA) 81 MG chewable tablet Take 1 tablet (81 mg) by mouth daily    cycloSPORINE (RESTASIS) 0.05 % ophthalmic emulsion Apply 1 drop to eye 2 times daily    metFORMIN (GLUCOPHAGE) 1000 MG tablet Take 1 tablet by mouth daily (with dinner)    methylcellulose (CITRUCEL) powder Take 1 teaspoonful by mouth daily    metoprolol succinate ER (TOPROL XL) 25 MG 24 hr tablet Take 25 mg by mouth daily    nystatin (MYCOSTATIN) 729679 UNIT/GM external powder Apply 1 strip topically 3 times daily as needed (jock itch)    rosuvastatin (CRESTOR) 20 MG tablet Take 1 tablet by mouth daily    vitamin B-12 (CYANOCOBALAMIN) 1000 MCG tablet Take 1,000 mcg by mouth daily    vitamin C (ASCORBIC ACID) 500 MG tablet Take 500 mg by mouth daily    Vitamin D3 (CHOLECALCIFEROL) 25 mcg (1000 units) tablet Take 25 mcg by mouth daily     No current facility-administered medications for this visit.       ROS:  10 point ROS of systems including Constitutional, Eyes, Respiratory, Cardiovascular, Gastroenterology, Genitourinary, Integumentary, Musculoskeletal, Psychiatric were all negative except for pertinent positives noted in my HPI.    Vitals:  /60   Pulse 60   Temp 98.2  F (36.8  C)   Resp 16   Wt 78.3 kg (172 lb 9.6 oz)   SpO2 99%   BMI 27.03 kg/m    Exam:  GENERAL APPEARANCE:   Alert, in no distress, pleasant, cooperative, oriented x self and place and recent events  EYES:  EOM, lids, pupils and irises normal, sclera clear and conjunctiva normal, no discharge or mattering on lids or lashes noted  ENT:  Mouth normal, moist mucous membranes, nose normal without drainage or crusting, external ears without lesions, hearing acuity impaired  NECK: supple, symmetrical, trachea midline  RESP:  respiratory effort normal, no chest wall tenderness, no respiratory distress, Lung sounds clear, patient is on room air  CV:  Auscultation of heart done, rate and rhythm controlled and regular with audible valve click, no murmur, no rub or gallop. Edema trace LE bilaterally.   ABDOMEN:  hypoactive bowel sounds, soft, nontender, no palpable masses.  M/S:   Gait and station unsafe without assistance, no tenderness or swelling of the joints; able to move all extremities, digits normal  NEURO: cranial nerves 2-12 grossly intact, no facial asymmetry, no speech deficits and able to follow directions, moves all extremities symmetrically  PSYCH:  insight and judgement and memory appear impaired, affect and mood normal     Lab/Diagnostic data:  Most Recent 3 CBC's:  Recent Labs   Lab Test 08/23/23  0520 08/18/23  0549 08/17/23  0603   WBC 6.7 6.6 7.0   HGB 11.5* 12.1* 13.0*   MCV 98 95 96    104* 123*     Most Recent 3 BMP's:  Recent Labs   Lab Test 08/23/23  0520 08/18/23  1155 08/18/23  1008 08/18/23  0549 08/17/23  1105 08/17/23  0603     --   --  139  --  140   POTASSIUM 4.0  --   --  3.5  --  3.9   CHLORIDE 104  --   --  104  --  103   CO2 23  --   --  23  --  22   BUN 12.9  --   --  18.2  --  9.3   CR 0.70  --   --  0.69  --  0.60*   ANIONGAP 14  --   --  12  --  15   FERMIN 9.5  --   --  9.2  --  9.4   GLC 89 158* 128* 107*   < > 119*    < > = values in this interval not displayed.       ASSESSMENT/PLAN:  Severe aortic stenosis, s/p TAVR  Complete heart block  Cardiac pacemaker  placement  HF  CAD  HTN  HLD  Acute on chronic, stable and asymptomatic. Continue aspirin 81 mg daily, metoprolol ER 25 mg daily, rosuvastatin 20 mg daily. Monitor vs and wt. Follow-up with cardiology as directed. Check BMP 8/23     DM type II  Chronic, well managed with metformin 1000 mg daily. Check BG PRN.      Anemia  Last Hgb 11.5. Check CBC on 8/23     Dementia  Forgetful. Monitor for safety and f/u with cognitive test results.      Physical deconditioning  Acute. Therapies as ordered and f/u progress.     Constipation  New complaint. Add senna s 2 tabs daily. Staff to update provider if not effective.     Advance directives  Asks to be DNR/DNI    Orders:  DNR/DNI  Senna s 2 tabs daily diagnosis constipation  CBC and BMP on 8/23 diagnosis HF, anemia    Electronically signed by:  LESTER Valdez CNP

## 2023-08-22 ENCOUNTER — LAB REQUISITION (OUTPATIENT)
Dept: LAB | Facility: CLINIC | Age: 83
End: 2023-08-22

## 2023-08-22 ENCOUNTER — DOCUMENTATION ONLY (OUTPATIENT)
Dept: OTHER | Facility: CLINIC | Age: 83
End: 2023-08-22
Payer: COMMERCIAL

## 2023-08-22 DIAGNOSIS — D64.9 ANEMIA, UNSPECIFIED: ICD-10-CM

## 2023-08-22 LAB
GAMMA INTERFERON BACKGROUND BLD IA-ACNC: 0.1 IU/ML
M TB IFN-G BLD-IMP: NEGATIVE
M TB IFN-G CD4+ BCKGRND COR BLD-ACNC: 9.9 IU/ML
MITOGEN IGNF BCKGRD COR BLD-ACNC: 0 IU/ML
MITOGEN IGNF BCKGRD COR BLD-ACNC: 0.03 IU/ML
QUANTIFERON MITOGEN: 10 IU/ML
QUANTIFERON NIL TUBE: 0.1 IU/ML
QUANTIFERON TB1 TUBE: 0.13 IU/ML
QUANTIFERON TB2 TUBE: 0.1

## 2023-08-23 ENCOUNTER — ANCILLARY PROCEDURE (OUTPATIENT)
Dept: CARDIOLOGY | Facility: CLINIC | Age: 83
End: 2023-08-23
Attending: INTERNAL MEDICINE
Payer: COMMERCIAL

## 2023-08-23 DIAGNOSIS — Z95.0 CARDIAC PACEMAKER IN SITU: ICD-10-CM

## 2023-08-23 LAB
ANION GAP SERPL CALCULATED.3IONS-SCNC: 14 MMOL/L (ref 7–15)
ATRIAL RATE - MUSE: 65 BPM
BUN SERPL-MCNC: 12.9 MG/DL (ref 8–23)
CALCIUM SERPL-MCNC: 9.5 MG/DL (ref 8.8–10.2)
CHLORIDE SERPL-SCNC: 104 MMOL/L (ref 98–107)
CREAT SERPL-MCNC: 0.7 MG/DL (ref 0.67–1.17)
DEPRECATED HCO3 PLAS-SCNC: 23 MMOL/L (ref 22–29)
DIASTOLIC BLOOD PRESSURE - MUSE: NORMAL MMHG
ERYTHROCYTE [DISTWIDTH] IN BLOOD BY AUTOMATED COUNT: 14.2 % (ref 10–15)
GFR SERPL CREATININE-BSD FRML MDRD: 90 ML/MIN/1.73M2
GLUCOSE SERPL-MCNC: 89 MG/DL (ref 70–99)
HCT VFR BLD AUTO: 34.7 % (ref 40–53)
HGB BLD-MCNC: 11.5 G/DL (ref 13.3–17.7)
INTERPRETATION ECG - MUSE: NORMAL
MCH RBC QN AUTO: 32.5 PG (ref 26.5–33)
MCHC RBC AUTO-ENTMCNC: 33.1 G/DL (ref 31.5–36.5)
MCV RBC AUTO: 98 FL (ref 78–100)
P AXIS - MUSE: 90 DEGREES
PLATELET # BLD AUTO: 151 10E3/UL (ref 150–450)
POTASSIUM SERPL-SCNC: 4 MMOL/L (ref 3.4–5.3)
PR INTERVAL - MUSE: 238 MS
QRS DURATION - MUSE: 114 MS
QT - MUSE: 452 MS
QTC - MUSE: 470 MS
R AXIS - MUSE: 79 DEGREES
RBC # BLD AUTO: 3.54 10E6/UL (ref 4.4–5.9)
SODIUM SERPL-SCNC: 141 MMOL/L (ref 136–145)
SYSTOLIC BLOOD PRESSURE - MUSE: NORMAL MMHG
T AXIS - MUSE: -89 DEGREES
VENTRICULAR RATE- MUSE: 65 BPM
WBC # BLD AUTO: 6.7 10E3/UL (ref 4–11)

## 2023-08-23 PROCEDURE — 36415 COLL VENOUS BLD VENIPUNCTURE: CPT | Performed by: NURSE PRACTITIONER

## 2023-08-23 PROCEDURE — 93280 PM DEVICE PROGR EVAL DUAL: CPT | Performed by: INTERNAL MEDICINE

## 2023-08-23 PROCEDURE — 85027 COMPLETE CBC AUTOMATED: CPT | Performed by: NURSE PRACTITIONER

## 2023-08-23 PROCEDURE — 80048 BASIC METABOLIC PNL TOTAL CA: CPT | Performed by: NURSE PRACTITIONER

## 2023-08-24 LAB
MDC_IDC_LEAD_IMPLANT_DT: NORMAL
MDC_IDC_LEAD_IMPLANT_DT: NORMAL
MDC_IDC_LEAD_LOCATION: NORMAL
MDC_IDC_LEAD_LOCATION: NORMAL
MDC_IDC_LEAD_LOCATION_DETAIL_1: NORMAL
MDC_IDC_LEAD_LOCATION_DETAIL_1: NORMAL
MDC_IDC_LEAD_MFG: NORMAL
MDC_IDC_LEAD_MFG: NORMAL
MDC_IDC_LEAD_MODEL: NORMAL
MDC_IDC_LEAD_MODEL: NORMAL
MDC_IDC_LEAD_POLARITY_TYPE: NORMAL
MDC_IDC_LEAD_POLARITY_TYPE: NORMAL
MDC_IDC_LEAD_SERIAL: NORMAL
MDC_IDC_LEAD_SERIAL: NORMAL
MDC_IDC_MSMT_BATTERY_DTM: NORMAL
MDC_IDC_MSMT_BATTERY_REMAINING_LONGEVITY: 140 MO
MDC_IDC_MSMT_BATTERY_RRT_TRIGGER: 2.62
MDC_IDC_MSMT_BATTERY_STATUS: NORMAL
MDC_IDC_MSMT_BATTERY_VOLTAGE: 3.22 V
MDC_IDC_MSMT_LEADCHNL_RA_IMPEDANCE_VALUE: 418 OHM
MDC_IDC_MSMT_LEADCHNL_RA_IMPEDANCE_VALUE: 589 OHM
MDC_IDC_MSMT_LEADCHNL_RA_PACING_THRESHOLD_AMPLITUDE: 1.62 V
MDC_IDC_MSMT_LEADCHNL_RA_PACING_THRESHOLD_AMPLITUDE: 1.75 V
MDC_IDC_MSMT_LEADCHNL_RA_PACING_THRESHOLD_PULSEWIDTH: 0.4 MS
MDC_IDC_MSMT_LEADCHNL_RA_PACING_THRESHOLD_PULSEWIDTH: 0.4 MS
MDC_IDC_MSMT_LEADCHNL_RA_SENSING_INTR_AMPL: 1.5 MV
MDC_IDC_MSMT_LEADCHNL_RA_SENSING_INTR_AMPL: 1.62 MV
MDC_IDC_MSMT_LEADCHNL_RV_IMPEDANCE_VALUE: 608 OHM
MDC_IDC_MSMT_LEADCHNL_RV_IMPEDANCE_VALUE: 760 OHM
MDC_IDC_MSMT_LEADCHNL_RV_PACING_THRESHOLD_AMPLITUDE: 0.5 V
MDC_IDC_MSMT_LEADCHNL_RV_PACING_THRESHOLD_PULSEWIDTH: 0.4 MS
MDC_IDC_MSMT_LEADCHNL_RV_SENSING_INTR_AMPL: 10.62 MV
MDC_IDC_MSMT_LEADCHNL_RV_SENSING_INTR_AMPL: 13.38 MV
MDC_IDC_PG_IMPLANT_DTM: NORMAL
MDC_IDC_PG_MFG: NORMAL
MDC_IDC_PG_MODEL: NORMAL
MDC_IDC_PG_SERIAL: NORMAL
MDC_IDC_PG_TYPE: NORMAL
MDC_IDC_SESS_CLINIC_NAME: NORMAL
MDC_IDC_SESS_DTM: NORMAL
MDC_IDC_SESS_TYPE: NORMAL
MDC_IDC_SET_BRADY_AT_MODE_SWITCH_RATE: 171 {BEATS}/MIN
MDC_IDC_SET_BRADY_HYSTRATE: NORMAL
MDC_IDC_SET_BRADY_LOWRATE: 60 {BEATS}/MIN
MDC_IDC_SET_BRADY_MAX_SENSOR_RATE: 130 {BEATS}/MIN
MDC_IDC_SET_BRADY_MAX_TRACKING_RATE: 130 {BEATS}/MIN
MDC_IDC_SET_BRADY_MODE: NORMAL
MDC_IDC_SET_BRADY_PAV_DELAY_LOW: 180 MS
MDC_IDC_SET_BRADY_SAV_DELAY_LOW: 150 MS
MDC_IDC_SET_LEADCHNL_RA_PACING_AMPLITUDE: 2.5 V
MDC_IDC_SET_LEADCHNL_RA_PACING_ANODE_ELECTRODE_1: NORMAL
MDC_IDC_SET_LEADCHNL_RA_PACING_ANODE_LOCATION_1: NORMAL
MDC_IDC_SET_LEADCHNL_RA_PACING_CAPTURE_MODE: NORMAL
MDC_IDC_SET_LEADCHNL_RA_PACING_CATHODE_ELECTRODE_1: NORMAL
MDC_IDC_SET_LEADCHNL_RA_PACING_CATHODE_LOCATION_1: NORMAL
MDC_IDC_SET_LEADCHNL_RA_PACING_POLARITY: NORMAL
MDC_IDC_SET_LEADCHNL_RA_PACING_PULSEWIDTH: 0.4 MS
MDC_IDC_SET_LEADCHNL_RA_SENSING_ANODE_ELECTRODE_1: NORMAL
MDC_IDC_SET_LEADCHNL_RA_SENSING_ANODE_LOCATION_1: NORMAL
MDC_IDC_SET_LEADCHNL_RA_SENSING_CATHODE_ELECTRODE_1: NORMAL
MDC_IDC_SET_LEADCHNL_RA_SENSING_CATHODE_LOCATION_1: NORMAL
MDC_IDC_SET_LEADCHNL_RA_SENSING_POLARITY: NORMAL
MDC_IDC_SET_LEADCHNL_RA_SENSING_SENSITIVITY: 0.3 MV
MDC_IDC_SET_LEADCHNL_RV_PACING_AMPLITUDE: 2.5 V
MDC_IDC_SET_LEADCHNL_RV_PACING_ANODE_ELECTRODE_1: NORMAL
MDC_IDC_SET_LEADCHNL_RV_PACING_ANODE_LOCATION_1: NORMAL
MDC_IDC_SET_LEADCHNL_RV_PACING_CAPTURE_MODE: NORMAL
MDC_IDC_SET_LEADCHNL_RV_PACING_CATHODE_ELECTRODE_1: NORMAL
MDC_IDC_SET_LEADCHNL_RV_PACING_CATHODE_LOCATION_1: NORMAL
MDC_IDC_SET_LEADCHNL_RV_PACING_POLARITY: NORMAL
MDC_IDC_SET_LEADCHNL_RV_PACING_PULSEWIDTH: 0.4 MS
MDC_IDC_SET_LEADCHNL_RV_SENSING_ANODE_ELECTRODE_1: NORMAL
MDC_IDC_SET_LEADCHNL_RV_SENSING_ANODE_LOCATION_1: NORMAL
MDC_IDC_SET_LEADCHNL_RV_SENSING_CATHODE_ELECTRODE_1: NORMAL
MDC_IDC_SET_LEADCHNL_RV_SENSING_CATHODE_LOCATION_1: NORMAL
MDC_IDC_SET_LEADCHNL_RV_SENSING_POLARITY: NORMAL
MDC_IDC_SET_LEADCHNL_RV_SENSING_SENSITIVITY: 0.9 MV
MDC_IDC_SET_ZONE_DETECTION_INTERVAL: 350 MS
MDC_IDC_SET_ZONE_DETECTION_INTERVAL: 400 MS
MDC_IDC_SET_ZONE_TYPE: NORMAL
MDC_IDC_STAT_AT_BURDEN_PERCENT: 0 %
MDC_IDC_STAT_AT_DTM_END: NORMAL
MDC_IDC_STAT_AT_DTM_START: NORMAL
MDC_IDC_STAT_BRADY_AP_VP_PERCENT: 59.15 %
MDC_IDC_STAT_BRADY_AP_VS_PERCENT: 0.94 %
MDC_IDC_STAT_BRADY_AS_VP_PERCENT: 36.46 %
MDC_IDC_STAT_BRADY_AS_VS_PERCENT: 3.45 %
MDC_IDC_STAT_BRADY_DTM_END: NORMAL
MDC_IDC_STAT_BRADY_DTM_START: NORMAL
MDC_IDC_STAT_BRADY_RA_PERCENT_PACED: 61.74 %
MDC_IDC_STAT_BRADY_RV_PERCENT_PACED: 95.61 %
MDC_IDC_STAT_EPISODE_RECENT_COUNT: 0
MDC_IDC_STAT_EPISODE_RECENT_COUNT_DTM_END: NORMAL
MDC_IDC_STAT_EPISODE_RECENT_COUNT_DTM_START: NORMAL
MDC_IDC_STAT_EPISODE_TOTAL_COUNT: 0
MDC_IDC_STAT_EPISODE_TOTAL_COUNT_DTM_END: NORMAL
MDC_IDC_STAT_EPISODE_TOTAL_COUNT_DTM_START: NORMAL
MDC_IDC_STAT_EPISODE_TYPE: NORMAL

## 2023-08-24 NOTE — PROGRESS NOTES
[facility]  INITIAL VISIT NOTE  August 25, 2023    PRIMARY CARE PROVIDER AND CLINIC:  Minh Samuel W [address] [place] [address] / [facility] [id number]    CHIEF COMPLAINT:  Hospital follow-up/Initial visit    HPI:    Erick Shahid is a 83 year old  (1940) male who was seen at [facility] on August 25, 2023 for an initial visit.     Medical history is notable for dementia, CHF, CAD, hypertension, dyslipidemia, right carotid artery stenosis, DM type II, BPH, macular degeneration, sensorineural hearing loss, and recent hospitalization from July 23 through August 2, 2023 for critical aortic stenosis and syncope.    Summary of hospital course:  Patient was re-hospitalized at [facility] from August 15 through August 16, 2023 for elective aortic valve replacement.  Patient underwent TAVR on August 15, 2023.  Post procedure, patient developed complete heart block requiring implantation of a dual-chamber pacemaker.  Hospital course was further complicated by increased confusion.  Code stroke was called, but CT head showed no acute intracranial pathology and CT perfusion was negative for acute infarct. CT angio head and neck was significant for approximately 80% symptoms of the origin of the right internal carotid artery.  TCU was recommended per therapies.    Patient is admitted to this facility for medical management, nursing care, and rehab.     Of note, history was obtained from patient, facility RN, and extensive review of the chart.    Today's visit:  Patient was seen in his room, while sitting in a wheelchair.  He appears frail but comfortable.  He denies fever, chills, chest pain, palpitation, dyspnea, nausea, vomiting, abdominal pain, or urinary symptoms.  He had a bowel movement earlier today.  His stool was brown according to the patient.      CODE STATUS:   DNR / DNI    PAST MEDICAL HISTORY:   Dementia  Heart failure with recovered EF (LVEF 50-55%, per ECHO on August  15, 2023)  Severe aortic stenosis, s/p TAVR on August 15, 2023  Post TAVR complete heart block, s/p dual-chamber pacemaker on August 15, 2023  LBBB  CAD (90% proximal stenosis of a small size nondominant RCA and 25% mid LAD stenosis, per coronary angiogram in 2023)  Hypertension  Dyslipidemia  Moderate (80%) right ICA stenosis  DM type II  BPH  Urinary incontinence  Macular degeneration  Sensorineural hearing loss      Past Medical History:   Diagnosis Date     Benign essential hypertension      BPH (benign prostatic hyperplasia)      Chronic heart failure with reduced ejection fraction and diastolic dysfunction (H)      Cognitive impairment      Critical aortic valve stenosis      Diabetes mellitus, type 2 (H)     on metformin     Dyslipidemia      First degree atrioventricular block      LBBB (left bundle branch block)      Macular degeneration (senile) of retina        PAST SURGICAL HISTORY:   Past Surgical History:   Procedure Laterality Date     CV CORONARY ANGIOGRAM N/A 2023    Procedure: Coronary Angiogram;  Surgeon: Mando Field MD;  Location: [facility] CARDIAC CATH LAB     CV PCI N/A 2023    Procedure: Percutaneous Coronary Intervention;  Surgeon: Mando Field MD;  Location: [facility] CARDIAC CATH LAB     CV TRANSCATHETER AORTIC VALVE REPLACEMENT  08/15/2023     CV TRANSCATHETER AORTIC VALVE REPLACEMENT-FEMORAL APPROACH N/A 8/15/2023    Procedure: Transcatheter Aortic Valve Replacement-Femoral Approach;  Surgeon: Bosmsan Felipe MD;  Location: [facility] CARDIAC CATH LAB     EP PACEMAKER DEVICE & LEAD IMPLANT- RIGHT ATRIAL & LEFT VENTRICULAR N/A 8/15/2023    Procedure: Pacemaker Device & Lead Implant- Right Atrial & Left Ventricular;  Surgeon: Oscar Mendoza MD;  Location: [facility] CARDIAC CATH LAB     ORIF HIP FRACTURE Right 2018       FAMILY HISTORY:   Father  at age 29 from rheumatic fever.  Mother had heart disease and  at age 50.    SOCIAL HISTORY:  Social  "History     Tobacco Use     Smoking status: Never     Smokeless tobacco: Never   Substance Use Topics     Alcohol use: Never       MEDICATIONS:  Current Outpatient Medications   Medication Sig Dispense Refill     aspirin (ASA) 81 MG chewable tablet Take 1 tablet (81 mg) by mouth daily 90 tablet 3     cycloSPORINE (RESTASIS) 0.05 % ophthalmic emulsion Apply 1 drop to eye 2 times daily       metFORMIN (GLUCOPHAGE) 1000 MG tablet Take 1 tablet by mouth daily (with dinner)       methylcellulose (CITRUCEL) powder Take 1 teaspoonful by mouth daily       metoprolol succinate ER (TOPROL XL) 25 MG 24 hr tablet Take 25 mg by mouth daily       nystatin (MYCOSTATIN) 739706 UNIT/GM external powder Apply 1 strip topically 3 times daily as needed (jock itch)       rosuvastatin (CRESTOR) 20 MG tablet Take 1 tablet by mouth daily       vitamin B-12 (CYANOCOBALAMIN) 1000 MCG tablet Take 1,000 mcg by mouth daily       vitamin C (ASCORBIC ACID) 500 MG tablet Take 500 mg by mouth daily       Vitamin D3 (CHOLECALCIFEROL) 25 mcg (1000 units) tablet Take 25 mcg by mouth daily         MED REC REQUIRED  Post Medication Reconciliation Status: medication reconcilation previously completed during another office visit       ALLERGIES:  No Known Allergies    ROS:  10 point ROS were negative other than the symptoms noted above in the HPI.    PHYSICAL EXAM:  Vital signs were reviewed in the chart.  Vital Signs: BP (!) 166/77   Pulse 66   Temp 97.7  F (36.5  C)   Resp 18   Ht 1.702 m (5' 7\")   Wt 79.6 kg (175 lb 6.4 oz)   SpO2 100%   BMI 27.47 kg/m     General: Frail appearing but comfortable and in no acute distress  HEENT: No conjunctival pallor, no scleral icterus or injection, moist oral mucosa  Cardiovascular: Normal S1, S2, RRR  Respiratory: Lungs clear to auscultation bilaterally  GI: Abdomen soft, non-tender, non-distended, +BS  Extremities: 1+ bilateral LE edema  Neuro: CX II-XII grossly intact; ROM in all four extremities grossly " intact  Psych: Alert and oriented almost x3; normal affect  Skin: No acute rash    LABORATORY/IMAGING DATA:  All relevant labs and imaging data in [facility] and/or Care Everywhere were personally reviewed today.      Most Recent 3 CBC's:Recent Labs   Lab Test 08/23/23  0520 08/18/23  0549 08/17/23  0603   WBC 6.7 6.6 7.0   HGB 11.5* 12.1* 13.0*   MCV 98 95 96    104* 123*     Most Recent 3 BMP's:Recent Labs   Lab Test 08/23/23  0520 08/18/23  1155 08/18/23  1008 08/18/23  0549 08/17/23  1105 08/17/23  0603     --   --  139  --  140   POTASSIUM 4.0  --   --  3.5  --  3.9   CHLORIDE 104  --   --  104  --  103   CO2 23  --   --  23  --  22   BUN 12.9  --   --  18.2  --  9.3   CR 0.70  --   --  0.69  --  0.60*   ANIONGAP 14  --   --  12  --  15   FERMIN 9.5  --   --  9.2  --  9.4   GLC 89 158* 128* 107*   < > 119*    < > = values in this interval not displayed.     Most Recent 2 LFT's:Recent Labs   Lab Test 07/23/23  1445   AST 22   ALT 11   ALKPHOS 75   BILITOTAL 1.1     Most Recent Cholesterol Panel:Recent Labs   Lab Test 08/16/23  0036   CHOL 116   LDL 48   HDL 51   TRIG 84         ASSESSMENT/PLAN:  Severe aortic stenosis, s/p TAVR on August 15, 2023.  Stable.  Plan:  Continue aspirin 81 mg daily  Follow-up with cardiology as directed    Post TAVR complete heart block, s/p dual-chamber pacemaker on August 15, 2023,  LBBB.  Last EKG on August 18, 2023 revealed a paced rhythm.  Plan:  Follow-up with cardiology as directed    Heart failure with recovered EF (LVEF 50-55%, per ECHO on August 15, 2023).  Patient currently weighs 175.4 LBS and has 1+ bilateral lower extremity  Plan:  Continue metoprolol ER 25 mg daily  Monitor weight and volume status  Follow-up with cardiology as directed    CAD (90% proximal stenosis of a small size nondominant RCA and 25% mid LAD stenosis, per coronary angiogram in July 2023),  Essential hypertension,  Dyslipidemia,  Moderate (80%) right ICA stenosis.  CAD is managed  medically.  Stable.  Plan:  Continue aspirin 81 mg daily  Continue metoprolol ER 25 mg daily  Continue rosuvastatin 20 mg daily  Monitor blood pressure and cardiac status  Follow-up with cardiology as directed    DM type II.  Last hemoglobin A1c 6% on July 30, 2023.  Blood glucose levels in the range of  in the hospital.  Plan:  Continue metformin 1000 mg daily  Monitor blood glucose twice a week     Anemia.  Mild.  Normal hemoglobin of 13.4 on July 23.  Last CBC on August 23 with hemoglobin 11.5 and MCV 98.  Plan:  Monitor for signs or symptoms of blood loss  Monitor hemoglobin periodically    Dementia.  On today' examination, he is oriented almost x3.  Plan:  Standard delirium precautions  Staff to assist with daily care and mobility  Formal cognitive evaluation per OT for safe discharge planning    Physical deconditioning.  Plan:  Continue PT/OT evaluation and therapy        Orders written by provider at facility:  Monitor blood glucose twice a week, Dx: DM type II            Disclaimer: This note may contain text created using speech-recognition software and may contain unintended word substitutions.      Electronically signed by:  Nadeem Ochoa MD

## 2023-08-25 ENCOUNTER — TRANSITIONAL CARE UNIT VISIT (OUTPATIENT)
Dept: GERIATRICS | Facility: CLINIC | Age: 83
End: 2023-08-25
Payer: COMMERCIAL

## 2023-08-25 VITALS
RESPIRATION RATE: 18 BRPM | DIASTOLIC BLOOD PRESSURE: 77 MMHG | TEMPERATURE: 97.7 F | HEIGHT: 67 IN | HEART RATE: 66 BPM | OXYGEN SATURATION: 100 % | SYSTOLIC BLOOD PRESSURE: 166 MMHG | WEIGHT: 175.4 LBS | BODY MASS INDEX: 27.53 KG/M2

## 2023-08-25 DIAGNOSIS — I25.10 CORONARY ARTERY DISEASE INVOLVING NATIVE CORONARY ARTERY OF NATIVE HEART WITHOUT ANGINA PECTORIS: ICD-10-CM

## 2023-08-25 DIAGNOSIS — R53.81 PHYSICAL DECONDITIONING: ICD-10-CM

## 2023-08-25 DIAGNOSIS — I65.21 STENOSIS OF RIGHT CAROTID ARTERY: ICD-10-CM

## 2023-08-25 DIAGNOSIS — I50.9 CHRONIC CONGESTIVE HEART FAILURE, UNSPECIFIED HEART FAILURE TYPE (H): ICD-10-CM

## 2023-08-25 DIAGNOSIS — E78.5 DYSLIPIDEMIA: ICD-10-CM

## 2023-08-25 DIAGNOSIS — I10 ESSENTIAL HYPERTENSION: ICD-10-CM

## 2023-08-25 DIAGNOSIS — E11.69 TYPE 2 DIABETES MELLITUS WITH OTHER SPECIFIED COMPLICATION, WITHOUT LONG-TERM CURRENT USE OF INSULIN (H): ICD-10-CM

## 2023-08-25 DIAGNOSIS — I35.0 SEVERE AORTIC STENOSIS: Primary | ICD-10-CM

## 2023-08-25 DIAGNOSIS — I44.2 COMPLETE HEART BLOCK (H): ICD-10-CM

## 2023-08-25 DIAGNOSIS — D64.9 ANEMIA, UNSPECIFIED TYPE: ICD-10-CM

## 2023-08-25 DIAGNOSIS — Z95.2 S/P TAVR (TRANSCATHETER AORTIC VALVE REPLACEMENT): ICD-10-CM

## 2023-08-25 DIAGNOSIS — Z95.0 CARDIAC PACEMAKER IN SITU: ICD-10-CM

## 2023-08-25 DIAGNOSIS — F03.90 DEMENTIA, UNSPECIFIED DEMENTIA SEVERITY, UNSPECIFIED DEMENTIA TYPE, UNSPECIFIED WHETHER BEHAVIORAL, PSYCHOTIC, OR MOOD DISTURBANCE OR ANXIETY (H): ICD-10-CM

## 2023-08-25 PROCEDURE — 99305 1ST NF CARE MODERATE MDM 35: CPT | Performed by: INTERNAL MEDICINE

## 2023-08-25 NOTE — LETTER
8/25/2023        RE: Erick Shahid  [address]  [place] [address]        [facility] [facility]  INITIAL VISIT NOTE  August 25, 2023    PRIMARY CARE PROVIDER AND CLINIC:  Minh Samuel [address] [place] [address] / [facility] [address]    CHIEF COMPLAINT:  Hospital follow-up/Initial visit    HPI:    Erick Shahid is a 83 year old  (1940) male who was seen at [facility] on August 25, 2023 for an initial visit.     Medical history is notable for dementia, CHF, CAD, hypertension, dyslipidemia, right carotid artery stenosis, DM type II, BPH, macular degeneration, sensorineural hearing loss, and recent hospitalization from July 23 through August 2, 2023 for critical aortic stenosis and syncope.    Summary of hospital course:  Patient was re-hospitalized at [facility] from August 15 through August 16, 2023 for elective aortic valve replacement.  Patient underwent TAVR on August 15, 2023.  Post procedure, patient developed complete heart block requiring implantation of a dual-chamber pacemaker.  Hospital course was further complicated by increased confusion.  Code stroke was called, but CT head showed no acute intracranial pathology and CT perfusion was negative for acute infarct. CT angio head and neck was significant for approximately 80% symptoms of the origin of the right internal carotid artery.  TCU was recommended per therapies.    Patient is admitted to this facility for medical management, nursing care, and rehab.     Of note, history was obtained from patient, facility RN, and extensive review of the chart.    Today's visit:  Patient was seen in his room, while sitting in a wheelchair.  He appears frail but comfortable.  He denies fever, chills, chest pain, palpitation, dyspnea, nausea, vomiting, abdominal pain, or urinary symptoms.  He had a bowel movement earlier today.  His stool was brown according to the patient.      CODE STATUS:   DNR /  DNI    PAST MEDICAL HISTORY:   Dementia  Heart failure with recovered EF (LVEF 50-55%, per ECHO on August 15, 2023)  Severe aortic stenosis, s/p TAVR on August 15, 2023  Post TAVR complete heart block, s/p dual-chamber pacemaker on August 15, 2023  LBBB  CAD (90% proximal stenosis of a small size nondominant RCA and 25% mid LAD stenosis, per coronary angiogram in 2023)  Hypertension  Dyslipidemia  Moderate (80%) right ICA stenosis  DM type II  BPH  Urinary incontinence  Macular degeneration  Sensorineural hearing loss      Past Medical History:   Diagnosis Date     Benign essential hypertension      BPH (benign prostatic hyperplasia)      Chronic heart failure with reduced ejection fraction and diastolic dysfunction (H)      Cognitive impairment      Critical aortic valve stenosis      Diabetes mellitus, type 2 (H)     on metformin     Dyslipidemia      First degree atrioventricular block      LBBB (left bundle branch block)      Macular degeneration (senile) of retina        PAST SURGICAL HISTORY:   Past Surgical History:   Procedure Laterality Date     CV CORONARY ANGIOGRAM N/A 2023    Procedure: Coronary Angiogram;  Surgeon: Mando Field MD;  Location: [facility] CARDIAC CATH LAB     CV PCI N/A 2023    Procedure: Percutaneous Coronary Intervention;  Surgeon: Mando Field MD;  Location: [facility] CARDIAC CATH LAB     CV TRANSCATHETER AORTIC VALVE REPLACEMENT  08/15/2023     CV TRANSCATHETER AORTIC VALVE REPLACEMENT-FEMORAL APPROACH N/A 8/15/2023    Procedure: Transcatheter Aortic Valve Replacement-Femoral Approach;  Surgeon: Bossman Felipe MD;  Location: [facility] CARDIAC CATH LAB     EP PACEMAKER DEVICE & LEAD IMPLANT- RIGHT ATRIAL & LEFT VENTRICULAR N/A 8/15/2023    Procedure: Pacemaker Device & Lead Implant- Right Atrial & Left Ventricular;  Surgeon: Oscar Mendoza MD;  Location: [facility] CARDIAC CATH LAB     ORIF HIP FRACTURE Right 2018       FAMILY HISTORY:   Father   "at age 29 from rheumatic fever.  Mother had heart disease and  at age 50.    SOCIAL HISTORY:  Social History     Tobacco Use     Smoking status: Never     Smokeless tobacco: Never   Substance Use Topics     Alcohol use: Never       MEDICATIONS:  Current Outpatient Medications   Medication Sig Dispense Refill     aspirin (ASA) 81 MG chewable tablet Take 1 tablet (81 mg) by mouth daily 90 tablet 3     cycloSPORINE (RESTASIS) 0.05 % ophthalmic emulsion Apply 1 drop to eye 2 times daily       metFORMIN (GLUCOPHAGE) 1000 MG tablet Take 1 tablet by mouth daily (with dinner)       methylcellulose (CITRUCEL) powder Take 1 teaspoonful by mouth daily       metoprolol succinate ER (TOPROL XL) 25 MG 24 hr tablet Take 25 mg by mouth daily       nystatin (MYCOSTATIN) 120405 UNIT/GM external powder Apply 1 strip topically 3 times daily as needed (jock itch)       rosuvastatin (CRESTOR) 20 MG tablet Take 1 tablet by mouth daily       vitamin B-12 (CYANOCOBALAMIN) 1000 MCG tablet Take 1,000 mcg by mouth daily       vitamin C (ASCORBIC ACID) 500 MG tablet Take 500 mg by mouth daily       Vitamin D3 (CHOLECALCIFEROL) 25 mcg (1000 units) tablet Take 25 mcg by mouth daily         MED REC REQUIRED  Post Medication Reconciliation Status: medication reconcilation previously completed during another office visit       ALLERGIES:  No Known Allergies    ROS:  10 point ROS were negative other than the symptoms noted above in the HPI.    PHYSICAL EXAM:  Vital signs were reviewed in the chart.  Vital Signs: BP (!) 166/77   Pulse 66   Temp 97.7  F (36.5  C)   Resp 18   Ht 1.702 m (5' 7\")   Wt 79.6 kg (175 lb 6.4 oz)   SpO2 100%   BMI 27.47 kg/m     General: Frail appearing but comfortable and in no acute distress  HEENT: No conjunctival pallor, no scleral icterus or injection, moist oral mucosa  Cardiovascular: Normal S1, S2, RRR  Respiratory: Lungs clear to auscultation bilaterally  GI: Abdomen soft, non-tender, non-distended, " +BS  Extremities: 1+ bilateral LE edema  Neuro: CX II-XII grossly intact; ROM in all four extremities grossly intact  Psych: Alert and oriented almost x3; normal affect  Skin: No acute rash    LABORATORY/IMAGING DATA:  All relevant labs and imaging data in [facility] and/or Care Everywhere were personally reviewed today.      Most Recent 3 CBC's:Recent Labs   Lab Test 08/23/23  0520 08/18/23  0549 08/17/23  0603   WBC 6.7 6.6 7.0   HGB 11.5* 12.1* 13.0*   MCV 98 95 96    104* 123*     Most Recent 3 BMP's:Recent Labs   Lab Test 08/23/23  0520 08/18/23  1155 08/18/23  1008 08/18/23  0549 08/17/23  1105 08/17/23  0603     --   --  139  --  140   POTASSIUM 4.0  --   --  3.5  --  3.9   CHLORIDE 104  --   --  104  --  103   CO2 23  --   --  23  --  22   BUN 12.9  --   --  18.2  --  9.3   CR 0.70  --   --  0.69  --  0.60*   ANIONGAP 14  --   --  12  --  15   FERMIN 9.5  --   --  9.2  --  9.4   GLC 89 158* 128* 107*   < > 119*    < > = values in this interval not displayed.     Most Recent 2 LFT's:Recent Labs   Lab Test 07/23/23  1445   AST 22   ALT 11   ALKPHOS 75   BILITOTAL 1.1     Most Recent Cholesterol Panel:Recent Labs   Lab Test 08/16/23  0036   CHOL 116   LDL 48   HDL 51   TRIG 84         ASSESSMENT/PLAN:  Severe aortic stenosis, s/p TAVR on August 15, 2023.  Stable.  Plan:  Continue aspirin 81 mg daily  Follow-up with cardiology as directed    Post TAVR complete heart block, s/p dual-chamber pacemaker on August 15, 2023,  LBBB.  Last EKG on August 18, 2023 revealed a paced rhythm.  Plan:  Follow-up with cardiology as directed    Heart failure with recovered EF (LVEF 50-55%, PER ECHO on August 15, 2023).  Patient currently weighs 175.4 LBS and has 1+ bilateral lower extremity  Plan:  Continue metoprolol ER 25 mg daily  Monitor weight and volume status  Follow-up with cardiology as directed    CAD (90% proximal stenosis of a small size nondominant RCA and 25% mid LAD stenosis, per coronary angiogram in July  2023),  Essential hypertension,  Dyslipidemia,  Moderate (80%) right ICA stenosis.  CAD is managed medically.  Stable.  Plan:  Continue aspirin 81 mg daily  Continue metoprolol ER 25 mg daily  Continue rosuvastatin 20 mg daily  Monitor blood pressure and cardiac status  Follow-up with cardiology as directed    DM type II.  Last hemoglobin A1c 6% on July 30, 2023.  Blood glucose levels in the range of  in the hospital.  Plan:  Continue metformin 1000 mg daily  Monitor blood glucose twice a week     Anemia.  Mild.  Normal hemoglobin of 13.4 on July 23.  Last CBC on August 23 with hemoglobin 11.5 and MCV 98.  Plan:  Monitor for signs or symptoms of blood loss  Monitor hemoglobin periodically    Dementia.  On today' examination, he is oriented only x3.  Plan:  Standard delirium precautions  Staff to assist with daily care and mobility  Formal cognitive evaluation per OT for safe discharge planning    Physical deconditioning.  Plan:  Continue PT/OT evaluation and therapy        Orders written by provider at facility:  Monitor blood glucose twice a week, Dx: DM type II            Disclaimer: This note may contain text created using speech-recognition software and may contain unintended word substitutions.      Electronically signed by:  Nadeem Ochoa MD                          Sincerely,        Nadeem Ochoa MD

## 2023-08-28 ENCOUNTER — LAB REQUISITION (OUTPATIENT)
Dept: LAB | Facility: CLINIC | Age: 83
End: 2023-08-28

## 2023-08-28 DIAGNOSIS — N39.0 URINARY TRACT INFECTION, SITE NOT SPECIFIED: ICD-10-CM

## 2023-08-28 LAB
ALBUMIN UR-MCNC: 10 MG/DL
APPEARANCE UR: CLEAR
BILIRUB UR QL STRIP: NEGATIVE
CAOX CRY #/AREA URNS HPF: ABNORMAL /HPF
COLOR UR AUTO: YELLOW
GLUCOSE UR STRIP-MCNC: NEGATIVE MG/DL
HGB UR QL STRIP: NEGATIVE
KETONES UR STRIP-MCNC: NEGATIVE MG/DL
LEUKOCYTE ESTERASE UR QL STRIP: NEGATIVE
MUCOUS THREADS #/AREA URNS LPF: PRESENT /LPF
NITRATE UR QL: NEGATIVE
PH UR STRIP: 5 [PH] (ref 5–7)
RBC URINE: 1 /HPF
SP GR UR STRIP: 1.03 (ref 1–1.03)
SQUAMOUS EPITHELIAL: <1 /HPF
UROBILINOGEN UR STRIP-MCNC: NORMAL MG/DL
WBC URINE: 1 /HPF

## 2023-08-28 PROCEDURE — 81001 URINALYSIS AUTO W/SCOPE: CPT | Performed by: NURSE PRACTITIONER

## 2023-08-28 PROCEDURE — 87086 URINE CULTURE/COLONY COUNT: CPT | Performed by: NURSE PRACTITIONER

## 2023-08-29 VITALS
OXYGEN SATURATION: 99 % | HEART RATE: 60 BPM | WEIGHT: 170 LBS | DIASTOLIC BLOOD PRESSURE: 72 MMHG | BODY MASS INDEX: 26.68 KG/M2 | TEMPERATURE: 98.2 F | HEIGHT: 67 IN | SYSTOLIC BLOOD PRESSURE: 132 MMHG | RESPIRATION RATE: 16 BRPM

## 2023-08-30 ENCOUNTER — TRANSITIONAL CARE UNIT VISIT (OUTPATIENT)
Dept: GERIATRICS | Facility: CLINIC | Age: 83
End: 2023-08-30
Payer: COMMERCIAL

## 2023-08-30 ENCOUNTER — DOCUMENTATION ONLY (OUTPATIENT)
Dept: OTHER | Facility: CLINIC | Age: 83
End: 2023-08-30

## 2023-08-30 DIAGNOSIS — Z95.0 CARDIAC PACEMAKER IN SITU: ICD-10-CM

## 2023-08-30 DIAGNOSIS — E11.69 TYPE 2 DIABETES MELLITUS WITH OTHER SPECIFIED COMPLICATION, WITHOUT LONG-TERM CURRENT USE OF INSULIN (H): ICD-10-CM

## 2023-08-30 DIAGNOSIS — I50.9 CHRONIC CONGESTIVE HEART FAILURE, UNSPECIFIED HEART FAILURE TYPE (H): ICD-10-CM

## 2023-08-30 DIAGNOSIS — F03.90 DEMENTIA, UNSPECIFIED DEMENTIA SEVERITY, UNSPECIFIED DEMENTIA TYPE, UNSPECIFIED WHETHER BEHAVIORAL, PSYCHOTIC, OR MOOD DISTURBANCE OR ANXIETY (H): ICD-10-CM

## 2023-08-30 DIAGNOSIS — E78.5 DYSLIPIDEMIA: ICD-10-CM

## 2023-08-30 DIAGNOSIS — R53.81 PHYSICAL DECONDITIONING: ICD-10-CM

## 2023-08-30 DIAGNOSIS — I10 ESSENTIAL HYPERTENSION: ICD-10-CM

## 2023-08-30 DIAGNOSIS — Z95.2 S/P TAVR (TRANSCATHETER AORTIC VALVE REPLACEMENT): ICD-10-CM

## 2023-08-30 DIAGNOSIS — I35.0 SEVERE AORTIC STENOSIS: Primary | ICD-10-CM

## 2023-08-30 DIAGNOSIS — K59.01 SLOW TRANSIT CONSTIPATION: ICD-10-CM

## 2023-08-30 DIAGNOSIS — I44.2 COMPLETE HEART BLOCK (H): ICD-10-CM

## 2023-08-30 DIAGNOSIS — D64.9 ANEMIA, UNSPECIFIED TYPE: ICD-10-CM

## 2023-08-30 DIAGNOSIS — I25.10 CORONARY ARTERY DISEASE INVOLVING NATIVE CORONARY ARTERY OF NATIVE HEART WITHOUT ANGINA PECTORIS: ICD-10-CM

## 2023-08-30 LAB — BACTERIA UR CULT: NORMAL

## 2023-08-30 PROCEDURE — 99309 SBSQ NF CARE MODERATE MDM 30: CPT | Performed by: NURSE PRACTITIONER

## 2023-08-30 RX ORDER — POLYETHYLENE GLYCOL 3350 17 G/17G
1 POWDER, FOR SOLUTION ORAL DAILY
COMMUNITY

## 2023-08-30 NOTE — PROGRESS NOTES
"[facility]    Chief Complaint   Patient presents with    RECHECK     HPI:  Erick Shahid is a 83 year old  (1940), who is being seen today for an episodic care visit at: [facility] (TCU) [[id number]].     Per recent TCU provider progress notes:   83-year-old male PMH sCHF with EF 50-55% on Toprol XL, CAD, dementia, and critical symptomatic aortic stenosis now s/p aortic valve replacement with 23 mm Allison Maribel 3 valve with Dr. 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 Confused - CT head negative. On ASA daily. Moderate R ICA stenosis: on asa and statin. DM2 on metformin. Macular degeneration and on drops. To TCU for rehab.     Today's concern is: episodic f/u pain, mobility, vs, cognition. Confused and forgetful - SLUMS 12/30. Wt down 5 lbs since admission. BP range 111-132/58-72 and sats 100% room air. Reports constipation. No pain. Denies dyspnea, bladder issues. Seated exercises in therapies.     Allergies, and PMH/PSH reviewed in [facility] today.  REVIEW OF SYSTEMS:  Limited secondary to cognitive impairment but today pt reports constipation    Objective:   /72   Pulse 60   Temp 98.2  F (36.8  C)   Resp 16   Ht 1.702 m (5' 7\")   Wt 77.1 kg (170 lb)   SpO2 99%   BMI 26.63 kg/m    GENERAL APPEARANCE:  Alert, in no distress, pleasant, cooperative, oriented x self and place and recent events  EYES:  EOM, lids, pupils and irises normal, sclera clear and conjunctiva normal, no discharge or mattering on lids or lashes noted  ENT:  Mouth normal, moist mucous membranes, nose normal without drainage or crusting, external ears without lesions, hearing acuity impaired  RESP:  respiratory effort normal, no chest wall tenderness, no respiratory distress, Lung sounds clear, patient is on room air  CV:  Auscultation of heart done, rate and rhythm controlled and regular with audible valve click, no murmur, no rub or gallop. Edema trace LE " bilaterally.   ABDOMEN:  hypoactive bowel sounds, soft, nontender, no palpable masses.  M/S:   Gait and station unsafe without assistance, no tenderness or swelling of the joints; able to move all extremities, digits normal  NEURO: cranial nerves 2-12 grossly intact, no facial asymmetry, no speech deficits and able to follow directions, moves all extremities symmetrically  PSYCH:  insight and judgement and memory appear impaired, affect and mood normal     Most Recent 3 CBC's:  Recent Labs   Lab Test 08/23/23  0520 08/18/23  0549 08/17/23  0603   WBC 6.7 6.6 7.0   HGB 11.5* 12.1* 13.0*   MCV 98 95 96    104* 123*     Most Recent 3 BMP's:  Recent Labs   Lab Test 08/23/23  0520 08/18/23  1155 08/18/23  1008 08/18/23  0549 08/17/23  1105 08/17/23  0603     --   --  139  --  140   POTASSIUM 4.0  --   --  3.5  --  3.9   CHLORIDE 104  --   --  104  --  103   CO2 23  --   --  23  --  22   BUN 12.9  --   --  18.2  --  9.3   CR 0.70  --   --  0.69  --  0.60*   ANIONGAP 14  --   --  12  --  15   FERMIN 9.5  --   --  9.2  --  9.4   GLC 89 158* 128* 107*   < > 119*    < > = values in this interval not displayed.       Assessment/Plan:  Severe aortic stenosis, s/p TAVR  Complete heart block  Cardiac pacemaker placement  HF  CAD  HTN  HLD  Acute on chronic, stable and asymptomatic. Continue aspirin 81 mg daily, metoprolol ER 25 mg daily, rosuvastatin 20 mg daily. Monitor vs and wt. Follow-up with cardiology as directed. Check BMP 8/23 with Cr 0.70.      DM type II  Chronic, well managed with metformin 1000 mg daily. Check BG PRN.      Anemia  Last Hgb 11.5. Check CBC on 8/23 with hgb 11.5.      Dementia  Forgetful. Monitor for safety and f/u with cognitive test results.      Physical deconditioning  Acute. Therapies as ordered and f/u progress.     Constipation  Ongoing. Added senna s 2 tabs daily. Add Miralax 17 gm daily. Staff to update provider if not effective.    MED REC REQUIRED  Post Medication Reconciliation  Status:  Discharge medications reconciled and changed, see notes/orders    Orders:  Start Miralax 17 gm PO daily diagnosis constipation    Electronically signed by: LESTER Valdez CNP

## 2023-08-31 ENCOUNTER — OFFICE VISIT (OUTPATIENT)
Dept: CARDIOLOGY | Facility: CLINIC | Age: 83
End: 2023-08-31
Payer: COMMERCIAL

## 2023-08-31 VITALS
HEART RATE: 60 BPM | DIASTOLIC BLOOD PRESSURE: 71 MMHG | OXYGEN SATURATION: 99 % | HEIGHT: 67 IN | SYSTOLIC BLOOD PRESSURE: 131 MMHG | WEIGHT: 173 LBS | BODY MASS INDEX: 27.15 KG/M2

## 2023-08-31 DIAGNOSIS — Z95.3 S/P TAVR (TRANSCATHETER AORTIC VALVE REPLACEMENT), BIOPROSTHETIC: Primary | ICD-10-CM

## 2023-08-31 PROCEDURE — 99214 OFFICE O/P EST MOD 30 MIN: CPT | Performed by: NURSE PRACTITIONER

## 2023-08-31 NOTE — PROGRESS NOTES
Cardiology Clinic Progress Note  Erick Shahid MRN# [id number]   YOB: 1940 Age: 83 year old     Primary cardiologist: Dr. Felipe     Reason for visit: s/p TAVR     History of presenting illness:    Erick Shahid is a very pleasant 83-year-old gentleman with chronic systolic heart failure, coronary disease, dementia, and critical symptomatic aortic stenosis s/p TAVR with 23 mm Allison Maribel 3 valve on 8/15/23 complicated by complete heart block requiring dual chamber PPM implantation, who presents today for follow-up.     Overall his procedure went well.  He had transient complete heart block postoperatively and underwent implantation of dual-chamber permanent pacemaker.  His postprocedure echocardiogram showed well-seated bioprosthetic aortic valve with a mean gradient of 15 mmHg, no AI.  His LVEF improved to 50 to 55%, previously 30 to 35%.  He was discharged on 8/18/2023 to TCU.    Today Bernardo is doing well.  He is tolerating rehab at the TCU without any symptoms.  He denies any chest pain, shortness of breath, syncope or near syncope, or palpitations.  He has no PND or orthopnea.  He denies any pain or bleeding from his groin access sites.  No pain or redness or warmth at his pocket site.    His blood pressure is well controlled.  His weight is stable.           Assessment and Plan:     ASSESSMENT:    Critical aortic stenosis s/p TAVR with 23 mm Allison Maribel 3 valve on 8/15/23.  His postprocedure echo is outlined above, gradient is within normal limits.  He is recovering well at his TCU.  He is on ASA 81 mg daily.  CHB s/p PPM.  His device check showed normal device function.  Chronic systolic heart failure with improved EF to 50-55%, NYHA class I.  He has no clinical signs of heart failure.  CAD with known  of RCA.  He is asymptomatic without angina.  On ASA, Toprol-XL, and rosuvastatin.  Cognitive dysfunction  Type II diabetes       PLAN:     Bernardo appears to be doing well from a  "cardiovascular standpoint, I have made no changes to his regimen today.  He will continue ongoing rehab at his transitional care facility.  He will follow-up with me at 1 month's time with repeat echocardiogram, labs, and EKG.       Sheri Davis DNP, APRN, CNP  Page: [phone] (8a-5p M-F)    Orders this Visit:  No orders of the defined types were placed in this encounter.    No orders of the defined types were placed in this encounter.    There are no discontinued medications.    Today's clinic visit entailed:  Review of the result(s) of each unique test - echo, labs, EKG  Ordering of each unique test  Prescription drug management  35 minutes spent by me on the date of the encounter doing chart review, review of test results, patient visit, documentation, and discussion with family   Provider  Link to [facility] Help Grid     The level of medical decision making during this visit was of moderate complexity.           Review of Systems:     Review of Systems:  Skin:        Eyes:       ENT:       Respiratory:  Negative    Cardiovascular:  Negative    Gastroenterology:      Genitourinary:       Musculoskeletal:       Neurologic:       Psychiatric:       Heme/Lymph/Imm:       Endocrine:                 Physical Exam:   Vitals: /71   Pulse 60   Ht 1.702 m (5' 7\")   Wt 78.5 kg (173 lb)   SpO2 99%   BMI 27.10 kg/m    Constitutional:  cooperative        Skin:  warm and dry to the touch, no apparent skin lesions or masses noted incision healing well      Head:  normocephalic        Eyes:  pupils equal and round        ENT:  not assessed this visit        Neck:  JVP normal        Chest:  normal breath sounds, clear to auscultation, normal A-P diameter, normal symmetry, normal respiratory excursion, no use of accessory muscles        Cardiac: regular rhythm;normal S1 and S2       systolic ejection murmur;grade 1          Abdomen:  abdomen soft        Vascular: pulses full and equal                                  "     Extremities and Back:  no edema        Neurological:  no gross motor deficits;affect appropriate             Medications:     Current Outpatient Medications   Medication Sig Dispense Refill    aspirin (ASA) 81 MG chewable tablet Take 1 tablet (81 mg) by mouth daily 90 tablet 3    cycloSPORINE (RESTASIS) 0.05 % ophthalmic emulsion Apply 1 drop to eye 2 times daily      metFORMIN (GLUCOPHAGE) 1000 MG tablet Take 1 tablet by mouth daily (with dinner)      methylcellulose (CITRUCEL) powder Take 1 teaspoonful by mouth daily      metoprolol succinate ER (TOPROL XL) 25 MG 24 hr tablet Take 25 mg by mouth daily      nystatin (MYCOSTATIN) 163597 UNIT/GM external powder Apply 1 strip topically 3 times daily as needed (jock itch)      polyethylene glycol (MIRALAX) 17 g packet Take 1 packet by mouth daily      rosuvastatin (CRESTOR) 20 MG tablet Take 1 tablet by mouth daily      vitamin B-12 (CYANOCOBALAMIN) 1000 MCG tablet Take 1,000 mcg by mouth daily      vitamin C (ASCORBIC ACID) 500 MG tablet Take 500 mg by mouth daily      Vitamin D3 (CHOLECALCIFEROL) 25 mcg (1000 units) tablet Take 25 mcg by mouth daily         History reviewed. No pertinent family history.    Social History     Socioeconomic History    Marital status:      Spouse name: Not on file    Number of children: Not on file    Years of education: Not on file    Highest education level: Not on file   Occupational History    Not on file   Tobacco Use    Smoking status: Never    Smokeless tobacco: Never   Vaping Use    Vaping Use: Never used   Substance and Sexual Activity    Alcohol use: Never    Drug use: Never    Sexual activity: Not on file   Other Topics Concern    Not on file   Social History Narrative    Not on file     Social Determinants of Health     Financial Resource Strain: Not on file   Food Insecurity: Not on file   Transportation Needs: Not on file   Physical Activity: Not on file   Stress: Not on file   Social Connections: Not on file    Intimate Partner Violence: Not on file   Housing Stability: Not on file            Past Medical History:     Past Medical History:   Diagnosis Date    Benign essential hypertension     BPH (benign prostatic hyperplasia)     Chronic heart failure with reduced ejection fraction and diastolic dysfunction (H)     Cognitive impairment     Critical aortic valve stenosis     Diabetes mellitus, type 2 (H)     on metformin    Dyslipidemia     First degree atrioventricular block     LBBB (left bundle branch block)     Macular degeneration (senile) of retina               Past Surgical History:     Past Surgical History:   Procedure Laterality Date    CV CORONARY ANGIOGRAM N/A 07/28/2023    Procedure: Coronary Angiogram;  Surgeon: Mando Field MD;  Location: [facility] CARDIAC CATH LAB    CV PCI N/A 07/28/2023    Procedure: Percutaneous Coronary Intervention;  Surgeon: Mando Field MD;  Location: [facility] CARDIAC CATH LAB    CV TRANSCATHETER AORTIC VALVE REPLACEMENT  08/15/2023    CV TRANSCATHETER AORTIC VALVE REPLACEMENT-FEMORAL APPROACH N/A 8/15/2023    Procedure: Transcatheter Aortic Valve Replacement-Femoral Approach;  Surgeon: Bossman Felipe MD;  Location: [facility] CARDIAC CATH LAB    EP PACEMAKER DEVICE & LEAD IMPLANT- RIGHT ATRIAL & LEFT VENTRICULAR N/A 8/15/2023    Procedure: Pacemaker Device & Lead Implant- Right Atrial & Left Ventricular;  Surgeon: Oscar Mendoza MD;  Location:  HEART CARDIAC CATH LAB    ORIF HIP FRACTURE Right 2018              Allergies:   Patient has no known allergies.       Data:   All laboratory data reviewed:    Recent Labs   Lab Test 08/16/23  0036 08/15/23  0651 07/27/23  1827   LDL 48  --   --    HDL 51  --   --    NHDL 65  --   --    CHOL 116  --   --    TRIG 84  --   --    TSH  --  2.32  --    NTBNP  --   --  2,019*       Lab Results   Component Value Date    WBC 6.7 08/23/2023    RBC 3.54 (L) 08/23/2023    HGB 11.5 (L) 08/23/2023    HCT 34.7 (L) 08/23/2023     MCV 98 08/23/2023    MCH 32.5 08/23/2023    MCHC 33.1 08/23/2023    RDW 14.2 08/23/2023     08/23/2023       Lab Results   Component Value Date     08/23/2023    POTASSIUM 4.0 08/23/2023    CHLORIDE 104 08/23/2023    CO2 23 08/23/2023    ANIONGAP 14 08/23/2023    GLC 89 08/23/2023     (H) 08/18/2023    BUN 12.9 08/23/2023    CR 0.70 08/23/2023    GFRESTIMATED 90 08/23/2023    FERMIN 9.5 08/23/2023      Lab Results   Component Value Date    AST 22 07/23/2023    ALT 11 07/23/2023       Lab Results   Component Value Date    A1C 6.0 (H) 07/30/2023       Lab Results   Component Value Date    INR 1.17 (H) 08/15/2023    INR 1.07 07/27/2023

## 2023-08-31 NOTE — LETTER
8/31/2023    Minh Samuel MD  [address] [facility] [address]    RE: Erick Shahid       Dear Colleague,     I had the pleasure of seeing Erick Shahid in the [facility].  Cardiology Clinic Progress Note  Erick Shahid MRN# [id number]   YOB: 1940 Age: 83 year old     Primary cardiologist: Dr. Felipe     Reason for visit: s/p TAVR     History of presenting illness:    Erick Shhaid is a very pleasant 83-year-old gentleman with chronic systolic heart failure, coronary disease, dementia, and critical symptomatic aortic stenosis s/p TAVR with 23 mm Allison Maribel 3 valve on 8/15/23 complicated by complete heart block requiring dual chamber PPM implantation, who presents today for follow-up.     Overall his procedure went well.  He had transient complete heart block postoperatively and underwent implantation of dual-chamber permanent pacemaker.  His postprocedure echocardiogram showed well-seated bioprosthetic aortic valve with a mean gradient of 15 mmHg, no AI.  His LVEF improved to 50 to 55%, previously 30 to 35%.  He was discharged on 8/18/2023 to TCU.    Today Bernardo is doing well.  He is tolerating rehab at the TCU without any symptoms.  He denies any chest pain, shortness of breath, syncope or near syncope, or palpitations.  He has no PND or orthopnea.  He denies any pain or bleeding from his groin access sites.  No pain or redness or warmth at his pocket site.    His blood pressure is well controlled.  His weight is stable.           Assessment and Plan:     ASSESSMENT:    Critical aortic stenosis s/p TAVR with 23 mm Allison Maribel 3 valve on 8/15/23.  His postprocedure echo is outlined above, gradient is within normal limits.  He is recovering well at his TCU.  He is on ASA 81 mg daily.  CHB s/p PPM.  His device check showed normal device function.  Chronic systolic heart failure with improved EF to 50-55%, NYHA class I.  He has no clinical signs of heart  "failure.  CAD with known  of RCA.  He is asymptomatic without angina.  On ASA, Toprol-XL, and rosuvastatin.  Cognitive dysfunction  Type II diabetes       PLAN:     Bernardo appears to be doing well from a cardiovascular standpoint, I have made no changes to his regimen today.  He will continue ongoing rehab at his transitional care facility.  He will follow-up with me at 1 month's time with repeat echocardiogram, labs, and EKG.       Sheri Davis, DNP, APRN, CNP  Page: [phone] (8a-5p M-F)    Orders this Visit:  No orders of the defined types were placed in this encounter.    No orders of the defined types were placed in this encounter.    There are no discontinued medications.    Today's clinic visit entailed:  Review of the result(s) of each unique test - echo, labs, EKG  Ordering of each unique test  Prescription drug management  35 minutes spent by me on the date of the encounter doing chart review, review of test results, patient visit, documentation, and discussion with family   Provider  Link to [facility] Help Grid     The level of medical decision making during this visit was of moderate complexity.           Review of Systems:     Review of Systems:  Skin:        Eyes:       ENT:       Respiratory:  Negative    Cardiovascular:  Negative    Gastroenterology:      Genitourinary:       Musculoskeletal:       Neurologic:       Psychiatric:       Heme/Lymph/Imm:       Endocrine:                 Physical Exam:   Vitals: /71   Pulse 60   Ht 1.702 m (5' 7\")   Wt 78.5 kg (173 lb)   SpO2 99%   BMI 27.10 kg/m    Constitutional:  cooperative        Skin:  warm and dry to the touch, no apparent skin lesions or masses noted incision healing well      Head:  normocephalic        Eyes:  pupils equal and round        ENT:  not assessed this visit        Neck:  JVP normal        Chest:  normal breath sounds, clear to auscultation, normal A-P diameter, normal symmetry, normal respiratory excursion, no use of " accessory muscles        Cardiac: regular rhythm;normal S1 and S2       systolic ejection murmur;grade 1          Abdomen:  abdomen soft        Vascular: pulses full and equal                                      Extremities and Back:  no edema        Neurological:  no gross motor deficits;affect appropriate             Medications:     Current Outpatient Medications   Medication Sig Dispense Refill    aspirin (ASA) 81 MG chewable tablet Take 1 tablet (81 mg) by mouth daily 90 tablet 3    cycloSPORINE (RESTASIS) 0.05 % ophthalmic emulsion Apply 1 drop to eye 2 times daily      metFORMIN (GLUCOPHAGE) 1000 MG tablet Take 1 tablet by mouth daily (with dinner)      methylcellulose (CITRUCEL) powder Take 1 teaspoonful by mouth daily      metoprolol succinate ER (TOPROL XL) 25 MG 24 hr tablet Take 25 mg by mouth daily      nystatin (MYCOSTATIN) 910944 UNIT/GM external powder Apply 1 strip topically 3 times daily as needed (jock itch)      polyethylene glycol (MIRALAX) 17 g packet Take 1 packet by mouth daily      rosuvastatin (CRESTOR) 20 MG tablet Take 1 tablet by mouth daily      vitamin B-12 (CYANOCOBALAMIN) 1000 MCG tablet Take 1,000 mcg by mouth daily      vitamin C (ASCORBIC ACID) 500 MG tablet Take 500 mg by mouth daily      Vitamin D3 (CHOLECALCIFEROL) 25 mcg (1000 units) tablet Take 25 mcg by mouth daily         History reviewed. No pertinent family history.    Social History     Socioeconomic History    Marital status:      Spouse name: Not on file    Number of children: Not on file    Years of education: Not on file    Highest education level: Not on file   Occupational History    Not on file   Tobacco Use    Smoking status: Never    Smokeless tobacco: Never   Vaping Use    Vaping Use: Never used   Substance and Sexual Activity    Alcohol use: Never    Drug use: Never    Sexual activity: Not on file   Other Topics Concern    Not on file   Social History Narrative    Not on file     Social Determinants of  Health     Financial Resource Strain: Not on file   Food Insecurity: Not on file   Transportation Needs: Not on file   Physical Activity: Not on file   Stress: Not on file   Social Connections: Not on file   Intimate Partner Violence: Not on file   Housing Stability: Not on file            Past Medical History:     Past Medical History:   Diagnosis Date    Benign essential hypertension     BPH (benign prostatic hyperplasia)     Chronic heart failure with reduced ejection fraction and diastolic dysfunction (H)     Cognitive impairment     Critical aortic valve stenosis     Diabetes mellitus, type 2 (H)     on metformin    Dyslipidemia     First degree atrioventricular block     LBBB (left bundle branch block)     Macular degeneration (senile) of retina               Past Surgical History:     Past Surgical History:   Procedure Laterality Date    CV CORONARY ANGIOGRAM N/A 07/28/2023    Procedure: Coronary Angiogram;  Surgeon: Mando Field MD;  Location: [facility] CARDIAC CATH LAB    CV PCI N/A 07/28/2023    Procedure: Percutaneous Coronary Intervention;  Surgeon: Mando Field MD;  Location: [facility] CARDIAC CATH LAB    CV TRANSCATHETER AORTIC VALVE REPLACEMENT  08/15/2023    CV TRANSCATHETER AORTIC VALVE REPLACEMENT-FEMORAL APPROACH N/A 8/15/2023    Procedure: Transcatheter Aortic Valve Replacement-Femoral Approach;  Surgeon: Bossman Felipe MD;  Location: [facility] CARDIAC CATH LAB    EP PACEMAKER DEVICE & LEAD IMPLANT- RIGHT ATRIAL & LEFT VENTRICULAR N/A 8/15/2023    Procedure: Pacemaker Device & Lead Implant- Right Atrial & Left Ventricular;  Surgeon: Oscar Mendoza MD;  Location:  HEART CARDIAC CATH LAB    ORIF HIP FRACTURE Right 2018              Allergies:   Patient has no known allergies.       Data:   All laboratory data reviewed:    Recent Labs   Lab Test 08/16/23  0036 08/15/23  0651 07/27/23  1827   LDL 48  --   --    HDL 51  --   --    NHDL 65  --   --    CHOL 116  --   --    TRIG  84  --   --    TSH  --  2.32  --    NTBNP  --   --  2,019*       Lab Results   Component Value Date    WBC 6.7 08/23/2023    RBC 3.54 (L) 08/23/2023    HGB 11.5 (L) 08/23/2023    HCT 34.7 (L) 08/23/2023    MCV 98 08/23/2023    MCH 32.5 08/23/2023    MCHC 33.1 08/23/2023    RDW 14.2 08/23/2023     08/23/2023       Lab Results   Component Value Date     08/23/2023    POTASSIUM 4.0 08/23/2023    CHLORIDE 104 08/23/2023    CO2 23 08/23/2023    ANIONGAP 14 08/23/2023    GLC 89 08/23/2023     (H) 08/18/2023    BUN 12.9 08/23/2023    CR 0.70 08/23/2023    GFRESTIMATED 90 08/23/2023    FERMIN 9.5 08/23/2023      Lab Results   Component Value Date    AST 22 07/23/2023    ALT 11 07/23/2023       Lab Results   Component Value Date    A1C 6.0 (H) 07/30/2023       Lab Results   Component Value Date    INR 1.17 (H) 08/15/2023    INR 1.07 07/27/2023           Thank you for allowing me to participate in the care of your patient.      Sincerely,     Sheri Davis, CNP     [facility]  cc:   Bossman Felipe MD  [address]  [place] [id number]

## 2023-08-31 NOTE — PATIENT INSTRUCTIONS
Today's Plan:     - Continue rehab.   - Follow-up with me in September as planned     Catina RN ([phone]), Yessy RN ([phone]), and Tonya RN ([phone])    Scheduling phone number: [phone]    Reminder: Please bring in all current medications, over the counter supplements and vitamin bottles to your next appointment.-    It was a pleasure seeing you today!     Sheri Davis, SUSANA  8/31/2023    -

## 2023-09-11 ENCOUNTER — DISCHARGE SUMMARY NURSING HOME (OUTPATIENT)
Dept: GERIATRICS | Facility: CLINIC | Age: 83
End: 2023-09-11
Payer: COMMERCIAL

## 2023-09-11 VITALS
SYSTOLIC BLOOD PRESSURE: 132 MMHG | TEMPERATURE: 97.6 F | RESPIRATION RATE: 16 BRPM | OXYGEN SATURATION: 98 % | DIASTOLIC BLOOD PRESSURE: 73 MMHG | HEART RATE: 60 BPM | BODY MASS INDEX: 21.38 KG/M2 | HEIGHT: 67 IN | WEIGHT: 136.2 LBS

## 2023-09-11 DIAGNOSIS — I35.0 SEVERE AORTIC STENOSIS: Primary | ICD-10-CM

## 2023-09-11 DIAGNOSIS — Z95.0 CARDIAC PACEMAKER IN SITU: ICD-10-CM

## 2023-09-11 DIAGNOSIS — E11.69 TYPE 2 DIABETES MELLITUS WITH OTHER SPECIFIED COMPLICATION, WITHOUT LONG-TERM CURRENT USE OF INSULIN (H): ICD-10-CM

## 2023-09-11 DIAGNOSIS — K59.01 SLOW TRANSIT CONSTIPATION: ICD-10-CM

## 2023-09-11 DIAGNOSIS — Z95.2 S/P TAVR (TRANSCATHETER AORTIC VALVE REPLACEMENT): ICD-10-CM

## 2023-09-11 DIAGNOSIS — I44.2 COMPLETE HEART BLOCK (H): ICD-10-CM

## 2023-09-11 DIAGNOSIS — F03.90 DEMENTIA, UNSPECIFIED DEMENTIA SEVERITY, UNSPECIFIED DEMENTIA TYPE, UNSPECIFIED WHETHER BEHAVIORAL, PSYCHOTIC, OR MOOD DISTURBANCE OR ANXIETY (H): ICD-10-CM

## 2023-09-11 DIAGNOSIS — I10 ESSENTIAL HYPERTENSION: ICD-10-CM

## 2023-09-11 DIAGNOSIS — I50.9 CHRONIC CONGESTIVE HEART FAILURE, UNSPECIFIED HEART FAILURE TYPE (H): ICD-10-CM

## 2023-09-11 DIAGNOSIS — R53.81 PHYSICAL DECONDITIONING: ICD-10-CM

## 2023-09-11 DIAGNOSIS — E78.5 DYSLIPIDEMIA: ICD-10-CM

## 2023-09-11 DIAGNOSIS — I25.10 CORONARY ARTERY DISEASE INVOLVING NATIVE CORONARY ARTERY OF NATIVE HEART WITHOUT ANGINA PECTORIS: ICD-10-CM

## 2023-09-11 DIAGNOSIS — D64.9 ANEMIA, UNSPECIFIED TYPE: ICD-10-CM

## 2023-09-11 PROCEDURE — 99316 NF DSCHRG MGMT 30 MIN+: CPT | Performed by: NURSE PRACTITIONER

## 2023-09-11 RX ORDER — AMOXICILLIN 250 MG
2 CAPSULE ORAL DAILY PRN
COMMUNITY
End: 2024-01-01

## 2023-09-11 NOTE — PROGRESS NOTES
[facility] GERIATRICS DISCHARGE SUMMARY  PATIENT'S NAME: Erick Shahid  YOB: 1940  MEDICAL RECORD NUMBER:  [id number]  Place of Service where encounter took place:  [facility] ([facility]) [[id number]]    PRIMARY CARE PROVIDER AND CLINIC RESPONSIBLE AFTER TRANSFER:   Minh Samuel MD, [address] / [facility] [address]    Non-FMG Provider     Transferring providers: LESTER Valdez CNP, Dr. Don MD  Recent Hospitalization/ED:  [facility] stay 8/15/23 to 8/18/23.  Date of SNF Admission:  8/18/23  Date of SNF (anticipated) Discharge: 9/12/23  Discharged to: previous independent home  Cognitive Scores: SLUMS: 12/30  Physical Function: Ambulating 1000 ft with 2WW  DME: none needed    CODE STATUS/ADVANCE DIRECTIVES DISCUSSION:  No CPR- Do NOT Intubate   ALLERGIES: Patient has no known allergies.    NURSING FACILITY COURSE   Medication Changes/Rationale:   Added Miralax and senna s due to constipation    Per recent TCU provider progress notes:   83-year-old male PMH sCHF with EF 50-55% on Toprol XL, CAD, dementia, and critical symptomatic aortic stenosis now s/p aortic valve replacement with 23 mm Allison Maribel 3 valve with Dr. 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 Confused - CT head negative. On ASA daily. Moderate R ICA stenosis: on asa and statin. DM2 on metformin. Macular degeneration and on drops. To TCU for rehab.     Seen for discharge visit. No new concerns. Denies pain. BP range 109-151/72-77 and sats 98% room air. BG range 114-124 daily. Home with home care as noted below.     Summary of nursing facility stay:   Severe aortic stenosis, s/p TAVR  Complete heart block  Cardiac pacemaker placement  HF  CAD  HTN  HLD  Acute on chronic, stable and asymptomatic. Continue aspirin 81 mg daily, metoprolol ER 25 mg daily, rosuvastatin 20 mg daily. Monitor vs and wt. Follow-up with cardiology as  directed. Check BMP 8/23 with Cr 0.70.      DM type II  Chronic, well managed with metformin 1000 mg daily. Check BG PRN.      Anemia  Last Hgb 11.5. Check CBC on 8/23 with hgb 11.5.      Dementia  Forgetful. Monitor for safety.     Physical deconditioning  Acute. Improved. Home with home care.     Constipation  Ongoing. Added senna s 2 tabs daily. Added Miralax 17 gm daily. Effective.     Discharge Medications:  MED REC REQUIRED  Post Medication Reconciliation Status:  Discharge medications reconciled and changed, see notes/orders     Current Outpatient Medications   Medication Sig Dispense Refill    aspirin (ASA) 81 MG chewable tablet Take 1 tablet (81 mg) by mouth daily 90 tablet 3    cycloSPORINE (RESTASIS) 0.05 % ophthalmic emulsion Apply 1 drop to eye 2 times daily      metFORMIN (GLUCOPHAGE) 1000 MG tablet Take 1 tablet by mouth daily (with dinner)      metoprolol succinate ER (TOPROL XL) 25 MG 24 hr tablet Take 25 mg by mouth daily      nystatin (MYCOSTATIN) 532247 UNIT/GM external powder Apply 1 strip topically 3 times daily as needed (jock itch)      polyethylene glycol (MIRALAX) 17 g packet Take 1 packet by mouth daily      rosuvastatin (CRESTOR) 20 MG tablet Take 1 tablet by mouth daily      senna-docusate (SENOKOT-S/PERICOLACE) 8.6-50 MG tablet Take 2 tablets by mouth daily      vitamin B-12 (CYANOCOBALAMIN) 1000 MCG tablet Take 1,000 mcg by mouth daily      vitamin C (ASCORBIC ACID) 500 MG tablet Take 500 mg by mouth daily      Vitamin D3 (CHOLECALCIFEROL) 25 mcg (1000 units) tablet Take 25 mcg by mouth daily        Controlled medications:   not applicable/none     Past Medical History:   Past Medical History:   Diagnosis Date    Benign essential hypertension     BPH (benign prostatic hyperplasia)     Chronic heart failure with reduced ejection fraction and diastolic dysfunction (H)     Cognitive impairment     Critical aortic valve stenosis     Diabetes mellitus, type 2 (H)     on metformin     "Dyslipidemia     First degree atrioventricular block     LBBB (left bundle branch block)     Macular degeneration (senile) of retina      Physical Exam:   Vitals: /73   Pulse 60   Temp 97.6  F (36.4  C)   Resp 16   Ht 1.702 m (5' 7\")   Wt 61.8 kg (136 lb 3.2 oz)   SpO2 98%   BMI 21.33 kg/m   BMI: Body mass index is 21.33 kg/m .  GENERAL APPEARANCE:  Alert, in no distress, pleasant, cooperative, oriented x self and place and recent events  EYES:  EOM, lids, pupils and irises normal, sclera clear and conjunctiva normal, no discharge or mattering on lids or lashes noted  ENT:  Mouth normal, moist mucous membranes, nose normal without drainage or crusting, external ears without lesions, hearing acuity impaired  RESP:  respiratory effort normal, no chest wall tenderness, no respiratory distress, Lung sounds clear, patient is on room air  CV:  Auscultation of heart done, rate and rhythm controlled and regular with audible valve click, no murmur, no rub or gallop. Edema trace LE bilaterally.   ABDOMEN:  hypoactive bowel sounds, soft, nontender, no palpable masses.  M/S:   Gait and station unsafe without assistance, no tenderness or swelling of the joints; able to move all extremities, digits normal  NEURO: cranial nerves 2-12 grossly intact, no facial asymmetry, no speech deficits and able to follow directions, moves all extremities symmetrically  PSYCH:  insight and judgement and memory impaired, affect and mood normal     SNF labs: Most Recent 3 CBC's:  Recent Labs   Lab Test 08/23/23  0520 08/18/23  0549 08/17/23  0603   WBC 6.7 6.6 7.0   HGB 11.5* 12.1* 13.0*   MCV 98 95 96    104* 123*     Most Recent 3 BMP's:  Recent Labs   Lab Test 08/23/23  0520 08/18/23  1155 08/18/23  1008 08/18/23  0549 08/17/23  1105 08/17/23  0603     --   --  139  --  140   POTASSIUM 4.0  --   --  3.5  --  3.9   CHLORIDE 104  --   --  104  --  103   CO2 23  --   --  23  --  22   BUN 12.9  --   --  18.2  --  9.3   CR " 0.70  --   --  0.69  --  0.60*   ANIONGAP 14  --   --  12  --  15   FERMIN 9.5  --   --  9.2  --  9.4   GLC 89 158* 128* 107*   < > 119*    < > = values in this interval not displayed.       DISCHARGE PLAN:  Follow up labs: No labs orders/due  Medical Follow Up:      Follow up with primary care provider in 2-3 weeks  Follow up with specialist cardiology, surgeon as recommended   Current [facility] scheduled appointments:     Future Appointments   Date Time Provider Department Center   9/22/2023 10:00 AM [id number] [facility]   9/22/2023 11:15 AM [facility] [place] [facility]   9/22/2023  1:50 PM Sheri Davis CNP [facility]   9/27/2023 11:00 AM [facility] DCR2 [facility]   2/20/2024 10:45 AM Giorgio Talamantes MD [facility]      Discharge Services: Home Care:  Occupational Therapy, Physical Therapy, Registered Nurse, Home Health Aide, and From:  [facility]  Discharge Instructions Verbalized to Patient at Discharge:   None    TOTAL DISCHARGE TIME:   Greater than 30 minutes  Electronically signed by:  LESTER Valdez CNP     Home care Face to Face documentation done in [facility] attached to Home care orders for [facility].

## 2023-09-22 NOTE — PROGRESS NOTES
Cardiology Clinic Progress Note  Erick Shahid MRN# [id number]   YOB: 1940 Age: 83 year old     Primary cardiologist: Dr. Felipe     Reason for visit: s/p TAVR     History of presenting illness:    Erick Shahid is a very pleasant 83-year-old gentleman with chronic systolic heart failure, coronary disease, dementia, and critical symptomatic aortic stenosis s/p TAVR with 23 mm Allison Maribel 3 valve on 8/15/23 complicated by complete heart block requiring dual chamber PPM implantation, who presents today for follow-up.     Overall his procedure went well.  He had transient complete heart block postoperatively and underwent implantation of dual-chamber permanent pacemaker.  His postprocedure echocardiogram showed well-seated bioprosthetic aortic valve with a mean gradient of 15 mmHg, no AI.  His LVEF improved to 50 to 55%, previously 30 to 35%.  He was discharged on 8/18/2023 to TCU.    Since that time Bernardo appears to be doing well.  He was discharged from his TCU.  He will start home therapy next week.  He is ambulating well with a walker.  He denies any chest pain, shortness of breath, syncope or near-syncope, or palpitations.  His blood pressure is well controlled.  His weight is stable.    Repeat echocardiogram today shows well-seated bioprosthetic aortic valve with a mean gradient of 19 mmHg, LVEF 55-60%, and no AI.     Reviewed his labs drawn prior to this visit.  His CBC and BMP are unremarkable.         Assessment and Plan:     ASSESSMENT:    Critical aortic stenosis s/p TAVR with 23 mm Allison Maribel 3 valve on 8/15/23.  His gradients remain stable, he is tolerating activity without any symptoms.  Remains on ASA 81 mg daily.  CHB s/p PPM.  His device check showed normal device function.  Chronic systolic heart failure with normalization of his LV function with EF of 55 to 60%, NYHA class I.  Likely secondary to valvular disease, he remains euvolemic on exam with no clinical signs of  "heart failure.  Toprol-XL 25 mg daily.  CAD with known  of RCA.  He is asymptomatic without angina.  On ASA, Toprol-XL, and rosuvastatin.  Cognitive dysfunction  Type II diabetes       PLAN:     Bernardo appears to be doing well from a cardiovascular standpoint, I have made no changes to his regimen today.  He will start home physical therapy next week.  Follow-up with Dr. Felipe in approximately 6 months, sooner if needed.       Sheri Davis, DNP, APRN, CNP  Page: [phone] (8a-5p M-F)    Orders this Visit:  No orders of the defined types were placed in this encounter.    No orders of the defined types were placed in this encounter.    There are no discontinued medications.    Today's clinic visit entailed:  Review of the result(s) of each unique test - echo, labs, EKG  Ordering of each unique test  Prescription drug management  35 minutes spent by me on the date of the encounter doing chart review, review of test results, patient visit, documentation, and discussion with family   Provider  Link to [facility] Help Grid     The level of medical decision making during this visit was of moderate complexity.           Review of Systems:     Review of Systems:  Skin:        Eyes:       ENT:       Respiratory:  Negative    Cardiovascular:  lightheadedness;dizziness;palpitations;chest pain;Negative for;fatigue Positive for;edema  Gastroenterology:      Genitourinary:       Musculoskeletal:       Neurologic:       Psychiatric:       Heme/Lymph/Imm:  Negative    Endocrine:  Negative              Physical Exam:   Vitals: /74   Pulse 60   Ht 1.702 m (5' 7\")   Wt 80.7 kg (178 lb)   SpO2 98%   BMI 27.88 kg/m    Constitutional:  cooperative        Skin:  warm and dry to the touch        Head:  normocephalic        Eyes:  pupils equal and round        ENT:  no pallor or cyanosis        Neck:  JVP normal        Chest:  normal breath sounds, clear to auscultation, normal A-P diameter, normal symmetry, normal respiratory " excursion, no use of accessory muscles        Cardiac: regular rhythm;normal S1 and S2       systolic ejection murmur;grade 1          Abdomen:  abdomen soft        Vascular: pulses full and equal                                      Extremities and Back:  no edema        Neurological:  no gross motor deficits;affect appropriate             Medications:     Current Outpatient Medications   Medication Sig Dispense Refill    aspirin (ASA) 81 MG chewable tablet Take 1 tablet (81 mg) by mouth daily 90 tablet 3    cycloSPORINE (RESTASIS) 0.05 % ophthalmic emulsion Apply 1 drop to eye 2 times daily      metFORMIN (GLUCOPHAGE) 1000 MG tablet Take 1 tablet by mouth daily (with dinner)      metoprolol succinate ER (TOPROL XL) 25 MG 24 hr tablet Take 25 mg by mouth daily      nystatin (MYCOSTATIN) 474307 UNIT/GM external powder Apply 1 strip topically 3 times daily as needed (jock itch)      polyethylene glycol (MIRALAX) 17 g packet Take 1 packet by mouth daily      rosuvastatin (CRESTOR) 20 MG tablet Take 1 tablet by mouth daily      senna-docusate (SENOKOT-S/PERICOLACE) 8.6-50 MG tablet Take 2 tablets by mouth daily      vitamin B-12 (CYANOCOBALAMIN) 1000 MCG tablet Take 1,000 mcg by mouth daily      vitamin C (ASCORBIC ACID) 500 MG tablet Take 500 mg by mouth daily      Vitamin D3 (CHOLECALCIFEROL) 25 mcg (1000 units) tablet Take 25 mcg by mouth daily         History reviewed. No pertinent family history.    Social History     Socioeconomic History    Marital status:      Spouse name: Not on file    Number of children: Not on file    Years of education: Not on file    Highest education level: Not on file   Occupational History    Not on file   Tobacco Use    Smoking status: Never    Smokeless tobacco: Never   Vaping Use    Vaping Use: Never used   Substance and Sexual Activity    Alcohol use: Yes     Comment: few drinks per year - very rare    Drug use: Never    Sexual activity: Not on file   Other Topics Concern     Not on file   Social History Narrative    Not on file     Social Determinants of Health     Financial Resource Strain: Not on file   Food Insecurity: Not on file   Transportation Needs: Not on file   Physical Activity: Not on file   Stress: Not on file   Social Connections: Not on file   Interpersonal Safety: Not on file   Housing Stability: Not on file            Past Medical History:     Past Medical History:   Diagnosis Date    Benign essential hypertension     BPH (benign prostatic hyperplasia)     Chronic heart failure with reduced ejection fraction and diastolic dysfunction (H)     Cognitive impairment     Critical aortic valve stenosis     Diabetes mellitus, type 2 (H)     on metformin    Dyslipidemia     First degree atrioventricular block     LBBB (left bundle branch block)     Macular degeneration (senile) of retina               Past Surgical History:     Past Surgical History:   Procedure Laterality Date    CV CORONARY ANGIOGRAM N/A 07/28/2023    Procedure: Coronary Angiogram;  Surgeon: Mando Field MD;  Location: [facility] CARDIAC CATH LAB    CV PCI N/A 07/28/2023    Procedure: Percutaneous Coronary Intervention;  Surgeon: Mando Field MD;  Location: [facility] CARDIAC CATH LAB    CV TRANSCATHETER AORTIC VALVE REPLACEMENT  08/15/2023    CV TRANSCATHETER AORTIC VALVE REPLACEMENT-FEMORAL APPROACH N/A 8/15/2023    Procedure: Transcatheter Aortic Valve Replacement-Femoral Approach;  Surgeon: Bossman Felipe MD;  Location: [facility] CARDIAC CATH LAB    EP PACEMAKER DEVICE & LEAD IMPLANT- RIGHT ATRIAL & LEFT VENTRICULAR N/A 8/15/2023    Procedure: Pacemaker Device & Lead Implant- Right Atrial & Left Ventricular;  Surgeon: Oscar Mendoza MD;  Location: [facility] CARDIAC CATH LAB    ORIF HIP FRACTURE Right 2018              Allergies:   Patient has no known allergies.       Data:   All laboratory data reviewed:    Recent Labs   Lab Test 08/16/23  0036 08/15/23  0651 07/27/23  1827   LDL 48   --   --    HDL 51  --   --    NHDL 65  --   --    CHOL 116  --   --    TRIG 84  --   --    TSH  --  2.32  --    NTBNP  --   --  2,019*       Lab Results   Component Value Date    WBC 5.8 09/22/2023    RBC 3.95 (L) 09/22/2023    HGB 12.7 (L) 09/22/2023    HCT 38.5 (L) 09/22/2023    MCV 98 09/22/2023    MCH 32.2 09/22/2023    MCHC 33.0 09/22/2023    RDW 13.5 09/22/2023     09/22/2023       Lab Results   Component Value Date     09/22/2023    POTASSIUM 4.2 09/22/2023    CHLORIDE 105 09/22/2023    CO2 23 09/22/2023    ANIONGAP 12 09/22/2023    GLC 92 09/22/2023     (H) 08/18/2023    BUN 14.1 09/22/2023    CR 0.73 09/22/2023    GFRESTIMATED 89 09/22/2023    FERMIN 9.6 09/22/2023      Lab Results   Component Value Date    AST 22 07/23/2023    ALT 11 07/23/2023       Lab Results   Component Value Date    A1C 6.0 (H) 07/30/2023       Lab Results   Component Value Date    INR 1.17 (H) 08/15/2023    INR 1.07 07/27/2023

## 2023-09-22 NOTE — PATIENT INSTRUCTIONS
Today's Plan:     - Follow-up with Dr. Felipe in 6 months, sooner if needed.     Catina RN ([phone]), Yessy RN ([phone]), and Tonya RN ([phone])    Scheduling phone number: [phone]    Reminder: Please bring in all current medications, over the counter supplements and vitamin bottles to your next appointment.-    It was a pleasure seeing you today!     Sheri Davis CNP  9/22/2023    -

## 2023-09-22 NOTE — LETTER
9/22/2023    Minh Samuel MD  [address] [facility] [address]    RE: Erick Shahid       Dear Colleague,     I had the pleasure of seeing Erick Shahid in the [facility].  Cardiology Clinic Progress Note  Erick Shahid MRN# [id number]   YOB: 1940 Age: 83 year old     Primary cardiologist: Dr. Felipe     Reason for visit: s/p TAVR     History of presenting illness:    Erick Shahid is a very pleasant 83-year-old gentleman with chronic systolic heart failure, coronary disease, dementia, and critical symptomatic aortic stenosis s/p TAVR with 23 mm Allison Mairbel 3 valve on 8/15/23 complicated by complete heart block requiring dual chamber PPM implantation, who presents today for follow-up.     Overall his procedure went well.  He had transient complete heart block postoperatively and underwent implantation of dual-chamber permanent pacemaker.  His postprocedure echocardiogram showed well-seated bioprosthetic aortic valve with a mean gradient of 15 mmHg, no AI.  His LVEF improved to 50 to 55%, previously 30 to 35%.  He was discharged on 8/18/2023 to TCU.    Since that time Bernardo appears to be doing well.  He was discharged from his TCU.  He will start home therapy next week.  He is ambulating well with a walker.  He denies any chest pain, shortness of breath, syncope or near-syncope, or palpitations.  His blood pressure is well controlled.  His weight is stable.    Repeat echocardiogram today shows well-seated bioprosthetic aortic valve with a mean gradient of 19 mmHg, LVEF 55-60%, and no AI.     Reviewed his labs drawn prior to this visit.  His CBC and BMP are unremarkable.         Assessment and Plan:     ASSESSMENT:    Critical aortic stenosis s/p TAVR with 23 mm Allison Maribel 3 valve on 8/15/23.  His gradients remain stable, he is tolerating activity without any symptoms.  Remains on ASA 81 mg daily.  CHB s/p PPM.  His device check showed normal device  "function.  Chronic systolic heart failure with normalization of his LV function with EF of 55 to 60%, NYHA class I.  Likely secondary to valvular disease, he remains euvolemic on exam with no clinical signs of heart failure.  Toprol-XL 25 mg daily.  CAD with known  of RCA.  He is asymptomatic without angina.  On ASA, Toprol-XL, and rosuvastatin.  Cognitive dysfunction  Type II diabetes       PLAN:     Bernardo appears to be doing well from a cardiovascular standpoint, I have made no changes to his regimen today.  He will start home physical therapy next week.  Follow-up with Dr. Felipe in approximately 6 months, sooner if needed.       Sheri Davis, DNP, APRN, CNP  Page: [phone] (8a-5p M-F)    Orders this Visit:  No orders of the defined types were placed in this encounter.    No orders of the defined types were placed in this encounter.    There are no discontinued medications.    Today's clinic visit entailed:  Review of the result(s) of each unique test - echo, labs, EKG  Ordering of each unique test  Prescription drug management  35 minutes spent by me on the date of the encounter doing chart review, review of test results, patient visit, documentation, and discussion with family   Provider  Link to [facility] Help Grid     The level of medical decision making during this visit was of moderate complexity.           Review of Systems:     Review of Systems:  Skin:        Eyes:       ENT:       Respiratory:  Negative    Cardiovascular:  lightheadedness;dizziness;palpitations;chest pain;Negative for;fatigue Positive for;edema  Gastroenterology:      Genitourinary:       Musculoskeletal:       Neurologic:       Psychiatric:       Heme/Lymph/Imm:  Negative    Endocrine:  Negative              Physical Exam:   Vitals: /74   Pulse 60   Ht 1.702 m (5' 7\")   Wt 80.7 kg (178 lb)   SpO2 98%   BMI 27.88 kg/m    Constitutional:  cooperative        Skin:  warm and dry to the touch        Head:  normocephalic    "     Eyes:  pupils equal and round        ENT:  no pallor or cyanosis        Neck:  JVP normal        Chest:  normal breath sounds, clear to auscultation, normal A-P diameter, normal symmetry, normal respiratory excursion, no use of accessory muscles        Cardiac: regular rhythm;normal S1 and S2       systolic ejection murmur;grade 1          Abdomen:  abdomen soft        Vascular: pulses full and equal                                      Extremities and Back:  no edema        Neurological:  no gross motor deficits;affect appropriate             Medications:     Current Outpatient Medications   Medication Sig Dispense Refill    aspirin (ASA) 81 MG chewable tablet Take 1 tablet (81 mg) by mouth daily 90 tablet 3    cycloSPORINE (RESTASIS) 0.05 % ophthalmic emulsion Apply 1 drop to eye 2 times daily      metFORMIN (GLUCOPHAGE) 1000 MG tablet Take 1 tablet by mouth daily (with dinner)      metoprolol succinate ER (TOPROL XL) 25 MG 24 hr tablet Take 25 mg by mouth daily      nystatin (MYCOSTATIN) 821122 UNIT/GM external powder Apply 1 strip topically 3 times daily as needed (jock itch)      polyethylene glycol (MIRALAX) 17 g packet Take 1 packet by mouth daily      rosuvastatin (CRESTOR) 20 MG tablet Take 1 tablet by mouth daily      senna-docusate (SENOKOT-S/PERICOLACE) 8.6-50 MG tablet Take 2 tablets by mouth daily      vitamin B-12 (CYANOCOBALAMIN) 1000 MCG tablet Take 1,000 mcg by mouth daily      vitamin C (ASCORBIC ACID) 500 MG tablet Take 500 mg by mouth daily      Vitamin D3 (CHOLECALCIFEROL) 25 mcg (1000 units) tablet Take 25 mcg by mouth daily         History reviewed. No pertinent family history.    Social History     Socioeconomic History    Marital status:      Spouse name: Not on file    Number of children: Not on file    Years of education: Not on file    Highest education level: Not on file   Occupational History    Not on file   Tobacco Use    Smoking status: Never    Smokeless tobacco: Never    Vaping Use    Vaping Use: Never used   Substance and Sexual Activity    Alcohol use: Yes     Comment: few drinks per year - very rare    Drug use: Never    Sexual activity: Not on file   Other Topics Concern    Not on file   Social History Narrative    Not on file     Social Determinants of Health     Financial Resource Strain: Not on file   Food Insecurity: Not on file   Transportation Needs: Not on file   Physical Activity: Not on file   Stress: Not on file   Social Connections: Not on file   Interpersonal Safety: Not on file   Housing Stability: Not on file            Past Medical History:     Past Medical History:   Diagnosis Date    Benign essential hypertension     BPH (benign prostatic hyperplasia)     Chronic heart failure with reduced ejection fraction and diastolic dysfunction (H)     Cognitive impairment     Critical aortic valve stenosis     Diabetes mellitus, type 2 (H)     on metformin    Dyslipidemia     First degree atrioventricular block     LBBB (left bundle branch block)     Macular degeneration (senile) of retina               Past Surgical History:     Past Surgical History:   Procedure Laterality Date    CV CORONARY ANGIOGRAM N/A 07/28/2023    Procedure: Coronary Angiogram;  Surgeon: Mando Field MD;  Location: [facility] CARDIAC CATH LAB    CV PCI N/A 07/28/2023    Procedure: Percutaneous Coronary Intervention;  Surgeon: Mando Field MD;  Location: [facility] CARDIAC CATH LAB    CV TRANSCATHETER AORTIC VALVE REPLACEMENT  08/15/2023    CV TRANSCATHETER AORTIC VALVE REPLACEMENT-FEMORAL APPROACH N/A 8/15/2023    Procedure: Transcatheter Aortic Valve Replacement-Femoral Approach;  Surgeon: Bossman Felipe MD;  Location: [facility] CARDIAC CATH LAB    EP PACEMAKER DEVICE & LEAD IMPLANT- RIGHT ATRIAL & LEFT VENTRICULAR N/A 8/15/2023    Procedure: Pacemaker Device & Lead Implant- Right Atrial & Left Ventricular;  Surgeon: Oscar Mendoza MD;  Location: [facility] CARDIAC CATH LAB     ORIF HIP FRACTURE Right 2018              Allergies:   Patient has no known allergies.       Data:   All laboratory data reviewed:    Recent Labs   Lab Test 08/16/23  0036 08/15/23  0651 07/27/23  1827   LDL 48  --   --    HDL 51  --   --    NHDL 65  --   --    CHOL 116  --   --    TRIG 84  --   --    TSH  --  2.32  --    NTBNP  --   --  2,019*       Lab Results   Component Value Date    WBC 5.8 09/22/2023    RBC 3.95 (L) 09/22/2023    HGB 12.7 (L) 09/22/2023    HCT 38.5 (L) 09/22/2023    MCV 98 09/22/2023    MCH 32.2 09/22/2023    MCHC 33.0 09/22/2023    RDW 13.5 09/22/2023     09/22/2023       Lab Results   Component Value Date     09/22/2023    POTASSIUM 4.2 09/22/2023    CHLORIDE 105 09/22/2023    CO2 23 09/22/2023    ANIONGAP 12 09/22/2023    GLC 92 09/22/2023     (H) 08/18/2023    BUN 14.1 09/22/2023    CR 0.73 09/22/2023    GFRESTIMATED 89 09/22/2023    FERMIN 9.6 09/22/2023      Lab Results   Component Value Date    AST 22 07/23/2023    ALT 11 07/23/2023       Lab Results   Component Value Date    A1C 6.0 (H) 07/30/2023       Lab Results   Component Value Date    INR 1.17 (H) 08/15/2023    INR 1.07 07/27/2023         Thank you for allowing me to participate in the care of your patient.      Sincerely,     Sheri Davis, CNP     [facility]  cc:   Bossman Felipe MD  [address]  [place] [id number]

## 2023-10-13 PROBLEM — R29.898 LEFT LEG WEAKNESS: Status: ACTIVE | Noted: 2023-01-01

## 2023-10-13 NOTE — ED NOTES
Bed: ST02  Expected date:   Expected time:   Means of arrival:   Comments:  83M Stroke alert slurred speech , unable to follow commands. LWK 21:30

## 2023-10-13 NOTE — PHARMACY-ADMISSION MEDICATION HISTORY
Pharmacist Admission Medication History    Admission medication history is complete. The information provided in this note is only as accurate as the sources available at the time of the update.    Information Source(s): Family member via in-person        Changes made to PTA medication list:  Added: Sertraline  Deleted: None  Changed: Cyclosporine and Senna-docusated to PRN           Allergies reviewed with patient and updates made in EHR: yes    Medication History Completed By: Satish Barker [facility] 10/13/2023 1:25 PM    PTA Med List   Medication Sig Last Dose    aspirin (ASA) 81 MG chewable tablet Take 1 tablet (81 mg) by mouth daily 10/12/2023    cycloSPORINE (RESTASIS) 0.05 % ophthalmic emulsion Apply 1 drop to eye 2 times daily as needed for dry eyes  at PRN    metFORMIN (GLUCOPHAGE) 1000 MG tablet Take 1 tablet by mouth daily (with dinner) 10/12/2023    metoprolol succinate ER (TOPROL XL) 25 MG 24 hr tablet Take 25 mg by mouth daily 10/12/2023    nystatin (MYCOSTATIN) 004521 UNIT/GM external powder Apply 1 strip topically 3 times daily as needed (jock itch)  at PRN    polyethylene glycol (MIRALAX) 17 g packet Take 1 packet by mouth daily 10/12/2023    rosuvastatin (CRESTOR) 20 MG tablet Take 1 tablet by mouth daily 10/12/2023    senna-docusate (SENOKOT-S/PERICOLACE) 8.6-50 MG tablet Take 2 tablets by mouth daily as needed for constipation  at PRN    sertraline (ZOLOFT) 50 MG tablet Take 50 mg by mouth daily 10/12/2023    vitamin B-12 (CYANOCOBALAMIN) 1000 MCG tablet Take 1,000 mcg by mouth daily 10/12/2023    vitamin C (ASCORBIC ACID) 500 MG tablet Take 500 mg by mouth daily 10/12/2023    Vitamin D3 (CHOLECALCIFEROL) 25 mcg (1000 units) tablet Take 25 mcg by mouth daily 10/12/2023     10/13 Addendum: Pt family called to clarify that pt takes 1/2 of 50 mg tablet of Sertraline for 25 mg daily dose - PTA med list was updated [facility]

## 2023-10-13 NOTE — PROGRESS NOTES
Received MRI Cardiology Clearance form from [facility] MRI department.  Form completed, signed by MD, and faxed back to MRI at [phone].    MRAKIE Pat

## 2023-10-13 NOTE — ED NOTES
[facility]  ED Nurse Handoff Report    ED Chief complaint: Altered Mental Status      ED Diagnosis:   Final diagnoses:   None       Code Status: to be addressed    Allergies: No Known Allergies    Patient Story: pt has a hx of diabetes, hypertension, recent TAVR, and dementia, who presents to the ED with altered mental status. The patients last know normal was 2130 last night, and this earlier this am the pts son noted that his speech was mumbled which was unusual for him.     Focused Assessment:    Respiratory: On RA, stating in the 90s  Cardiac: HR in the 60s, denies chest pain  Neuro: alert, opens eyes spontaneously, alert to self, and place, see neuro flowsheet for in depth neuro assessment      Treatments and/or interventions provided:   Labs Ordered and Resulted from Time of ED Arrival to Time of ED Departure   COMPREHENSIVE METABOLIC PANEL - Abnormal       Result Value    Sodium 139      Potassium 3.7      Carbon Dioxide (CO2) 23      Anion Gap 16 (*)     Urea Nitrogen 14.8      Creatinine 0.73      GFR Estimate 89      Calcium 9.6      Chloride 100      Glucose 137 (*)     Alkaline Phosphatase 102      AST 24      ALT 14      Protein Total 7.4      Albumin 4.2      Bilirubin Total 1.1     TROPONIN T, HIGH SENSITIVITY - Abnormal    Troponin T, High Sensitivity 23 (*)    CBC WITH PLATELETS AND DIFFERENTIAL - Abnormal    WBC Count 8.4      RBC Count 4.00 (*)     Hemoglobin 12.7 (*)     Hematocrit 37.8 (*)     MCV 95      MCH 31.8      MCHC 33.6      RDW 12.8      Platelet Count 148 (*)     % Neutrophils 63      % Lymphocytes 24      % Monocytes 9      Mids % (Monos, Eos, Basos)        % Eosinophils 2      % Basophils 1      % Immature Granulocytes 1      NRBCs per 100 WBC 0      Absolute Neutrophils 5.4      Absolute Lymphocytes 2.0      Absolute Monocytes 0.8      Mids Abs (Monos, Eos, Basos)        Absolute Eosinophils 0.1      Absolute Basophils 0.1      Absolute Immature Granulocytes 0.0       Absolute NRBCs 0.0     ROUTINE UA WITH MICROSCOPIC - Abnormal    Color Urine Straw      Appearance Urine Clear      Glucose Urine Negative      Bilirubin Urine Negative      Ketones Urine 10 (*)     Specific Gravity Urine 1.023      Blood Urine Negative      pH Urine 7.5 (*)     Protein Albumin Urine Negative      Urobilinogen Urine Normal      Nitrite Urine Negative      Leukocyte Esterase Urine Negative      Mucus Urine Present (*)     RBC Urine 1      WBC Urine <1     ISTAT GASES LACTATE VENOUS POCT - Abnormal    Lactic Acid POCT 1.9      Bicarbonate Venous POCT 26      O2 Sat, Venous POCT 33 (*)     pCO2 Venous POCT 35 (*)     pH Venous POCT 7.48 (*)     pO2 Venous POCT 19 (*)    TROPONIN T, HIGH SENSITIVITY - Abnormal    Troponin T, High Sensitivity 23 (*)    INR - Normal    INR 1.03     PARTIAL THROMBOPLASTIN TIME - Normal    aPTT 32     INFLUENZA A/B, RSV, & SARS-COV2 PCR - Normal    Influenza A PCR Negative      Influenza B PCR Negative      RSV PCR Negative      SARS CoV2 PCR Negative     GLUCOSE MONITOR NURSING POCT        XR Chest Port 1 View   Final Result   IMPRESSION: No acute cardiopulmonary disease. Left chest wall   pacemaker device appears unchanged. TAVR device appears unchanged.   Biapical scarring and scattered calcified granulomas appear similar to   the prior exam. Degenerative changes are seen in the shoulders and   spine.      KRISTOPHER BARNHART MD            SYSTEM ID:  [id number]      CT Head Perfusion w Contrast - For Tier 2 Stroke   Final Result   IMPRESSION:    HEAD CT:   1.  No CT evidence for acute intracranial process.   2.  Brain atrophy and presumed chronic microvascular ischemic changes as above.      HEAD CTA:    1.  No proximal occlusion, aneurysm, or high flow vascular malformation identified.      NECK CTA:   1.  Approximately 50-70% stenosis of the right internal carotid artery at the bifurcation.   2.  Less than 50% stenosis of the left internal carotid artery at the  bifurcation.      CT PERFUSION:   1.  No convincing perfusion abnormality.         CT head and CTA head and neck findings were discussed with Dr. Plummer at 6:14 AM on 10/13/2023. CT perfusion findings were discussed with Dr. Plummer at 6:34 AM on 10/13/2023.      CTA Head Neck with Contrast   Final Result   IMPRESSION:    HEAD CT:   1.  No CT evidence for acute intracranial process.   2.  Brain atrophy and presumed chronic microvascular ischemic changes as above.      HEAD CTA:    1.  No proximal occlusion, aneurysm, or high flow vascular malformation identified.      NECK CTA:   1.  Approximately 50-70% stenosis of the right internal carotid artery at the bifurcation.   2.  Less than 50% stenosis of the left internal carotid artery at the bifurcation.      CT PERFUSION:   1.  No convincing perfusion abnormality.         CT head and CTA head and neck findings were discussed with Dr. Plummer at 6:14 AM on 10/13/2023. CT perfusion findings were discussed with Dr. Plummer at 6:34 AM on 10/13/2023.      CT Head w/o Contrast   Final Result   IMPRESSION:    HEAD CT:   1.  No CT evidence for acute intracranial process.   2.  Brain atrophy and presumed chronic microvascular ischemic changes as above.      HEAD CTA:    1.  No proximal occlusion, aneurysm, or high flow vascular malformation identified.      NECK CTA:   1.  Approximately 50-70% stenosis of the right internal carotid artery at the bifurcation.   2.  Less than 50% stenosis of the left internal carotid artery at the bifurcation.      CT PERFUSION:   1.  No convincing perfusion abnormality.         CT head and CTA head and neck findings were discussed with Dr. Plummer at 6:14 AM on 10/13/2023. CT perfusion findings were discussed with Dr. Plummer at 6:34 AM on 10/13/2023.      MR Brain w/o & w Contrast    (Results Pending)      Patient's response to treatments and/or interventions: tolerated    To be done/followed up on inpatient unit:  frequent vitals, repeat labs and  imaging     Does this patient have any cognitive concerns?: Baseline dementia    Activity level - Baseline/Home:  Total Care  Activity Level - Current:   Independent    Patient's Preferred language: English   Needed?: No    Isolation: None  Infection: Not Applicable  Patient tested for COVID 19 prior to admission: NO  Bariatric?: No    Vital Signs:   Vitals:    10/13/23 0619 10/13/23 0730 10/13/23 0759 10/13/23 0800   BP: (!) 165/75 (!) 164/73  (!) 146/56   Pulse: 75 64  61   Resp: 20 26  21   Temp: 98.2  F (36.8  C)      TempSrc: Temporal      SpO2: 100% 99% 99%    Weight:           Cardiac Rhythm:     Was the PSS-3 completed:   Yes  What interventions are required if any?               Family Comments: son at bedside, involved in care  OBS brochure/video discussed/provided to patient/family: N/A              Name of person given brochure if not patient:               Relationship to patient:     For the majority of the shift this patient's behavior was Green.   Behavioral interventions performed were .    ED NURSE PHONE NUMBER:

## 2023-10-13 NOTE — CONSULTS
[facility]    Stroke Consult Note    Reason for Consult:  acute stroke symptoms     Chief Complaint: Altered Mental Status       HPI  Erick Shahid is a 83 year old male with PMHx significant for anemia, CAD s/p recent TAVR, MD, heart block s/p PPM, DM2, HTN, HLD, CHF, hearing loss, dementia. He was recently hospitalized (8/15-8/16/23) for TAVR. Stroke code was called for worsening confusion and left facial weakness. CT and CTA were reassuring; unable to get MRI d/t recent PPM placement. His symptoms resolved relatively quickly.   He came in to the ED early this morning with mumbled speech and worsened confusion for which a stroke code was called.   On exam today, he has some mild-moderate dysarthria and some mild-moderate comprehension deficits. He also has some Parkinsonian features: masked facies, decreased blink rate, cogwheel rigidity, resting tremor. These haven't been previously documented. He has neuropsych testing scheduled at [facility] in January; unclear if he follows with a neurologist there.     Evaluation Summarized    MRI/Head CT MRI: no stroke; CSVID  CT: no acute intracranial process; brain atrophy and CSVID  CTP: normal    Intracranial Vasculature No LVO, no aneurysm, no high grade stenoses    Cervical Vasculature 50-70% right ICA stenosis, < 50% left ICA stenosis      Echocardiogram N/A   EKG/Telemetry Atrial sensed, ventricular paced rhythm w/ prolonged AV conduction    Other Testing TSH 1.47  UA unremarkable      LDL  No lab value available in past 30 days   A1C  7/30/2023: 6.0 %   Troponin 10/13/2023: 21 ng/L     Impression  Encephalopathy, unclear etiology     Recommendations   - would be reasonable to consult general neurology for parkinsonism  - PT  - continue rosuvastatin and aspirin     Thank you for this consult. No further stroke evaluation is recommended, so we will sign off. Please contact us with any additional questions.    Rosa Isela Last,  "NP  Vascular Neurology    To page me or covering stroke neurology team member, click here: [facility]  Choose \"On Call\" tab at top, then select \"NEUROLOGY/ALL SITES\" from middle drop-down box, press Enter, then look for \"stroke\" or \"telestroke\" for your site.  _____________________________________________________    Clinically Significant Risk Factors Present on Admission                # Drug Induced Platelet Defect: home medication list includes an antiplatelet medication        # Obesity: Estimated body mass index is 30.49 kg/m  as calculated from the following:    Height as of 9/22/23: 1.702 m (5' 7\").    Weight as of this encounter: 88.3 kg (194 lb 10.7 oz).        # Pacemaker present       Past Medical History    Past Medical History:   Diagnosis Date    Benign essential hypertension     BPH (benign prostatic hyperplasia)     Chronic heart failure with reduced ejection fraction and diastolic dysfunction (H)     Cognitive impairment     Critical aortic valve stenosis     Diabetes mellitus, type 2 (H)     on metformin    Dyslipidemia     First degree atrioventricular block     LBBB (left bundle branch block)     Macular degeneration (senile) of retina      Medications   Home Meds  Prior to Admission medications    Medication Sig Start Date End Date Taking? Authorizing Provider   aspirin (ASA) 81 MG chewable tablet Take 1 tablet (81 mg) by mouth daily 8/18/23  Yes Sheri Davis, CNP   cycloSPORINE (RESTASIS) 0.05 % ophthalmic emulsion Apply 1 drop to eye 2 times daily as needed for dry eyes 6/16/22  Yes Unknown, Entered By History   metFORMIN (GLUCOPHAGE) 1000 MG tablet Take 1 tablet by mouth daily (with dinner) 6/21/23  Yes Unknown, Entered By History   metoprolol succinate ER (TOPROL XL) 25 MG 24 hr tablet Take 25 mg by mouth daily   Yes Reported, Patient   nystatin (MYCOSTATIN) 410704 UNIT/GM external powder Apply 1 strip topically 3 times daily as needed (jock itch) 7/17/23  Yes Unknown, Entered By History "   polyethylene glycol (MIRALAX) 17 g packet Take 1 packet by mouth daily   Yes Reported, Patient   rosuvastatin (CRESTOR) 20 MG tablet Take 1 tablet by mouth daily 6/21/23  Yes Unknown, Entered By History   senna-docusate (SENOKOT-S/PERICOLACE) 8.6-50 MG tablet Take 2 tablets by mouth daily as needed for constipation   Yes Reported, Patient   sertraline (ZOLOFT) 50 MG tablet Take 25 mg by mouth daily   Yes Unknown, Entered By History   vitamin B-12 (CYANOCOBALAMIN) 1000 MCG tablet Take 1,000 mcg by mouth daily   Yes Reported, Patient   vitamin C (ASCORBIC ACID) 500 MG tablet Take 500 mg by mouth daily   Yes Reported, Patient   Vitamin D3 (CHOLECALCIFEROL) 25 mcg (1000 units) tablet Take 25 mcg by mouth daily   Yes Unknown, Entered By History       Scheduled Meds   aspirin  325 mg Oral Daily    Or    aspirin  324 mg Oral or NG Tube Daily    Or    aspirin  300 mg Rectal Daily    insulin aspart  1-7 Units Subcutaneous TID AC    insulin aspart  1-5 Units Subcutaneous At Bedtime    melatonin  1 mg Oral At Bedtime    polyethylene glycol  17 g Oral or NG Tube Daily    rosuvastatin  20 mg Oral Daily    senna-docusate  1-2 tablet Oral or NG Tube BID       Infusion Meds   0.9% sodium chloride + KCl 20 mEq/L 100 mL/hr at 10/13/23 1119       Allergies   No Known Allergies       PHYSICAL EXAMINATION   Temp:  [98.2  F (36.8  C)-99.1  F (37.3  C)] 99.1  F (37.3  C)  Pulse:  [60-75] 63  Resp:  [13-26] 16  BP: (114-166)/(54-77) 134/64  SpO2:  [99 %-100 %] 99 %    Neurologic  Mental Status:  alert, oriented to person/place (hospital), wrong age and month, follows commands but difficulty with complex instructions, speech mildly dysarthric but fluent, naming and repetition normal  Cranial Nerves:  visual fields intact, PERRL, EOMI with normal smooth pursuit, facial sensation intact and symmetric, facial movements symmetric, hard of hearing, mild dysarthria, shoulder shrug strong bilaterally, tongue protrusion midline but with tongue  tremor  Motor:  normal muscle tone and bulk, resting tremor in BUEs, cogwheel rigidity, able to move all limbs spontaneously, strength 5/5 throughout upper and lower extremities, no pronator drift  Sensory:  light touch sensation intact and symmetric throughout upper and lower extremities, no extinction on double simultaneous stimulation     Stroke Scales    NIHSS  1a. Level of Consciousness 0-->Alert, keenly responsive   1b. LOC Questions 2-->Answers neither question correctly   1c. LOC Commands 0-->Performs both tasks correctly   2.   Best Gaze 0-->Normal   3.   Visual 0-->No visual loss   4.   Facial Palsy 0-->Normal symmetrical movements   5a. Motor Arm, Left 0-->No drift, limb holds 90 (or 45) degrees for full 10 secs   5b. Motor Arm, Right 0-->No drift, limb holds 90 (or 45) degrees for full 10 secs   6a. Motor Leg, Left 0-->No drift, leg holds 30 degree position for full 5 secs   6b. Motor Leg, right 0-->No drift, leg holds 30 degree position for full 5 secs   7.   Limb Ataxia 0-->Absent   8.   Sensory 0-->Normal, no sensory loss   9.   Best Language 0-->No aphasia, normal   10. Dysarthria 1-->Mild-to-moderate dysarthria, patient slurs at least some words and, at worst, can be understood with some difficulty   11. Extinction and Inattention  0-->No abnormality   Total 3 (10/13/23 1433)       Imaging  I personally reviewed all imaging; relevant findings per HPI.    Labs Data   CBC  Recent Labs   Lab 10/13/23  0552   WBC 8.4   RBC 4.00*   HGB 12.7*   HCT 37.8*   *     Basic Metabolic Panel   Recent Labs   Lab 10/13/23  1224 10/13/23  0552   NA  --  139   POTASSIUM  --  3.7   CHLORIDE  --  100   CO2  --  23   BUN  --  14.8   CR  --  0.73   * 137*   FERMIN  --  9.6     Liver Panel  Recent Labs   Lab 10/13/23  0552   PROTTOTAL 7.4   ALBUMIN 4.2   BILITOTAL 1.1   ALKPHOS 102   AST 24   ALT 14     INR    Recent Labs   Lab Test 10/13/23  0552 08/15/23  1258 07/27/23  1827   INR 1.03 1.17* 1.07            Stroke Consult Data Data   This was a non-emergent, non-telestroke consult.  I have personally spent a total of 40 minutes providing care today, time spent in reviewing medical records and reviewing tests, examining the patient and obtaining history, coordination of care, and discussion with the patient and/or family regarding diagnostic results, prognosis, symptom management, risks and benefits of management options, and development of plan of care. Greater than 50% was spent in counseling and coordination of care.

## 2023-10-13 NOTE — PLAN OF CARE
Reason for Admission: AMS    Cognitive/Mentation: A/Ox self only  Neuros/CMS: Intact ex slurred speech, LE weakness BLE 3/5,   VS: stable.   Tele: 100% V-Paced.  GI: Last BM. Continent.  : Occasional inContinent.  Pulmonary: LS clear throughout.  Pain: denies.     Drains/Lines: PIV w NS w/20K @ 100ml/h  Skin: scattered bruising, excoriation to migel groin  Activity: Assist x 2 with gb/W, was only able to pivot to commode today.  Diet: Minced and Moist with thin liquids. Takes pills whole with water.     Therapies recs: TCU  Discharge: pending    Aggression Stoplight Tool: yellow d/t confusion/forgetfulness    End of shift summary: Pt arrived on the unit around 10a. Neuros remain stable throughout shift. Had MRI completed.

## 2023-10-13 NOTE — PROGRESS NOTES
10/13/23 1630   Appointment Info   Signing Clinician's Name / Credentials (PT) Britni Castillo, PT, DPT   Living Environment   Living Environment Comments Unable to gather from patient 2/2 confusion   Self-Care   Activity/Exercise/Self-Care Comment Per chart, receives some assistance w/ADLs at baseline   General Information   Onset of Illness/Injury or Date of Surgery 10/13/23   Referring Physician Katiana Pardo MD   Patient/Family Therapy Goals Statement (PT) Unable to state 2/2 confusion   Pertinent History of Current Problem (include personal factors and/or comorbidities that impact the POC) 83 year old male with chronic HFrEF, CAD, dementia, s/p recent TAVR 23 mm Allison ROBERTO 3 valve on 8/15/2023, type II DM, complicated by CHB, s/p dual-chamber pacemaker who admitted on 10/13/2023, due to acute mental status changes with some word-finding deficits and possible left sided weakness (although not noted on Stroke Neuro's exam). CT indicates no acute intracranial process; MRI pending.   Existing Precautions/Restrictions fall;pacemaker   Cognition   Affect/Mental Status (Cognition) confused   Orientation Status (Cognition) oriented to;person;place   Follows Commands (Cognition) follows one-step commands   Pain Assessment   Patient Currently in Pain No   Posture    Posture Forward head position;Kyphosis;Protracted shoulders   Strength (Manual Muscle Testing)   Strength Comments Grossly decreased in B LEs, grossly 4/5 B LEs   Bed Mobility   Bed Mobility bed mobility (all) activities   Comment, (Bed Mobility) mod A 2/2, confused   Transfers   Comment, (Transfers) SST w/2WW and modA   Gait/Stairs (Locomotion)   Comment, (Gait/Stairs) Amb w/mod-maxA at 2WW, min-modA at EvaWalker w/chair follow   Balance   Balance Comments Impaired static and dynamic balance   Sensory Examination   Sensory Perception Comments Not assessed 2/2 confusion   Coordination   Coordination Comments Not assessed 2/2 confusion   Muscle Tone    Muscle Tone Comments Incr tone, cogwheel rigidity noted in B LEs   Clinical Impression   Criteria for Skilled Therapeutic Intervention Yes, treatment indicated   PT Diagnosis (PT) Impaired indep w/transfers, amb   Influenced by the following impairments Strength, balance, cognition, tone   Functional limitations due to impairments Indep w/transfers, amb   Clinical Presentation (PT Evaluation Complexity) evolving   Clinical Presentation Rationale Multiple affecting comorbidities, 3+ body system and function areas affected, evolving presentation   Clinical Decision Making (Complexity) moderate complexity   Planned Therapy Interventions (PT) balance training;bed mobility training;gait training;home exercise program;neuromuscular re-education;patient/family education;postural re-education;stair training;strengthening;transfer training;progressive activity/exercise   Risk & Benefits of therapy have been explained evaluation/treatment results reviewed;care plan/treatment goals reviewed;participants included;patient;risks/benefits reviewed;current/potential barriers reviewed   Clinical Impression Comments Pt very confused not able to fully participate in education   PT Total Evaluation Time   PT Eval, Moderate Complexity Minutes (59566) 10   Physical Therapy Goals   PT Frequency 5x/week   PT Predicted Duration/Target Date for Goal Attainment 10/27/23   PT Goals Bed Mobility;Transfers;Gait;Aerobic Activity   PT: Bed Mobility Supervision/stand-by assist   PT: Transfers Supervision/stand-by assist   PT: Gait Supervision/stand-by assist   PT: Perform aerobic activity with stable cardiovascular response 5 minutes;10 minutes;intermittent activity;continuous activity   Interventions   Interventions Quick Adds Therapeutic Activity   Therapeutic Activity   Therapeutic Activities: dynamic activities to improve functional performance Minutes (63723) 25   Symptoms Noted During/After Treatment Fatigue   Treatment Detail/Skilled  Intervention Session focused on promoting healing, safe mechanics inside platform walker; Pt greeted in supine; confused and asking to stand on the bed. Able to be redirected, with repetition, verbal and tactile cues. Completed sup>sit with miodA x 1-2. Seated EOB, stood at 2WW and took few steps and turned to sit in recliner with modAx2. AD changed to EvaWalker due to notable difficulty with turning, mild freezing of gait, posturlal instability and incr effort. VSS. Pt completed hallway amb 100ft with EvaWalker and chair follow, Gilberto. Intermittent mild dragging of L LE, uneven step length.otable postural instabilty following a turn with pt lifting wheels on R side of walker needing modA for stabilizing;   PT Discharge Planning   PT Plan Promote OOB to reduce risk of delirium, repeated sit<>stands, amb with turns, balance including sensory integration, anticipatory/reactive tasks   PT Discharge Recommendation (DC Rec) Transitional Care Facility   PT Rationale for DC Rec High falls risk, poor safety awareness; impaired gait and balance   PT Brief overview of current status mod A x 1-2, use chair follow   Total Session Time   Timed Code Treatment Minutes 25   Total Session Time (sum of timed and untimed services) 35       [facility]  OUTPATIENT PHYSICAL THERAPY EVALUATION  PLAN OF TREATMENT FOR OUTPATIENT REHABILITATION  (COMPLETE FOR INITIAL CLAIMS ONLY)  Patient's Last Name, First Name, M.I.  YOB: 1940  Erick Shahid                        Provider's Name  [facility] Medical Record No.  [id number]                             Onset Date:  10/13/23   Start of Care Date:   10/13/2023   Type:     _X_PT   ___OT   ___SLP Medical Diagnosis:            left leg weakness     PT Diagnosis:  Impaired indep w/transfers, amb Visits from SOC:  1     See note for plan of treatment, functional goals and certification details    I CERTIFY THE NEED  FOR THESE SERVICES FURNISHED UNDER        THIS PLAN OF TREATMENT AND WHILE UNDER MY CARE     (Physician co-signature of this document indicates review and certification of the therapy plan).

## 2023-10-13 NOTE — ED PROVIDER NOTES
History     Chief Complaint:  Altered Mental Status       The history is provided by the EMS personnel. The history is limited by the condition of the patient (Dementia).      Erick Shahid is a 83 year old male with a history of hypertension, congestive heart failure, type 2 diabetes mellitus, and critical aortic valve stenosis who presents with altered mental status. He was last known normal at 2130 last night. Then earlier this morning, his son noticed the his speech was mumbled, which is unusual for him. EMS was called and he was brought to the ED. While en route, his blood sugar was 127 and his heart rate was 190 systolic. Upon arrival, EMS notes he is incontinent of urine. He previously had a cardiac sergei repair surgery 3 weeks ago, and recently returned home from [facility]. He does not have any unilateral deficits. He normally uses a walker to ambulate.     Son later adds that he heard his dad go to the bathroom around 1 AM this morning normally.  Then he heard a commotion around 4 AM when his dad was calling out for help from the bed.  Noted to have slurred speech, generalized weakness.  EMS stated that the patient was unable to stand    Independent Historian:   The EMS personnel     Review of External Notes:   None     Medications:    ASA   Metformin   Toprol XL   Crestor     Past Medical History:    HTN   BPH   CHF   Dementia    Critical aortic sergei stenosis   Type 2 DM  Dyslipidemia   Macular degeneration     Past Surgical History:    Transcatheter aortic sergei replacement   Pacemaker insertion   R hip ORIF     Physical Exam   Patient Vitals for the past 24 hrs:   BP Temp Temp src Pulse Resp SpO2 Weight   10/13/23 0800 (!) 146/56 -- -- 61 21 -- --   10/13/23 0759 -- -- -- -- -- 99 % --   10/13/23 0730 (!) 164/73 -- -- 64 26 99 % --   10/13/23 0619 (!) 165/75 98.2  F (36.8  C) Temporal 75 20 100 % --   10/13/23 0550 (!) 166/77 -- -- 73 13 100 % 88.3 kg (194 lb 10.7 oz)        Physical Exam  General: Awake,  "alert, in no acute distress   HEENT: Atraumatic   EOM normal   External ears normal   Trachea midline  Neck: Supple, normal ROM  CV: Regular rate and rhythm   No murmur   No lower extremity edema  PULM: Breath sounds normal bilaterally. No wheezes or rales  ABD: Soft, non-tender, non-distended. Normal bowel sounds   No rebound or guarding   MSK: No gross deformities  NEURO: Alert, no focal deficits  NIH 5  Skin: Warm, dry and intact      Emergency Department Course   ECG    ECG results from 08/15/23   EKG 12-lead, tracing only     Value    Systolic Blood Pressure     Diastolic Blood Pressure     Ventricular Rate 54    Atrial Rate 54    MO Interval 384    QRS Duration 148        QTc 540    P Axis 61    R AXIS -33    T Axis -63    Interpretation ECG      Sinus bradycardia with 1st degree A-V block with Premature ventricular complexes  Left axis deviation  Left bundle branch block  Abnormal ECG  When compared with ECG of 23-JUL-2023 18:01,  PVC  is now Present  T wave inversion now evident in Inferior leads  Confirmed by MD RIVERA DEMOS ([id number]),  Jean Duvall ([id number]) on 8/18/2023 10:53:33 AM     EKG 12-lead, tracing only     Value    Systolic Blood Pressure     Diastolic Blood Pressure     Ventricular Rate 54    Atrial Rate 52    MO Interval     QRS Duration 148        QTc 525    P Axis     R AXIS -37    T Axis -54    Interpretation ECG      Sinus rhythm with 1st degree AV block vs. 2\"1 AV block  Left axis deviation  Left bundle branch block  Abnormal ECG  When compared with ECG of 15-AUG-2023 09:39, (unconfirmed)  No significant change was found  Confirmed by MD RIVERA DEMOS ([id number]),  MACHO LOPEZ ([id number]) on 8/18/2023 10:12:26 AM     EKG 12-lead, tracing only     Value    Systolic Blood Pressure     Diastolic Blood Pressure     Ventricular Rate 99    Atrial Rate 99    MO Interval 210    QRS Duration 116        QTc 533    P Axis 64    R AXIS 74    T Axis 264    Interpretation ECG      " Atrial-sensed ventricular-paced rhythm with prolonged AV conduction  Abnormal ECG  When compared with ECG of 15-AUG-2023 12:08, (unconfirmed)  Electronic ventricular pacemaker has replaced sinus rhythm with probable 2:1 AV block  Vent. rate has increased BY  45 BPM  Confirmed by MD RIVERA DEMOS ([id number]),  MACHO LOPEZ ([id number]) on 8/18/2023 10:13:59 AM     EKG 12-lead, tracing only     Value    Systolic Blood Pressure     Diastolic Blood Pressure     Ventricular Rate 80    Atrial Rate 80    CO Interval 238    QRS Duration 112        QTc 459    P Axis 61    R AXIS 56    T Axis 269    Interpretation ECG      Atrial-sensed ventricular-paced rhythm  Abnormal ECG  When compared with ECG of 16-AUG-2023 07:19,  Vent. rate has decreased BY  19 BPM  Confirmed by MD RIVERA DEMOS ([id number]),  HAY RUTHERFORD ([id number]) on 8/20/2023 4:15:27 PM     EKG 12-lead, tracing only     Value    Systolic Blood Pressure     Diastolic Blood Pressure     Ventricular Rate 65    Atrial Rate 65    CO Interval 238    QRS Duration 114        QTc 470    P Axis 90    R AXIS 79    T Axis -89    Interpretation ECG      Atrial-sensed ventricular-paced rhythm with prolonged AV conduction  Abnormal ECG  When compared with ECG of 17-AUG-2023 10:14, (unconfirmed)  Vent. rate has decreased BY  15 BPM  Confirmed by MD RIVERA DEMOS ([id number]),  Muriel Phillips ([id number]) on 8/23/2023 8:26:25 AM           Imaging:  XR Chest Port 1 View   Final Result   IMPRESSION: No acute cardiopulmonary disease. Left chest wall   pacemaker device appears unchanged. TAVR device appears unchanged.   Biapical scarring and scattered calcified granulomas appear similar to   the prior exam. Degenerative changes are seen in the shoulders and   spine.      KRISTOPHER BARNHART MD            SYSTEM ID:  [id number]      CT Head Perfusion w Contrast - For Tier 2 Stroke   Final Result   IMPRESSION:    HEAD CT:   1.  No CT evidence for acute intracranial process.   2.  Brain atrophy  and presumed chronic microvascular ischemic changes as above.      HEAD CTA:    1.  No proximal occlusion, aneurysm, or high flow vascular malformation identified.      NECK CTA:   1.  Approximately 50-70% stenosis of the right internal carotid artery at the bifurcation.   2.  Less than 50% stenosis of the left internal carotid artery at the bifurcation.      CT PERFUSION:   1.  No convincing perfusion abnormality.         CT head and CTA head and neck findings were discussed with Dr. Plummer at 6:14 AM on 10/13/2023. CT perfusion findings were discussed with Dr. Plummer at 6:34 AM on 10/13/2023.      CTA Head Neck with Contrast   Final Result   IMPRESSION:    HEAD CT:   1.  No CT evidence for acute intracranial process.   2.  Brain atrophy and presumed chronic microvascular ischemic changes as above.      HEAD CTA:    1.  No proximal occlusion, aneurysm, or high flow vascular malformation identified.      NECK CTA:   1.  Approximately 50-70% stenosis of the right internal carotid artery at the bifurcation.   2.  Less than 50% stenosis of the left internal carotid artery at the bifurcation.      CT PERFUSION:   1.  No convincing perfusion abnormality.         CT head and CTA head and neck findings were discussed with Dr. Plummer at 6:14 AM on 10/13/2023. CT perfusion findings were discussed with Dr. Plummer at 6:34 AM on 10/13/2023.      CT Head w/o Contrast   Final Result   IMPRESSION:    HEAD CT:   1.  No CT evidence for acute intracranial process.   2.  Brain atrophy and presumed chronic microvascular ischemic changes as above.      HEAD CTA:    1.  No proximal occlusion, aneurysm, or high flow vascular malformation identified.      NECK CTA:   1.  Approximately 50-70% stenosis of the right internal carotid artery at the bifurcation.   2.  Less than 50% stenosis of the left internal carotid artery at the bifurcation.      CT PERFUSION:   1.  No convincing perfusion abnormality.         CT head and CTA head and neck  findings were discussed with Dr. Plummer at 6:14 AM on 10/13/2023. CT perfusion findings were discussed with Dr. Plummer at 6:34 AM on 10/13/2023.      MR Brain w/o & w Contrast    (Results Pending)      Results per radiology     Laboratory:  Labs Ordered and Resulted from Time of ED Arrival to Time of ED Departure   COMPREHENSIVE METABOLIC PANEL - Abnormal       Result Value    Sodium 139      Potassium 3.7      Carbon Dioxide (CO2) 23      Anion Gap 16 (*)     Urea Nitrogen 14.8      Creatinine 0.73      GFR Estimate 89      Calcium 9.6      Chloride 100      Glucose 137 (*)     Alkaline Phosphatase 102      AST 24      ALT 14      Protein Total 7.4      Albumin 4.2      Bilirubin Total 1.1     TROPONIN T, HIGH SENSITIVITY - Abnormal    Troponin T, High Sensitivity 23 (*)    CBC WITH PLATELETS AND DIFFERENTIAL - Abnormal    WBC Count 8.4      RBC Count 4.00 (*)     Hemoglobin 12.7 (*)     Hematocrit 37.8 (*)     MCV 95      MCH 31.8      MCHC 33.6      RDW 12.8      Platelet Count 148 (*)     % Neutrophils 63      % Lymphocytes 24      % Monocytes 9      Mids % (Monos, Eos, Basos)        % Eosinophils 2      % Basophils 1      % Immature Granulocytes 1      NRBCs per 100 WBC 0      Absolute Neutrophils 5.4      Absolute Lymphocytes 2.0      Absolute Monocytes 0.8      Mids Abs (Monos, Eos, Basos)        Absolute Eosinophils 0.1      Absolute Basophils 0.1      Absolute Immature Granulocytes 0.0      Absolute NRBCs 0.0     ROUTINE UA WITH MICROSCOPIC - Abnormal    Color Urine Straw      Appearance Urine Clear      Glucose Urine Negative      Bilirubin Urine Negative      Ketones Urine 10 (*)     Specific Gravity Urine 1.023      Blood Urine Negative      pH Urine 7.5 (*)     Protein Albumin Urine Negative      Urobilinogen Urine Normal      Nitrite Urine Negative      Leukocyte Esterase Urine Negative      Mucus Urine Present (*)     RBC Urine 1      WBC Urine <1     ISTAT GASES LACTATE VENOUS POCT - Abnormal     Lactic Acid POCT 1.9      Bicarbonate Venous POCT 26      O2 Sat, Venous POCT 33 (*)     pCO2 Venous POCT 35 (*)     pH Venous POCT 7.48 (*)     pO2 Venous POCT 19 (*)    TROPONIN T, HIGH SENSITIVITY - Abnormal    Troponin T, High Sensitivity 23 (*)    INR - Normal    INR 1.03     PARTIAL THROMBOPLASTIN TIME - Normal    aPTT 32     INFLUENZA A/B, RSV, & SARS-COV2 PCR - Normal    Influenza A PCR Negative      Influenza B PCR Negative      RSV PCR Negative      SARS CoV2 PCR Negative     GLUCOSE MONITOR NURSING POCT        Emergency Department Course & Assessments:    Interventions:  Medications   iopamidol (ISOVUE-370) solution 125 mL (125 mLs Intravenous $Given 10/13/23 0615)   100mL Saline Flush (100 mLs Intravenous $Given 10/13/23 0616)        Assessments:  0547 EMS arrived in the room with the patient. I obtained history and examined the patient as noted above.  0720 I rechecked the patient and explained findings.    Independent Interpretation (X-rays, CTs, rhythm strip):  CT HEAD: No intracranial hemorrhage      Consultations/Discussion of Management or Tests:  0555 I spoke with Stroke Neurology.      Social Determinants of Health affecting care:   None    Disposition:  The patient was admitted to the hospital under the care of Dr. Pardo.     Impression & Plan    Medical Decision Making:  Vitals hypertensive 180s/100s with EMS, 160s over 70s on arrival to ED otherwise WNL  Patient presents with an NIH of 5 in the setting of recent valve replacement on aspirin  He had extensive ED work-up which started with tier 2 stroke given that no one actually saw him around 1 AM with a last known normal at 2130 last night.  Due to no acute findings on imaging and symptoms improving he is a poor candidate for TNK  He is pending MRI  Troponin is elevated although flat, EKG is nonischemic  His urine does not appear infected  There are no events on his pacemaker interrogation  Chest x-ray does not show any evidence of  pneumonia  His lactic is WNL as his blood gas  Symptoms are somewhat improving.  Patient is moving the left leg against gravity, aphasia improved although patient still confused  I spoke with son at bedside and updated on plan of care    Spoke with Dr. Pardo \[facility]\"" medicine who kindly accepts to observation      Diagnosis:    ICD-10-CM    1. Altered mental status, unspecified altered mental status type  R41.82       2. Left leg weakness  R29.898                Scribe Disclosure:  I, Kurt Rubio, am serving as a scribe at 5:53 AM on 10/13/2023 to document services personally performed by Luz Nichols DO based on my observations and the provider's statements to me.   10/13/2023   Luz Nichols DO Pappas Richter, Ellen, DO  10/13/23 0826

## 2023-10-13 NOTE — ED TRIAGE NOTES
Pt BIBA from home where pt lives with son. Son called this AM when he noticed pt to have slurred speech and increased confusion along with incontinence, last known well 2130 yesterday. At baseline pt has some mild confusion. Had valve replacement 3 weeks ago. Blood sugar 127. SBP in 190s

## 2023-10-13 NOTE — PROGRESS NOTES
"  Speech-Language Pathology: Clinical Swallow Evaluation       10/13/23 1000   Appointment Info   Signing Clinician's Name / Credentials (SLP) Alana Beyer   General Information   Onset of Illness/Injury or Date of Surgery 10/13/23   Referring Physician Dr. Katiana Pardo   Patient/Family Therapy Goal Statement (SLP) \"I want some water. I need my dentures.\"   Pertinent History of Current Problem Copied from H and P: Erick Shahid is a 83 year old male with chronic HFrEF, CAD, dementia, s/p recent TAVR 23 mm Allison ROBERTO 3 valve on 8/15/2023, type II DM, complicated by CHB, s/p dual-chamber pacemaker who admitted on 10/13/2023, due to acute mental status changes with some word-finding deficits and possible left sided weakness (although not noted on Stroke Neuro's exam). CT indicates no acute intracranial process; MRI pending.   General Observations Patient is alert, confused, ability to communicate basic wants/needs and inconsistently follow simple instructions.   Type of Evaluation   Type of Evaluation Swallow Evaluation   Oral Motor   Oral Musculature generally intact   Structural Abnormalities none present   Oral Motor Deficits Observed Dentition (Oral Motor) (Group);Tongue Function (Oral Motor) (Group);Lip Function (Oral Motor) (Group)   Dentition (Oral Motor)   Dentition (Oral Motor) edentulous  (patient reports family may bring today)   Facial Symmetry (Oral Motor)   Facial Symmetry (Oral Motor) WNL   Lip Function (Oral Motor)   Lip Range of Motion (Oral Motor)   (mild impairment)   Lip Strength (Oral Motor) mild impairment   Lip Coordination (Oral Motor) minimal impairment   Tongue Function (Oral Motor)   Tongue Strength (Oral Motor) minimal impairment   Tongue Coordination/Speed (Oral Motor) reduced rate   Tongue ROM (Oral Motor)   (mild impaired ROM)   Jaw Function (Oral Motor)   Jaw Function (Oral Motor) WNL   Facial Sensation   Facial Sensation WNL   Cough/Swallow/Gag Reflex (Oral Motor) "   Volitional Throat Clear/Cough (Oral Motor) reduced strength   Volitional Swallow (Oral Motor) mildly delayed   General Swallowing Observations   Past History of Dysphagia patient reports no difficulty chewing or swallowing   Current Diet/Method of Nutritional Intake (General Swallowing Observations, NIS) regular diet;thin liquids (level 0)   Swallowing Evaluation Clinical swallow evaluation   Clinical Swallow Evaluation   Feeding Assistance set up only required   Clinical Swallow Evaluation: Puree Solid Texture Trial   Mode of Presentation, Puree spoon;self-fed   Oral Phase, Puree WFL   Pharyngeal Phase, Puree intact   Diagnostic Statement no overt s/sx of aspiration   Clinical Swallow Eval: Minced & Moist   Mode of Presentation self-fed;spoon   Volume Presented 2 bites   Oral Phase delayed AP movement   Oral Residue   (none)   Pharyngeal Phase repeated swallows   Diagnostic Statement no overt s/sx of aspiration   Clinical Swallow Evaluation: Solid Food Texture Trial   Mode of Presentation self-fed   Volume Presented attempted one bite   Oral Phase impaired mastication;delayed AP movement;residue in oral cavity   Pharyngeal Phase   (Unable to masticate and patient expectorates with verbal cue from SLP)   Diagnostic Statement moderate impaired mastication; patient expectorates and unable to manage solids d/t edentulous   Esophageal Phase of Swallow   Patient reports or presents with symptoms of esophageal dysphagia No   Swallowing Recommendations   Diet Consistency Recommendations thin liquids (level 0);minced & moist (level 5)   Supervision Level for Intake distant supervision needed   Mode of Delivery Recommendations bolus size, small;food moistened;slow rate of intake   Swallowing Maneuver Recommendations alternate food and liquid intake   Monitoring/Assistance Required (Eating/Swallowing) monitor for cough or change in vocal quality with intake   Recommended Feeding/Eating Techniques (Swallow Eval) maintain  upright sitting position for eating;set-up and prepare tray   Medication Administration Recommendations, Swallowing (SLP) as tolerated   Instrumental Assessment Recommendations instrumental evaluation not recommended at this time   General Therapy Interventions   Planned Therapy Interventions Dysphagia Treatment   Clinical Impression   Criteria for Skilled Therapeutic Interventions Met (SLP Eval) Yes, treatment indicated   SLP Diagnosis dysphagia   Risks & Benefits of therapy have been explained care plan/treatment goals reviewed;evaluation/treatment results reviewed;patient   Clinical Impression Comments Patient presents with moderate oral dysphagia characterized by mild reduced bolus formation, moderate reduced mastication d/t edentulous status, and no overt s/sx noted. Recommend level 5 diet-minced and moist and thin liquids via sigle sips or consecutive drinks from straw. Swallow precautions: upright, slow rate, monitor for s/sx. Plan to ADAT when family brings denture. Consider speech-language assessment pending MRI results, however not indicated at this time, as symptoms resolving and word finding difficulties and cogntive impairment at baseline.   SLP Total Evaluation Time   Eval: oral/pharyngeal swallow function, clinical swallow Minutes [id number]) 11   SLP Goals   Therapy Frequency (SLP Eval) 3 times/week   SLP Predicted Duration/Target Date for Goal Attainment 10/19/23   SLP Goals Swallow;SLP Goal 1   SLP: Safely tolerate diet without signs/symptoms of aspiration Regular diet;Thin liquids   SLP Discharge Planning   SLP Plan PO tolerance, ADAT when dentures available. Speech-language assessment as indicated, pending MRI.   SLP Discharge Recommendation home;home with home health   SLP Rationale for DC Rec Patient likely to meet SLP goals prior to discharge   SLP Brief overview of current status  Recommend level 5-minced and moist diet and thin liquids with medications as tolerated. Precautions: upright, slow  rate, small bites/sips, alternate consistencies.

## 2023-10-13 NOTE — PROGRESS NOTES
RECEIVING UNIT ED HANDOFF REVIEW    ED Nurse Handoff Report was reviewed by: Mili Muhammad RN on October 13, 2023 at 8:56 AM

## 2023-10-13 NOTE — H&P
[facility]    History and Physical - Hospitalist Service       Date of Admission:  10/13/2023    Assessment & Plan      Erick Shahid is a 83 year old male with chronic HFrEF, CAD, dementia, s/p recent TAVR 23 mm Allison ROBERTO 3 valve on 8/15/2023, type II DM, complicated by CHB, s/p dual-chamber pacemaker who admitted on 10/13/2023, due to acute mental status changes with some word-finding deficits and possible left sided weakness (although not noted on Stroke Neuro's exam).  per EMS.      Per son, patient last seen normal at 1 AM.  At 4:30 AM he woke screaming stating he had pain all over.  He was noted to have been incontinent in bed, no clear evidence of weakness moving all 4 extremities in bed.  He was mumbling but able to complete full sentences that were understandable.  When he was brought to the ED it was noted he had word finding difficulties and some left-sided weakness.  Per neuro team.  He is not able to answer questions of orientation and has some minimal aphasia.  Otherwise no motor deficits noted.     T98.2, P75, /77, RR 13, O2 100%  Labs: Lites normal, CR 0.73 (stable), bicarb 23, AG 16, lactic acid 1.9, glucose 137, liver panel normal, troponin minimally elevated at 23.  CBC WBC 8.4, Hgb 12.7 (stable),  (stable)   UA negative for infection, with 10 ketones  COVID, influenza, RSV PCR negative  Chest x-ray personally reviewed.  Formal read pending.  I see no evidence of acute cardiopulmonary disease.  EKG reviewed showing sinus bradycardia with first-degree AV block.  Note PVCs, and some nonspecific ST-T wave changes.    CT Head was without acute changes   CTA head and neck - no evidence of large-vessel occlusion, moderate to severe R carotid stenosis, 50-70% stenosis  CTP - No large deficits.     Pacemaker interrogation unremarkable    Acute confusion with observed aphasia and possible LLE weakness, RESOLVING -  There is concern for stroke, however  word finding deficits wax and wane, and noted at baseline as well. With regards to LLE weakness. Son notes chronic uneven gate appearing to favor left side but unknown cause (all prior significant leg injuries on R). 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  For me, he is able to lift left, but does fatigue with mild drift of leg and states it feels weaker, also notes pain.. LLE motor exam reported as normal by stroke neuro. Therefore, if MRI negative I suspect symptoms may be due to acute confusion following a vivid dream in the context of dementia.  No no evidence of infection or significant acute metabolic disturbance to cause encephalopathy.   R mod carotid artery stenosis (carotid ultrasound on 8/15 showed 50 to 69% stenosis as well)   *CT imaging without evidence of acute stroke or proximal vascular occlusion    Swallow screen. If passes, give aspirin 325 mg starting now.  (On monotherapy with aspirin 81 mg PTA)  Continue PTA statin  MRI of the brain > will have to coordinate with PM company.   Appreciate stroke neuro consult  Admit to observation, telemetry neuro every 4 hours  May advance diet if passes swallow screen  Normal saline +20 KCl x1 L, then stop  Recent echo performed so hold for now, pending findings on MRI.   If MRI negative, I think this is most consistent with dementia with acute confusion following vivid dream.  Defer to neurology whether EEG should be performed.   PT/OT/Speech/CM    Chronic HF with recovered EF  CAD, s/p L HC for evaluation of TAVR on 7/28/23 single-vessel occlusive disease of mid small size nondominant RCA.  Remainder of coronaries with minimal nonocclusive disease  Critical aortic stenosis s/p TAVR with 23 mm Allison Maribel 3 valve on 8/15/23.    *Echo 9/22/2023: Bioprosthetic aortic valve with mean gradient of 19 mmHg, LVEF 55 to 60% (improved from prior EF of 35%) No AI  CHB s/p PPM following TAVR - [employer] Baker (D)   Mild troponin elevation, (23)  complained of pain all over when he screamed out in the middle the night.  Now denies chest pain or shortness of breath. EKG SB with first degree block LBBB with nonspecific changes and PVCs    HTN, HLD  Aspirin and statin as above. (Notably, on ASA 81 mg daily post-TAVR)   Will need to coordinate MRI with PM team.   No evidence of fluid overload  We will be on telemetry as above with a follow-up troponin ordered.  Consider repeat echo pending results.   Hold PTA metoprolol XL for permissive hypertension at this point as we await MRI.    DM, type II  * A1c 6 on 7/3023, glucose 137 at admission.  Holding PTA metformin  Ordered medium sliding scale insulin with 4 times daily blood sugar checks    Dementia - Uses walker at baseline.  He is alert and oriented to hospital and year. Has difficulty with the month and unable to give me history about recent events.  Per son he sometimes needs help with dressing.  He does eat on his own.  They have noted persistent decline over the last year.   Frequent re-orientation  Maintain normal day/night, sleep wake cycles  Minimize sedating/altering medications as able  Schedule melatonin at bedtime   Quetiapine PRN for agitation at night only.     Mild anion gap elevation with normal bicarbonate and mildly elevated ketones in urine.  Blood sugar 137.  Lactic acid normal.   *Per son he has been eating and drinking well.  Do not suspect starvation ketoacidosis   I think this is of unlikely clinical significance.  He will receive fluids and will repeat his BMP in the morning.     Macular Degeneration   Continue PTA eyedrops    Constipation   Continue PTA scheduled MiraLAX and senna as well as as needed regimen    Chronic normocytic anemia, stable at hgb 12.7  Resume vitamin B12 at discharge    Awaiting medication reconciliation           Diet: NPO for Medical/Clinical Reasons Except for: No Exceptions    DVT Prophylaxis: Pneumatic Compression Devices  Pablo Catheter: Not present  Lines:  "None     Cardiac Monitoring: None  Code Status:  Discussed DNR/DNI    Clinically Significant Risk Factors Present on Admission                # Drug Induced Platelet Defect: home medication list includes an antiplatelet medication        # Obesity: Estimated body mass index is 30.49 kg/m  as calculated from the following:    Height as of 9/22/23: 1.702 m (5' 7\").    Weight as of this encounter: 88.3 kg (194 lb 10.7 oz).        # Pacemaker present       Disposition Plan      Expected Discharge Date: 10/14/2023, 12:00 PM       Admitted under observation with anticipated discharge on HD#1 if no further deficits.        Katiana Pardo MD  Hospitalist Service  [facility]  Securely message with [employer] (more info)  Text page via [facility] Paging/Directory     ______________________________________________________________________    Chief Complaint   Altered Mental Status    History is obtained from the patient, mostly from son as patient unable to remember recent events    History of Present Illness   Erick Shahid is a 83 year old male with chronic HFrEF, CAD, dementia, s/p recent TAVR 23 mm Allison ROBERTO 3 valve on 8/15/2023, type II DM, complicated by CHB, s/p dual-chamber pacemaker who admitted on 10/13/2023, due to acute mental status changes with some word-finding deficits and possible left sided weakness (although not noted on Stroke Neuro's exam).     Patient has had an uneventful course following his TAVR in August.  Returning home from rehab and doing quite well.  His son is staying with him this weekend as his wife and daughter out of town.  At 1 AM he screamed out and son got up to see what was wrong.  Bernardo appeared to have had a bad dream and was awoken by his own screen.  He got him back in bed.  Then at 4:30 AM, he was calling out for help.  When he arrived in his room.  He had kicked off all his covers.  He was moving around all of his legs and stating that he had pain all " over and he needed to go to the hospital.  EMS noted blood sugar of 127.     Son reports no recent signs of illness.  He has been acting his normal self.    Past Medical History    Past Medical History:   Diagnosis Date    Benign essential hypertension     BPH (benign prostatic hyperplasia)     Chronic heart failure with reduced ejection fraction and diastolic dysfunction (H)     Cognitive impairment     Critical aortic valve stenosis     Diabetes mellitus, type 2 (H)     on metformin    Dyslipidemia     First degree atrioventricular block     LBBB (left bundle branch block)     Macular degeneration (senile) of retina        Past Surgical History   Past Surgical History:   Procedure Laterality Date    CV CORONARY ANGIOGRAM N/A 07/28/2023    Procedure: Coronary Angiogram;  Surgeon: Mando Field MD;  Location: [facility] CARDIAC CATH LAB    CV PCI N/A 07/28/2023    Procedure: Percutaneous Coronary Intervention;  Surgeon: Mando Field MD;  Location: [facility] CARDIAC CATH LAB    CV TRANSCATHETER AORTIC VALVE REPLACEMENT  08/15/2023    CV TRANSCATHETER AORTIC VALVE REPLACEMENT-FEMORAL APPROACH N/A 8/15/2023    Procedure: Transcatheter Aortic Valve Replacement-Femoral Approach;  Surgeon: Bossman Felipe MD;  Location: [facility] CARDIAC CATH LAB    EP PACEMAKER DEVICE & LEAD IMPLANT- RIGHT ATRIAL & LEFT VENTRICULAR N/A 8/15/2023    Procedure: Pacemaker Device & Lead Implant- Right Atrial & Left Ventricular;  Surgeon: Oscar Mendoza MD;  Location: [facility] CARDIAC CATH LAB    ORIF HIP FRACTURE Right 2018       Prior to Admission Medications   Prior to Admission Medications   Prescriptions Last Dose Informant Patient Reported? Taking?   Vitamin D3 (CHOLECALCIFEROL) 25 mcg (1000 units) tablet  Spouse/Significant Other Yes No   Sig: Take 25 mcg by mouth daily   aspirin (ASA) 81 MG chewable tablet   No No   Sig: Take 1 tablet (81 mg) by mouth daily   cycloSPORINE (RESTASIS) 0.05 % ophthalmic emulsion   Spouse/Significant Other Yes No   Sig: Apply 1 drop to eye 2 times daily   metFORMIN (GLUCOPHAGE) 1000 MG tablet  Spouse/Significant Other Yes No   Sig: Take 1 tablet by mouth daily (with dinner)   metoprolol succinate ER (TOPROL XL) 25 MG 24 hr tablet  Spouse/Significant Other Yes No   Sig: Take 25 mg by mouth daily   nystatin (MYCOSTATIN) 277364 UNIT/GM external powder  Spouse/Significant Other Yes No   Sig: Apply 1 strip topically 3 times daily as needed (jock itch)   polyethylene glycol (MIRALAX) 17 g packet   Yes No   Sig: Take 1 packet by mouth daily   rosuvastatin (CRESTOR) 20 MG tablet  Spouse/Significant Other Yes No   Sig: Take 1 tablet by mouth daily   senna-docusate (SENOKOT-S/PERICOLACE) 8.6-50 MG tablet   Yes No   Sig: Take 2 tablets by mouth daily   vitamin B-12 (CYANOCOBALAMIN) 1000 MCG tablet  Spouse/Significant Other Yes No   Sig: Take 1,000 mcg by mouth daily   vitamin C (ASCORBIC ACID) 500 MG tablet  Spouse/Significant Other Yes No   Sig: Take 500 mg by mouth daily      Facility-Administered Medications: None        Social History   I have reviewed this patient's social history and updated it with pertinent information if needed.  Lives with wife.  Able to ambulate independently with walker.  He does need help with dressing at times.  Able to eat on his own.  He has never smoked and does not drink alcohol regularly.  Social History     Tobacco Use    Smoking status: Never    Smokeless tobacco: Never   Vaping Use    Vaping Use: Never used   Substance Use Topics    Alcohol use: Yes     Comment: few drinks per year - very rare    Drug use: Never        Physical Exam   Vital Signs: Temp: 98.2  F (36.8  C) Temp src: Temporal BP: (!) 146/56 Pulse: 61   Resp: 21 SpO2: 99 % O2 Device: None (Room air)    Weight: 194 lbs 10.66 oz  Constitutional:   awake, alert, cooperative, no apparent distress, and appears stated age, [place]     Eyes:   Lids and lashes normal, extra ocular muscles intact, sclera clear,  conjunctiva normal     ENT:   Normocephalic, without obvious abnormality, atramatic, oral pharynx, edentulous     Neck:   Supple, symmetrical, trachea midline, no adenopathy, thyroid symmetric, not enlarged and no tenderness, skin normal     Lungs:   No increased work of breathing, good air exchange, clear to auscultation bilaterally, no crackles or wheezing     Cardiovascular:   Regular rate and rhythm, normal S1 and S2, no S3 or S4, and no murmur noted. Extremities are warm. 1+ edema which son states is baseline.      Abdomen:   Normal bowel sounds, soft, non-distended, non-tender, no masses palpated, no hepatosplenomegally     Musculoskeletal:   There is no redness, warmth, or swelling of the joints. Normal build.     Neurologic:   Awake, alert, oriented to name, place. Has difficulty with month. Knows year and president. Can not tell me recent events. Has difficulty stating months backwards.   Speech intact. Follows commands.   Face symmetric   Horizontal gaze normal. PERRL  Able to raise all 4 extremities off the bed and hold for 5 seconds.  However in the left lower extremity.  He does have some slight fatigue with drift but is able to maintain it off the bed.  He does state it feels weaker.   Coordination intact with finger-nose-finger     Neuropsychiatric:   General: normal, calm and normal eye contact     Skin:   No excessive bruising. No bleeding, redness, warmth, or swelling and no rashes         Medical Decision Making       Over 75 MINUTES SPENT BY ME on the date of service doing chart review, history, exam, documentation & further activities per the note.      Data     I have personally reviewed the following data over the past 24 hrs:    8.4  \   12.7 (L)   / 148 (L)     139 100 14.8 /  137 (H)   3.7 23 0.73 \     ALT: 14 AST: 24 AP: 102 TBILI: 1.1   ALB: 4.2 TOT PROTEIN: 7.4 LIPASE: N/A     Trop: 23 (H) BNP: N/A     Procal: N/A CRP: N/A Lactic Acid: 1.9       INR:  1.03 PTT:  32   D-dimer:  N/A  Fibrinogen:  N/A       Imaging results reviewed over the past 24 hrs:   Recent Results (from the past 24 hour(s))   CT Head w/o Contrast    Narrative    EXAM: CT HEAD W/O CONTRAST, CTA HEAD NECK W CONTRAST, CT HEAD PERFUSION W CONTRAST  LOCATION: [facility]  DATE: 10/13/2023    INDICATION: Neuro deficit. Stroke alert. Slurred speech.  COMPARISON: 08/15/2023  CONTRAST: 125mL Isovue 370  TECHNIQUE: Head and neck CT angiogram with IV contrast. Noncontrast head CT followed by axial helical CT images of the head and neck vessels obtained during the arterial phase of intravenous contrast administration. Axial 2D reconstructed images and   multiplanar 3D MIP reconstructed images of the head and neck vessels were performed by the technologist. Additional CT cerebral perfusion was performed utilizing a second contrast bolus. Perfusion data were post processed with generation of standard   perfusion maps and estimation of ischemic/infarcted volumes utilizing standard threshold values. Dose reduction techniques were used. All stenosis measurements made according to NASCET criteria unless otherwise specified.    FINDINGS:   NONCONTRAST HEAD CT:   INTRACRANIAL CONTENTS: No intracranial hemorrhage, extraaxial collection, or mass effect.  No CT evidence of acute infarct. Mild presumed chronic small vessel ischemic changes. Mild generalized volume loss. No hydrocephalus.     VISUALIZED ORBITS/SINUSES/MASTOIDS: Prior bilateral cataract surgery. Visualized portions of the orbits are otherwise unremarkable. Mucous retention cyst in the left maxillary sinus. No middle ear or mastoid effusion.    BONES/SOFT TISSUES: No acute abnormality.    HEAD CTA:  ANTERIOR CIRCULATION: Atherosclerotic calcification of the supraclinoid internal carotid arteries. No stenosis/occlusion, aneurysm, or high flow vascular malformation. Standard [place] of Humphries anatomy.    POSTERIOR CIRCULATION: No stenosis/occlusion, aneurysm, or  high flow vascular malformation. Dominant left and smaller right vertebral artery contribute to a normal basilar artery.     DURAL VENOUS SINUSES: Expected enhancement of the major dural venous sinuses.    NECK CTA:  RIGHT CAROTID: Atherosclerotic plaque results in 50-70% stenosis in the right ICA. No dissection.    LEFT CAROTID: Atherosclerotic plaque results in less than 50% stenosis in the left ICA. No dissection.    VERTEBRAL ARTERIES: No focal stenosis or dissection. Dominant left and smaller right vertebral arteries.    AORTIC ARCH: Bovine origin left common carotid artery. No significant stenosis at the origin of the great vessels.    NONVASCULAR STRUCTURES: Calcified pleural plaque at the left lung apex. Multilevel cervical spondylosis, with high-grade bilateral neural foraminal narrowing. Multilevel ankylosis of the cervical spine. Diffuse bony demineralization.    CT PERFUSION:  PERFUSION MAPS: Small areas of elevated Tmax in the right temporal lobe and left frontal lobe are favored to be artifactual. No convincing perfusion abnormality.    RAPID ANALYSIS:  CBF<30%: 0 mL  Tmax>6sec: 8 mL, visually corrected to 0  Mismatch volume: 8 mL, visually corrected to 0  Mismatch ratio: Infinite, visually corrected to none      Impression    IMPRESSION:   HEAD CT:  1.  No CT evidence for acute intracranial process.  2.  Brain atrophy and presumed chronic microvascular ischemic changes as above.    HEAD CTA:   1.  No proximal occlusion, aneurysm, or high flow vascular malformation identified.    NECK CTA:  1.  Approximately 50-70% stenosis of the right internal carotid artery at the bifurcation.  2.  Less than 50% stenosis of the left internal carotid artery at the bifurcation.    CT PERFUSION:  1.  No convincing perfusion abnormality.      CT head and CTA head and neck findings were discussed with Dr. Plummer at 6:14 AM on 10/13/2023. CT perfusion findings were discussed with Dr. Plummer at 6:34 AM on 10/13/2023.   CTA  Head Neck with Contrast    Narrative    EXAM: CT HEAD W/O CONTRAST, CTA HEAD NECK W CONTRAST, CT HEAD PERFUSION W CONTRAST  LOCATION: [facility]  DATE: 10/13/2023    INDICATION: Neuro deficit. Stroke alert. Slurred speech.  COMPARISON: 08/15/2023  CONTRAST: 125mL Isovue 370  TECHNIQUE: Head and neck CT angiogram with IV contrast. Noncontrast head CT followed by axial helical CT images of the head and neck vessels obtained during the arterial phase of intravenous contrast administration. Axial 2D reconstructed images and   multiplanar 3D MIP reconstructed images of the head and neck vessels were performed by the technologist. Additional CT cerebral perfusion was performed utilizing a second contrast bolus. Perfusion data were post processed with generation of standard   perfusion maps and estimation of ischemic/infarcted volumes utilizing standard threshold values. Dose reduction techniques were used. All stenosis measurements made according to NASCET criteria unless otherwise specified.    FINDINGS:   NONCONTRAST HEAD CT:   INTRACRANIAL CONTENTS: No intracranial hemorrhage, extraaxial collection, or mass effect.  No CT evidence of acute infarct. Mild presumed chronic small vessel ischemic changes. Mild generalized volume loss. No hydrocephalus.     VISUALIZED ORBITS/SINUSES/MASTOIDS: Prior bilateral cataract surgery. Visualized portions of the orbits are otherwise unremarkable. Mucous retention cyst in the left maxillary sinus. No middle ear or mastoid effusion.    BONES/SOFT TISSUES: No acute abnormality.    HEAD CTA:  ANTERIOR CIRCULATION: Atherosclerotic calcification of the supraclinoid internal carotid arteries. No stenosis/occlusion, aneurysm, or high flow vascular malformation. Standard [place] of Humphries anatomy.    POSTERIOR CIRCULATION: No stenosis/occlusion, aneurysm, or high flow vascular malformation. Dominant left and smaller right vertebral artery contribute to a normal basilar  artery.     DURAL VENOUS SINUSES: Expected enhancement of the major dural venous sinuses.    NECK CTA:  RIGHT CAROTID: Atherosclerotic plaque results in 50-70% stenosis in the right ICA. No dissection.    LEFT CAROTID: Atherosclerotic plaque results in less than 50% stenosis in the left ICA. No dissection.    VERTEBRAL ARTERIES: No focal stenosis or dissection. Dominant left and smaller right vertebral arteries.    AORTIC ARCH: Bovine origin left common carotid artery. No significant stenosis at the origin of the great vessels.    NONVASCULAR STRUCTURES: Calcified pleural plaque at the left lung apex. Multilevel cervical spondylosis, with high-grade bilateral neural foraminal narrowing. Multilevel ankylosis of the cervical spine. Diffuse bony demineralization.    CT PERFUSION:  PERFUSION MAPS: Small areas of elevated Tmax in the right temporal lobe and left frontal lobe are favored to be artifactual. No convincing perfusion abnormality.    RAPID ANALYSIS:  CBF<30%: 0 mL  Tmax>6sec: 8 mL, visually corrected to 0  Mismatch volume: 8 mL, visually corrected to 0  Mismatch ratio: Infinite, visually corrected to none      Impression    IMPRESSION:   HEAD CT:  1.  No CT evidence for acute intracranial process.  2.  Brain atrophy and presumed chronic microvascular ischemic changes as above.    HEAD CTA:   1.  No proximal occlusion, aneurysm, or high flow vascular malformation identified.    NECK CTA:  1.  Approximately 50-70% stenosis of the right internal carotid artery at the bifurcation.  2.  Less than 50% stenosis of the left internal carotid artery at the bifurcation.    CT PERFUSION:  1.  No convincing perfusion abnormality.      CT head and CTA head and neck findings were discussed with Dr. Plummer at 6:14 AM on 10/13/2023. CT perfusion findings were discussed with Dr. Plummer at 6:34 AM on 10/13/2023.   CT Head Perfusion w Contrast - For Tier 2 Stroke    Narrative    EXAM: CT HEAD W/O CONTRAST, CTA HEAD NECK W  CONTRAST, CT HEAD PERFUSION W CONTRAST  LOCATION: [facility]  DATE: 10/13/2023    INDICATION: Neuro deficit. Stroke alert. Slurred speech.  COMPARISON: 08/15/2023  CONTRAST: 125mL Isovue 370  TECHNIQUE: Head and neck CT angiogram with IV contrast. Noncontrast head CT followed by axial helical CT images of the head and neck vessels obtained during the arterial phase of intravenous contrast administration. Axial 2D reconstructed images and   multiplanar 3D MIP reconstructed images of the head and neck vessels were performed by the technologist. Additional CT cerebral perfusion was performed utilizing a second contrast bolus. Perfusion data were post processed with generation of standard   perfusion maps and estimation of ischemic/infarcted volumes utilizing standard threshold values. Dose reduction techniques were used. All stenosis measurements made according to NASCET criteria unless otherwise specified.    FINDINGS:   NONCONTRAST HEAD CT:   INTRACRANIAL CONTENTS: No intracranial hemorrhage, extraaxial collection, or mass effect.  No CT evidence of acute infarct. Mild presumed chronic small vessel ischemic changes. Mild generalized volume loss. No hydrocephalus.     VISUALIZED ORBITS/SINUSES/MASTOIDS: Prior bilateral cataract surgery. Visualized portions of the orbits are otherwise unremarkable. Mucous retention cyst in the left maxillary sinus. No middle ear or mastoid effusion.    BONES/SOFT TISSUES: No acute abnormality.    HEAD CTA:  ANTERIOR CIRCULATION: Atherosclerotic calcification of the supraclinoid internal carotid arteries. No stenosis/occlusion, aneurysm, or high flow vascular malformation. Standard [place] of Humphries anatomy.    POSTERIOR CIRCULATION: No stenosis/occlusion, aneurysm, or high flow vascular malformation. Dominant left and smaller right vertebral artery contribute to a normal basilar artery.     DURAL VENOUS SINUSES: Expected enhancement of the major dural venous  sinuses.    NECK CTA:  RIGHT CAROTID: Atherosclerotic plaque results in 50-70% stenosis in the right ICA. No dissection.    LEFT CAROTID: Atherosclerotic plaque results in less than 50% stenosis in the left ICA. No dissection.    VERTEBRAL ARTERIES: No focal stenosis or dissection. Dominant left and smaller right vertebral arteries.    AORTIC ARCH: Bovine origin left common carotid artery. No significant stenosis at the origin of the great vessels.    NONVASCULAR STRUCTURES: Calcified pleural plaque at the left lung apex. Multilevel cervical spondylosis, with high-grade bilateral neural foraminal narrowing. Multilevel ankylosis of the cervical spine. Diffuse bony demineralization.    CT PERFUSION:  PERFUSION MAPS: Small areas of elevated Tmax in the right temporal lobe and left frontal lobe are favored to be artifactual. No convincing perfusion abnormality.    RAPID ANALYSIS:  CBF<30%: 0 mL  Tmax>6sec: 8 mL, visually corrected to 0  Mismatch volume: 8 mL, visually corrected to 0  Mismatch ratio: Infinite, visually corrected to none      Impression    IMPRESSION:   HEAD CT:  1.  No CT evidence for acute intracranial process.  2.  Brain atrophy and presumed chronic microvascular ischemic changes as above.    HEAD CTA:   1.  No proximal occlusion, aneurysm, or high flow vascular malformation identified.    NECK CTA:  1.  Approximately 50-70% stenosis of the right internal carotid artery at the bifurcation.  2.  Less than 50% stenosis of the left internal carotid artery at the bifurcation.    CT PERFUSION:  1.  No convincing perfusion abnormality.      CT head and CTA head and neck findings were discussed with Dr. Plummer at 6:14 AM on 10/13/2023. CT perfusion findings were discussed with Dr. Plummer at 6:34 AM on 10/13/2023.   XR Chest Port 1 View    Narrative    XR CHEST PORT 1 VIEW   10/13/2023 7:59 AM     HISTORY: stroke    COMPARISON: 8/16/2023      Impression    IMPRESSION: No acute cardiopulmonary disease. Left  chest wall  pacemaker device appears unchanged. TAVR device appears unchanged.  Biapical scarring and scattered calcified granulomas appear similar to  the prior exam. Degenerative changes are seen in the shoulders and  spine.    KRISTOPHER BARNHART MD         SYSTEM ID:  [id number]

## 2023-10-13 NOTE — CONSULTS
[facility]    Stroke Consult Note    Reason for Consult: Stroke Code     Chief Complaint: Altered Mental Status      HPI  Erick Shahid is a 83 year old with hx of DM, HTN, HLD, CAD (  of RCA), carotid disease, macular degen, dementia, recent TAVR, pacemaker, depression, hearing loss.     He called his son around 4 AM and was mumbling. He said he hurt all over and could not move. He was restless in bed. Son was trying to talk to him but was very difficult. Earlier , he got up at 1 AM and was walking without his walker and was confused at that time. Son then helped him back into bed.    We know him from consultation from the time of his TAVR- stroke code was called for left facial droop. MRI could not be done 2/2 pacemaker but pts deficits resolved before discharge.    Not a historian 2/2 encephalopathy.    Imaging Findings  Ct head was without acute IC changes.   CTA head and neck- no LVO, moderate to severe R carotid stenosis.   CTP - no large perfusion defects.    Intravenous Thrombolysis  Not given due to:   - unclear or unfavorable risk-benefit profile for extended window thrombolysis beyond the conventional 4.5 hour time window    Endovascular Treatment  Not initiated due to absence of proximal vessel occlusion    Impression   Encephalopathy vs stroke    Recommendations  MRI brain if possible   TSH, Vit b12, folate, UA, CXR, CBC, CMP.  Continue ASA and statin    Patient Follow-up     - final recommendation pending work-up    Thank you for this consult.    Odilon Matthews MD     ______________________________________________________       Past Medical History   Past Medical History:   Diagnosis Date     Benign essential hypertension      BPH (benign prostatic hyperplasia)      Chronic heart failure with reduced ejection fraction and diastolic dysfunction (H)      Cognitive impairment      Critical aortic valve stenosis      Diabetes mellitus, type 2 (H)     on metformin      Dyslipidemia      First degree atrioventricular block      LBBB (left bundle branch block)      Macular degeneration (senile) of retina      Past Surgical History   Past Surgical History:   Procedure Laterality Date     CV CORONARY ANGIOGRAM N/A 07/28/2023    Procedure: Coronary Angiogram;  Surgeon: Mando Field MD;  Location:  HEART CARDI[facility] CATH LAB     CV PCI N/A 07/28/2023    Procedure: Percutaneous Coronary Intervention;  Surgeon: Mando Field MD;  Location:  [facility]ARDI[facility] CATH LAB     CV TRANSCATHETER AORTIC VALVE REPLACEMENT  08/15/2023     CV TRANSCATHETER AORTIC VALVE REPLACEMENT-FEMORAL APPROACH N/A 8/15/2023    Procedure: Transcatheter Aortic Valve Replacement-Femoral Approach;  Surgeon: Bossman Felipe MD;  Location:  HEART CARDI[facility] CATH LAB     EP PACEMAKER DEVICE & LEAD IMPLANT- RIGHT ATRIAL & LEFT VENTRICULAR N/A 8/15/2023    Procedure: Pacemaker Device & Lead Implant- Right Atrial & Left Ventricular;  Surgeon: Oscar Mendoza MD;  Location:  HEART CARDIAC CATH LAB     ORIF HIP FRACTURE Right 2018     Medications   Home Meds  Prior to Admission medications    Medication Sig Start Date End Date Taking? Authorizing Provider   aspirin (ASA) 81 MG chewable tablet Take 1 tablet (81 mg) by mouth daily 8/18/23   Sheri Davis, CNP   cycloSPORINE (RESTASIS) 0.05 % ophthalmic emulsion Apply 1 drop to eye 2 times daily 6/16/22   Unknown, Entered By History   metFORMIN (GLUCOPHAGE) 1000 MG tablet Take 1 tablet by mouth daily (with dinner) 6/21/23   Unknown, Entered By History   metoprolol succinate ER (TOPROL XL) 25 MG 24 hr tablet Take 25 mg by mouth daily    Reported, Patient   nystatin (MYCOSTATIN) 290383 UNIT/GM external powder Apply 1 strip topically 3 times daily as needed (jock itch) 7/17/23   Unknown, Entered By History   polyethylene glycol (MIRALAX) 17 g packet Take 1 packet by mouth daily    Reported, Patient   rosuvastatin (CRESTOR) 20 MG tablet Take 1 tablet by  mouth daily 6/21/23   Unknown, Entered By History   senna-docusate (SENOKOT-S/PERICOLACE) 8.6-50 MG tablet Take 2 tablets by mouth daily    Reported, Patient   vitamin B-12 (CYANOCOBALAMIN) 1000 MCG tablet Take 1,000 mcg by mouth daily    Reported, Patient   vitamin C (ASCORBIC ACID) 500 MG tablet Take 500 mg by mouth daily    Reported, Patient   Vitamin D3 (CHOLECALCIFEROL) 25 mcg (1000 units) tablet Take 25 mcg by mouth daily    Unknown, Entered By History       Scheduled Meds      Infusion Meds      PRN Meds      Allergies   No Known Allergies  Family History   No family history on file.  Social History   Social History     Tobacco Use     Smoking status: Never     Smokeless tobacco: Never   Vaping Use     Vaping Use: Never used   Substance Use Topics     Alcohol use: Yes     Comment: few drinks per year - very rare     Drug use: Never       Review of Systems   The 10 point Review of Systems is negative other than noted in the HPI or here.        PHYSICAL EXAMINATION  Temp:  [98.2  F (36.8  C)] 98.2  F (36.8  C)  Pulse:  [73-75] 75  Resp:  [13-20] 20  BP: (165-166)/(75-77) 165/75  SpO2:  [100 %] 100 %     General Exam  General:  patient lying in bed without any acute distress    HEENT:  normocephalic/atraumatic  Cardio:  RRR  Pulmonary:  no respiratory distress  Abdomen:  non-distended  Extremities:  pitting edema present  Skin:  intact     Neuro Exam  Mental Status:  Not oriented, follows commands, naming and repetition normal  Cranial Nerves:  visual fields intact , EOMI with normal smooth pursuit, facial sensation intact and symmetric (tested by nurse), facial movements symmetric, hearing not formally tested but intact to conversation, no dysarthria, shoulder shrug equal bilaterally, tongue protrusion midline  Motor:  no abnormal movements, able to move all limbs antigravity spontaneously with no signs of hemiparesis observed, no pronator drift   Reflexes:  unable to test (telestroke)  Sensory:  light touch  sensation intact and symmetric throughout upper and lower extremities (assessed by nurse), no extinction on double simultaneous stimulation (assessed by nurse)  Coordination:  normal finger-to-nose and heel-to-shin bilaterally without dysmetria, rapid alternating movements symmetric  Station/Gait:  unable to test due to telestroke    Dysphagia Screen  Per Nursing    Stroke Scales    NIHSS  1a. Level of Consciousness 0-->Alert, keenly responsive   1b. LOC Questions 2-->Answers neither question correctly   1c. LOC Commands 0-->Performs both tasks correctly   2.   Best Gaze 0-->Normal   3.   Visual 0-->No visual loss   4.   Facial Palsy 0-->Normal symmetrical movements   5a. Motor Arm, Left 0-->No drift, limb holds 90 (or 45) degrees for full 10 secs   5b. Motor Arm, Right 0-->No drift, limb holds 90 (or 45) degrees for full 10 secs   6a. Motor Leg, Left 0-->No drift, leg holds 30 degree position for full 5 secs   6b. Motor Leg, right 0-->No drift, leg holds 30 degree position for full 5 secs   7.   Limb Ataxia 0-->Absent   8.   Sensory 0-->Normal, no sensory loss   9.   Best Language 1-->Mild-to-moderate aphasia, some obvious loss of fluency or facility of comprehension, without significant limitation on ideas expressed or form of expression. Reduction of speech and/or comprehension, however, makes conversation. . . (see row details)   10. Dysarthria 0-->Normal   11. Extinction and Inattention  0-->No abnormality   Total 3 (10/13/23 0618)       Modified Tevin Score (Pre-morbid)  1 - No significant disability.  Able to carry out all usual activities, despite some symptoms.    Imaging  I personally reviewed all imaging; relevant findings per HPI.     Lab Results Data   CBC  Recent Labs   Lab 10/13/23  0552   WBC 8.4   RBC 4.00*   HGB 12.7*   HCT 37.8*   *     Basic Metabolic Panel    Recent Labs   Lab 10/13/23  0552      POTASSIUM 3.7   CHLORIDE 100   CO2 23   BUN 14.8   CR 0.73   *   FERMIN 9.6      Liver Panel  Recent Labs   Lab 10/13/23  0552   PROTTOTAL 7.4   ALBUMIN 4.2   BILITOTAL 1.1   ALKPHOS 102   AST 24   ALT 14     INR    Recent Labs   Lab Test 10/13/23  0552 08/15/23  1258 07/27/23  1827   INR 1.03 1.17* 1.07      Lipid Profile    Recent Labs   Lab Test 08/16/23  0036   CHOL 116   HDL 51   LDL 48   TRIG 84     A1C    Recent Labs   Lab Test 07/30/23  0601   A1C 6.0*     Troponin    Recent Labs   Lab 10/13/23  0552   CTROPT 23*          Stroke Code Data Data   Stroke Code Data  (for stroke code with tele)  Stroke code activated 10/13/23   0555   First stroke provider response 10/13/23   0557   Video start time 10/13/23   0557   Video end time 10/13/23   0645   Last known normal 10/13/23   2130   Time of discovery  (or onset of symptoms)  10/13/23   0400   Head CT read by Stroke Neuro Dr/Provider 10/13/23   0605   Was stroke code de-escalated? Yes 10/13/23 0655           Telestroke Service Details  Type of service telemedicine diagnostic assessment of acute neurological changes   Reason telemedicine is appropriate patient requires assessment with a specialist for diagnosis and treatment of neurological symptoms   Mode of transmission secure interactive audio and video communication per Amilcar   Originating site (patient location) [facility]    Distant site (provider location) Provider remote site       I have personally spent a total of 60 minutes providing care today, time spent in reviewing medical records and reviewing tests, examining the patient and obtaining history, coordination of care, and discussion with the patient and/or family regarding diagnostic results, prognosis, symptom management, risks and benefits of management options, and development of plan of care. Greater than 50% was spent in counseling and coordination of care.

## 2023-10-14 PROBLEM — R41.82 ALTERED MENTAL STATUS, UNSPECIFIED ALTERED MENTAL STATUS TYPE: Status: ACTIVE | Noted: 2023-01-01

## 2023-10-14 NOTE — CONSULTS
Neuroscience and Spine [place]  [facility]    Neurology Consultation    Erick Shahid MRN# [id number]   YOB: 1940 Age: 83 year old    Code Status:No CPR- Do NOT Intubate   Date of Admission: 10/13/2023  Date of Consult:10/14/2023                                                                                       Assessment and Plan:                                           Dementia; probably secondary to small vessel ischemic disease (SVID) and brain atrophy. Should be seen as an out patient by neurology and go through neurocognitive testing. Also partly due to poor hearing and depression contributing towards the picture of expressionless face, bradykinesia and lack of motivation.   Gait instability; probably secondary to SVID   Urinary incontinence most likely due to SVID and progressive dementia.   REM sleep behavior disorder; recommended Melatonin 10 mg at 8 PM.   ----------------------------------------------------------------------------------  ----------------------------------------------------------------------------------  Reason for consult: I was asked by Dr. Katiana Pardo to evaluate this patient for memory difficulties and possible PD.       Chief Complaint:   Memory difficulties, gait difficulties and poor sleep  History is obtained from the son and DIL / chart       History of Present Illness:   This patient is a 83 year old male who presents with as per Per son, patient last seen normal at 1 AM.  At 4:30 AM he woke screaming stating he had pain all over.  He was noted to have been incontinent in bed, no clear evidence of weakness moving all 4 extremities in bed.  He was mumbling but able to complete full sentences that were understandable.  When he was brought to the ED it was noted he had word finding difficulties and some left-sided weakness.  Per neuro team.  He is not able to answer questions of orientation and has some minimal aphasia.   Otherwise no motor deficits noted.   Has had progressive worsening of memory needs some help with his ADLs. Lives with wife. Has poor hearing. Shuffles. Urinary incontinence. Talks in sleep and acts out.          Past Medical History:     Past Medical History:   Diagnosis Date    Benign essential hypertension     BPH (benign prostatic hyperplasia)     Chronic heart failure with reduced ejection fraction and diastolic dysfunction (H)     Cognitive impairment     Critical aortic valve stenosis     Diabetes mellitus, type 2 (H)     on metformin    Dyslipidemia     First degree atrioventricular block     LBBB (left bundle branch block)     Macular degeneration (senile) of retina          Past Surgical History:     Past Surgical History:   Procedure Laterality Date    CV CORONARY ANGIOGRAM N/A 07/28/2023    Procedure: Coronary Angiogram;  Surgeon: Mando Field MD;  Location: [facility] CARDIAC CATH LAB    CV PCI N/A 07/28/2023    Procedure: Percutaneous Coronary Intervention;  Surgeon: Mando Field MD;  Location: [facility] CARDIAC CATH LAB    CV TRANSCATHETER AORTIC VALVE REPLACEMENT  08/15/2023    CV TRANSCATHETER AORTIC VALVE REPLACEMENT-FEMORAL APPROACH N/A 8/15/2023    Procedure: Transcatheter Aortic Valve Replacement-Femoral Approach;  Surgeon: Bossman Felipe MD;  Location: [facility] CARDIAC CATH LAB    EP PACEMAKER DEVICE & LEAD IMPLANT- RIGHT ATRIAL & LEFT VENTRICULAR N/A 8/15/2023    Procedure: Pacemaker Device & Lead Implant- Right Atrial & Left Ventricular;  Surgeon: Oscar Mendoza MD;  Location: [facility] CARDIAC CATH LAB    ORIF HIP FRACTURE Right 2018          Social History:     Social History     Socioeconomic History    Marital status:      Spouse name: None    Number of children: None    Years of education: None    Highest education level: None   Tobacco Use    Smoking status: Never    Smokeless tobacco: Never   Vaping Use    Vaping Use: Never used   Substance and Sexual  Activity    Alcohol use: Yes     Comment: few drinks per year - very rare    Drug use: Never     Patient denies smoking, no significant alcohol intake, denies illicit drugs use       Family History:   History reviewed. No pertinent family history.  Reviewed and not felt to be contributory.          Home Medications:     Prior to Admission Medications   Prescriptions Last Dose Informant Patient Reported? Taking?   Vitamin D3 (CHOLECALCIFEROL) 25 mcg (1000 units) tablet 10/12/2023 Spouse/Significant Other Yes Yes   Sig: Take 25 mcg by mouth daily   aspirin (ASA) 81 MG chewable tablet 10/12/2023  No Yes   Sig: Take 1 tablet (81 mg) by mouth daily   cycloSPORINE (RESTASIS) 0.05 % ophthalmic emulsion  at PRN Spouse/Significant Other Yes Yes   Sig: Apply 1 drop to eye 2 times daily as needed for dry eyes   metFORMIN (GLUCOPHAGE) 1000 MG tablet 10/12/2023 Spouse/Significant Other Yes Yes   Sig: Take 1 tablet by mouth daily (with dinner)   metoprolol succinate ER (TOPROL XL) 25 MG 24 hr tablet 10/12/2023 Spouse/Significant Other Yes Yes   Sig: Take 25 mg by mouth daily   nystatin (MYCOSTATIN) 440783 UNIT/GM external powder  at PRN Spouse/Significant Other Yes Yes   Sig: Apply 1 strip topically 3 times daily as needed (jock itch)   polyethylene glycol (MIRALAX) 17 g packet 10/12/2023  Yes Yes   Sig: Take 1 packet by mouth daily   rosuvastatin (CRESTOR) 20 MG tablet 10/12/2023 Spouse/Significant Other Yes Yes   Sig: Take 1 tablet by mouth daily   senna-docusate (SENOKOT-S/PERICOLACE) 8.6-50 MG tablet  at PRN  Yes Yes   Sig: Take 2 tablets by mouth daily as needed for constipation   sertraline (ZOLOFT) 50 MG tablet 10/12/2023  Yes Yes   Sig: Take 25 mg by mouth daily   vitamin B-12 (CYANOCOBALAMIN) 1000 MCG tablet 10/12/2023 Spouse/Significant Other Yes Yes   Sig: Take 1,000 mcg by mouth daily   vitamin C (ASCORBIC ACID) 500 MG tablet 10/12/2023 Spouse/Significant Other Yes Yes   Sig: Take 500 mg by mouth daily     "  Facility-Administered Medications: None          Allergy:   No Known Allergies       Inpatient Medications:   Scheduled Meds:   aspirin  81 mg Oral Daily    insulin aspart  1-7 Units Subcutaneous TID AC    insulin aspart  1-5 Units Subcutaneous At Bedtime    melatonin  1 mg Oral At Bedtime    metoprolol succinate ER  25 mg Oral Daily    polyethylene glycol  17 g Oral or NG Tube Daily    rosuvastatin  20 mg Oral Daily    senna-docusate  1-2 tablet Oral or NG Tube BID    sertraline  25 mg Oral Daily     PRN Meds:         Review of Systems    Review of systems is limited by patient factors - confusion  A comprehensive review of  10 systems was performed and found to be negative except as described in this note  CONSTITUTIONAL: negative for fever, chills, change in weight  INTEGUMENTARY/SKIN: no rash or obvious new lesions  ENT/MOUTH: no sore throat, new sinus pain or nasal drainage, no neck mass noted  RESP: No pleuretic pain, No cough, no hemoptysis, No SOB   CV: negative for chest pain, palpitations or peripheral edema  GI: no nausea, vomiting, change in stools  : no dysuria or hematuria  MUSCULOSKELETAL: no myalgias, arthralgias or join efffusion  ENDOCRINE: no history of polyuria, polydyspsia or symptoms of thyroid dysfunction  PSYCHIATRIC: no change in mood stable  LYMPHATIC: no new lymphadenopathy  HEME: no bleeding or easy bruisability  NEURO: see HPI       Physical Exam:   Physical Exam   Vitals:  Height:5' 7\"  Weight:194 lbs 10.66 oz   Temp: 100.1  F (37.8  C) Temp src: Oral BP: 112/60 Pulse: 61   Resp: 18 SpO2: 99 % O2 Device: None (Room air)    General Appearance:  No acute distress  Neuro:       Mental Status Exam:    Alert and knows it's 2023       Cranial Nerves:   II-XII intact           Motor:   Moves all extremities. No cogwheeling           Reflexes:  1 bilaterally       Sensory:  difficult to assess                   Coordination:   normal       Gait:  shuffling and apractic.          Data: "   ROUTINE IP LABS   CBC RESULTS:     Recent Labs   Lab 10/13/23  0552   WBC 8.4   RBC 4.00*   HGB 12.7*   HCT 37.8*   *     Basic Metabolic Panel:   Recent Labs   Lab Test 10/14/23  1127 10/14/23  1044 10/14/23  0646 10/13/23  1224 10/13/23  0552 09/22/23  0950   NA  --  136  --   --  139 140   POTASSIUM  --  4.0  --   --  3.7 4.2   CHLORIDE  --  103  --   --  100 105   CO2  --  20*  --   --  23 23   BUN  --  12.2  --   --  14.8 14.1   CR  --  0.61*  --   --  0.73 0.73   * 177* 113*   < > 137* 92   FERMNI  --  9.3  --   --  9.6 9.6    < > = values in this interval not displayed.     Liver panel:  Recent Labs   Lab Test 10/13/23  0552 07/23/23  1445   PROTTOTAL 7.4 7.4   ALBUMIN 4.2 4.3   BILITOTAL 1.1 1.1   ALKPHOS 102 75   AST 24 22   ALT 14 11     INR:  Recent Labs   Lab Test 10/13/23  0552 08/15/23  1258 07/27/23  1827   INR 1.03 1.17* 1.07      Lipid Profile:  Recent Labs   Lab Test 10/13/23  0748 08/16/23  0036   CHOL 137 116   HDL 69 51   LDL 52 48   TRIG 80 84     Thyroid Panel:  Recent Labs   Lab Test 10/13/23  0748 08/15/23  0651   TSH 1.47 2.32      Vitamin B12: No lab results found.   Vitamin D level: No lab results found.  A1C:   Recent Labs   Lab Test 07/30/23  0601   A1C 6.0*          IMAGING:   All imaging studies were reviewed personally    MRI brain:   --Generalized atrophy with SVID.

## 2023-10-14 NOTE — PLAN OF CARE
Goal Outcome Evaluation:  [id number]  Summary/diagnosis: AMS  Orientation/Cognitive: A&1/confuse  Neuros/CMS: slurred speech, LE weakness   Mobility Level/Assist Equipment: A2 gbw, not OOB this shift  Pain Management: denies  Tele/VS/O2: VSS, 100% V-paced.  Diet: mod carb, minced and moist, thin liquids  GIGU: incontinent of bladder  Skin : bruises upper and lower extremities, hip  Drains/Devices: L PIV SL  Discharge date/time: TBD

## 2023-10-14 NOTE — PLAN OF CARE
Reason for Admission: AMS, L leg weakness    Cognitive/Mentation: A/O to self & place, d/o to time/situation.  Neuros/CMS: Slurred speech, BLE weakness   VS: stable.   Tele: 100% V-Paced.  GI: Last BM 10/14. Continent. 1 large loose stool today.  : Continent/incontinent, using urinal.  Pulmonary: LS clear.  Pain: denies.     Drains/Lines: PIV SL  Skin: scattered bruising, redness on bilateral groin  Activity: Assist x 2 with josy steady.  Diet: Soft/bite sized with thin liquids. Takes pills whole with water.     Therapies recs: TCU  Discharge: pending    Aggression Stoplight Tool: yellow for confusion    End of shift summary: Pt stable today. Worked with SLP and diet was updated to soft/bite sized, better intake with new diet.

## 2023-10-14 NOTE — PROGRESS NOTES
[facility]    Hospitalist Progress Note    Date of Service (when I saw the patient): 10/14/2023  Admit date: 10/13/2023    Interval History   Full details of events over last 24 hours outlined below.   Erick has been afebrile hemodynamically stable overnight with good oxygenation on room air.   MRI came back negative.  Confusion noted by nursing but no acute events overnight.  Cognition and speech are back to baseline, but patient has significant generalized weakness    Assessment & Plan   Erick Shahid is a 83 year old male with chronic HFrEF, CAD, dementia, s/p recent TAVR 23 mm Allison ROBERTO 3 valve on 8/15/2023, type II DM, complicated by CHB, s/p dual-chamber pacemaker who admitted on 10/13/2023, due to acute mental status changes with some word-finding deficits and possible left sided weakness (although not noted on Stroke Neuro's exam).  per EMS.       Per son, patient last seen normal at 1 AM.  At 4:30 AM he woke screaming stating he had pain all over.  He was noted to have been incontinent in bed, no clear evidence of weakness moving all 4 extremities in bed.  He was mumbling but able to complete full sentences that were understandable.  When he was brought to the ED it was noted he had word finding difficulties and some left-sided weakness.  Per neuro team.  He is not able to answer questions of orientation and has some minimal aphasia.  Otherwise no motor deficits noted.      T98.2, P75, /77, RR 13, O2 100%  Labs: Lites normal, CR 0.73 (stable), bicarb 23, AG 16, lactic acid 1.9, glucose 137, liver panel normal, troponin minimally elevated at 23.  CBC WBC 8.4, Hgb 12.7 (stable),  (stable)   UA negative for infection, with 10 ketones  COVID, influenza, RSV PCR negative  Chest x-ray personally reviewed.  Formal read pending.  I see no evidence of acute cardiopulmonary disease.  EKG reviewed showing sinus bradycardia with first-degree AV block.  Note PVCs,  and some nonspecific ST-T wave changes.     CT Head was without acute changes   CTA head and neck - no evidence of large-vessel occlusion, moderate to severe R carotid stenosis, 50-70% stenosis  CTP - No large deficits.      Pacemaker interrogation unremarkable     Dementia at baseline  Acute confusion with observed aphasia and possible LLE weakness, RESOLVING   REM sleep behavior disorder    There was concern for stroke, however word finding deficits wax and wane, and noted at baseline as well. With regards to LLE weakness. Son notes chronic uneven gate appearing to favor left side but unknown cause (all prior significant leg injuries on R), and on my exam he is able to hold LLE for count of 5 with minimal drift.  I suspect symptoms may be due to acute confusion following a vivid dream in the context of dementia.  No no evidence of infection or significant acute metabolic disturbance to cause encephalopathy.   R mod carotid artery stenosis (carotid ultrasound on 8/15 showed 50 to 69% stenosis as well)   *CT imaging without evidence of acute stroke or proximal vascular occlusion     Swallow screen. If passes, give aspirin 325 mg starting now.  (On monotherapy with aspirin 81 mg PTA)  Continue PTA statin  MRI brain on 10/13/23 came back negative.   Appreciate stroke neuro consult:  Signed off after negative MRI.  They noted parkinsonian features and recommended general neurology.  Stop telemetry  General neurology consulted on 10/14/23 for evaluation of Parkinsonism noted by stroke neurology. Also, question REM sleep disorder given his vivid dreams often acting out at night.  Per neurology, and do not fee he has Parkinson's but do feel patient has REM sleep behavior disorder and recommended melatonin 10 mg q 8 AM (ordered)   PT/OT/Speech/CM > Significant generalized weakness noted by therapies, below baseline > anticipate discharge to TCU once placement found     Delirium prevention   Frequent  re-orientation  Maintain normal day/night, sleep wake cycles  Minimize sedating/altering medications as able  Schedule melatonin at bedtime at 10 mg as above.   Quetiapine PRN for agitation at night only.      Chronic HF with recovered EF  CAD, s/p L HC for evaluation of TAVR on 7/28/23 single-vessel occlusive disease of mid small size nondominant RCA.  Remainder of coronaries with minimal nonocclusive disease  Critical aortic stenosis s/p TAVR with 23 mm Allison Maribel 3 valve on 8/15/23.    *Echo 9/22/2023: Bioprosthetic aortic valve with mean gradient of 19 mmHg, LVEF 55 to 60% (improved from prior EF of 35%) No AI  CHB s/p PPM following TAVR - [employer] Neeta (D)   Mild troponin elevation, (23) complained of pain all over when he screamed out in the middle the night.  Now denies chest pain or shortness of breath. EKG SB with first degree block LBBB with nonspecific changes and PVCs    HTN, HLD  Continue PTA aspirin and statin  No evidence of fluid overload  Follow-up troponin normalized (23>21)   Resume PTA metoprolol XL.      DM, type II  * A1c 6 on 7/2023, glucose 137 at admission.  Holding PTA metformin  Ordered medium sliding scale insulin with 4 times daily blood sugar checks    Recent Labs   Lab 10/14/23  0646 10/14/23  0125 10/13/23  2227 10/13/23  1708 10/13/23  1224 10/13/23  0552   * 118* 126* 134* 154* 137*            Mild anion gap elevation, RESOLVED with normal bicarbonate and mildly elevated ketones in urine.  Blood sugar 137.  Lactic acid normal.   *Per son he has been eating and drinking well.  Do not suspect starvation ketoacidosis   I think this is of unlikely clinical significance.  Received fluids x1 L  Anion gap resolved on 10/14/23.     Macular Degeneration   Continue PTA eyedrops     Constipation   Continue PTA scheduled MiraLAX and senna as well as as needed regimen     Chronic normocytic anemia, stable at hgb 12.7  Resume vitamin B12 at discharge         Awaiting medication  "reconciliation  Clinically Significant Risk Factors Present on Admission                # Drug Induced Platelet Defect: home medication list includes an antiplatelet medication        # Obesity: Estimated body mass index is 30.49 kg/m  as calculated from the following:    Height as of this encounter: 1.702 m (5' 7\").    Weight as of this encounter: 88.3 kg (194 lb 10.7 oz).        # Pacemaker present         Diet: Orders Placed This Encounter      Combination Diet Moderate Consistent Carb (60 g CHO per Meal) Diet; Thin Liquids (level 0); Minced and Moist Diet (level 5); Thin Liquids (level 0)     IVF: S/p 1 L of IV fluids now off  Pablo Catheter: Not present     DVT Prophylaxis: PCD's, ambulate, PTA aspirin  Code Status: No CPR- Do NOT Intubate     Disposition: Expected discharge 10/14/23   Communication: Discussed with son, daughter-in-law, neurologist on 10/14/23    Katiana Pardo MD    Hospitalist Service  [facility]  Securely message with the Vocera Web Console (learn more here)  Text page via [employer] Paging/Directory      -Data reviewed today: I reviewed all new labs and imaging results over the last 24 hours. I personally reviewed no images or EKG's today.    Physical Exam   Temp: 98.6  F (37  C) Temp src: Axillary BP: 139/63 Pulse: 63   Resp: 16 SpO2: 97 % O2 Device: None (Room air)    Vitals:    10/13/23 0550   Weight: 88.3 kg (194 lb 10.7 oz)     Vital Signs with Ranges  Temp:  [98.6  F (37  C)-100.4  F (38  C)] 98.6  F (37  C)  Pulse:  [60-66] 63  Resp:  [13-21] 16  BP: (114-150)/(54-67) 139/63  SpO2:  [96 %-99 %] 97 %  I/O last 3 completed shifts:  In: -   Out: 100 [Urine:100]    Today's Exam (full exam not performed as neurology came to the room during my visit)  Constitutional: NAD,   Neuropsyche: alert, smiling, answers simple questions appropriately   Respiratory: Breathing comfortably,   Skin/Integumen: No acute rash or sign of bleeding.     Medications   All medications " reviewed on 10/14/23      aspirin  81 mg Oral Daily    insulin aspart  1-7 Units Subcutaneous TID AC    insulin aspart  1-5 Units Subcutaneous At Bedtime    melatonin  1 mg Oral At Bedtime    metoprolol succinate ER  25 mg Oral Daily    polyethylene glycol  17 g Oral or NG Tube Daily    rosuvastatin  20 mg Oral Daily    senna-docusate  1-2 tablet Oral or NG Tube BID    sertraline  25 mg Oral Daily     PRN Meds: acetaminophen **OR** acetaminophen **OR** acetaminophen, bisacodyl, cycloSPORINE, glucose **OR** dextrose **OR** glucagon, hydrALAZINE, labetalol, ondansetron **OR** ondansetron, QUEtiapine    Data   Recent Labs   Lab 10/14/23  0646 10/14/23  0125 10/13/23  2227 10/13/23  1224 10/13/23  0552   WBC  --   --   --   --  8.4   HGB  --   --   --   --  12.7*   MCV  --   --   --   --  95   PLT  --   --   --   --  148*   INR  --   --   --   --  1.03   NA  --   --   --   --  139   POTASSIUM  --   --   --   --  3.7   CHLORIDE  --   --   --   --  100   CO2  --   --   --   --  23   BUN  --   --   --   --  14.8   CR  --   --   --   --  0.73   ANIONGAP  --   --   --   --  16*   FERMIN  --   --   --   --  9.6   * 118* 126*   < > 137*   ALBUMIN  --   --   --   --  4.2   PROTTOTAL  --   --   --   --  7.4   BILITOTAL  --   --   --   --  1.1   ALKPHOS  --   --   --   --  102   ALT  --   --   --   --  14   AST  --   --   --   --  24    < > = values in this interval not displayed.       Recent Results (from the past 24 hour(s))   XR Chest Port 1 View    Narrative    XR CHEST PORT 1 VIEW   10/13/2023 7:59 AM     HISTORY: stroke    COMPARISON: 8/16/2023      Impression    IMPRESSION: No acute cardiopulmonary disease. Left chest wall  pacemaker device appears unchanged. TAVR device appears unchanged.  Biapical scarring and scattered calcified granulomas appear similar to  the prior exam. Degenerative changes are seen in the shoulders and  spine.    KRISTOPHER BARNHART MD         SYSTEM ID:  [id number]   MR Brain w/o & w Contrast     Narrative    MRI BRAIN WITHOUT AND WITH CONTRAST  10/13/2023 2:44 PM     HISTORY:  AMS     TECHNIQUE:  Multiplanar, multisequence MRI of the brain without and  with 8mL Gadavist     COMPARISON: CT brain from the same day..     FINDINGS:    Mild chronic small vessel ischemic changes and moderate global  cortical volume loss with ex vacuo dilatation of the ventricular  system. No acute intracranial hemorrhage. No hydrocephalus or  extra-axial hemorrhage.    No restricted diffusion.    No acute intracranial hemorrhage. No hydrocephalus or extra-axial  hemorrhage. Susceptibility imaging is negative.    Mastoids are clear. Orbits and paranasal sinuses are unremarkable.      Impression    IMPRESSION:      Chronic changes. No acute intracranial modality. No restricted  diffusion to suggest acute ischemia.      JEFF MORENO MD         SYSTEM ID:  [id number]

## 2023-10-14 NOTE — PROGRESS NOTES
10/14/23 0800   Appointment Info   Signing Clinician's Name / Credentials (OT) Sabina Mercado OTR/L   Rehab Comments (OT) attempted to call wife for PLOF - no answer this AM   Quick Adds   Quick Adds Certification   Living Environment   People in Home spouse   Current Living Arrangements condominium   Living Environment Comments Pt states he lives with his wife in a condo. Unable to obtain full PLOF or living situation d/t [place] and impaired cognition   Self-Care   Usual Activity Tolerance moderate   Current Activity Tolerance poor   Equipment Currently Used at Home walker, rolling;shower chair   Activity/Exercise/Self-Care Comment Pt states that he has an RN that comes 1x/week to help with bathing and potentially meds   Instrumental Activities of Daily Living (IADL)   IADL Comments Reports that his wife helps with meals and home mgmt, states his daughter also helps them out a lot.   General Information   Onset of Illness/Injury or Date of Surgery 10/13/23   Referring Physician Katiana Pardo MD   Patient/Family Therapy Goal Statement (OT) return home - pt states he was supposed to go home yesterday   Additional Occupational Profile Info/Pertinent History of Current Problem Erick Shahid is a 83 year old male with PMHx significant for anemia, CAD s/p recent TAVR, MD, heart block s/p PPM, DM2, HTN, HLD, CHF, hearing loss, dementia. He was recently hospitalized (8/15-8/16/23) for TAVR. Stroke code was called for worsening confusion and left facial weakness. CT and CTA were reassuring; unable to get MRI d/t recent PPM placement. His symptoms resolved relatively quickly.   He came in to the ED early this morning with mumbled speech and worsened confusion for which a stroke code was called.   Existing Precautions/Restrictions fall;pacemaker   Limitations/Impairments safety/cognitive;hearing   General Observations and Info Very hard of hearing   Cognitive Status Examination   Orientation Status place    Affect/Mental Status (Cognitive) confused;flat/blunted affect   Follows Commands follows one-step commands;50-74% accuracy;delayed response/completion;increased processing time needed;initiation impaired   Safety Deficit moderate deficit   Cognitive Status Comments Pt oriented to year, but not day of week or month, states the month is august and day of week as wednesday   Visual Perception   Visual Impairment/Limitations corrective lenses full-time   Impact of Vision Impairment on Function (Vision) rheum build up in corners of eyes   Sensory   Sensory Comments denies any change   Pain Assessment   Patient Currently in Pain No   Posture   Posture forward head position;protracted shoulders;kyphosis   Range of Motion Comprehensive   Comment, General Range of Motion UE WFL   Strength Comprehensive (MMT)   Comment, General Manual Muscle Testing (MMT) Assessment UE WFL, generalized weakness noted   Coordination   Coordination Comments short cogwheeling w/ steps, difficulty with mobility   Bed Mobility   Comment (Bed Mobility) Mod Ax1 supine > sit EOB,   Transfers   Transfer Comments Mod Ax1 STS to FWW, VC and assist for handplacement   Balance   Balance Comments impaired, short shuffled gait   Activities of Daily Living   BADL Assessment/Intervention lower body dressing;grooming;toileting   Lower Body Dressing Assessment/Training   Comment, (Lower Body Dressing) to don shoes/socks   [place] Level (Lower Body Dressing) maximum assist (25% patient effort)   Grooming Assessment/Training   Comment, (Grooming) set up A while sitting in chair, reports assist w/ bathing at baseline? will need to confirm   Toileting   Comment, (Toileting) Per clinicial judgement pt would require use of grab bars and Mod A STS from toilet   Clinical Impression   Criteria for Skilled Therapeutic Interventions Met (OT) Yes, treatment indicated   OT Diagnosis impaired ADL/IADL IND   OT Problem List-Impairments impacting ADL problems related  to;activity tolerance impaired;cognition;strength;mobility   Assessment of Occupational Performance 3-5 Performance Deficits   Identified Performance Deficits dressing, bathing, functional mobility, home mgmt, activity tolerance, standing tolerance   Planned Therapy Interventions (OT) ADL retraining;strengthening;transfer training;progressive activity/exercise   Clinical Decision Making Complexity (OT) problem focused assessment/low complexity   Risk & Benefits of therapy have been explained evaluation/treatment results reviewed;care plan/treatment goals reviewed;patient;spouse/significant other   OT Total Evaluation Time   OT Eval, Low Complexity Minutes (13561) 10   Therapy Certification   Medical Diagnosis AMS   Start of Care Date 10/14/23   Certification date from 10/14/23   Certification date to 10/21/23   OT Goals   Therapy Frequency (OT) 5 times/week   OT Predicted Duration/Target Date for Goal Attainment 10/24/23   OT Goals Hygiene/Grooming;Toilet Transfer/Toileting;Cognition;OT Goal 1;Lower Body Dressing;Upper Body Dressing   OT: Hygiene/Grooming supervision/stand-by assist   OT: Upper Body Dressing Modified independent   OT: Lower Body Dressing Supervision/stand-by assist   OT: Toilet Transfer/Toileting Supervision/stand-by assist;toilet transfer;cleaning and garment management   OT: Cognitive Patient/caregiver will verbalize understanding of cognitive assessment results/recommendations as needed for safe discharge planning   OT: Goal 1 Pt will demonstrate improved functional activity tolerance required for ADL/IADL IND by standing for > 10 minutes.   Interventions   Interventions Quick Adds Self-Care/Home Management   Self-Care/Home Management   Self-Care/Home Mgmt/ADL, Compensatory, Meal Prep Minutes (01930) 20   Symptoms Noted During/After Treatment (Meal Preparation/Planning Training) fatigue   Treatment Detail/Skilled Intervention OT: Mod Ax1 supine > sit EOB, delayed response and initiation impaired,  Mod A to square up at EOB, Max A to don B shoes, pt has firm heel shoes that should be able to slide into, still requires assist to don this date. Completes STS from EOB w/ Mod Ax1 and height of bed elevated and VC for safe hand placement. Ambulates ~15'x2 within room, impaired safety awareness for handplacement pulling on FWW. refused standing g/h at sink this date, refused to put dentures in. Completes face washing while sitting upright in chair/. LE elevated at end of session and chair alarm on   OT Discharge Planning   OT Plan standing g/h (has dentures), LB dsg (shoe horn?), confirm support at home, standing tolerance   OT Discharge Recommendation (DC Rec) Transitional Care Facility   OT Rationale for DC Rec Pt functioning below basleine, currently Mod Ax1-2 for bed and functional mobility w/ FWW. Pt would benefit from continued IP OT in TCU setting prior to returning to home. Unclear level of physical A that is/could be provided at home.   OT Brief overview of current status Mod Ax1 STS from EOB w/ height elevated, [facility]   OT Equipment Needed at Discharge shower chair   Total Session Time   Timed Code Treatment Minutes 20   Total Session Time (sum of timed and untimed services) 30        [facility]  OUTPATIENT OCCUPATIONAL THERAPY  EVALUATION  PLAN OF TREATMENT FOR OUTPATIENT REHABILITATION  (COMPLETE FOR INITIAL CLAIMS ONLY)  Patient's Last Name, First Name, M.I.  YOB: 1940  Erick Shahid                          Provider's Name  [facility] Medical Record No.  [id number]                             Onset Date:  10/13/23   Start of Care Date:  (P) 10/14/23   Type:     ___PT   _X_OT   ___SLP Medical Diagnosis:  (P) AMS                    OT Diagnosis:  impaired ADL/IADL IND Visits from SOC:  1     See note for plan of treatment, functional goals and certification details    I CERTIFY THE NEED FOR THESE SERVICES FURNISHED UNDER         THIS PLAN OF TREATMENT AND WHILE UNDER MY CARE     (Physician co-signature of this document indicates review and certification of the therapy plan).

## 2023-10-15 NOTE — PLAN OF CARE
Reason for Admission: AMS, BLE weakness    Cognitive/Mentation: A/O to self, place, disoriented to time, situation - improving.  Neuros/CMS: Intact ex slurred speech, BLE 3/5, confusion  VS: stable on RA.   Tele: 100% V-Paced.  GI: Last BM 10/14. Continent.  : Continent/incontinent, using urinal.  Pulmonary: LS clear.  Pain: denies.     Drains/Lines: PIV SL  Skin: scattered bruising  Activity: Assist x 2 with josy steady.  Diet: Soft/bite sized with thin liquids. Takes pills whole.     Therapies recs: TCU  Discharge: pending    Aggression Stoplight Tool: yellow for confusion    End of shift summary: Pt's strength in legs appears to be improving slightly today.

## 2023-10-15 NOTE — PLAN OF CARE
Goal Outcome Evaluation:         Vitals/ Neuros stable, a/o x2. Incontinent of bladder over night. Up with 2 assist and josy steady. Discharge planning in progress to TCU.

## 2023-10-15 NOTE — CONSULTS
Care Management Initial Consult    General Information  Assessment completed with: Spouse or significant other, Zainab  Type of CM/SW Visit: Initial Assessment    Primary Care Provider verified and updated as needed: Yes   Readmission within the last 30 days: no previous admission in last 30 days      Reason for Consult: discharge planning  Advance Care Planning: Advance Care Planning Reviewed: present on chart, verified with patient          Communication Assessment  Patient's communication style: spoken language (English or Bilingual)    Hearing Difficulty or Deaf: yes   Wear Glasses or Blind: yes    Cognitive  Cognitive/Neuro/Behavioral: .WDL except, orientation, speech  Level of Consciousness: alert  Arousal Level: opens eyes spontaneously  Orientation: disoriented to, time, situation  Mood/Behavior: cooperative, calm  Best Language: 1 - Mild to moderate  Speech: slurred    Living Environment:   People in home: spouse     Current living Arrangements: condominium      Able to return to prior arrangements: no (TCU recommended)       Family/Social Support:  Care provided by: self  Provides care for: no one  Marital Status:   Wife, Children          Description of Support System: Supportive, Involved         Current Resources:   Patient receiving home care services: Yes  Skilled Home Care Services: Skilled Nursing, Physical Therapy, Occupational Therapy  Community Resources: None  Equipment currently used at home: shower chair, grab bar, toilet, grab bar, tub/shower, raised toilet seat, walker, rolling  Supplies currently used at home: None    Employment/Financial:  Employment Status: retired        Financial Concerns: none   Referral to Financial Worker: No       Does the patient's insurance plan have a 3 day qualifying hospital stay waiver?  No    Lifestyle & Psychosocial Needs:  Social Determinants of Health     Food Insecurity: Not on file   Depression: Not on file   Housing Stability: Not on file    Tobacco Use: Low Risk  (10/13/2023)    Patient History     Smoking Tobacco Use: Never     Smokeless Tobacco Use: Never     Passive Exposure: Not on file   Financial Resource Strain: Not on file   Alcohol Use: Not on file   Transportation Needs: Not on file   Physical Activity: Not on file   Interpersonal Safety: Not on file   Stress: Not on file   Social Connections: Not on file       Functional Status:  Prior to admission patient needed assistance:   Dependent ADLs:: Ambulation-walker  Dependent IADLs:: Transportation, Money Management, Shopping       Mental Health Status:          Chemical Dependency Status:                Values/Beliefs:  Spiritual, Cultural Beliefs, [employer] Practices, Values that affect care: no               Additional Information:    Due to patient's disorientation, writer contacted wife Zainab to complete assessment and introduce self and role.  Per Zainab, they live in a condo with no steps to enter and 1 step inside.  Patient uses a rolling walker at baseline and manages his own meds.  There is a shower chair in the bathroom.  Patient's wife states that patient is currently receiving homecare RN/PT/OT services thru [facility] and had been at [facility] after his last admission in  August. Writer shared with Zainab that therapies are recommending TCU at discharge and she would like patient to go back to [place].  Referral made to [facility].    Care coordination team will continue to follow and assist with a safe discharge plan.    Rhonda Kyle RN Care Coordinator  [facility]  [phone]

## 2023-10-15 NOTE — PROGRESS NOTES
[facility]    Hospitalist Progress Note    Date of Service (when I saw the patient): 10/15/2023  Admit date: 10/13/2023    Interval History   Full details of events over last 24 hours outlined below.   Erick has been afebrile hemodynamically stable overnight.  Oxygenation normal on room air.  Per nurses notes no acute events.  Incontinent overnight.  Patient denies any concerns other than he hopes will be able to return home after rehab.   He is able to tell me he is in the hospital he does not remember which one.  He has difficulty with the month but finally comes up with October.    Assessment & Plan   Erick Shahid is a 83 year old male with chronic HFrEF, CAD, dementia, s/p recent TAVR 23 mm Allison ROBERTO 3 valve on 8/15/2023, type II DM, complicated by CHB, s/p dual-chamber pacemaker who admitted on 10/13/2023, due to acute mental status changes with some word-finding deficits and possible left sided weakness (although not noted on Stroke Neuro's exam).  per EMS.       Per son, patient last seen normal at 1 AM.  At 4:30 AM he woke screaming stating he had pain all over.  He was noted to have been incontinent in bed, no clear evidence of weakness moving all 4 extremities in bed.  He was mumbling but able to complete full sentences that were understandable.  When he was brought to the ED it was noted he had word finding difficulties and some left-sided weakness.  Per neuro team.  He is not able to answer questions of orientation and has some minimal aphasia.  Otherwise no motor deficits noted.      T98.2, P75, /77, RR 13, O2 100%  Labs: Lites normal, CR 0.73 (stable), bicarb 23, AG 16, lactic acid 1.9, glucose 137, liver panel normal, troponin minimally elevated at 23.  CBC WBC 8.4, Hgb 12.7 (stable),  (stable)   UA negative for infection, with 10 ketones  COVID, influenza, RSV PCR negative  Chest x-ray personally reviewed.  Formal read pending.  I see no  evidence of acute cardiopulmonary disease.  EKG reviewed showing sinus bradycardia with first-degree AV block.  Note PVCs, and some nonspecific ST-T wave changes.     CT Head was without acute changes   CTA head and neck - no evidence of large-vessel occlusion, moderate to severe R carotid stenosis, 50-70% stenosis  CTP - No large deficits.      Pacemaker interrogation unremarkable.      Dementia at baseline  Acute confusion with observed aphasia and possible LLE weakness, RESOLVING   REM sleep behavior disorder    There was concern for stroke, however word finding deficits wax and wane, and noted at baseline as well. With regards to LLE weakness. Son notes chronic uneven gate appearing to favor left side but unknown cause (all prior significant leg injuries on R), and on my exam he is able to hold LLE for count of 5 with minimal drift.  I suspect symptoms may be due to acute confusion following a vivid dream in the context of dementia.  No no evidence of infection or significant acute metabolic disturbance to cause encephalopathy.   R mod carotid artery stenosis (carotid ultrasound on 8/15 showed 50 to 69% stenosis as well)   *CT imaging without evidence of acute stroke or proximal vascular occlusion     Swallow screen. If passes, give aspirin 325 mg starting now.  (On monotherapy with aspirin 81 mg PTA)  Continue PTA statin  MRI brain on 10/13/23 negative.   Appreciate stroke neuro consult:  Signed off after negative MRI.  They noted parkinsonian features and recommended general neurology.  Stop telemetry  General neurology consulted on 10/14/23 for evaluation of Parkinsonism and potential REM sleep disorder given his vivid dreams often acting out at night.  Appreciate neurology consult on 10/15 by Dr. Valladares: Do not fee he has Parkinson's but do feel patient has REM sleep behavior disorder and recommended melatonin 10 mg q 8 AM (ordered)   PT/OT/Speech/CM > Significant generalized weakness noted by therapies,  below baseline > anticipate discharge to TCU once placement found     Delirium prevention   Frequent re-orientation  Maintain normal day/night, sleep wake cycles  Minimize sedating/altering medications as able  Schedule melatonin at bedtime at 10 mg as above.   Quetiapine PRN for agitation at night only. (As of 10/15 none given this hospitalization)     Chronic HF with recovered EF  CAD, s/p L HC for evaluation of TAVR on 7/28/23 single-vessel occlusive disease of mid small size nondominant RCA.  Remainder of coronaries with minimal nonocclusive disease  Critical aortic stenosis s/p TAVR with 23 mm Allison Maribel 3 valve on 8/15/23.    *Echo 9/22/2023: Bioprosthetic aortic valve with mean gradient of 19 mmHg, LVEF 55 to 60% (improved from prior EF of 35%) No AI  CHB s/p PPM following TAVR - Medtronic Neeta (D)   Mild troponin elevation, (23) complained of pain all over when he screamed out in the middle the night.  Now denies chest pain or shortness of breath. EKG SB with first degree block LBBB with nonspecific changes and PVCs    HTN, HLD  Continue PTA aspirin, metoprolol XL and statin  No evidence of fluid overload  Follow-up troponin normalized (23>21)      DM, type II  * A1c 6 on 7/2023, glucose 137 at admission.  Holding PTA metformin  Ordered medium sliding scale insulin with 4 times daily blood sugar checks    Recent Labs   Lab 10/15/23  0750 10/15/23  0241 10/14/23  2204 10/14/23  1600 10/14/23  1127 10/14/23  1044   * 104* 113* 131* 128* 177*       Macular Degeneration   Continue PTA eyedrops     Constipation   Continue PTA scheduled MiraLAX and senna as well as as needed regimen     Chronic normocytic anemia, stable at hgb 12.7  Resume vitamin B12 at discharge       Awaiting medication reconciliation  Clinically Significant Risk Factors Present on Admission                  # Drug Induced Platelet Defect: home medication list includes an antiplatelet medication        # Obesity: Estimated body mass  "index is 30.49 kg/m  as calculated from the following:    Height as of this encounter: 1.702 m (5' 7\").    Weight as of this encounter: 88.3 kg (194 lb 10.7 oz).        # Pacemaker present         Diet: Orders Placed This Encounter      Combination Diet Moderate Consistent Carb (60 g CHO per Meal) Diet; Thin Liquids (level 0); Soft and Bite Sized Diet (level 6); Thin Liquids (level 0)     IVF: S/p 1 L of IV fluids now off  Pablo Catheter: Not present     DVT Prophylaxis: PCD's, ambulate, PTA aspirin  Code Status: No CPR- Do NOT Intubate     Disposition: Expected discharge 10/14/23   Communication: Discussed with son, daughter-in-law, neurologist on 10/14/23    Katiana Pardo MD    Hospitalist Service  [facility]  Securely message with the Vocera Web Console (learn more here)  Text page via [employer] Paging/Directory      -Data reviewed today: I reviewed all new labs and imaging results over the last 24 hours. I personally reviewed no images or EKG's today.    Physical Exam   Temp: 97.9  F (36.6  C) Temp src: Axillary BP: (!) 144/71 Pulse: 61   Resp: 16 SpO2: 98 % O2 Device: None (Room air)    Vitals:    10/13/23 0550   Weight: 88.3 kg (194 lb 10.7 oz)     Vital Signs with Ranges  Temp:  [97.4  F (36.3  C)-100.1  F (37.8  C)] 97.9  F (36.6  C)  Pulse:  [60-68] 61  Resp:  [16-18] 16  BP: (112-146)/(52-71) 144/71  SpO2:  [98 %-99 %] 98 %  I/O last 3 completed shifts:  In: 440 [P.O.:440]  Out: 250 [Urine:250]    Today's Exam   Constitutional: NAD,   Neuropsyche: alert and oriented to being in a hospital, remembers the month after a few tries.  Speech normal, face symmetric and moving all 4 extremities without gross focal neurological deficit.   Respiratory: Breathing comfortably, good air exchange, no wheezes, no crackles.   Cardiovascular: Regular rate and rhythm with systolic murmur, 1+ edema.  GI: Soft, NT/ND, BS normal  Skin/Integumen: No acute rash or sign of bleeding.       Medications "   All medications reviewed on 10/15/23      aspirin  81 mg Oral Daily    insulin aspart  1-7 Units Subcutaneous TID AC    insulin aspart  1-5 Units Subcutaneous At Bedtime    melatonin  10 mg Oral At Bedtime    metoprolol succinate ER  25 mg Oral Daily    polyethylene glycol  17 g Oral or NG Tube Daily    rosuvastatin  20 mg Oral Daily    senna-docusate  1-2 tablet Oral or NG Tube BID    sertraline  25 mg Oral Daily     PRN Meds: acetaminophen **OR** acetaminophen **OR** acetaminophen, bisacodyl, carboxymethylcellulose PF, glucose **OR** dextrose **OR** glucagon, hydrALAZINE, labetalol, ondansetron **OR** ondansetron, QUEtiapine    Data   Recent Labs   Lab 10/15/23  0750 10/15/23  0241 10/14/23  2204 10/14/23  1127 10/14/23  1044 10/13/23  1224 10/13/23  0552   WBC  --   --   --   --   --   --  8.4   HGB  --   --   --   --   --   --  12.7*   MCV  --   --   --   --   --   --  95   PLT  --   --   --   --   --   --  148*   INR  --   --   --   --   --   --  1.03   NA  --   --   --   --  136  --  139   POTASSIUM  --   --   --   --  4.0  --  3.7   CHLORIDE  --   --   --   --  103  --  100   CO2  --   --   --   --  20*  --  23   BUN  --   --   --   --  12.2  --  14.8   CR  --   --   --   --  0.61*  --  0.73   ANIONGAP  --   --   --   --  13  --  16*   FERMIN  --   --   --   --  9.3  --  9.6   * 104* 113*   < > 177*   < > 137*   ALBUMIN  --   --   --   --   --   --  4.2   PROTTOTAL  --   --   --   --   --   --  7.4   BILITOTAL  --   --   --   --   --   --  1.1   ALKPHOS  --   --   --   --   --   --  102   ALT  --   --   --   --   --   --  14   AST  --   --   --   --   --   --  24    < > = values in this interval not displayed.       No results found for this or any previous visit (from the past 24 hour(s)).

## 2023-10-15 NOTE — UTILIZATION REVIEW
Concurrent stay review; [facility]        Under the authority of the Utilization Management Committee, the utilization review process indicated a secondary review on the above patient.  The review outcome is based on review of the medical records, discussions with staff, and applying clinical experience noted on the date of the review.        (x) Observation/outpatient Status Appropriate - Concurrent stay review       RATIONALE FOR DETERMINATION:     Patient delayed discharge is related to disposition, there is no medical necessity for inpatient admission at the time of this review. If there is a change in patient status, please resend for review.    Erick Shahid is a 83 year old male presented with   Acute confusion with observed aphasia and possible LLE weakness, RESOLVING. There was concern for stroke, however word finding deficits wax and wane, and noted at baseline as well. With regards to LLE weakness. Son notes chronic uneven gate appearing to favor left side but unknown cause (all prior significant leg injuries on R), and on my exam he is able to hold LLE for count of 5 with minimal drift.  No no evidence of infection or significant acute metabolic disturbance to cause encephalopathy. R mod carotid artery stenosis (carotid ultrasound on 8/15 showed 50 to 69% stenosis as well) CT imaging without evidence of acute stroke or proximal vascular occlusion. MRI brain on 10/13/23 came back negative. Neuro consult noted SVID and likely dementia.  Recommended outpatient follow up. PT/OT/Speech/CM > Significant generalized weakness noted by therapies, below baseline > anticipate discharge to TCU once placement found.          The information on this document is developed by the utilization review team in order for the business office to ensure compliance.  This only denotes the appropriateness of proper admission status and does not reflect the quality  of care rendered.       The definitions of Inpatient Status and Observation Status used in making the determination above are those provided in the CMS Coverage Manual, Chapter 1 and Chapter 6, section 70.4.       Sincerely,    Lea Kerr MD

## 2023-10-16 NOTE — PLAN OF CARE
Goal Outcome Evaluation:         Vitals/Neuros stable, a/o x2. Incontinent of bladder overnight. Up with 2 assist and josy steady. Plan to discharge to [place] when bed is available.

## 2023-10-16 NOTE — PROGRESS NOTES
[facility]    Hospitalist Progress Note    Date of Service (when I saw the patient): 10/16/2023  Admit date: 10/13/2023    Interval History     Erick has been afebrile hemodynamically stable overnight.  Oxygenation normal on room air.  Per nurses notes no acute events.    No CP/SOB  No fevers  No nausea / vomiting or abdominal pain  No new complaints, working on placement    Assessment & Plan     Erick Shahid is a 83 year old male with chronic HFrEF, CAD, dementia, s/p recent TAVR 23 mm Allison ROBERTO 3 valve on 8/15/2023, type II DM, complicated by CHB, s/p dual-chamber pacemaker who admitted on 10/13/2023, due to acute mental status changes with some word-finding deficits and possible left sided weakness (although not noted on Stroke Neuro's exam).  per EMS.       Per son, patient last seen normal at 1 AM.  At 4:30 AM he woke screaming stating he had pain all over.  He was noted to have been incontinent in bed, no clear evidence of weakness moving all 4 extremities in bed.  He was mumbling but able to complete full sentences that were understandable.  When he was brought to the ED it was noted he had word finding difficulties and some left-sided weakness.  Per neuro team.  He is not able to answer questions of orientation and has some minimal aphasia.  Otherwise no motor deficits noted.      T98.2, P75, /77, RR 13, O2 100%  Labs: Lites normal, CR 0.73 (stable), bicarb 23, AG 16, lactic acid 1.9, glucose 137, liver panel normal, troponin minimally elevated at 23.  CBC WBC 8.4, Hgb 12.7 (stable),  (stable)   UA negative for infection, with 10 ketones  COVID, influenza, RSV PCR negative  Chest x-ray personally reviewed.  Formal read pending.  I see no evidence of acute cardiopulmonary disease.  EKG reviewed showing sinus bradycardia with first-degree AV block.  Note PVCs, and some nonspecific ST-T wave changes.     CT Head was without acute changes   CTA head and  neck - no evidence of large-vessel occlusion, moderate to severe R carotid stenosis, 50-70% stenosis  CTP - No large deficits.   Pacemaker interrogation unremarkable.      Dementia at baseline  Acute confusion with observed aphasia and possible LLE weakness, RESOLVING   REM sleep behavior disorder    There was concern for stroke, however word finding deficits wax and wane, and noted at baseline as well. With regards to LLE weakness. Son notes chronic uneven gate appearing to favor left side but unknown cause (all prior significant leg injuries on R), and on my exam he is able to hold LLE for count of 5 with minimal drift.  I suspect symptoms may be due to acute confusion following a vivid dream in the context of dementia.  No no evidence of infection or significant acute metabolic disturbance to cause encephalopathy.   R mod carotid artery stenosis (carotid ultrasound on 8/15 showed 50 to 69% stenosis as well)   *CT imaging without evidence of acute stroke or proximal vascular occlusion     Swallow screen. If passes, give aspirin 325 mg starting now.  (On monotherapy with aspirin 81 mg PTA)  Continue PTA statin  MRI brain on 10/13/23 negative.   Appreciate stroke neuro consult:  Signed off after negative MRI.  They noted parkinsonian features and recommended general neurology.  Stop telemetry  General neurology consulted on 10/14/23 for evaluation of Parkinsonism and potential REM sleep disorder given his vivid dreams often acting out at night.  Appreciate neurology consult on 10/15 by Dr. Valladares: Do not fee he has Parkinson's but do feel patient has REM sleep behavior disorder and recommended melatonin 10 mg q 8 AM (ordered)   PT/OT/Speech/CM > Significant generalized weakness noted by therapies, below baseline > anticipate discharge to TCU once placement found     Delirium prevention   Frequent re-orientation  Maintain normal day/night, sleep wake cycles  Minimize sedating/altering medications as able  Schedule  "melatonin at bedtime at 10 mg as above.   Quetiapine PRN for agitation at night only. (As of 10/15 none given this hospitalization)     Chronic HF with recovered EF  CAD, s/p L HC for evaluation of TAVR on 7/28/23 single-vessel occlusive disease of mid small size nondominant RCA.  Remainder of coronaries with minimal nonocclusive disease  Critical aortic stenosis s/p TAVR with 23 mm Allison Maribel 3 valve on 8/15/23.    *Echo 9/22/2023: Bioprosthetic aortic valve with mean gradient of 19 mmHg, LVEF 55 to 60% (improved from prior EF of 35%) No AI  CHB s/p PPM following TAVR - [employer] Neeta (D)   Mild troponin elevation, (23) complained of pain all over when he screamed out in the middle the night.  Now denies chest pain or shortness of breath. EKG SB with first degree block LBBB with nonspecific changes and PVCs    HTN, HLD  Continue PTA aspirin, metoprolol XL and statin  No evidence of fluid overload  Follow-up troponin normalized (23>21)      DM, type II  * A1c 6 on 7/2023, glucose 137 at admission.  Holding PTA metformin  Ordered medium sliding scale insulin with 4 times daily blood sugar checks    Recent Labs   Lab 10/16/23  1147 10/16/23  0914 10/16/23  0215 10/15/23  2208 10/15/23  1713 10/15/23  1206   * 107* 97 102* 114* 201*       Macular Degeneration   Continue PTA eyedrops     Constipation   Continue PTA scheduled MiraLAX and senna as well as as needed regimen     Chronic normocytic anemia, stable at hgb 12.7  Resume vitamin B12 at discharge       Awaiting medication reconciliation  Clinically Significant Risk Factors Present on Admission                  # Drug Induced Platelet Defect: home medication list includes an antiplatelet medication        # Obesity: Estimated body mass index is 30.49 kg/m  as calculated from the following:    Height as of this encounter: 1.702 m (5' 7\").    Weight as of this encounter: 88.3 kg (194 lb 10.7 oz).       # Financial/Environmental Concerns: none   # " Pacemaker present         Diet: Orders Placed This Encounter      Combination Diet Moderate Consistent Carb (60 g CHO per Meal) Diet; Thin Liquids (level 0); Soft and Bite Sized Diet (level 6); Thin Liquids (level 0)     IVF: S/p 1 L of IV fluids now off  Pablo Catheter: Not present     DVT Prophylaxis: PCD's, ambulate, PTA aspirin  Code Status: No CPR- Do NOT Intubate     Disposition: Expected discharge 10/14/23   Communication: Discussed with son, daughter-in-law, neurologist on 10/14/23    Arash Ford MD    Hospitalist Service  [facility]  Securely message with the Vocera Web Console (learn more here)  Text page via [employer] Paging/Directory      -Data reviewed today: I reviewed all new labs and imaging results over the last 24 hours. I personally reviewed no images or EKG's today.    Physical Exam   Temp: 97.4  F (36.3  C) Temp src: Oral BP: 116/57 Pulse: 63   Resp: 16 SpO2: 99 % O2 Device: None (Room air)    Vitals:    10/13/23 0550   Weight: 88.3 kg (194 lb 10.7 oz)     Vital Signs with Ranges  Temp:  [97.1  F (36.2  C)-98.3  F (36.8  C)] 97.4  F (36.3  C)  Pulse:  [60-63] 63  Resp:  [15-18] 16  BP: (113-146)/(47-72) 116/57  SpO2:  [99 %-100 %] 99 %  I/O last 3 completed shifts:  In: 480 [P.O.:480]  Out: 200 [Urine:200]    Today's Exam   Constitutional: NAD,   Neuropsyche: alert and oriented to being in a hospital, remembers the month after a few tries.  Speech normal, face symmetric and moving all 4 extremities without gross focal neurological deficit.   Respiratory: Breathing comfortably, good air exchange, no wheezes, no crackles.   Cardiovascular: Regular rate and rhythm with systolic murmur, 1+ edema.  GI: Soft, NT/ND, BS normal  Skin/Integumen: No acute rash or sign of bleeding.       Medications   All medications reviewed on 10/16/23      aspirin  81 mg Oral Daily    insulin aspart  1-7 Units Subcutaneous TID AC    insulin aspart  1-5 Units Subcutaneous At Bedtime    melatonin  10  mg Oral At Bedtime    metoprolol succinate ER  25 mg Oral Daily    polyethylene glycol  17 g Oral or NG Tube Daily    rosuvastatin  20 mg Oral Daily    senna-docusate  1-2 tablet Oral or NG Tube BID    sertraline  25 mg Oral Daily     PRN Meds: acetaminophen **OR** acetaminophen **OR** acetaminophen, bisacodyl, carboxymethylcellulose PF, glucose **OR** dextrose **OR** glucagon, hydrALAZINE, labetalol, ondansetron **OR** ondansetron, QUEtiapine    Data   Recent Labs   Lab 10/16/23  1147 10/16/23  0914 10/16/23  0215 10/14/23  1127 10/14/23  1044 10/13/23  1224 10/13/23  0552   WBC  --   --   --   --   --   --  8.4   HGB  --   --   --   --   --   --  12.7*   MCV  --   --   --   --   --   --  95   PLT  --   --   --   --   --   --  148*   INR  --   --   --   --   --   --  1.03   NA  --   --   --   --  136  --  139   POTASSIUM  --   --   --   --  4.0  --  3.7   CHLORIDE  --   --   --   --  103  --  100   CO2  --   --   --   --  20*  --  23   BUN  --   --   --   --  12.2  --  14.8   CR  --   --   --   --  0.61*  --  0.73   ANIONGAP  --   --   --   --  13  --  16*   FERMIN  --   --   --   --  9.3  --  9.6   * 107* 97   < > 177*   < > 137*   ALBUMIN  --   --   --   --   --   --  4.2   PROTTOTAL  --   --   --   --   --   --  7.4   BILITOTAL  --   --   --   --   --   --  1.1   ALKPHOS  --   --   --   --   --   --  102   ALT  --   --   --   --   --   --  14   AST  --   --   --   --   --   --  24    < > = values in this interval not displayed.       No results found for this or any previous visit (from the past 24 hour(s)).

## 2023-10-16 NOTE — PROGRESS NOTES
Pt A&O x2, forgetful, slow response, slurred speech. LLE weaker than RLE. Up x2 with josy steady. Tolerated soft/bite size diet well. Denied pain. Uses urinal or bedside commode. BG checks, uses insulin. TCU placement pending.

## 2023-10-17 NOTE — PLAN OF CARE
Goal Outcome Evaluation:     Aox2-3, not situation. Koi. Neuros: left sided weakness noted. Slurred speech and slow to speak.  Denies pain. Incontinent of urine. Up w/ 2 and josy steady. Soft bite sized diet and thin liquids. BG checks 99, 101 this shift.  Plan: discharge to TCU pending placement            Observation goals  PRIOR TO DISCHARGE       Comments: List all goals to be met before discharge home      Free from ACUTE neuro deficits - met   Testing complete -  met

## 2023-10-17 NOTE — DISCHARGE INSTRUCTIONS
Your risk factors for stroke or TIA (transient ischemic attack):    Your Risk Factors Your Results Normal Ranges   High blood pressure BP Readings from Last 1 Encounters:   10/17/23 107/75    Less than 120/80   Cholesterol              Total Lab Results   Component Value Date    CHOL 137 10/13/2023      Less than 150    Triglycerides   Lab Results   Component Value Date    TRIG 80 10/13/2023    Less than 150   LDL Lab Results   Component Value Date    LDL 52 10/13/2023       Less than 70   HDL Lab Results   Component Value Date    HDL 69 10/13/2023            Greater than 40 (men)  Greater than 50 (women)   Diabetes Recent Labs   Lab 10/17/23  1213   *    Fasting blood glucose    Smoking/tobacco use  Quit smoking and tobacco   Overweight  Lose 1-2 pounds a week   Lack of exercise  30 minutes moderate activity each day   Other risk factors include carotid (neck) artery disease, atrial fibrillation and stress. You may be on new medicine to treat high blood pressure, cholesterol, diabetes or atrial fibrillation.    Understanding Stroke Booklet given to patient. Please refer to booklet for further information.    Stroke warning signs and symptoms - CALL 911 right away for:  - Sudden numbness or weakness in the face, arm or leg (often on one side of the body).  - Sudden confusion or trouble understanding what is going on.  - Sudden blurred or decreased vision in one or both eyes.  - Sudden trouble speaking, loss of balance, dizziness or problems with coordination.  - Sudden, severe headache for no reason.  - Fainting or seizures.  - Symptoms may go away then come back suddenly.

## 2023-10-17 NOTE — DISCHARGE SUMMARY
"[facility]  Hospitalist Discharge Summary      Date of Admission:  10/13/2023  Date of Discharge:  10/17/2023  Discharging Provider: Arash Ford MD  Discharge Service: Hospitalist Service    Discharge Diagnoses     Dementia at baseline  Acute confusion with observed aphasia and possible LLE weakness, RESOLVING   REM sleep behavior disorder      Clinically Significant Risk Factors     # Obesity: Estimated body mass index is 30.5 kg/m  as calculated from the following:    Height as of this encounter: 1.702 m (5' 7\").    Weight as of this encounter: 88.3 kg (194 lb 12.1 oz).       Follow-ups Needed After Discharge   Follow-up Appointments     Follow Up and recommended labs and tests      Follow up with Nursing home physician.  No follow up labs or test are   needed.            Unresulted Labs Ordered in the Past 30 Days of this Admission       No orders found from 9/13/2023 to 10/14/2023.          Discharge Disposition   Discharged to rehabilitation facility  Condition at discharge: Stable    Hospital Course   Erick Shahid is a 83 year old male with chronic HFrEF, CAD, dementia, s/p recent TAVR 23 mm Allison ROBERTO 3 valve on 8/15/2023, type II DM, complicated by CHB, s/p dual-chamber pacemaker who admitted on 10/13/2023, due to acute mental status changes with some word-finding deficits and possible left sided weakness (although not noted on Stroke Neuro's exam).  per EMS.       Per son, patient last seen normal at 1 AM.  At 4:30 AM he woke screaming stating he had pain all over.  He was noted to have been incontinent in bed, no clear evidence of weakness moving all 4 extremities in bed.  He was mumbling but able to complete full sentences that were understandable.  When he was brought to the ED it was noted he had word finding difficulties and some left-sided weakness.  Per neuro team.  He is not able to answer questions of orientation and has some minimal aphasia.  Otherwise no " motor deficits noted.      T98.2, P75, /77, RR 13, O2 100%  Labs: Lites normal, CR 0.73 (stable), bicarb 23, AG 16, lactic acid 1.9, glucose 137, liver panel normal, troponin minimally elevated at 23.  CBC WBC 8.4, Hgb 12.7 (stable),  (stable)   UA negative for infection, with 10 ketones  COVID, influenza, RSV PCR negative  Chest x-ray personally reviewed.  Formal read pending.  I see no evidence of acute cardiopulmonary disease.  EKG reviewed showing sinus bradycardia with first-degree AV block.  Note PVCs, and some nonspecific ST-T wave changes.     CT Head was without acute changes   CTA head and neck - no evidence of large-vessel occlusion, moderate to severe R carotid stenosis, 50-70% stenosis  CTP - No large deficits.   Pacemaker interrogation unremarkable.      Dementia at baseline  Acute confusion with observed aphasia and possible LLE weakness, RESOLVING   REM sleep behavior disorder    There was concern for stroke, however word finding deficits wax and wane, and noted at baseline as well. With regards to LLE weakness. Son notes chronic uneven gate appearing to favor left side but unknown cause (all prior significant leg injuries on R), and on my exam he is able to hold LLE for count of 5 with minimal drift.  I suspect symptoms may be due to acute confusion following a vivid dream in the context of dementia.  No no evidence of infection or significant acute metabolic disturbance to cause encephalopathy.   R mod carotid artery stenosis (carotid ultrasound on 8/15 showed 50 to 69% stenosis as well)   *CT imaging without evidence of acute stroke or proximal vascular occlusion     Swallow screen. If passes, give aspirin 325 mg starting now.  (On monotherapy with aspirin 81 mg PTA)  Continue PTA statin  MRI brain on 10/13/23 negative.   Appreciate stroke neuro consult:  Signed off after negative MRI.  They noted parkinsonian features and recommended general neurology.  Stop telemetry  General  neurology consulted on 10/14/23 for evaluation of Parkinsonism and potential REM sleep disorder given his vivid dreams often acting out at night.  Appreciate neurology consult on 10/15 by Dr. Valladares: Do not fee he has Parkinson's but do feel patient has REM sleep behavior disorder and recommended melatonin 10 mg q 8 AM (ordered)   PT/OT/Speech/CM > Significant generalized weakness noted by therapies, below baseline > anticipate discharge to TCU once placement found     Delirium prevention   Frequent re-orientation  Maintain normal day/night, sleep wake cycles  Minimize sedating/altering medications as able  Schedule melatonin at bedtime at 10 mg as above.   Quetiapine PRN for agitation at night.     Chronic HF with recovered EF  CAD, s/p L HC for evaluation of TAVR on 7/28/23 single-vessel occlusive disease of mid small size nondominant RCA.  Remainder of coronaries with minimal nonocclusive disease  Critical aortic stenosis s/p TAVR with 23 mm Allison Maribel 3 valve on 8/15/23.    *Echo 9/22/2023: Bioprosthetic aortic valve with mean gradient of 19 mmHg, LVEF 55 to 60% (improved from prior EF of 35%) No AI  CHB s/p PPM following TAVR - [employer] [place] (D)   Mild troponin elevation, (23) complained of pain all over when he screamed out in the middle the night.  Now denies chest pain or shortness of breath. EKG SB with first degree block LBBB with nonspecific changes and PVCs    HTN, HLD  Continue PTA aspirin, metoprolol XL and statin  No evidence of fluid overload  Follow-up troponin normalized (23>21)      DM, type II  * A1c 6 on 7/2023, glucose 137 at admission.  Resume  PTA metformin    Macular Degeneration   Continue PTA eyedrops     Constipation   Continue PTA scheduled MiraLAX and senna      Chronic normocytic anemia, stable at hgb 12.7  Resume vitamin B12 at discharge       Awaiting medication reconciliation    Consultations This Hospital Stay   NEUROLOGY IP STROKE CONSULT  SPEECH LANGUAGE PATH ADULT IP  CONSULT  PHYSICAL THERAPY ADULT IP CONSULT  OCCUPATIONAL THERAPY ADULT IP CONSULT  CARE MANAGEMENT / SOCIAL WORK IP CONSULT  NEUROLOGY IP CONSULT  PHYSICAL THERAPY ADULT IP CONSULT  OCCUPATIONAL THERAPY ADULT IP CONSULT  SPEECH LANGUAGE PATH ADULT IP CONSULT    Code Status   No CPR- Do NOT Intubate    Time Spent on this Encounter   I, Arash Ford MD, personally saw the patient today and spent greater than 30 minutes discharging this patient.       Arash Ford MD  [facility] NEUROSCIENCE UNIT  [address] [place] [address]  Phone: [phone]  ______________________________________________________________________    Physical Exam   Vital Signs: Temp: 97.5  F (36.4  C) Temp src: Axillary BP: 107/75 Pulse: 81   Resp: 18 SpO2: 98 % O2 Device: None (Room air)    Weight: 194 lbs 12.07 oz  ----------------------------------------------------------------------------------------       Primary Care Physician   Minh Samuel    Discharge Orders      General info for SNF    Length of Stay Estimate: Short Term Care: Estimated # of Days <30  Condition at Discharge: Stable  Level of care:skilled   Rehabilitation Potential: Fair  Admission H&P remains valid and up-to-date: Yes  Recent Chemotherapy: N/A  Use Nursing Home Standing Orders: Yes     Mantoux instructions    Give two-step Mantoux (PPD) Per Facility Policy Yes     Follow Up and recommended labs and tests    Follow up with Nursing home physician.  No follow up labs or test are needed.     Intake and output    Every shift     Daily weights    Call Provider for weight gain of more than 2 pounds per day or 5 pounds per week.     Reason for your hospital stay    You had weakness and confusion concerning for stroke like symptoms.     Activity - Up with assistive device     No CPR- Do NOT Intubate     Physical Therapy Adult Consult    Evaluate and treat as clinically indicated.    Reason:  Physical deconditioning     Occupational Therapy Adult Consult     Evaluate and treat as clinically indicated.    Reason:  cognitive impairment     Speech Language Path Adult Consult    Evaluate and treat as clinically indicated.    Reason:  Dysphagia     Fall precautions     Diet    Follow this diet upon discharge: Orders Placed This Encounter      Room Service      Combination Diet Moderate Consistent Carb (60 g CHO per Meal) Diet; Thin Liquids (level 0); Soft and Bite Sized Diet (level 6); Thin Liquids (level 0)       Significant Results and Procedures   Most Recent 3 CBC's:  Recent Labs   Lab Test 10/13/23  0552 09/22/23  0950 08/23/23  0520   WBC 8.4 5.8 6.7   HGB 12.7* 12.7* 11.5*   MCV 95 98 98   * 156 151     Most Recent 3 BMP's:  Recent Labs   Lab Test 10/17/23  1213 10/17/23  0900 10/17/23  0740 10/14/23  1127 10/14/23  1044 10/13/23  1224 10/13/23  0552 09/22/23  0950   NA  --   --   --   --  136  --  139 140   POTASSIUM  --   --   --   --  4.0  --  3.7 4.2   CHLORIDE  --   --   --   --  103  --  100 105   CO2  --   --   --   --  20*  --  23 23   BUN  --   --   --   --  12.2  --  14.8 14.1   CR  --   --   --   --  0.61*  --  0.73 0.73   ANIONGAP  --   --   --   --  13  --  16* 12   FERMIN  --   --   --   --  9.3  --  9.6 9.6   * 103* 116*   < > 177*   < > 137* 92    < > = values in this interval not displayed.     Most Recent 2 LFT's:  Recent Labs   Lab Test 10/13/23  0552 07/23/23  1445   AST 24 22   ALT 14 11   ALKPHOS 102 75   BILITOTAL 1.1 1.1     Most Recent 3 INR's:  Recent Labs   Lab Test 10/13/23  0552 08/15/23  1258 07/27/23  1827   INR 1.03 1.17* 1.07     Most Recent 3 Troponin's:No lab results found.  Most Recent 3 BNP's:  Recent Labs   Lab Test 07/27/23  1827   NTBNP 2,019*     Most Recent D-dimer:No lab results found.  Most Recent Cholesterol Panel:  Recent Labs   Lab Test 10/13/23  0748   CHOL 137   LDL 52   HDL 69   TRIG 80     Most Recent Urinalysis:  Recent Labs   Lab Test 10/13/23  0643   COLOR Straw   APPEARANCE Clear   URINEGLC Negative    URINEBILI Negative   URINEKETONE 10*   SG 1.023   UBLD Negative   URINEPH 7.5*   PROTEIN Negative   NITRITE Negative   LEUKEST Negative   RBCU 1   WBCU <1   ,   Results for orders placed or performed during the hospital encounter of 10/13/23   CT Head w/o Contrast    Narrative    EXAM: CT HEAD W/O CONTRAST, CTA HEAD NECK W CONTRAST, CT HEAD PERFUSION W CONTRAST  LOCATION: [facility]  DATE: 10/13/2023    INDICATION: Neuro deficit. Stroke alert. Slurred speech.  COMPARISON: 08/15/2023  CONTRAST: 125mL Isovue 370  TECHNIQUE: Head and neck CT angiogram with IV contrast. Noncontrast head CT followed by axial helical CT images of the head and neck vessels obtained during the arterial phase of intravenous contrast administration. Axial 2D reconstructed images and   multiplanar 3D MIP reconstructed images of the head and neck vessels were performed by the technologist. Additional CT cerebral perfusion was performed utilizing a second contrast bolus. Perfusion data were post processed with generation of standard   perfusion maps and estimation of ischemic/infarcted volumes utilizing standard threshold values. Dose reduction techniques were used. All stenosis measurements made according to NASCET criteria unless otherwise specified.    FINDINGS:   NONCONTRAST HEAD CT:   INTRACRANIAL CONTENTS: No intracranial hemorrhage, extraaxial collection, or mass effect.  No CT evidence of acute infarct. Mild presumed chronic small vessel ischemic changes. Mild generalized volume loss. No hydrocephalus.     VISUALIZED ORBITS/SINUSES/MASTOIDS: Prior bilateral cataract surgery. Visualized portions of the orbits are otherwise unremarkable. Mucous retention cyst in the left maxillary sinus. No middle ear or mastoid effusion.    BONES/SOFT TISSUES: No acute abnormality.    HEAD CTA:  ANTERIOR CIRCULATION: Atherosclerotic calcification of the supraclinoid internal carotid arteries. No stenosis/occlusion, aneurysm, or  high flow vascular malformation. Standard [place] anatomy.    POSTERIOR CIRCULATION: No stenosis/occlusion, aneurysm, or high flow vascular malformation. Dominant left and smaller right vertebral artery contribute to a normal basilar artery.     DURAL VENOUS SINUSES: Expected enhancement of the major dural venous sinuses.    NECK CTA:  RIGHT CAROTID: Atherosclerotic plaque results in 50-70% stenosis in the right ICA. No dissection.    LEFT CAROTID: Atherosclerotic plaque results in less than 50% stenosis in the left ICA. No dissection.    VERTEBRAL ARTERIES: No focal stenosis or dissection. Dominant left and smaller right vertebral arteries.    AORTIC ARCH: Bovine origin left common carotid artery. No significant stenosis at the origin of the great vessels.    NONVASCULAR STRUCTURES: Calcified pleural plaque at the left lung apex. Multilevel cervical spondylosis, with high-grade bilateral neural foraminal narrowing. Multilevel ankylosis of the cervical spine. Diffuse bony demineralization.    CT PERFUSION:  PERFUSION MAPS: Small areas of elevated Tmax in the right temporal lobe and left frontal lobe are favored to be artifactual. No convincing perfusion abnormality.    RAPID ANALYSIS:  CBF<30%: 0 mL  Tmax>6sec: 8 mL, visually corrected to 0  Mismatch volume: 8 mL, visually corrected to 0  Mismatch ratio: Infinite, visually corrected to none      Impression    IMPRESSION:   HEAD CT:  1.  No CT evidence for acute intracranial process.  2.  Brain atrophy and presumed chronic microvascular ischemic changes as above.    HEAD CTA:   1.  No proximal occlusion, aneurysm, or high flow vascular malformation identified.    NECK CTA:  1.  Approximately 50-70% stenosis of the right internal carotid artery at the bifurcation.  2.  Less than 50% stenosis of the left internal carotid artery at the bifurcation.    CT PERFUSION:  1.  No convincing perfusion abnormality.      CT head and CTA head and neck findings were  discussed with Dr. Plummer at 6:14 AM on 10/13/2023. CT perfusion findings were discussed with Dr. Plummer at 6:34 AM on 10/13/2023.   CTA Head Neck with Contrast    Narrative    EXAM: CT HEAD W/O CONTRAST, CTA HEAD NECK W CONTRAST, CT HEAD PERFUSION W CONTRAST  LOCATION: [facility]  DATE: 10/13/2023    INDICATION: Neuro deficit. Stroke alert. Slurred speech.  COMPARISON: 08/15/2023  CONTRAST: 125mL Isovue 370  TECHNIQUE: Head and neck CT angiogram with IV contrast. Noncontrast head CT followed by axial helical CT images of the head and neck vessels obtained during the arterial phase of intravenous contrast administration. Axial 2D reconstructed images and   multiplanar 3D MIP reconstructed images of the head and neck vessels were performed by the technologist. Additional CT cerebral perfusion was performed utilizing a second contrast bolus. Perfusion data were post processed with generation of standard   perfusion maps and estimation of ischemic/infarcted volumes utilizing standard threshold values. Dose reduction techniques were used. All stenosis measurements made according to NASCET criteria unless otherwise specified.    FINDINGS:   NONCONTRAST HEAD CT:   INTRACRANIAL CONTENTS: No intracranial hemorrhage, extraaxial collection, or mass effect.  No CT evidence of acute infarct. Mild presumed chronic small vessel ischemic changes. Mild generalized volume loss. No hydrocephalus.     VISUALIZED ORBITS/SINUSES/MASTOIDS: Prior bilateral cataract surgery. Visualized portions of the orbits are otherwise unremarkable. Mucous retention cyst in the left maxillary sinus. No middle ear or mastoid effusion.    BONES/SOFT TISSUES: No acute abnormality.    HEAD CTA:  ANTERIOR CIRCULATION: Atherosclerotic calcification of the supraclinoid internal carotid arteries. No stenosis/occlusion, aneurysm, or high flow vascular malformation. Standard [place] anatomy.    POSTERIOR CIRCULATION: No  stenosis/occlusion, aneurysm, or high flow vascular malformation. Dominant left and smaller right vertebral artery contribute to a normal basilar artery.     DURAL VENOUS SINUSES: Expected enhancement of the major dural venous sinuses.    NECK CTA:  RIGHT CAROTID: Atherosclerotic plaque results in 50-70% stenosis in the right ICA. No dissection.    LEFT CAROTID: Atherosclerotic plaque results in less than 50% stenosis in the left ICA. No dissection.    VERTEBRAL ARTERIES: No focal stenosis or dissection. Dominant left and smaller right vertebral arteries.    AORTIC ARCH: Bovine origin left common carotid artery. No significant stenosis at the origin of the great vessels.    NONVASCULAR STRUCTURES: Calcified pleural plaque at the left lung apex. Multilevel cervical spondylosis, with high-grade bilateral neural foraminal narrowing. Multilevel ankylosis of the cervical spine. Diffuse bony demineralization.    CT PERFUSION:  PERFUSION MAPS: Small areas of elevated Tmax in the right temporal lobe and left frontal lobe are favored to be artifactual. No convincing perfusion abnormality.    RAPID ANALYSIS:  CBF<30%: 0 mL  Tmax>6sec: 8 mL, visually corrected to 0  Mismatch volume: 8 mL, visually corrected to 0  Mismatch ratio: Infinite, visually corrected to none      Impression    IMPRESSION:   HEAD CT:  1.  No CT evidence for acute intracranial process.  2.  Brain atrophy and presumed chronic microvascular ischemic changes as above.    HEAD CTA:   1.  No proximal occlusion, aneurysm, or high flow vascular malformation identified.    NECK CTA:  1.  Approximately 50-70% stenosis of the right internal carotid artery at the bifurcation.  2.  Less than 50% stenosis of the left internal carotid artery at the bifurcation.    CT PERFUSION:  1.  No convincing perfusion abnormality.      CT head and CTA head and neck findings were discussed with Dr. Plummer at 6:14 AM on 10/13/2023. CT perfusion findings were discussed with   Kavitha at 6:34 AM on 10/13/2023.   CT Head Perfusion w Contrast - For Tier 2 Stroke    Narrative    EXAM: CT HEAD W/O CONTRAST, CTA HEAD NECK W CONTRAST, CT HEAD PERFUSION W CONTRAST  LOCATION: [facility]  DATE: 10/13/2023    INDICATION: Neuro deficit. Stroke alert. Slurred speech.  COMPARISON: 08/15/2023  CONTRAST: 125mL Isovue 370  TECHNIQUE: Head and neck CT angiogram with IV contrast. Noncontrast head CT followed by axial helical CT images of the head and neck vessels obtained during the arterial phase of intravenous contrast administration. Axial 2D reconstructed images and   multiplanar 3D MIP reconstructed images of the head and neck vessels were performed by the technologist. Additional CT cerebral perfusion was performed utilizing a second contrast bolus. Perfusion data were post processed with generation of standard   perfusion maps and estimation of ischemic/infarcted volumes utilizing standard threshold values. Dose reduction techniques were used. All stenosis measurements made according to NASCET criteria unless otherwise specified.    FINDINGS:   NONCONTRAST HEAD CT:   INTRACRANIAL CONTENTS: No intracranial hemorrhage, extraaxial collection, or mass effect.  No CT evidence of acute infarct. Mild presumed chronic small vessel ischemic changes. Mild generalized volume loss. No hydrocephalus.     VISUALIZED ORBITS/SINUSES/MASTOIDS: Prior bilateral cataract surgery. Visualized portions of the orbits are otherwise unremarkable. Mucous retention cyst in the left maxillary sinus. No middle ear or mastoid effusion.    BONES/SOFT TISSUES: No acute abnormality.    HEAD CTA:  ANTERIOR CIRCULATION: Atherosclerotic calcification of the supraclinoid internal carotid arteries. No stenosis/occlusion, aneurysm, or high flow vascular malformation. Standard [place] anatomy.    POSTERIOR CIRCULATION: No stenosis/occlusion, aneurysm, or high flow vascular malformation. Dominant left and  smaller right vertebral artery contribute to a normal basilar artery.     DURAL VENOUS SINUSES: Expected enhancement of the major dural venous sinuses.    NECK CTA:  RIGHT CAROTID: Atherosclerotic plaque results in 50-70% stenosis in the right ICA. No dissection.    LEFT CAROTID: Atherosclerotic plaque results in less than 50% stenosis in the left ICA. No dissection.    VERTEBRAL ARTERIES: No focal stenosis or dissection. Dominant left and smaller right vertebral arteries.    AORTIC ARCH: Bovine origin left common carotid artery. No significant stenosis at the origin of the great vessels.    NONVASCULAR STRUCTURES: Calcified pleural plaque at the left lung apex. Multilevel cervical spondylosis, with high-grade bilateral neural foraminal narrowing. Multilevel ankylosis of the cervical spine. Diffuse bony demineralization.    CT PERFUSION:  PERFUSION MAPS: Small areas of elevated Tmax in the right temporal lobe and left frontal lobe are favored to be artifactual. No convincing perfusion abnormality.    RAPID ANALYSIS:  CBF<30%: 0 mL  Tmax>6sec: 8 mL, visually corrected to 0  Mismatch volume: 8 mL, visually corrected to 0  Mismatch ratio: Infinite, visually corrected to none      Impression    IMPRESSION:   HEAD CT:  1.  No CT evidence for acute intracranial process.  2.  Brain atrophy and presumed chronic microvascular ischemic changes as above.    HEAD CTA:   1.  No proximal occlusion, aneurysm, or high flow vascular malformation identified.    NECK CTA:  1.  Approximately 50-70% stenosis of the right internal carotid artery at the bifurcation.  2.  Less than 50% stenosis of the left internal carotid artery at the bifurcation.    CT PERFUSION:  1.  No convincing perfusion abnormality.      CT head and CTA head and neck findings were discussed with Dr. Plummer at 6:14 AM on 10/13/2023. CT perfusion findings were discussed with Dr. Plummer at 6:34 AM on 10/13/2023.   XR Chest Port 1 View    Narrative    XR CHEST PORT 1  VIEW   10/13/2023 7:59 AM     HISTORY: stroke    COMPARISON: 8/16/2023      Impression    IMPRESSION: No acute cardiopulmonary disease. Left chest wall  pacemaker device appears unchanged. TAVR device appears unchanged.  Biapical scarring and scattered calcified granulomas appear similar to  the prior exam. Degenerative changes are seen in the shoulders and  spine.    KRISTOPHER BARNHART MD         SYSTEM ID:  [id number]   MR Brain w/o & w Contrast    Narrative    MRI BRAIN WITHOUT AND WITH CONTRAST  10/13/2023 2:44 PM     HISTORY:  AMS     TECHNIQUE:  Multiplanar, multisequence MRI of the brain without and  with 8mL Gadavist     COMPARISON: CT brain from the same day..     FINDINGS:    Mild chronic small vessel ischemic changes and moderate global  cortical volume loss with ex vacuo dilatation of the ventricular  system. No acute intracranial hemorrhage. No hydrocephalus or  extra-axial hemorrhage.    No restricted diffusion.    No acute intracranial hemorrhage. No hydrocephalus or extra-axial  hemorrhage. Susceptibility imaging is negative.    Mastoids are clear. Orbits and paranasal sinuses are unremarkable.      Impression    IMPRESSION:      Chronic changes. No acute intracranial modality. No restricted  diffusion to suggest acute ischemia.      JEFF MORENO MD         SYSTEM ID:  [id number]       Discharge Medications   Current Discharge Medication List        START taking these medications    Details   melatonin 10 MG TABS tablet Take 1 tablet (10 mg) by mouth at bedtime    Associated Diagnoses: Altered mental status, unspecified altered mental status type; Left leg weakness      QUEtiapine (SEROQUEL) 25 MG tablet Take 1 tablet (25 mg) by mouth nightly as needed (give at night only for agitation or persistent insomnia.)    Associated Diagnoses: Altered mental status, unspecified altered mental status type; Left leg weakness           CONTINUE these medications which have NOT CHANGED    Details   aspirin (ASA) 81 MG  chewable tablet Take 1 tablet (81 mg) by mouth daily  Qty: 90 tablet, Refills: 3    Associated Diagnoses: S/P TAVR (transcatheter aortic valve replacement); Aortic stenosis, severe      cycloSPORINE (RESTASIS) 0.05 % ophthalmic emulsion Apply 1 drop to eye 2 times daily as needed for dry eyes      metFORMIN (GLUCOPHAGE) 1000 MG tablet Take 1 tablet by mouth daily (with dinner)      metoprolol succinate ER (TOPROL XL) 25 MG 24 hr tablet Take 25 mg by mouth daily      nystatin (MYCOSTATIN) 123004 UNIT/GM external powder Apply 1 strip topically 3 times daily as needed (jock itch)      polyethylene glycol (MIRALAX) 17 g packet Take 1 packet by mouth daily      rosuvastatin (CRESTOR) 20 MG tablet Take 1 tablet by mouth daily      senna-docusate (SENOKOT-S/PERICOLACE) 8.6-50 MG tablet Take 2 tablets by mouth daily as needed for constipation      sertraline (ZOLOFT) 50 MG tablet Take 25 mg by mouth daily      vitamin B-12 (CYANOCOBALAMIN) 1000 MCG tablet Take 1,000 mcg by mouth daily      vitamin C (ASCORBIC ACID) 500 MG tablet Take 500 mg by mouth daily      Vitamin D3 (CHOLECALCIFEROL) 25 mcg (1000 units) tablet Take 25 mcg by mouth daily           Allergies   No Known Allergies

## 2023-10-17 NOTE — PLAN OF CARE
Orientation: A/Ox2 - disoriented to time and place - confused    Vitals/Tele: VSS - afebrile, LSC - RA, Normotensive, SR    IV Access/drains: No access - MD aware    Diet: Mecanical bite/Soft dental - thin liquids - Room service     Mobility: Ax2 Sera steady     GI/: Incontinent of B/B - Adequate UO via urinal, BS audible - Large loose BM in AM    Wound/Skin: Scattered bruising, BLLE edema - +2    Consults: OT, PT, SLP    Discharge Plan: [facility] today     See Flow sheets for assessment      Goal Outcome Evaluation:

## 2023-10-17 NOTE — PROGRESS NOTES
Observation goals  PRIOR TO DISCHARGE       Comments: List all goals to be met before discharge home      Free from ACUTE neuro deficits - met   Testing complete -  met

## 2023-10-17 NOTE — PROGRESS NOTES
1900-23:30  Pt here with AMS. A&O X2-3. Neuros aphasia with speech was slurred at times. VSS. Tele 100% V-paced. Soft and bitesized diet, thin liquids. Takes pills whole with water. Up with Ax2 josy steady. Denies pain. Pt scoring green on the Aggression Stop Light Tool. Plan for discharge pending.

## 2023-10-17 NOTE — PROGRESS NOTES
Care Management Discharge Note    Discharge Date: 10/17/2023    Discharge Disposition: Transitional Care  Discharge Services: None  Discharge DME: None  Discharge Transportation: family or friend will provide    Private pay costs discussed: Not applicable    Does the patient's insurance plan have a 3 day qualifying hospital stay waiver?  No    PAS Confirmation Code:  [id number]    Persons Notified of Discharge Plans: patient, family   Patient/Family in Agreement with the Plan: yes    Handoff Referral Completed: No    Additional Information:  Messaged Mary with admissions at [facility] to inquire if patient was accepted there. He is and they can accept any time today. [facility] insurance authorization received. Dates of service begins today 10/17/23. Case number: [id number] with approval code: [id number]. PAS completed and faxed.     PAS-RR    D: Per DHS regulation, KANE completed and submitted PAS-RR to [place] [employer] Direct Connect via the Senior LinkAge Line.  PAS-RR confirmation # is : [id number]  P: Further questions may be directed to Senior Link[employer] Line at #[phone], option #4 for PAS-RR staff.     Yolanda Pitt, MSMOHINI, LGSW   Social Work

## 2023-10-18 NOTE — PLAN OF CARE
Occupational Therapy Discharge Summary    Reason for therapy discharge:    Discharged to transitional care facility.    Progress towards therapy goal(s). See goals on Care Plan in [facility] electronic health record for goal details.  Goals partially met.  Barriers to achieving goals:   discharge from facility.    Therapy recommendation(s):    Continued therapy is recommended.  Rationale/Recommendations:  Pt functioning below baseline and will benefit from continued skilled OT to maximize safety and independence.    **Pt not seen by writer on this date, note written based on previous treating OT's note and recommendations.

## 2023-10-18 NOTE — PROGRESS NOTES
[facility] received from [facility] on 10/13/23 after patient presented to the ED w/ acute mental status changes w/ someword-finding deficits and possible left sided weakness. Pacemaker interrogated, results as below:    [employer] [place] (D) PPM - [facility]  Presenting Rhythm: AS- 60s  Battery Status: 12.7 years  Leads: stable    Mode: DDD     AP: 80.5%    : 99.6%    Episodes: none  Heart Rate: minimal variability (unchanged from prior)    Stable device check. No AF noted.  MARKIE Chandra

## 2023-10-18 NOTE — PLAN OF CARE
Physical Therapy Discharge Summary    Reason for therapy discharge:    Discharged to transitional care facility.    Progress towards therapy goal(s). See goals on Care Plan in [facility] electronic health record for goal details.  Goals partially met.  Barriers to achieving goals:   discharge from facility.    Therapy recommendation(s):    Continued therapy is recommended.  Rationale/Recommendations:  to address functional mobility.

## 2023-10-18 NOTE — PROGRESS NOTES
[facility] GERIATRICS    PRIMARY CARE PROVIDER AND CLINIC:  Minh Samuel MD, [address] [place] [address] / [facility] [id number]  Chief Complaint   Patient presents with    Hospital F/U      [facility] Medical Record Number:  [id number]  Place of Service where encounter took place:  [facility] (TCU) [[id number]]    Erick Shahid  is a 83 year old  (1940), admitted to the above facility from  [facility]. Hospital stay 10/13/23 through 10/17/23..   HPI:    83 year old male with chronic HFrEF, CAD, dementia, s/p recent TAVR 23 mm Allison ROBERTO 3 valve on 8/15/2023, type II DM, complicated by CHB, s/p dual-chamber pacemaker hospitalized with acute mental status changes with some word-finding deficits and possible left sided weakness. At admission labs stable at patient's baseline. CT head negative. Noted moderate/severe right carotid stenosis. Does have dementia at baseline, now acute confusion with vivid dream. Neuro signed off, started melatonin for vivid dreams. HFrEF, CAD, HTN, HLD: on aspirin, metoprolol, statin. DM2 on metformin. Macular degeneration on drops. Constipation on laxatives. Anemia Hgb 12 range. To TCU for rehab.      Seen for initial visit. Poor historian. No pain. No dyspnea, breathing issues, bowel or bladder concerns. BP range 101-129/56-71 and sats 98% room air. BG range 118-138. Per POLST code is DNR/DNI    CODE STATUS/ADVANCE DIRECTIVES DISCUSSION:  No CPR- Do NOT Intubate  DNR / DNI  ALLERGIES: No Known Allergies   PAST MEDICAL HISTORY:   Past Medical History:   Diagnosis Date    Benign essential hypertension     BPH (benign prostatic hyperplasia)     Chronic heart failure with reduced ejection fraction and diastolic dysfunction (H)     Cognitive impairment     Critical aortic valve stenosis     Diabetes mellitus, type 2 (H)     on metformin    Dyslipidemia     First degree atrioventricular block     LBBB (left bundle branch block)     Macular degeneration (senile) of  retina       PAST SURGICAL HISTORY:   has a past surgical history that includes Coronary Angiogram (N/A, 07/28/2023); Percutaneous Coronary Intervention (N/A, 07/28/2023); Transcatheter Aortic Valve Replacement (08/15/2023); Orif Hip Fracture (Right, 2018); Pacemaker Device & Lead Implant- Right Atrial & Left Ventricular (N/A, 8/15/2023); and Transcatheter Aortic Valve Replacement-Femoral Approach (N/A, 8/15/2023).  FAMILY HISTORY: family history is not on file.  SOCIAL HISTORY:   reports that he has never smoked. He has never used smokeless tobacco. He reports current alcohol use. He reports that he does not use drugs.  Patient's living condition: lives with spouse    Post Discharge Medication Reconciliation Status:   MED REC REQUIRED  Post Medication Reconciliation Status:  Discharge medications reconciled, continue medications without change       Current Outpatient Medications   Medication Sig    aspirin (ASA) 81 MG chewable tablet Take 1 tablet (81 mg) by mouth daily    cycloSPORINE (RESTASIS) 0.05 % ophthalmic emulsion Apply 1 drop to eye 2 times daily as needed for dry eyes    melatonin 10 MG TABS tablet Take 1 tablet (10 mg) by mouth at bedtime    metFORMIN (GLUCOPHAGE) 1000 MG tablet Take 1 tablet by mouth daily (with dinner)    metoprolol succinate ER (TOPROL XL) 25 MG 24 hr tablet Take 25 mg by mouth daily    nystatin (MYCOSTATIN) 365643 UNIT/GM external powder Apply 1 strip topically 3 times daily as needed (jock itch)    polyethylene glycol (MIRALAX) 17 g packet Take 1 packet by mouth daily    QUEtiapine (SEROQUEL) 25 MG tablet Take 1 tablet (25 mg) by mouth nightly as needed (give at night only for agitation or persistent insomnia.)    rosuvastatin (CRESTOR) 20 MG tablet Take 1 tablet by mouth daily    senna-docusate (SENOKOT-S/PERICOLACE) 8.6-50 MG tablet Take 2 tablets by mouth daily as needed for constipation    sertraline (ZOLOFT) 50 MG tablet Take 25 mg by mouth daily    vitamin B-12  "(CYANOCOBALAMIN) 1000 MCG tablet Take 1,000 mcg by mouth daily    vitamin C (ASCORBIC ACID) 500 MG tablet Take 500 mg by mouth daily    Vitamin D3 (CHOLECALCIFEROL) 25 mcg (1000 units) tablet Take 25 mcg by mouth daily     No current facility-administered medications for this visit.     ROS:  Limited secondary to cognitive impairment but today pt reports no pain, dyspnea, bowel or bladder issues    Vitals:  /59   Pulse 60   Temp 98.4  F (36.9  C)   Resp 16   Ht 1.702 m (5' 7\")   Wt 77.9 kg (171 lb 12.8 oz)   SpO2 99%   BMI 26.91 kg/m    Exam:  GENERAL APPEARANCE:  Alert, in no distress, pleasant, cooperative, oriented x self  EYES:  EOM, lids, pupils and irises normal, sclera clear and conjunctiva normal, no discharge or mattering on lids or lashes noted  ENT:  Mouth normal, moist mucous membranes, nose normal without drainage or crusting, external ears without lesions, hearing acuity impaired  RESP:  respiratory effort normal, no chest wall tenderness, no respiratory distress, Lung sounds clear, patient is on room air  CV:  Auscultation of heart done, rate and rhythm controlled and regular, no murmur, no rub or gallop. Edema none bilateral lower extremities  ABDOMEN:  normal bowel sounds, soft, nontender, no palpable masses.  M/S:   Gait and station unsafe without assistance, no tenderness or swelling of the joints; able to move all extremities, digits normal  NEURO: cranial nerves 2-12 grossly intact, no facial asymmetry, no speech deficits and able to follow directions, moves all extremities symmetrically  PSYCH:  insight and judgement and memory impaired, affect and mood normal     Lab/Diagnostic data:  Most Recent 3 CBC's:  Recent Labs   Lab Test 10/13/23  0552 09/22/23  0950 08/23/23  0520   WBC 8.4 5.8 6.7   HGB 12.7* 12.7* 11.5*   MCV 95 98 98   * 156 151     Most Recent 3 BMP's:  Recent Labs   Lab Test 10/17/23  1213 10/17/23  0900 10/17/23  0740 10/14/23  1127 10/14/23  1044 " 10/13/23  1224 10/13/23  0552 09/22/23  0950   NA  --   --   --   --  136  --  139 140   POTASSIUM  --   --   --   --  4.0  --  3.7 4.2   CHLORIDE  --   --   --   --  103  --  100 105   CO2  --   --   --   --  20*  --  23 23   BUN  --   --   --   --  12.2  --  14.8 14.1   CR  --   --   --   --  0.61*  --  0.73 0.73   ANIONGAP  --   --   --   --  13  --  16* 12   FERMIN  --   --   --   --  9.3  --  9.6 9.6   * 103* 116*   < > 177*   < > 137* 92    < > = values in this interval not displayed.     Most Recent TSH and T4:  Recent Labs   Lab Test 10/13/23  0748   TSH 1.47     Most Recent Hemoglobin A1c:  Recent Labs   Lab Test 07/30/23  0601   A1C 6.0*       ASSESSMENT/PLAN:  Acute confusion  Dementia, unspecified dementia severity, unspecified dementia type, unspecified whether behavioral, psychotic, or mood disturbance or anxiety (H)  Generalized muscle weakness  Acute on chronic. Continue melatonin 10 mg PO HS, Seroquel 25 mg HS PRN agitation and monitor for safety. Therapies as ordered and f/u progress.     Chronic congestive heart failure, unspecified heart failure type (H)  Severe aortic stenosis  Coronary artery disease involving native coronary artery of native heart without angina pectoris  S/P TAVR (transcatheter aortic valve replacement)  Complete heart block (H)  Essential hypertension  Cardiac pacemaker in situ  Dyslipidemia  Acute on chronic. Continue asa 81 mg daily, Toprol XL 25 mg daily, Crestor 20 mg daily, monitor vs and wt and f/u ranges next visit. Cardiology f/u per home routine.     Type 2 diabetes mellitus with other specified complication, without long-term current use of insulin (H)  Chronic. Continue metformin 1000 mg daily, monitor BG per home routine.     Anemia, unspecified type  Chronic. Check CBC one week. Continue Vit B12 1000 mcg daily. Last Hgb 12.7    Depression  Continue PTA Zoloft 25 mg daily.     Advance directives  DNR/DNI per prior POLST    Orders:  DNR/DNI  CBC on 10/25  diagnosis anemia    Electronically signed by:  LESTER Valdez CNP

## 2023-10-18 NOTE — PROGRESS NOTES
[facility] [place]    Background: Transitional Care Management program identified per system criteria and reviewed by [facility] team for possible outreach.    Assessment: Upon chart review, CCRC Team member will not proceed with patient outreach related to this episode of Transitional Care Management program due to reason below:    Non-[facility] TCU: CCRC team member noted patient discharged to TCU/ARU/LTACH. Patient is not established with a [facility] currently supported by Primary Care-Care Coordination therefore handoff to Primary Care-Care Coordination is not appropriate at this time.    Plan: Transitional Care Management episode addressed appropriately per reason noted above.      DEISY Medina  [facility] [place], [facility]    *Connected Care Resource Team does NOT follow patient ongoing. Referrals are identified based on internal discharge reports and the outreach is to ensure patient has an understanding of their discharge instructions.

## 2023-10-20 NOTE — PROGRESS NOTES
Speech Language Therapy Discharge Summary    Reason for therapy discharge:    Discharged to transitional care facility.    Progress towards therapy goal(s). See goals on Care Plan in [facility] electronic health record for goal details.  Goals partially met.  Barriers to achieving goals:   discharge from facility.    Therapy recommendation(s):    Continued therapy is recommended.  Rationale/Recommendations:  Recommend continue on the Soft/bite sized solids, continue thin liquids with medications as tolerated. Precautions: upright, slow rate, small bites/sips, alternate consistencies..

## 2023-10-24 NOTE — PROGRESS NOTES
[facility]      OUTPATIENT SPEECH LANGUAGE PATHOLOGY EVALUATION  PLAN OF TREATMENT FOR OUTPATIENT REHABILITATION  (COMPLETE FOR INITIAL CLAIMS ONLY)  Patient's Last Name, First Name, M.I.  YOB: 1940  ShahidErick DAVIS                        Provider's Name  [facility] Medical Record No.  [id number]                               Onset Date:  10/13/23  Start of Care Date:   10/13/23   Type:     ___PT   ___OT   _X_SLP Medical Diagnosis:            SLP Diagnosis:  dysphagia  Visits from SOC:  1   ________________________________________________________________  Plan of Treatment/Functional Goals    Planned Interventions:   Dysphagia Treatment                Goals: See Speech Language Pathology Goals on Care Plan in [facility] electronic health record.    Therapy Frequency:3 times/week   Predicted Duration of Therapy Intervention: 10/19/23  ________________________________________________________________________________    I CERTIFY THE NEED FOR THESE SERVICES FURNISHED UNDER        THIS PLAN OF TREATMENT AND WHILE UNDER MY CARE     (Physician attestation of this document indicates review and certification of the therapy plan).                     Referring Physician: Dr. Katiana Pardo           Initial Assessment        See Speech Language Pathology documentation in Epic electronic health record, evaluation dated

## 2023-10-25 NOTE — PROGRESS NOTES
"[facility] GERIATRICS    Chief Complaint   Patient presents with    RECHECK     HPI:  Erick Shahid is a 83 year old  (1940), who is being seen today for an episodic care visit at: [facility] (TCU) [[id number]].     Per recent TCU provider progress notes:   83 year old male with chronic HFrEF, CAD, dementia, s/p recent TAVR 23 mm Allison ROBERTO 3 valve on 8/15/2023, type II DM, complicated by CHB, s/p dual-chamber pacemaker hospitalized with acute mental status changes with some word-finding deficits and possible left sided weakness. At admission labs stable at patient's baseline. CT head negative. Noted moderate/severe right carotid stenosis. Does have dementia at baseline, now acute confusion with vivid dream. Neuro signed off, started melatonin for vivid dreams. HFrEF, CAD, HTN, HLD: on aspirin, metoprolol, statin. DM2 on metformin. Macular degeneration on drops. Constipation on laxatives. Anemia Hgb 12 range. To TCU for rehab.     Today's concern is: episodic f/u mobility, vs, overall status. Reports feeling stronger. No headaches, dizziness, chest pain, dyspnea, bowel or bladder issues. BP range /54-70 and sats 99% room air. BG range 114-138. SLUMS 9/30 and walks 125 ft with FWW.     Allergies, and PMH/PSH reviewed in [facility] today.  REVIEW OF SYSTEMS:  Limited secondary to cognitive impairment but today pt reports no pain, dyspnea, bowel or bladder issues    Objective:   /66   Pulse 86   Temp 97.6  F (36.4  C)   Resp 18   Ht 1.702 m (5' 7\")   Wt 76.4 kg (168 lb 6.4 oz)   SpO2 95%   BMI 26.38 kg/m    GENERAL APPEARANCE:  Alert, in no distress, pleasant, cooperative, oriented x self  EYES:  EOM, lids, pupils and irises normal, sclera clear and conjunctiva normal, no discharge or mattering on lids or lashes noted  ENT:  Mouth normal, moist mucous membranes, nose normal without drainage or crusting, external ears without lesions, hearing acuity impaired  RESP:  respiratory effort " normal, no chest wall tenderness, no respiratory distress, Lung sounds clear, patient is on room air  CV:  Auscultation of heart done, rate and rhythm controlled and regular, no murmur, no rub or gallop. Edema none bilateral lower extremities  ABDOMEN:  normal bowel sounds, soft, nontender, no palpable masses.  M/S:   Gait and station unsafe without assistance, no tenderness or swelling of the joints; able to move all extremities, digits normal  NEURO: cranial nerves 2-12 grossly intact, no facial asymmetry, no speech deficits and able to follow directions, moves all extremities symmetrically  PSYCH:  insight and judgement and memory impaired, affect and mood normal     Most Recent 3 CBC's:  Recent Labs   Lab Test 10/20/23  0741 10/13/23  0552 09/22/23  0950   WBC 6.8 8.4 5.8   HGB 12.8* 12.7* 12.7*   MCV 97 95 98    148* 156     Most Recent 3 BMP's:  Recent Labs   Lab Test 10/17/23  1213 10/17/23  0900 10/17/23  0740 10/14/23  1127 10/14/23  1044 10/13/23  1224 10/13/23  0552 09/22/23  0950   NA  --   --   --   --  136  --  139 140   POTASSIUM  --   --   --   --  4.0  --  3.7 4.2   CHLORIDE  --   --   --   --  103  --  100 105   CO2  --   --   --   --  20*  --  23 23   BUN  --   --   --   --  12.2  --  14.8 14.1   CR  --   --   --   --  0.61*  --  0.73 0.73   ANIONGAP  --   --   --   --  13  --  16* 12   FERMIN  --   --   --   --  9.3  --  9.6 9.6   * 103* 116*   < > 177*   < > 137* 92    < > = values in this interval not displayed.       Assessment/Plan:  Acute confusion  Dementia, unspecified dementia severity, unspecified dementia type, unspecified whether behavioral, psychotic, or mood disturbance or anxiety (H)  Generalized muscle weakness  Acute on chronic. Continue melatonin 10 mg PO HS, Seroquel 25 mg HS PRN agitation and monitor for safety. Therapies as ordered and f/u progress.     Chronic congestive heart failure, unspecified heart failure type (H)  Severe aortic stenosis  Coronary artery  disease involving native coronary artery of native heart without angina pectoris  S/P TAVR (transcatheter aortic valve replacement)  Complete heart block (H)  Essential hypertension  Cardiac pacemaker in situ  Dyslipidemia  Acute on chronic. Continue asa 81 mg daily, Toprol XL 25 mg daily, Crestor 20 mg daily, monitor vs and wt and f/u ranges next visit. Cardiology f/u per home routine.     Type 2 diabetes mellitus with other specified complication, without long-term current use of insulin (H)  Chronic. Continue metformin 1000 mg daily, monitor BG per home routine. A1C 6% on 7/30/23    Anemia, unspecified type  Chronic. Check CBC one week. Continue Vit B12 1000 mcg daily. Last Hgb 12.7.     Depression  Continue PTA Zoloft 25 mg daily.     MED REC REQUIRED  Post Medication Reconciliation Status:  Discharge medications reconciled, continue medications without change    Orders:  No new orders    Electronically signed by: LESTER Valdez CNP

## 2023-10-26 NOTE — PROGRESS NOTES
[place] [facility]  INITIAL VISIT NOTE  October 27, 2023    PRIMARY CARE PROVIDER AND CLINIC:  Minh Samuel W th  / [facility] [address]    CHIEF COMPLAINT:  Hospital follow-up/Initial visit    HPI:    Erick Shahid is a 83 year old  (1940) male who was seen at [place] [facility] on October 27, 2023 for an initial visit.     Medical history is notable for dementia, CHF, CAD, hypertension, dyslipidemia, aortic stenosis, s/p TAVR, right carotid artery stenosis, DM type II, anemia, BPH, depression, macular degeneration, and sensorineural hearing loss.    Summary of hospital course:  Patient was hospitalized at [facility] from October 13 through October 17, 2023 for acute confusion with transient aphasia and possible left lower extremity weakness.  EKG was remarkable for a paced rhythm.  Brain CT/MRI was negative for acute intracranial pathology or stroke.  CT angio head and neck was remarkable for right internal carotid artery stenosis at bifurcation, approximately 50 to 70%.  Chest x-ray showed no acute cardiopulmonary disease.  Blood work was significant for creatinine 0.73, normal electrolytes, normal LFT, lactic acid 1.9, high sensitive troponin I 23, TSH 1.47, white count 8.4, and hemoglobin 12.7.  UA was unremarkable and screening for COVID-19, influenza, and RSV was negative.  She was evaluated by neuro service and there was concern for possible REM sleep behavior disorder and therefore started on melatonin.  TCU was recommended per therapies.    Patient is admitted to this facility for medical management, nursing care, and rehab.     Of note, history was obtained from patient, facility RN, and extensive review of the chart.    Today's visit:  Patient was seen in his room, while seen in a wheelchair.  He has memory impairment.  He looks frail but comfortable.  He had a bowel movement 2 days ago.  There is no report of fever, chills, chest pain, palpitation,  dyspnea, nausea, vomiting, abdominal pain, or urinary symptoms.      CODE STATUS:   DNR / DNI    PAST MEDICAL HISTORY:   Dementia  REM sleep behavior disorder  Heart failure with recovered EF (LVEF 50-55%, per ECHO on August 15, 2023)  Severe aortic stenosis, s/p TAVR on August 15, 2023  Post TAVR complete heart block, s/p dual-chamber pacemaker on August 15, 2023  LBBB  CAD (90% proximal stenosis of a small size nondominant RCA and 25% mid LAD stenosis, per coronary angiogram in July 2023)  Right internal carotid artery stenosis (50-70%, per CTA on October 13, 2023)  Hypertension  Dyslipidemia  DM type II  Chronic anemia, baseline hemoglobin 11.5-13  BPH  Urinary incontinence  Macular degeneration  Sensorineural hearing loss  Depression       Past Medical History:   Diagnosis Date     Benign essential hypertension      BPH (benign prostatic hyperplasia)      Chronic heart failure with reduced ejection fraction and diastolic dysfunction (H)      Cognitive impairment      Critical aortic valve stenosis      Diabetes mellitus, type 2 (H)     on metformin     Dyslipidemia      First degree atrioventricular block      LBBB (left bundle branch block)      Macular degeneration (senile) of retina        PAST SURGICAL HISTORY:   Past Surgical History:   Procedure Laterality Date     CV CORONARY ANGIOGRAM N/A 07/28/2023    Procedure: Coronary Angiogram;  Surgeon: Mando Field MD;  Location: [facility] CARDIAC CATH LAB     CV PCI N/A 07/28/2023    Procedure: Percutaneous Coronary Intervention;  Surgeon: Mando Field MD;  Location: [facility] CARDIAC CATH LAB     CV TRANSCATHETER AORTIC VALVE REPLACEMENT  08/15/2023     CV TRANSCATHETER AORTIC VALVE REPLACEMENT-FEMORAL APPROACH N/A 8/15/2023    Procedure: Transcatheter Aortic Valve Replacement-Femoral Approach;  Surgeon: Bossman Felipe MD;  Location: [facility] CARDIAC CATH LAB     EP PACEMAKER DEVICE & LEAD IMPLANT- RIGHT ATRIAL & LEFT VENTRICULAR N/A  8/15/2023    Procedure: Pacemaker Device & Lead Implant- Right Atrial & Left Ventricular;  Surgeon: Oscar Mendoza MD;  Location:  HEART CARDIAC CATH LAB     ORIF HIP FRACTURE Right 2018       FAMILY HISTORY:   Father  at age 29 from rheumatic fever.  Mother had heart disease and  at age 50.    SOCIAL HISTORY:  Social History     Tobacco Use     Smoking status: Never     Smokeless tobacco: Never   Substance Use Topics     Alcohol use: Yes     Comment: few drinks per year - very rare       MEDICATIONS:  Current Outpatient Medications   Medication Sig Dispense Refill     aspirin (ASA) 81 MG chewable tablet Take 1 tablet (81 mg) by mouth daily 90 tablet 3     cycloSPORINE (RESTASIS) 0.05 % ophthalmic emulsion Apply 1 drop to eye 2 times daily as needed for dry eyes       melatonin 10 MG TABS tablet Take 1 tablet (10 mg) by mouth at bedtime       metFORMIN (GLUCOPHAGE) 1000 MG tablet Take 1 tablet by mouth daily (with dinner)       metoprolol succinate ER (TOPROL XL) 25 MG 24 hr tablet Take 25 mg by mouth daily       nystatin (MYCOSTATIN) 500386 UNIT/GM external powder Apply 1 strip topically 3 times daily as needed (jock itch)       polyethylene glycol (MIRALAX) 17 g packet Take 1 packet by mouth daily       QUEtiapine (SEROQUEL) 25 MG tablet Take 1 tablet (25 mg) by mouth nightly as needed (give at night only for agitation or persistent insomnia.)       rosuvastatin (CRESTOR) 20 MG tablet Take 1 tablet by mouth daily       senna-docusate (SENOKOT-S/PERICOLACE) 8.6-50 MG tablet Take 2 tablets by mouth daily as needed for constipation       sertraline (ZOLOFT) 50 MG tablet Take 25 mg by mouth daily       vitamin B-12 (CYANOCOBALAMIN) 1000 MCG tablet Take 1,000 mcg by mouth daily       vitamin C (ASCORBIC ACID) 500 MG tablet Take 500 mg by mouth daily       Vitamin D3 (CHOLECALCIFEROL) 25 mcg (1000 units) tablet Take 25 mcg by mouth daily         MED REC REQUIRED  Post Medication Reconciliation Status:  "medication reconcilation previously completed during another office visit         ALLERGIES:  No Known Allergies    ROS:  10 point ROS was attempted but was limited, given patient's underlying cognitive impairment. It was reviewed as much as possible as outlined in HPI.    PHYSICAL EXAM:  Vital signs were reviewed in the chart.  Vital Signs: /71   Pulse 59   Temp 97.8  F (36.6  C)   Resp 16   Ht 1.702 m (5' 7\")   Wt 76.5 kg (168 lb 9.6 oz)   SpO2 100%   BMI 26.41 kg/m    General: Frail appearing but comfortable and in no acute distress  HEENT: No conjunctival pallor, no scleral icterus or injection, moist oral mucosa  Cardiovascular: Normal S1, S2, RRR  Respiratory: Lungs clear to auscultation bilaterally  GI: Abdomen soft, non-tender, non-distended, +BS  Extremities: 1+ bilateral LE edema  Neuro: CX II-XII grossly intact; ROM in all four extremities grossly intact  Psych: Alert and oriented x 2-3; normal affect  Skin: No acute rash    LABORATORY/IMAGING DATA:  All relevant labs and imaging data in [facility] and/or [facility] Everywhere were personally reviewed today.      Most Recent 3 CBC's:Recent Labs   Lab Test 10/20/23  0741 10/13/23  0552 09/22/23  0950   WBC 6.8 8.4 5.8   HGB 12.8* 12.7* 12.7*   MCV 97 95 98    148* 156     Most Recent 3 BMP's:Recent Labs   Lab Test 10/17/23  1213 10/17/23  0900 10/17/23  0740 10/14/23  1127 10/14/23  1044 10/13/23  1224 10/13/23  0552 09/22/23  0950   NA  --   --   --   --  136  --  139 140   POTASSIUM  --   --   --   --  4.0  --  3.7 4.2   CHLORIDE  --   --   --   --  103  --  100 105   CO2  --   --   --   --  20*  -- 23 23   BUN  --   --   --   --  12.2  --  14.8 14.1   CR  --   --   --   --  0.61*  --  0.73 0.73   ANIONGAP  --   --   --   --  13  --  16* 12   FERMIN  --   --   --   --  9.3  --  9.6 9.6   * 103* 116*   < > 177*   < > 137* 92    < > = values in this interval not displayed.     Most Recent 2 LFT's:Recent Labs   Lab Test 10/13/23  0552 " 07/23/23  1445   AST 24 22   ALT 14 11   ALKPHOS 102 75   BILITOTAL 1.1 1.1         ASSESSMENT/PLAN:  Acute encephalopathy,  Dementia,  REM sleep behavior disorder,  Depression.  SLUMS 12/30 in September 2023.  Brain imaging negative for acute stroke..  On today' examination, he is oriented x2-3.  Plan:  Continue melatonin 10 mg at bedtime  Continue sertraline 25 mg daily  Continue Seroquel 25 mg nightly as needed  Standard delirium precautions  Staff to assist with daily care and mobility  Formal cognitive evaluation per OT for safe discharge planning     Heart failure with recovered EF (LVEF 50-55%, per ECHO on August 15, 2023).  Patient currently weighs 166.6 LBS and has 1+ bilateral lower extremity.  Plan:  Continue metoprolol ER 25 mg daily  Monitor weight and volume status  Follow-up with cardiology as directed     CAD (90% proximal stenosis of a small size nondominant RCA and 25% mid LAD stenosis, per coronary angiogram in July 2023),  Essential hypertension,  Dyslipidemia,  Right internal carotid artery stenosis (50-70%, per CTA on October 13, 2023).  CAD is managed medically.  Blood pressure is fairly controlled.  Plan:  Continue aspirin 81 mg daily  Continue rosuvastatin 20 mg daily  Continue metoprolol ER 25 mg daily  Monitor blood pressure and cardiac status  Follow-up with cardiology as directed    History of severe aortic stenosis, s/p TAVR on August 15, 2023.  Stable.  Plan:  Continue aspirin 81 mg daily  Follow-up with cardiology as directed     History of post TAVR complete heart block, s/p dual-chamber pacemaker on August 15, 2023,  LBBB.  EKG in the hospital revealed a paced rhythm.  Plan:  Follow-up with cardiology as directed     DM type II.  Last hemoglobin A1c 6% on July 30, 2023.  Blood glucose is fairly controlled.  Plan:  Continue diabetic diet  Continue metformin 1000 mg daily  Monitor blood glucose as ordered    Anemia.  Chronic.  Baseline hemoglobin 11.5-13.  Last CBC on October 20 with  hemoglobin 12.8 and MCV 97.  Plan:  Continue vitamin B12 1000 mcg daily  Monitor hemoglobin periodically     Physical deconditioning.  Plan:  Continue PT/OT evaluation and therapy        Orders written by provider at facility:  None.          Disclaimer: This note contains text created using speech-recognition software and may have unintended word substitutions.    Electronically signed by:  Nadeem Ochoa MD

## 2023-10-27 NOTE — LETTER
10/27/2023        RE: Erick Shahid  [address]  [facility] [address]        [place] [facility]  INITIAL VISIT NOTE  October 27, 2023    PRIMARY CARE PROVIDER AND CLINIC:  Minh Samuel W [address] [place] [id number]    CHIEF COMPLAINT:  Hospital follow-up/Initial visit    HPI:    Erick Shahid is a 83 year old  (1940) male who was seen at [facility] on October 27, 2023 for an initial visit.     Medical history is notable for dementia, CHF, CAD, hypertension, dyslipidemia, aortic stenosis, s/p TAVR, right carotid artery stenosis, DM type II, anemia, BPH, depression, macular degeneration, and sensorineural hearing loss.    Summary of hospital course:  Patient was hospitalized at  from October 13 through October 17, 2023 for acute confusion with transient aphasia and possible left lower extremity weakness.  EKG was remarkable for a paced rhythm.  Brain CT/MRI was negative for acute intracranial pathology or stroke.  CT angio head and neck was remarkable for right internal carotid artery stenosis at bifurcation, approximately 50 to 70%.  Chest x-ray showed no acute cardiopulmonary disease.  Blood work was significant for creatinine 0.73, normal electrolytes, normal LFT, lactic acid 1.9, high sensitive troponin I 23, TSH 1.47, white count 8.4, and hemoglobin 12.7.  UA was unremarkable and screening for COVID-19, influenza, and RSV was negative.  She was evaluated by neuro service and there was concern for possible REM sleep behavior disorder and therefore started on melatonin.  TCU was recommended per therapies.    Patient is admitted to this facility for medical management, nursing care, and rehab.     Of note, history was obtained from patient, facility RN, and extensive review of the chart.    Today's visit:  Patient was seen in his room, while seen in a wheelchair.  He has memory impairment.  He looks frail but comfortable.  He  had a bowel movement 2 days ago.  There is no report of fever, chills, chest pain, palpitation, dyspnea, nausea, vomiting, abdominal pain, or urinary symptoms.      CODE STATUS:   DNR / DNI    PAST MEDICAL HISTORY:   Dementia  REM sleep behavior disorder  Heart failure with recovered EF (LVEF 50-55%, per ECHO on August 15, 2023)  Severe aortic stenosis, s/p TAVR on August 15, 2023  Post TAVR complete heart block, s/p dual-chamber pacemaker on August 15, 2023  LBBB  CAD (90% proximal stenosis of a small size nondominant RCA and 25% mid LAD stenosis, per coronary angiogram in July 2023)  Right internal carotid artery stenosis (50-70%, per CTA on October 13, 2023)  Hypertension  Dyslipidemia  DM type II  Chronic anemia, baseline hemoglobin 11.5-13  BPH  Urinary incontinence  Macular degeneration  Sensorineural hearing loss  Depression       Past Medical History:   Diagnosis Date     Benign essential hypertension      BPH (benign prostatic hyperplasia)      Chronic heart failure with reduced ejection fraction and diastolic dysfunction (H)      Cognitive impairment      Critical aortic valve stenosis      Diabetes mellitus, type 2 (H)     on metformin     Dyslipidemia      First degree atrioventricular block      LBBB (left bundle branch block)      Macular degeneration (senile) of retina        PAST SURGICAL HISTORY:   Past Surgical History:   Procedure Laterality Date     CV CORONARY ANGIOGRAM N/A 07/28/2023    Procedure: Coronary Angiogram;  Surgeon: Mando Field MD;  Location: [facility] CARDIAC CATH LAB     CV PCI N/A 07/28/2023    Procedure: Percutaneous Coronary Intervention;  Surgeon: Mando Field MD;  Location: [facility] CARDIAC CATH LAB     CV TRANSCATHETER AORTIC VALVE REPLACEMENT  08/15/2023     CV TRANSCATHETER AORTIC VALVE REPLACEMENT-FEMORAL APPROACH N/A 8/15/2023    Procedure: Transcatheter Aortic Valve Replacement-Femoral Approach;  Surgeon: Bossman Felipe MD;  Location: [facility]  CARDIAC CATH LAB     EP PACEMAKER DEVICE & LEAD IMPLANT- RIGHT ATRIAL & LEFT VENTRICULAR N/A 8/15/2023    Procedure: Pacemaker Device & Lead Implant- Right Atrial & Left Ventricular;  Surgeon: Oscar Mendoza MD;  Location:  [facility]ART CARDIAC CATH LAB     ORIF HIP FRACTURE Right 2018       FAMILY HISTORY:   Father  at age 29 from rheumatic fever.  Mother had heart disease and  at age 50.    SOCIAL HISTORY:  Social History     Tobacco Use     Smoking status: Never     Smokeless tobacco: Never   Substance Use Topics     Alcohol use: Yes     Comment: few drinks per year - very rare       MEDICATIONS:  Current Outpatient Medications   Medication Sig Dispense Refill     aspirin (ASA) 81 MG chewable tablet Take 1 tablet (81 mg) by mouth daily 90 tablet 3     cycloSPORINE (RESTASIS) 0.05 % ophthalmic emulsion Apply 1 drop to eye 2 times daily as needed for dry eyes       melatonin 10 MG TABS tablet Take 1 tablet (10 mg) by mouth at bedtime       metFORMIN (GLUCOPHAGE) 1000 MG tablet Take 1 tablet by mouth daily (with dinner)       metoprolol succinate ER (TOPROL XL) 25 MG 24 hr tablet Take 25 mg by mouth daily       nystatin (MYCOSTATIN) 174477 UNIT/GM external powder Apply 1 strip topically 3 times daily as needed (jock itch)       polyethylene glycol (MIRALAX) 17 g packet Take 1 packet by mouth daily       QUEtiapine (SEROQUEL) 25 MG tablet Take 1 tablet (25 mg) by mouth nightly as needed (give at night only for agitation or persistent insomnia.)       rosuvastatin (CRESTOR) 20 MG tablet Take 1 tablet by mouth daily       senna-docusate (SENOKOT-S/PERICOLACE) 8.6-50 MG tablet Take 2 tablets by mouth daily as needed for constipation       sertraline (ZOLOFT) 50 MG tablet Take 25 mg by mouth daily       vitamin B-12 (CYANOCOBALAMIN) 1000 MCG tablet Take 1,000 mcg by mouth daily       vitamin C (ASCORBIC ACID) 500 MG tablet Take 500 mg by mouth daily       Vitamin D3 (CHOLECALCIFEROL) 25 mcg (1000 units) tablet Take  "25 mcg by mouth daily         MED REC REQUIRED  Post Medication Reconciliation Status: medication reconcilation previously completed during another office visit         ALLERGIES:  No Known Allergies    ROS:  10 point ROS was attempted but was limited, given patient's underlying cognitive impairment. It was reviewed as much as possible as outlined in HPI.    PHYSICAL EXAM:  Vital signs were reviewed in the chart.  Vital Signs: /71   Pulse 59   Temp 97.8  F (36.6  C)   Resp 16   Ht 1.702 m (5' 7\")   Wt 76.5 kg (168 lb 9.6 oz)   SpO2 100%   BMI 26.41 kg/m    General: Frail appearing but comfortable and in no acute distress  HEENT: No conjunctival pallor, no scleral icterus or injection, moist oral mucosa  Cardiovascular: Normal S1, S2, RRR  Respiratory: Lungs clear to auscultation bilaterally  GI: Abdomen soft, non-tender, non-distended, +BS  Extremities: 1+ bilateral LE edema  Neuro: CX II-XII grossly intact; ROM in all four extremities grossly intact  Psych: Alert and oriented x 2-3; normal affect  Skin: No acute rash    LABORATORY/IMAGING DATA:  All relevant labs and imaging data in [facility] and/or Care Everywhere were personally reviewed today.      Most Recent 3 CBC's:Recent Labs   Lab Test 10/20/23  0741 10/13/23  0552 09/22/23  0950   WBC 6.8 8.4 5.8   HGB 12.8* 12.7* 12.7*   MCV 97 95 98    148* 156     Most Recent 3 BMP's:Recent Labs   Lab Test 10/17/23  1213 10/17/23  0900 10/17/23  0740 10/14/23  1127 10/14/23  1044 10/13/23  1224 10/13/23  0552 09/22/23  0950   NA  --   --   --   --  136  --  139 140   POTASSIUM  --   --   --   --  4.0  --  3.7 4.2   CHLORIDE  --   --   --   --  103  --  100 105   CO2  --   --   --   --  20*  -- 23 23   BUN  --   --   --   --  12.2  --  14.8 14.1   CR  --   --   --   --  0.61*  --  0.73 0.73   ANIONGAP  --   --   --   --  13  --  16* 12   FERMIN  --   --   --   --  9.3  --  9.6 9.6   * 103* 116*   < > 177*   < > 137* 92    < > = values in this interval " not displayed.     Most Recent 2 LFT's:Recent Labs   Lab Test 10/13/23  0552 07/23/23  1445   AST 24 22   ALT 14 11   ALKPHOS 102 75   BILITOTAL 1.1 1.1         ASSESSMENT/PLAN:  Acute encephalopathy,  Dementia,  REM sleep behavior disorder,  Depression.  SLUMS 12/30 in September 2023.  Brain imaging negative for acute stroke..  On today' examination, he is oriented x2-3.  Plan:  Continue melatonin 10 mg at bedtime  Continue sertraline 25 mg daily  Continue Seroquel 25 mg nightly as needed  Standard delirium precautions  Staff to assist with daily care and mobility  Formal cognitive evaluation per OT for safe discharge planning     Heart failure with recovered EF (LVEF 50-55%, per ECHO on August 15, 2023).  Patient currently weighs 166.6 LBS and has 1+ bilateral lower extremity.  Plan:  Continue metoprolol ER 25 mg daily  Monitor weight and volume status  Follow-up with cardiology as directed     CAD (90% proximal stenosis of a small size nondominant RCA and 25% mid LAD stenosis, per coronary angiogram in July 2023),  Essential hypertension,  Dyslipidemia,  Right internal carotid artery stenosis (50-70%, per CTA on October 13, 2023).  CAD is managed medically.  Blood pressure is fairly controlled.  Plan:  Continue aspirin 81 mg daily  Continue rosuvastatin 20 mg daily  Continue metoprolol ER 25 mg daily  Monitor blood pressure and cardiac status  Follow-up with cardiology as directed    History of severe aortic stenosis, s/p TAVR on August 15, 2023.  Stable.  Plan:  Continue aspirin 81 mg daily  Follow-up with cardiology as directed     History of post TAVR complete heart block, s/p dual-chamber pacemaker on August 15, 2023,  LBBB.  EKG in the hospital revealed a paced rhythm.  Plan:  Follow-up with cardiology as directed     DM type II.  Last hemoglobin A1c 6% on July 30, 2023.  Blood glucose is fairly controlled.  Plan:  Continue diabetic diet  Continue metformin 1000 mg daily  Monitor blood glucose as  ordered    Anemia.  Chronic.  Baseline hemoglobin 11.5-13.  Last CBC on October 20 with hemoglobin 12.8 and MCV 97.  Plan:  Continue vitamin B12 1000 mcg daily  Monitor hemoglobin periodically     Physical deconditioning.  Plan:  Continue PT/OT evaluation and therapy        Orders written by provider at facility:  None.          Disclaimer: This note contains text created using speech-recognition software and may have unintended word substitutions.    Electronically signed by:  Nadeem Ochoa MD                          Sincerely,        Nadeem Ochoa MD

## 2023-10-31 NOTE — PROGRESS NOTES
"[facility] GERIATRICS    Chief Complaint   Patient presents with    RECHECK     HPI:  Erick Shahid is a 83 year old  (1940), who is being seen today for an episodic care visit at: [facility] (TCU) [[id number]].     Per recent TCU provider progress notes:   83 year old male with chronic HFrEF, CAD, dementia, s/p recent TAVR 23 mm Allison ROBERTO 3 valve on 8/15/2023, type II DM, complicated by CHB, s/p dual-chamber pacemaker hospitalized with acute mental status changes with some word-finding deficits and possible left sided weakness. At admission labs stable at patient's baseline. CT head negative. Noted moderate/severe right carotid stenosis. Does have dementia at baseline, now acute confusion with vivid dream. Neuro signed off, started melatonin for vivid dreams. HFrEF, CAD, HTN, HLD: on aspirin, metoprolol, statin. DM2 on metformin. Macular degeneration on drops. Constipation on laxatives. Anemia Hgb 12 range. To TCU for rehab.     Today's concern is: episodic f/u mobility, vs, overall status. No new concerns. Therapies recommend wheelchair for home use. Patient denies headaches, dizziness, chest pain, shortness of breath, bowel or bladder issues. BP ranging 122-150/66-72, HR 60s, spO2 98% on room air.  Slums 9/30.  Wheelchair bound.     Allergies, and PMH/PSH reviewed in [facility] today.  REVIEW OF SYSTEMS:  Limited secondary to cognitive impairment but today pt reports no pain, dyspnea, bowel or bladder issues    Objective:   /71   Pulse 60   Temp 98  F (36.7  C)   Resp 16   Ht 1.702 m (5' 7\")   Wt 75.3 kg (166 lb)   SpO2 98%   BMI 26.00 kg/m    GENERAL APPEARANCE:  Alert, in no distress, cooperative  ENT:  Mouth normal, moist mucous membranes, normal hearing acuity  EYES:  Conjunctiva and lids normal  RESP:  no respiratory distress, LSC on room air  CV: HRR, no edema lower legs  NEURO:   No facial asymmetry, speech clear  PSYCH:  oriented X 3, affect and mood normal     Most Recent 3 " CBC's:  Recent Labs   Lab Test 10/20/23  0741 10/13/23  0552 09/22/23  0950   WBC 6.8 8.4 5.8   HGB 12.8* 12.7* 12.7*   MCV 97 95 98    148* 156     Most Recent 3 BMP's:  Recent Labs   Lab Test 10/17/23  1213 10/17/23  0900 10/17/23  0740 10/14/23  1127 10/14/23  1044 10/13/23  1224 10/13/23  0552 09/22/23  0950   NA  --   --   --   --  136  --  139 140   POTASSIUM  --   --   --   --  4.0  --  3.7 4.2   CHLORIDE  --   --   --   --  103  --  100 105   CO2  --   --   --   --  20*  --  23 23   BUN  --   --   --   --  12.2  --  14.8 14.1   CR  --   --   --   --  0.61*  --  0.73 0.73   ANIONGAP  --   --   --   --  13  --  16* 12   FERMIN  --   --   --   --  9.3  --  9.6 9.6   * 103* 116*   < > 177*   < > 137* 92    < > = values in this interval not displayed.       Assessment/Plan:  Acute encephalopathy  Dementia  REM sleep behavior disorder  Depression  Acute on chronic. Continue melatonin 10 mg at bedtime, sertraline 25 mg daily, Seroquel 25 mg nightly as needed. Monitor for safety.      Heart failure with recovered EF  CAD   Essential hypertension  Dyslipidemia  Right internal carotid artery stenosis   History of severe aortic stenosis, s/p TAVR   History of post TAVR complete heart block, s/p dual-chamber pacemaker  LBBB  Chronic. Continue aspirin 81 mg daily, rosuvastatin 20 mg daily, metoprolol ER 25 mg daily. Monitor vs and wt, f/u cardiology as recommended.      DM type II  Chronic and well managed. Continue diabetic diet and metformin 1000 mg daily. Monitor blood glucose as ordered    Anemia  Chronic. Last hemoglobin 12.8. Continue vitamin B12 1000 mcg daily     Physical deconditioning  Continue PT/OT evaluation and therapy    MED REC REQUIRED  Post Medication Reconciliation Status:  Discharge medications reconciled, continue medications without change    Orders:  Wheelchair as below    Electronically signed by:    Colt Jacobsen DNP Student    I was present with the student who participated in the  "service and documentation of the note. I have verified the history and personally performed the physical exam and medical decision making. I agree with the assessment and plan of care as documented in the note.     Electronically signed by LESTER Valdez GNP         [facility] GERIATRICS  Face to Face and Medical Necessity Statement for DME Provider visit    Patient: Erick Shahid  Gender: male  YOB: 1940  [place] Medical Record Number: [id number]  Demographics:  [address]  [place] [address]  [phone] (home)   Social Security Number:   Primary Care Provider: Minh Samuel  Insurance: Payor: [facility] / Plan: [facility] MEDICARE ADVANTAGE / Product Type: Medicare /     HPI: Erick Shahid is a 83 year old (1940), who is being seen today for a face to face provider visit at [facility] [[id number]]. Medical necessity statement for DME included.     This patient requires the following: DME Ordered and Medical Necessity Statement    OT recommending 18\" width by 16\" depth wheelchair with bilateral elevating footrests to  assist with increasing overall blood flow in BLE and prevent swelling in BLEs in order to promote skin integrity in daily life. Wheelchair also necessitates standard seat cushion to decrease risk for pressure sores and B removable arm rests. Pt would benefit from wheelchair to assist with ADLs such as dressing, toileting, and hygiene/grooming as well as mobility within his home to decrease risk of falls and increase pt's overall participation. Pt would not be able to ambulate household distances with 2WW/cane secondary to balance deficits, cog deficits (SLUMS: 9/30), variable  ambulation distance, RLE foot drag and weakness that make pt unable and unsafe to ambulate with device as he is at a high risk for falls/injury which would impact his independence and safety with completion of self-cares.      Wheelchair Documentation  Size: 18 " x 16  Corresponding cushion: Yes: seat and back  Standard foot rests: No  Elevating leg rests: Yes bilateral  Arm rests: Yes: removable bilateral  Lap tray: No  Dose the patient use oxygen? No   Is the patient able to propel wheelchair? Yes If no why not? able And is there someone who can?yes  1. The patient has mobility limitations that impairs their ability to participate in one or more mobility related activities: Toileting, Feeding, Grooming, and Bathing.  The wheelchair is suitable and necessary for use in the patient's home.  2. The patient's mobility limitations cannot be safely resolved by using a cane/walker:Yes  cannot be resolved  Reason why a cane or walker will not meet the patient's needs: weakness, fall risk, poor balance  3. The patients home has adequate access to use a manual wheelchair:Yes  4. The use of a manual wheelchair on a regular basis will improve the patients ability to participate in mobility related ADL's at home:Yes  5. The patient is willing to use a manual wheelchair at home:Yes  6. The patient has adequate upper body strength and the mental capability to safely use a manual wheelchair and/or has a caregiver that is able to assist: Yes  7. Does the patient have a lower extremity injury or edema?Yes edema  Reason for Type of Wheelchair  Wt Readings from Last 5 Encounters:   10/30/23 75.3 kg (166 lb)   10/27/23 76.5 kg (168 lb 9.6 oz)   10/24/23 76.4 kg (168 lb 6.4 oz)   10/17/23 77.9 kg (171 lb 12.8 oz)   10/17/23 88.3 kg (194 lb 12.1 oz)     Pt needing above DME with expected length of need of  99 months due to medical necessity associated with following diagnosis:     Acute encephalopathy  Dementia, unspecified dementia severity, unspecified dementia type, unspecified whether behavioral, psychotic, or mood disturbance or anxiety (H)  REM sleep behavior disorder  Depression, unspecified depression type  Congestive heart failure, unspecified HF chronicity, unspecified heart failure  "type (H)  Coronary artery disease involving native coronary artery of native heart without angina pectoris  Essential hypertension  Dyslipidemia  Stenosis of right carotid artery  Status post transcatheter aortic valve replacement (TAVR) using bioprosthesis  Cardiac pacemaker in situ  Type 2 diabetes mellitus with other specified complication, without long-term current use of insulin (H)  LBBB (left bundle branch block)  Anemia, unspecified type  Physical deconditioning      Past Medical History:   has a past medical history of Benign essential hypertension, BPH (benign prostatic hyperplasia), Chronic heart failure with reduced ejection fraction and diastolic dysfunction (H), Cognitive impairment, Critical aortic valve stenosis, Diabetes mellitus, type 2 (H), Dyslipidemia, First degree atrioventricular block, LBBB (left bundle branch block), and Macular degeneration (senile) of retina.    Review of Systems:  10 point ROS of systems including Constitutional, Eyes, Respiratory, Cardiovascular, Gastroenterology, Genitourinary, Integumentary, Musculoskeletal, Psychiatric were all negative except for pertinent positives noted in my HPI.    Exam:  Vitals: /71   Pulse 60   Temp 98  F (36.7  C)   Resp 16   Ht 1.702 m (5' 7\")   Wt 75.3 kg (166 lb)   SpO2 98%   BMI 26.00 kg/m    BMI: Body mass index is 26 kg/m .  GENERAL APPEARANCE:  Alert, in no distress, pleasant, cooperative, oriented x self  EYES:  EOM, lids, pupils and irises normal, sclera clear and conjunctiva normal, no discharge or mattering on lids or lashes noted  ENT:  Mouth normal, moist mucous membranes, nose normal without drainage or crusting, external ears without lesions, hearing acuity impaired  RESP:  respiratory effort normal, no chest wall tenderness, no respiratory distress, Lung sounds clear, patient is on room air  CV:  Auscultation of heart done, rate and rhythm controlled and regular, no murmur, no rub or gallop. Edema none bilateral " lower extremities  ABDOMEN:  normal bowel sounds, soft, nontender, no palpable masses.  M/S:   Gait and station unsafe without assistance, no tenderness or swelling of the joints; able to move all extremities, digits normal  NEURO: cranial nerves 2-12 grossly intact, no facial asymmetry, no speech deficits and able to follow directions, moves all extremities symmetrically  PSYCH:  insight and judgement and memory impaired, affect and mood normal      Assessment/Plan:  1. Acute encephalopathy    2. Dementia, unspecified dementia severity, unspecified dementia type, unspecified whether behavioral, psychotic, or mood disturbance or anxiety (H)    3. REM sleep behavior disorder    4. Depression, unspecified depression type    5. Congestive heart failure, unspecified HF chronicity, unspecified heart failure type (H)    6. Coronary artery disease involving native coronary artery of native heart without angina pectoris    7. Essential hypertension    8. Dyslipidemia    9. Stenosis of right carotid artery    10. Status post transcatheter aortic valve replacement (TAVR) using bioprosthesis    11. Cardiac pacemaker in situ    12. Type 2 diabetes mellitus with other specified complication, without long-term current use of insulin (H)    13. LBBB (left bundle branch block)    14. Anemia, unspecified type    15. Physical deconditioning        Orders:  1. Facility staff/TC to contact [employer] to get their order form for provider to fill out    ELECTRONICALLY SIGNED BY DAVID CERTIFIED PROVIDER: LESTER Valdez CNP   NPI [id number]   [facility] GERIATRICS  [address] [place] [address]

## 2023-10-31 NOTE — PROGRESS NOTES
WOUND VISIT AT [facility]    ASSESSMENT:   Deep tissue   At risk of skin breakdown  deconditioning    PLAN/DISCUSSION:   Apply barrier cream to sacrum with pericares and PRN,   Monitor area for worsening and contact me if concerns   Encourage patient to sit upright in wheelchair or loungechair and avoid pressure over sacrum, Encourage patient to reposition every 2 hours when awake    HISTORY OF PRESENT ILLNESS:   Erick Shahid is a 83 year old male who is seen at [facility] today. Was recently hospitalized at [facility] from 10/13-10/17 due to acute mental status changes with some word-finding deficits and possible left sided weakness. I am seeing this patient today for redness on his buttocks. He and son report that he spends a lot of time seated. Continent of urine and stool.    Pmhx notable for chronic HFrEF, CAD, dementia, s/p recent TAVR on 8/15/2023, type II DM, complicated by CHB, s/p dual-chamber pacemaker.    TREATMENT COURSE:  10/26/2023 : Initial visit at [facility].     PHYSICAL EXAM:  GENERAL: Patient is alert and oriented and in no acute distress  INTEGUMENTARY: skin over sacrum intact, slight blanchable erythematous discoloration consistent with minimally ambulatory status    MDM: 30 minutes were spent on the date of the visit reviewing previous chart notes, evaluating patient and developing the treatment plan, this excludes any time spent on procedures    Electronically signed by Chelo Malin PA-C on October 31, 2023

## 2023-11-14 NOTE — PROGRESS NOTES
[facility] GERIATRICS DISCHARGE SUMMARY  PATIENT'S NAME: Erick Shahid  YOB: 1940  MEDICAL RECORD NUMBER:  [id number]  Place of Service where encounter took place:  [facility] (TCU) [[id number]]    PRIMARY CARE PROVIDER AND CLINIC RESPONSIBLE AFTER TRANSFER:   Minh Samuel MD, [address]  / [facility] [id number]    Non-FMG Provider     Transferring providers: LESTER Valdez CNP, Dr. Don MD  Recent Hospitalization/ED:  [facility] stay 10/13/23 to 10/17/23.  Date of SNF Admission:  10/17/23  Date of SNF (anticipated) Discharge:  11/14/23 or when ready  Discharged to: new assisted living for patient TBD  Cognitive Scores: SLUMS: 9/30  Physical Function: Ambulating 250 ft with 2WW  DME: No new DME needed    CODE STATUS/ADVANCE DIRECTIVES DISCUSSION:  No CPR- Do NOT Intubate   ALLERGIES: Patient has no known allergies.    NURSING FACILITY COURSE   Medication Changes/Rationale:   None     Per recent TCU provider progress notes:   83 year old male with chronic HFrEF, CAD, dementia, s/p recent TAVR 23 mm Allison ROBERTO 3 valve on 8/15/2023, type II DM, complicated by CHB, s/p dual-chamber pacemaker hospitalized with acute mental status changes with some word-finding deficits and possible left sided weakness. At admission labs stable at patient's baseline. CT head negative. Noted moderate/severe right carotid stenosis. Does have dementia at baseline, now acute confusion with vivid dream. Neuro signed off, started melatonin for vivid dreams. HFrEF, CAD, HTN, HLD: on aspirin, metoprolol, statin. DM2 on metformin. Macular degeneration on drops. Constipation on laxatives. Anemia Hgb 12 range. To TCU for rehab.     Seen for discharge visit. No new concerns. BP range 100-126/62-71 and sats 97% room air. Wt down 8 lbs since admission. Discharge to skilled nursing as planned when ready.     Summary of nursing facility stay:   Acute encephalopathy  Dementia  REM sleep  behavior disorder  Depression  Acute on chronic. Continue melatonin 10 mg at bedtime, sertraline 25 mg daily, Seroquel 25 mg nightly as needed. Monitor for safety.      Heart failure with recovered EF  CAD   Essential hypertension  Dyslipidemia  Right internal carotid artery stenosis   History of severe aortic stenosis, s/p TAVR   History of post TAVR complete heart block, s/p dual-chamber pacemaker  LBBB  Chronic. Continue aspirin 81 mg daily, rosuvastatin 20 mg daily, metoprolol ER 25 mg daily. Monitor vs and wt, f/u cardiology as recommended.      DM type II  Chronic and well managed. Continue diabetic diet and metformin 1000 mg daily. Monitor blood glucose as ordered    Anemia  Chronic. Last hemoglobin 12.8. Continue vitamin B12 1000 mcg daily     Physical deconditioning  Improved with therapies. Monitor for safety.     Discharge Medications:  MED REC REQUIRED  Post Medication Reconciliation Status:  Discharge medications reconciled, continue medications without change    Current Outpatient Medications   Medication Sig Dispense Refill    aspirin (ASA) 81 MG chewable tablet Take 1 tablet (81 mg) by mouth daily 90 tablet 3    cycloSPORINE (RESTASIS) 0.05 % ophthalmic emulsion Apply 1 drop to eye 2 times daily as needed for dry eyes      melatonin 10 MG TABS tablet Take 1 tablet (10 mg) by mouth at bedtime      metFORMIN (GLUCOPHAGE) 1000 MG tablet Take 1 tablet by mouth daily (with dinner)      metoprolol succinate ER (TOPROL XL) 25 MG 24 hr tablet Take 25 mg by mouth daily      nystatin (MYCOSTATIN) 351652 UNIT/GM external powder Apply 1 strip topically 3 times daily as needed (jock itch)      polyethylene glycol (MIRALAX) 17 g packet Take 1 packet by mouth daily      QUEtiapine (SEROQUEL) 25 MG tablet Take 1 tablet (25 mg) by mouth nightly as needed (give at night only for agitation or persistent insomnia.)      rosuvastatin (CRESTOR) 20 MG tablet Take 1 tablet by mouth daily      senna-docusate  "(SENOKOT-S/PERICOLACE) 8.6-50 MG tablet Take 2 tablets by mouth daily as needed for constipation      sertraline (ZOLOFT) 50 MG tablet Take 25 mg by mouth daily      vitamin B-12 (CYANOCOBALAMIN) 1000 MCG tablet Take 1,000 mcg by mouth daily      vitamin C (ASCORBIC ACID) 500 MG tablet Take 500 mg by mouth daily      Vitamin D3 (CHOLECALCIFEROL) 25 mcg (1000 units) tablet Take 25 mcg by mouth daily          Controlled medications:   not applicable/none     Past Medical History:   Past Medical History:   Diagnosis Date    Benign essential hypertension     BPH (benign prostatic hyperplasia)     Chronic heart failure with reduced ejection fraction and diastolic dysfunction (H)     Cognitive impairment     Critical aortic valve stenosis     Diabetes mellitus, type 2 (H)     on metformin    Dyslipidemia     First degree atrioventricular block     LBBB (left bundle branch block)     Macular degeneration (senile) of retina      Physical Exam:   Vitals: /62   Pulse 60   Temp 97.6  F (36.4  C)   Resp 16   Ht 1.702 m (5' 7\")   Wt 73.6 kg (162 lb 3.2 oz)   SpO2 97%   BMI 25.40 kg/m    BMI: Body mass index is 25.4 kg/m .  GENERAL APPEARANCE:  Alert, in no distress, cooperative  ENT:  Mouth normal, moist mucous membranes, normal hearing acuity  EYES:  Conjunctiva and lids normal  RESP:  no respiratory distress, LSC on room air  CV: HRR, no edema lower legs  NEURO:   No facial asymmetry, speech clear  PSYCH:  oriented X self and place, affect and mood normal     SNF labs: Most Recent 3 CBC's:  Recent Labs   Lab Test 10/20/23  0741 10/13/23  0552 09/22/23  0950   WBC 6.8 8.4 5.8   HGB 12.8* 12.7* 12.7*   MCV 97 95 98    148* 156     Most Recent 3 BMP's:  Recent Labs   Lab Test 10/17/23  1213 10/17/23  0900 10/17/23  0740 10/14/23  1127 10/14/23  1044 10/13/23  1224 10/13/23  0552 09/22/23  0950   NA  --   --   --   --  136  --  139 140   POTASSIUM  --   --   --   --  4.0  --  3.7 4.2   CHLORIDE  --   --   --   " --  103  --  100 105   CO2  --   --   --   --  20*  --  23 23   BUN  --   --   --   --  12.2  --  14.8 14.1   CR  --   --   --   --  0.61*  --  0.73 0.73   ANIONGAP  --   --   --   --  13  --  16* 12   FERMIN  --   --   --   --  9.3  --  9.6 9.6   * 103* 116*   < > 177*   < > 137* 92    < > = values in this interval not displayed.       DISCHARGE PLAN:  Follow up labs: No labs orders/due  Medical Follow Up:      Follow up with primary care provider in 2-3 weeks  Current [place] scheduled appointments:     Future Appointments   Date Time Provider Department Center   12/27/2023 12:00 AM [address] [facility]   2/21/2024  1:45 PM Bossman Felipe MD [facility]      Discharge Services: No therapy or home care recommended.   Discharge Instructions Verbalized to Patient at Discharge:   Wound care Apply barrier cream to sacrum, monitor area for worsening and contact Loree if concerns at [email or web address]. Encourage resident to sit upright in wheelchair or lounge chair and avoid pressure over sacrum.    TOTAL DISCHARGE TIME:   Less than or equal to 30 minutes  Electronically signed by:  LESTER Valdez CNP

## 2023-11-16 NOTE — PROGRESS NOTES
[facility] GERIATRICS    PRIMARY CARE PROVIDER AND CLINIC:  LESTER Otoole CNP, [address] [facility] [address]  Chief Complaint   Patient presents with    [facility] Medical Record Number:  [id number]  Place of Service where encounter took place:  [facility] ([facility]) [[id number]]    Erick Shahid  is a 83 year old  (1940), admitted to the above facility from  [place] [facility] (TCU)..       Patient's past medical history includes    Dementia   heart failure with preserved ejection fraction   aortic stenosis s/p TAVR 23 mm Allison ROBERTO valve on 8/2023  Complete heart block s/p permanent pacemaker  Carotid stenosis  Coronary artery disease   type 2 diabetes  Anemia   Depression  Macular degeneration  Constipation    Patient was in transitional care unit at [place] from 10/17/2023 to 11/14/2023.     In August 2023 he underwent a TAVR which was complicated by a complete heart block.  During hospitalization he had acute mental status changes with word finding deficit and possible left-sided weakness.  His CT was negative.  He was noted to have moderate to severe right carotid stenosis.  In baseline dementia.  He was started on melatonin for vivid dreams.      Today he appears to be adjusting well to his new senior care.  He is in a wheelchair.  He has been dissipating activities.    CODE STATUS/ADVANCE DIRECTIVES DISCUSSION:  No CPR- Do NOT Intubate  DNR only  ALLERGIES: No Known Allergies   PAST MEDICAL HISTORY:   Past Medical History:   Diagnosis Date    Benign essential hypertension     BPH (benign prostatic hyperplasia)     Chronic heart failure with reduced ejection fraction and diastolic dysfunction (H)     Cognitive impairment     Critical aortic valve stenosis     Diabetes mellitus, type 2 (H)     on metformin    Dyslipidemia     First degree atrioventricular block     LBBB (left bundle branch block)     Macular degeneration (senile) of retina       PAST SURGICAL HISTORY:    has a past surgical history that includes Coronary Angiogram (N/A, 07/28/2023); Percutaneous Coronary Intervention (N/A, 07/28/2023); Transcatheter Aortic Valve Replacement (08/15/2023); Orif Hip Fracture (Right, 2018); Pacemaker Device & Lead Implant- Right Atrial & Left Ventricular (N/A, 8/15/2023); and Transcatheter Aortic Valve Replacement-Femoral Approach (N/A, 8/15/2023).  FAMILY HISTORY: family history is not on file.  SOCIAL HISTORY:   reports that he has never smoked. He has never used smokeless tobacco. He reports current alcohol use. He reports that he does not use drugs.  Patient's living condition: lives in an assisted living facility    Post Discharge Medication Reconciliation Status:   MED REC REQUIRED  Post Medication Reconciliation Status:  Discharge medications reconciled, continue medications without change       Current Outpatient Medications   Medication Sig    aspirin (ASA) 81 MG chewable tablet Take 1 tablet (81 mg) by mouth daily    cycloSPORINE (RESTASIS) 0.05 % ophthalmic emulsion Apply 1 drop to eye 2 times daily as needed for dry eyes    melatonin 10 MG TABS tablet Take 1 tablet (10 mg) by mouth at bedtime    metFORMIN (GLUCOPHAGE) 1000 MG tablet Take 1 tablet by mouth daily (with dinner)    metoprolol succinate ER (TOPROL XL) 25 MG 24 hr tablet Take 25 mg by mouth daily    nystatin (MYCOSTATIN) 901410 UNIT/GM external powder Apply 1 strip topically 3 times daily as needed (jock itch)    polyethylene glycol (MIRALAX) 17 g packet Take 1 packet by mouth daily    rosuvastatin (CRESTOR) 20 MG tablet Take 1 tablet by mouth daily    senna-docusate (SENOKOT-S/PERICOLACE) 8.6-50 MG tablet Take 2 tablets by mouth daily as needed for constipation    sertraline (ZOLOFT) 50 MG tablet Take 25 mg by mouth daily    vitamin B-12 (CYANOCOBALAMIN) 1000 MCG tablet Take 1,000 mcg by mouth daily    vitamin C (ASCORBIC ACID) 500 MG tablet Take 500 mg by mouth daily    Vitamin D3 (CHOLECALCIFEROL) 25 mcg (1000  "units) tablet Take 25 mcg by mouth daily     No current facility-administered medications for this visit.       ROS:  4 point ROS including Respiratory, CV, GI and , other than that noted in the HPI,  is negative    Vitals:  /74   Pulse 86   Temp 97.6  F (36.4  C)   Resp 18   Ht 1.702 m (5' 7\")   Wt 76.1 kg (167 lb 12.8 oz)   SpO2 97%   BMI 26.28 kg/m    Exam:  GENERAL APPEARANCE:  Alert, in no distress, using a wheelchair  RESP:  no respiratory distress  CV:  Palpation and auscultation of heart done , rate-normal  PSYCH:  insight and judgement impaired, memory impaired     Lab/Diagnostic data:  Most Recent 3 CBC's:  Recent Labs   Lab Test 10/20/23  0741 10/13/23  0552 09/22/23  0950   WBC 6.8 8.4 5.8   HGB 12.8* 12.7* 12.7*   MCV 97 95 98    148* 156     Most Recent 3 BMP's:  Recent Labs   Lab Test 10/17/23  1213 10/17/23  0900 10/17/23  0740 10/14/23  1127 10/14/23  1044 10/13/23  1224 10/13/23  0552 09/22/23  0950   NA  --   --   --   --  136  --  139 140   POTASSIUM  --   --   --   --  4.0  --  3.7 4.2   CHLORIDE  --   --   --   --  103  --  100 105   CO2  --   --   --   --  20*  -- 23 23   BUN  --   --   --   --  12.2  --  14.8 14.1   CR  --   --   --   --  0.61*  --  0.73 0.73   ANIONGAP  --   --   --   --  13  --  16* 12   FERMIN  --   --   --   --  9.3  --  9.6 9.6   * 103* 116*   < > 177*   < > 137* 92    < > = values in this interval not displayed.       ASSESSMENT/PLAN:    (F03.90) Dementia, unspecified dementia severity, unspecified dementia type, unspecified whether behavioral, psychotic, or mood disturbance or anxiety (H)  (primary encounter diagnosis)  Comment: Chronic, progressive.     Patient requiring 24-hour skilled nursing care and would be a high risk of wandering if he were moved off the dementia care unit.   No behavioral issues or emotional distress with changes in environment  Expect further functional and cognitive decline.  Expect weight loss.   Pt was on prn " Seroquel at [place].   Plan: Continue with plan of care no changes at this time, adjustment as needed      (H35.3230) Bilateral exudative age-related macular degeneration, unspecified stage (H)  Comment: Chronic.  Receives drops  Plan: Continue with plan of care no changes at this time, adjustment as needed      (F32.1) Depression, major, single episode, moderate (H)  Comment: chronic.  Receives sertraline  Plan: Continue with plan of care no changes at this time, adjustment as needed      (I50.9) Congestive heart failure, unspecified HF chronicity, unspecified heart failure type (H)  (I25.10) Coronary artery disease involving native coronary artery of native heart without angina pectoris  (Z95.0) Cardiac pacemaker in situ  (Z95.3) Status post transcatheter aortic valve replacement (TAVR) using bioprosthesis  (I65.21) Stenosis of right carotid artery  Comment: chronic.  Currently taking metoprolol, rosuvastatin, aspirin,  Plan: Continue with plan of care no changes at this time, adjustment as needed      (E11.69) Type 2 diabetes mellitus with other specified complication, without long-term current use of insulin (H)  Comment: chronic.   Currently taking metformin 1000 mg with supper.    Last hgb A1c=6    Plan: Continue with plan of care no changes at this time, adjustment as needed      (D64.9) Anemia, unspecified type  Comment: Chronic.  Taking Vitamin B12.   Hgb stable at 12.8  Plan: Continue with plan of care no changes at this time, adjustment as needed      (K59.01) Slow transit constipation  Comment: chronic.   Currently taking miralax  Plan: Continue with plan of care no changes at this time, adjustment as needed      Electronically signed by:          LESTER Otoole CNP on 11/17/2023 at 3:09 PM        45 minutes was spent reviewing medical record from [facility] and [place] assessing patient, reviewing medical notes from previous primary care provider, talking with pt and answering their  questions/concerns, coordinating above plan of care with nursing , SW, therapy and dietitian and patient and reviewed medications with patient and counseled pt. on above plan of care.  Counseled on medications and side effects.

## 2023-11-17 PROBLEM — F32.1 DEPRESSION, MAJOR, SINGLE EPISODE, MODERATE (H): Status: ACTIVE | Noted: 2023-01-01

## 2023-11-17 PROBLEM — H35.3230 BILATERAL EXUDATIVE AGE-RELATED MACULAR DEGENERATION, UNSPECIFIED STAGE (H): Status: ACTIVE | Noted: 2023-01-01

## 2023-11-17 NOTE — LETTER
11/17/2023        RE: Erick Shahid  [address]  [place] [address]        [facility] GERIATRICS    PRIMARY CARE PROVIDER AND CLINIC:  LESTER Otoole [facility], [address] [facility] [address]  Chief Complaint   Patient presents with     [facility] Care      [facility] Medical Record Number:  [id number]  Place of Service where encounter took place:  [facility] ([facility]) [[id number]]    Erick Shahid  is a 83 year old  (1940), [facility] the [facility]  [place] [facility]       Patient's past medical history includes    Dementia   heart failure with preserved ejection fraction   aortic stenosis s/p TAVR 23 mm Allison ROBERTO valve on 8/2023  Complete heart block s/p permanent pacemaker  Carotid stenosis  Coronary artery disease   type 2 diabetes  Anemia   Depression  Macular degeneration  Constipation    Patient was in transitional care unit at [place] from 10/17/2023 to 11/14/2023.     In August 2023 he underwent a TAVR which was complicated by a complete heart block.  During hospitalization he had acute mental status changes with word finding deficit and possible left-sided weakness.  His CT was negative.  He was noted to have moderate to severe right carotid stenosis.  In baseline dementia.  He was started on melatonin for vivid dreams.      Today he appears to be adjusting well to his new [facility].  He is in a wheelchair.  He has been dissipating activities.    CODE STATUS/ADVANCE DIRECTIVES DISCUSSION:  No CPR- Do NOT Intubate  DNR only  ALLERGIES: No Known Allergies   PAST MEDICAL HISTORY:   Past Medical History:   Diagnosis Date     Benign essential hypertension      BPH (benign prostatic hyperplasia)      Chronic heart failure with reduced ejection fraction and diastolic dysfunction (H)      Cognitive impairment      Critical aortic valve stenosis      Diabetes mellitus, type 2 (H)     on metformin     Dyslipidemia      First degree atrioventricular block       LBBB (left bundle branch block)      Macular degeneration (senile) of retina       PAST SURGICAL HISTORY:   has a past surgical history that includes Coronary Angiogram (N/A, 07/28/2023); Percutaneous Coronary Intervention (N/A, 07/28/2023); Transcatheter Aortic Valve Replacement (08/15/2023); Orif Hip Fracture (Right, 2018); Pacemaker Device & Lead Implant- Right Atrial & Left Ventricular (N/A, 8/15/2023); and Transcatheter Aortic Valve Replacement-Femoral Approach (N/A, 8/15/2023).  FAMILY HISTORY: family history is not on file.  SOCIAL HISTORY:   reports that he has never smoked. He has never used smokeless tobacco. He reports current alcohol use. He reports that he does not use drugs.  Patient's living condition: lives in an assisted living facility    Post Discharge Medication Reconciliation Status:   MED REC REQUIRED  Post Medication Reconciliation Status:  Discharge medications reconciled, continue medications without change       Current Outpatient Medications   Medication Sig     aspirin (ASA) 81 MG chewable tablet Take 1 tablet (81 mg) by mouth daily     cycloSPORINE (RESTASIS) 0.05 % ophthalmic emulsion Apply 1 drop to eye 2 times daily as needed for dry eyes     melatonin 10 MG TABS tablet Take 1 tablet (10 mg) by mouth at bedtime     metFORMIN (GLUCOPHAGE) 1000 MG tablet Take 1 tablet by mouth daily (with dinner)     metoprolol succinate ER (TOPROL XL) 25 MG 24 hr tablet Take 25 mg by mouth daily     nystatin (MYCOSTATIN) 416498 UNIT/GM external powder Apply 1 strip topically 3 times daily as needed (jock itch)     polyethylene glycol (MIRALAX) 17 g packet Take 1 packet by mouth daily     rosuvastatin (CRESTOR) 20 MG tablet Take 1 tablet by mouth daily     senna-docusate (SENOKOT-S/PERICOLACE) 8.6-50 MG tablet Take 2 tablets by mouth daily as needed for constipation     sertraline (ZOLOFT) 50 MG tablet Take 25 mg by mouth daily     vitamin B-12 (CYANOCOBALAMIN) 1000 MCG tablet Take 1,000 mcg by mouth  "daily     vitamin C (ASCORBIC ACID) 500 MG tablet Take 500 mg by mouth daily     Vitamin D3 (CHOLECALCIFEROL) 25 mcg (1000 units) tablet Take 25 mcg by mouth daily     No current facility-administered medications for this visit.       ROS:  4 point ROS including Respiratory, CV, GI and , other than that noted in the HPI,  is negative    Vitals:  /74   Pulse 86   Temp 97.6  F (36.4  C)   Resp 18   Ht 1.702 m (5' 7\")   Wt 76.1 kg (167 lb 12.8 oz)   SpO2 97%   BMI 26.28 kg/m    Exam:  GENERAL APPEARANCE:  Alert, in no distress, using a wheelchair  RESP:  no respiratory distress  CV:  Palpation and auscultation of heart done , rate-normal  PSYCH:  insight and judgement impaired, memory impaired     Lab/Diagnostic data:  Most Recent 3 CBC's:  Recent Labs   Lab Test 10/20/23  0741 10/13/23  0552 09/22/23  0950   WBC 6.8 8.4 5.8   HGB 12.8* 12.7* 12.7*   MCV 97 95 98    148* 156     Most Recent 3 BMP's:  Recent Labs   Lab Test 10/17/23  1213 10/17/23  0900 10/17/23  0740 10/14/23  1127 10/14/23  1044 10/13/23  1224 10/13/23  0552 09/22/23  0950   NA  --   --   --   --  136  --  139 140   POTASSIUM  --   --   --   --  4.0  --  3.7 4.2   CHLORIDE  --   --   --   --  103  --  100 105   CO2  --   --   --   --  20*  --  23 23   BUN  --   --   --   --  12.2  --  14.8 14.1   CR  --   --   --   --  0.61*  --  0.73 0.73   ANIONGAP  --   --   --   --  13  --  16* 12   FERMIN  --   --   --   --  9.3  --  9.6 9.6   * 103* 116*   < > 177*   < > 137* 92    < > = values in this interval not displayed.       ASSESSMENT/PLAN:    (F03.90) Dementia, unspecified dementia severity, unspecified dementia type, unspecified whether behavioral, psychotic, or mood disturbance or anxiety (H)  (primary encounter diagnosis)  Comment: Chronic, progressive.     Patient requiring 24-hour skilled nursing care and would be a high risk of wandering if he were moved off the dementia care unit.   No behavioral issues or emotional " distress with changes in environment  Expect further functional and cognitive decline.  Expect weight loss.   Pt was on prn Seroquel at [place].   Plan: Continue with plan of care no changes at this time, adjustment as needed      (H35.3230) Bilateral exudative age-related macular degeneration, unspecified stage (H)  Comment: Chronic.  Receives drops  Plan: Continue with plan of care no changes at this time, adjustment as needed      (F32.1) Depression, major, single episode, moderate (H)  Comment: chronic.  Receives sertraline  Plan: Continue with plan of care no changes at this time, adjustment as needed      (I50.9) Congestive heart failure, unspecified HF chronicity, unspecified heart failure type (H)  (I25.10) Coronary artery disease involving native coronary artery of native heart without angina pectoris  (Z95.0) Cardiac pacemaker in situ  (Z95.3) Status post transcatheter aortic valve replacement (TAVR) using bioprosthesis  (I65.21) Stenosis of right carotid artery  Comment: chronic.  Currently taking metoprolol, rosuvastatin, aspirin,  Plan: Continue with plan of care no changes at this time, adjustment as needed      (E11.69) Type 2 diabetes mellitus with other specified complication, without long-term current use of insulin (H)  Comment: chronic.   Currently taking metformin 1000 mg with supper.    Last hgb A1c=6    Plan: Continue with plan of care no changes at this time, adjustment as needed      (D64.9) Anemia, unspecified type  Comment: Chronic.  Taking Vitamin B12.   Hgb stable at 12.8  Plan: Continue with plan of care no changes at this time, adjustment as needed      (K59.01) Slow transit constipation  Comment: chronic.   Currently taking miralax  Plan: Continue with plan of care no changes at this time, adjustment as needed      Electronically signed by:          LESTER Otoole CNP on 11/17/2023 at 3:09 PM        45 minutes was spent reviewing medical record from [facility] and  [place] assessing patient, reviewing medical notes from previous primary care provider, talking with pt and answering their questions/concerns, coordinating above plan of care with nursing , SW, therapy and dietitian and patient and reviewed medications with patient and counseled pt. on above plan of care.  Counseled on medications and side effects.                         Sincerely,        LESTER Otoole CNP

## 2023-11-20 NOTE — Clinical Note
Conscious sedation is planned for this procedure.    Provider immediate assessment completed.  Good nutrition is important when healing from an illness, injury, or surgery. Follow any nutrition recommendations given to you during your hospital stay. If you were given an oral nutrition supplement while in the hospital, continue to take this supplement at home. You can take it with meals, in-between meals, and/or before bedtime. These supplements can be purchased at most local grocery stores, pharmacies, and chain [employer]. If you have any questions about your diet or nutrition, call the hospital and ask for the dietitian.

## 2023-12-04 NOTE — LETTER
12/4/2023        RE: Erick Shahid  [address]  [place] [address]        Erick Shahid is a 83 year old male seen December 4, 2023 at [facility] unit where he has resided for one month (admit 11/2023) seen for initial visit.   Pt is seen on the unit up to chair, states he feels well, denies any pain or other symptoms   Eating well.   Reviewed with [facility] nursing staff, pt has settled in well and no concerns today.      By chart review, pt has a history HFpEF, CAD, DM2.  He had critical aortic stenosis which was thought to be causing falls, s/p TAVR in August 2023 which was complicated by complete heart block and he received a dual chamber pacemaker.  He was more confused and then started on sertraline for depression after that hospital and TCU stay.    He had a [facility] hospitalization in October for aphasia and possible LLE weakness   Workup for stroke, infection, etc was unremarkable.   He was seen by Neurology and thought to have REM sleep disorder, placed on melatonin.  He was discharged to [facility] but unable to regain enough functional status to return home, so has transitioned to Memory Care unit.     Past Medical History:   Diagnosis Date     Benign essential hypertension      BPH (benign prostatic hyperplasia)      Chronic heart failure with reduced ejection fraction and diastolic dysfunction (H)      Cognitive impairment      Critical aortic valve stenosis      Diabetes mellitus, type 2 (H)     on metformin     Dyslipidemia      First degree atrioventricular block      LBBB (left bundle branch block)      Macular degeneration (senile) of retina      Past Surgical History:   Procedure Laterality Date     CV CORONARY ANGIOGRAM N/A 07/28/2023    Procedure: Coronary Angiogram;  Surgeon: Mando Field MD;  Location: [facility] CARDIAC CATH LAB     CV PCI N/A 07/28/2023    Procedure: Percutaneous Coronary Intervention;  Surgeon: Mando Field  "MD Manuel;  Location:  HEART CARDIAC CATH LAB     CV TRANSCATHETER AORTIC VALVE REPLACEMENT  08/15/2023     CV TRANSCATHETER AORTIC VALVE REPLACEMENT-FEMORAL APPROACH N/A 8/15/2023    Procedure: Transcatheter Aortic Valve Replacement-Femoral Approach;  Surgeon: Bossman Felipe MD;  Location:  HEART CARDIAC CATH LAB     EP PACEMAKER DEVICE & LEAD IMPLANT- RIGHT ATRIAL & LEFT VENTRICULAR N/A 8/15/2023    Procedure: Pacemaker Device & Lead Implant- Right Atrial & Left Ventricular;  Surgeon: Oscar Mendoza MD;  Location:  HEART CARDIAC CATH LAB     ORIF HIP FRACTURE Right 2018     Social History     Tobacco Use     Smoking status: Never     Smokeless tobacco: Never   Substance Use Topics     Alcohol use: Yes     Comment: few drinks per year - very rare      SH:  Previously lived with his wife KIMBERLYN Lamb apartment in [place].      They have a son Toribio and daughter Lin    Pt is a retired civil and     ROS:  limited by pt's dementia       SLUMS 9/30     Ambulatory with FWW and cuing     Weight in July 2023 was 184 lbs  Wt Readings from Last 5 Encounters:   12/04/23 76.6 kg (168 lb 14.4 oz)   11/29/23 75.9 kg (167 lb 4.8 oz)   11/17/23 76.1 kg (167 lb 12.8 oz)   11/14/23 73.6 kg (162 lb 3.2 oz)   10/30/23 75.3 kg (166 lb)      BP Readings from Last 3 Encounters:   12/04/23 (!) 156/68   11/29/23 (!) 148/69   11/17/23 131/74      GENERAL APPEARANCE: alert and no distress  BP (!) 156/68   Pulse 66   Temp 97.7  F (36.5  C)   Resp 18   Ht 1.702 m (5' 7\")   Wt 76.6 kg (168 lb 14.4 oz)   SpO2 96%   BMI 26.45 kg/m     HEENT: normocephalic, no lesion or abnormalities, [place]  NECK: no adenopathy, no asymmetry, masses, or scars and thyroid normal to palpation  RESP: decreased BS, no rales or wheeze   CV: regular rate and rhythm, normal S1 S2, I/VI GABBIE  ABDOMEN:  soft, nontender, no HSM or masses and bowel sounds normal  MS: extremities normal- 1+ ankle edema     SKIN: no suspicious lesions or " rashes  NEURO: Normal strength and tone, sensory exam grossly normal, and speech normal, quiet  PSYCH: affect okay, pleasant     LYMPHATICS: No cervical,  or supraclavicular nodes     Lab Results   Component Value Date     10/14/2023    POTASSIUM 4.0 10/14/2023    CHLORIDE 103 10/14/2023    CO2 20 (L) 10/14/2023    ANIONGAP 13 10/14/2023     (H) 10/17/2023    BUN 12.2 10/14/2023    CR 0.61 (L) 10/14/2023    GFRESTIMATED >90 10/14/2023    FERMIN 9.3 10/14/2023     Lab Results   Component Value Date    AST 24 10/13/2023      ALBUMIN 4.2 10/13/2023      ALKPHOS 102 10/13/2023      Lab Results   Component Value Date    WBC 6.8 10/20/2023      HGB 12.8 10/20/2023      MCV 97 10/20/2023       10/20/2023      TSH   Date Value Ref Range Status   10/13/2023 1.47 0.30 - 4.20 uIU/mL Final      Lab Results   Component Value Date    A1C 6.0 07/30/2023      10/13/2023   HEAD CT:  1.  No CT evidence for acute intracranial process.  2.  Brain atrophy and presumed chronic microvascular ischemic changes as above.  HEAD CTA:   1.  No proximal occlusion, aneurysm, or high flow vascular malformation identified.  NECK CTA:  1.  Approximately 50-70% stenosis of the right internal carotid artery at the bifurcation.  2.  Less than 50% stenosis of the left internal carotid artery at the bifurcation.  CT PERFUSION:  1.  No convincing perfusion abnormality.    ECHO 9/2023     s/p TAVR- 23 mm Maribel 3 valve mean grad 19mmHg, peak 31mmHg  Left ventricular systolic function is normal.   The visual ejection fraction is 55-60%.  In comparison to previous study, 8/2023 AV gradients are sliglhtly increased.      IMP/PLAN:     (I25.10) Coronary artery disease involving native coronary artery of native heart without angina pectoris    (Z95.3) Status post transcatheter aortic valve replacement (TAVR) using bioprosthesis  (I65.21) Stenosis of right carotid artery  Comment: improved symptoms and LV function after TAVR     Plan: aspirin 81  mg/day and rosuvastatin 20 mg/day for secondary prevention   Metoprolol 25 mg/day   Follow up with Cardiology later this month as scheduled      (E11.59) Type 2 diabetes mellitus with other circulatory complication, without long-term current use of insulin (H)  Comment:   Lab Results   Component Value Date    A1C 6.0 07/30/2023      Plan: metformin 500 mg/day    Control is tight for his age, would recheck A1C next labs and consider decreasing metformin dose   Consider addition of ACEI given higher BPs   Ophthalmology and Podiatry follow up      (G47.52) REM sleep behavior disorder  Comment: per Neurology   Plan: melatonin 10 mg/ HS     (F03.B3) Moderate dementia with mood disturbance, unspecified dementia type (H)  Comment: decline in cognitive function past several months   Plan: [facility] and Care Memory Care unit for assist with med admin, meals, activity and secure unit   Remains on sertraline 25 mg/day since September           Marianela Mac MD       Sincerely,        Marianela Mac MD

## 2023-12-18 NOTE — PROGRESS NOTES
Erick Shahid is a 83 year old male seen December 4, 2023 at [facility] unit where he has resided for one month (admit 11/2023) seen for initial visit.   Pt is seen on the unit up to chair, states he feels well, denies any pain or other symptoms   Eating well.   Reviewed with [facility] nursing staff, pt has settled in well and no concerns today.      By chart review, pt has a history HFpEF, CAD, DM2.  He had critical aortic stenosis which was thought to be causing falls, s/p TAVR in August 2023 which was complicated by complete heart block and he received a dual chamber pacemaker.  He was more confused and then started on sertraline for depression after that hospital and TCU stay.    He had a [facility] hospitalization in October for aphasia and possible LLE weakness   Workup for stroke, infection, etc was unremarkable.   He was seen by Neurology and thought to have REM sleep disorder, placed on melatonin.  He was discharged to [place] [facility] but unable to regain enough functional status to return home, so has transitioned to Memory Care unit.     Past Medical History:   Diagnosis Date    Benign essential hypertension     BPH (benign prostatic hyperplasia)     Chronic heart failure with reduced ejection fraction and diastolic dysfunction (H)     Cognitive impairment     Critical aortic valve stenosis     Diabetes mellitus, type 2 (H)     on metformin    Dyslipidemia     First degree atrioventricular block     LBBB (left bundle branch block)     Macular degeneration (senile) of retina      Past Surgical History:   Procedure Laterality Date    CV CORONARY ANGIOGRAM N/A 07/28/2023    Procedure: Coronary Angiogram;  Surgeon: Mando Field MD;  Location:  HEART CARDIAC CATH LAB    CV PCI N/A 07/28/2023    Procedure: Percutaneous Coronary Intervention;  Surgeon: Mando Field MD;  Location: [facility] CARDIAC CATH LAB    CV TRANSCATHETER AORTIC VALVE REPLACEMENT  08/15/2023    CV  "TRANSCATHETER AORTIC VALVE REPLACEMENT-FEMORAL APPROACH N/A 8/15/2023    Procedure: Transcatheter Aortic Valve Replacement-Femoral Approach;  Surgeon: Bossman Felipe MD;  Location:  HEART CARDIAC CATH LAB    EP PACEMAKER DEVICE & LEAD IMPLANT- RIGHT ATRIAL & LEFT VENTRICULAR N/A 8/15/2023    Procedure: Pacemaker Device & Lead Implant- Right Atrial & Left Ventricular;  Surgeon: Oscar Mendoza MD;  Location:  HEART CARDIAC CATH LAB    ORIF HIP FRACTURE Right 2018     Social History     Tobacco Use    Smoking status: Never    Smokeless tobacco: Never   Substance Use Topics    Alcohol use: Yes     Comment: few drinks per year - very rare      SH:  Previously lived with his wife KIMBERLYN Lamb apartment in [place].      They have a son Toribio and daughter Lin    Pt is a retired civil and     ROS:  limited by pt's dementia       SLUMS 9/30     Ambulatory with FWW and cuing     Weight in July 2023 was 184 lbs  Wt Readings from Last 5 Encounters:   12/04/23 76.6 kg (168 lb 14.4 oz)   11/29/23 75.9 kg (167 lb 4.8 oz)   11/17/23 76.1 kg (167 lb 12.8 oz)   11/14/23 73.6 kg (162 lb 3.2 oz)   10/30/23 75.3 kg (166 lb)      BP Readings from Last 3 Encounters:   12/04/23 (!) 156/68   11/29/23 (!) 148/69   11/17/23 131/74      GENERAL APPEARANCE: alert and no distress  BP (!) 156/68   Pulse 66   Temp 97.7  F (36.5  C)   Resp 18   Ht 1.702 m (5' 7\")   Wt 76.6 kg (168 lb 14.4 oz)   SpO2 96%   BMI 26.45 kg/m     HEENT: normocephalic, no lesion or abnormalities, [place]  NECK: no adenopathy, no asymmetry, masses, or scars and thyroid normal to palpation  RESP: decreased BS, no rales or wheeze   CV: regular rate and rhythm, normal S1 S2, I/VI GABBIE  ABDOMEN:  soft, nontender, no HSM or masses and bowel sounds normal  MS: extremities normal- 1+ ankle edema     SKIN: no suspicious lesions or rashes  NEURO: Normal strength and tone, sensory exam grossly normal, and speech normal, quiet  PSYCH: affect okay, pleasant   "   LYMPHATICS: No cervical,  or supraclavicular nodes     Lab Results   Component Value Date     10/14/2023    POTASSIUM 4.0 10/14/2023    CHLORIDE 103 10/14/2023    CO2 20 (L) 10/14/2023    ANIONGAP 13 10/14/2023     (H) 10/17/2023    BUN 12.2 10/14/2023    CR 0.61 (L) 10/14/2023    GFRESTIMATED >90 10/14/2023    FERMIN 9.3 10/14/2023     Lab Results   Component Value Date    AST 24 10/13/2023      ALBUMIN 4.2 10/13/2023      ALKPHOS 102 10/13/2023      Lab Results   Component Value Date    WBC 6.8 10/20/2023      HGB 12.8 10/20/2023      MCV 97 10/20/2023       10/20/2023      TSH   Date Value Ref Range Status   10/13/2023 1.47 0.30 - 4.20 uIU/mL Final      Lab Results   Component Value Date    A1C 6.0 07/30/2023      10/13/2023   HEAD CT:  1.  No CT evidence for acute intracranial process.  2.  Brain atrophy and presumed chronic microvascular ischemic changes as above.  HEAD CTA:   1.  No proximal occlusion, aneurysm, or high flow vascular malformation identified.  NECK CTA:  1.  Approximately 50-70% stenosis of the right internal carotid artery at the bifurcation.  2.  Less than 50% stenosis of the left internal carotid artery at the bifurcation.  CT PERFUSION:  1.  No convincing perfusion abnormality.    ECHO 9/2023     s/p TAVR- 23 mm Maribel 3 valve mean grad 19mmHg, peak 31mmHg  Left ventricular systolic function is normal.   The visual ejection fraction is 55-60%.  In comparison to previous study, 8/2023 AV gradients are sliglhtly increased.      IMP/PLAN:     (I25.10) Coronary artery disease involving native coronary artery of native heart without angina pectoris    (Z95.3) Status post transcatheter aortic valve replacement (TAVR) using bioprosthesis  (I65.21) Stenosis of right carotid artery  Comment: improved symptoms and LV function after TAVR     Plan: aspirin 81 mg/day and rosuvastatin 20 mg/day for secondary prevention   Metoprolol 25 mg/day   Follow up with Cardiology later this month  as scheduled      (E11.59) Type 2 diabetes mellitus with other circulatory complication, without long-term current use of insulin (H)  Comment:   Lab Results   Component Value Date    A1C 6.0 07/30/2023      Plan: metformin 500 mg/day    Control is tight for his age, would recheck A1C next labs and consider decreasing metformin dose   Consider addition of ACEI given higher BPs   Ophthalmology and Podiatry follow up      (G47.52) REM sleep behavior disorder  Comment: per Neurology   Plan: melatonin 10 mg/ HS     (F03.B3) Moderate dementia with mood disturbance, unspecified dementia type (H)  Comment: decline in cognitive function past several months   Plan: Board and Care Memory Care unit for assist with med admin, meals, activity and secure unit   Remains on sertraline 25 mg/day since September           Marianela Mac MD

## 2024-01-01 ENCOUNTER — APPOINTMENT (OUTPATIENT)
Dept: CT IMAGING | Facility: CLINIC | Age: 84
DRG: 552 | End: 2024-01-01
Attending: EMERGENCY MEDICINE
Payer: COMMERCIAL

## 2024-01-01 ENCOUNTER — HOSPITAL ENCOUNTER (INPATIENT)
Facility: CLINIC | Age: 84
LOS: 1 days | Discharge: NURSING, REHAB OR LONG-TERM CARE | DRG: 552 | End: 2024-07-25
Attending: EMERGENCY MEDICINE | Admitting: HOSPITALIST
Payer: COMMERCIAL

## 2024-01-01 ENCOUNTER — TELEPHONE (OUTPATIENT)
Dept: GERIATRICS | Facility: CLINIC | Age: 84
End: 2024-01-01

## 2024-01-01 ENCOUNTER — TELEPHONE (OUTPATIENT)
Dept: CARDIOLOGY | Facility: CLINIC | Age: 84
End: 2024-01-01
Payer: COMMERCIAL

## 2024-01-01 ENCOUNTER — TELEPHONE (OUTPATIENT)
Dept: GERIATRICS | Facility: CLINIC | Age: 84
End: 2024-01-01
Payer: COMMERCIAL

## 2024-01-01 ENCOUNTER — NURSING HOME VISIT (OUTPATIENT)
Dept: GERIATRICS | Facility: CLINIC | Age: 84
End: 2024-01-01
Payer: COMMERCIAL

## 2024-01-01 ENCOUNTER — TRANSITIONAL CARE UNIT VISIT (OUTPATIENT)
Dept: GERIATRICS | Facility: CLINIC | Age: 84
End: 2024-01-01
Payer: COMMERCIAL

## 2024-01-01 ENCOUNTER — APPOINTMENT (OUTPATIENT)
Dept: GENERAL RADIOLOGY | Facility: CLINIC | Age: 84
DRG: 563 | End: 2024-01-01
Attending: EMERGENCY MEDICINE
Payer: COMMERCIAL

## 2024-01-01 ENCOUNTER — APPOINTMENT (OUTPATIENT)
Dept: GENERAL RADIOLOGY | Facility: CLINIC | Age: 84
DRG: 552 | End: 2024-01-01
Attending: EMERGENCY MEDICINE
Payer: COMMERCIAL

## 2024-01-01 ENCOUNTER — PATIENT OUTREACH (OUTPATIENT)
Dept: CARE COORDINATION | Facility: CLINIC | Age: 84
End: 2024-01-01
Payer: COMMERCIAL

## 2024-01-01 ENCOUNTER — APPOINTMENT (OUTPATIENT)
Dept: OCCUPATIONAL THERAPY | Facility: CLINIC | Age: 84
DRG: 563 | End: 2024-01-01
Payer: COMMERCIAL

## 2024-01-01 ENCOUNTER — DOCUMENTATION ONLY (OUTPATIENT)
Dept: GERIATRICS | Facility: CLINIC | Age: 84
End: 2024-01-01
Payer: COMMERCIAL

## 2024-01-01 ENCOUNTER — ANCILLARY PROCEDURE (OUTPATIENT)
Dept: CARDIOLOGY | Facility: CLINIC | Age: 84
End: 2024-01-01
Attending: INTERNAL MEDICINE
Payer: COMMERCIAL

## 2024-01-01 ENCOUNTER — ASSISTED LIVING VISIT (OUTPATIENT)
Dept: GERIATRICS | Facility: CLINIC | Age: 84
End: 2024-01-01
Payer: COMMERCIAL

## 2024-01-01 ENCOUNTER — TELEPHONE (OUTPATIENT)
Dept: NEUROSURGERY | Facility: CLINIC | Age: 84
End: 2024-01-01
Payer: COMMERCIAL

## 2024-01-01 ENCOUNTER — LAB REQUISITION (OUTPATIENT)
Dept: LAB | Facility: CLINIC | Age: 84
End: 2024-01-01
Payer: COMMERCIAL

## 2024-01-01 ENCOUNTER — HOSPITAL ENCOUNTER (INPATIENT)
Facility: CLINIC | Age: 84
LOS: 3 days | Discharge: NURSING, REHAB OR LONG-TERM CARE | DRG: 563 | End: 2024-02-16
Attending: EMERGENCY MEDICINE | Admitting: HOSPITALIST
Payer: COMMERCIAL

## 2024-01-01 ENCOUNTER — APPOINTMENT (OUTPATIENT)
Dept: CARDIOLOGY | Facility: CLINIC | Age: 84
DRG: 563 | End: 2024-01-01
Attending: HOSPITALIST
Payer: COMMERCIAL

## 2024-01-01 ENCOUNTER — TELEPHONE (OUTPATIENT)
Dept: CARDIOLOGY | Facility: CLINIC | Age: 84
End: 2024-01-01

## 2024-01-01 ENCOUNTER — APPOINTMENT (OUTPATIENT)
Dept: CT IMAGING | Facility: CLINIC | Age: 84
DRG: 563 | End: 2024-01-01
Attending: EMERGENCY MEDICINE
Payer: COMMERCIAL

## 2024-01-01 ENCOUNTER — APPOINTMENT (OUTPATIENT)
Dept: MRI IMAGING | Facility: CLINIC | Age: 84
DRG: 552 | End: 2024-01-01
Attending: EMERGENCY MEDICINE
Payer: COMMERCIAL

## 2024-01-01 ENCOUNTER — DOCUMENTATION ONLY (OUTPATIENT)
Dept: CARDIOLOGY | Facility: CLINIC | Age: 84
End: 2024-01-01

## 2024-01-01 ENCOUNTER — DOCUMENTATION ONLY (OUTPATIENT)
Dept: OTHER | Facility: CLINIC | Age: 84
End: 2024-01-01
Payer: COMMERCIAL

## 2024-01-01 ENCOUNTER — APPOINTMENT (OUTPATIENT)
Dept: PHYSICAL THERAPY | Facility: CLINIC | Age: 84
DRG: 563 | End: 2024-01-01
Attending: HOSPITALIST
Payer: COMMERCIAL

## 2024-01-01 ENCOUNTER — TRANSFERRED RECORDS (OUTPATIENT)
Dept: HEALTH INFORMATION MANAGEMENT | Facility: CLINIC | Age: 84
End: 2024-01-01
Payer: COMMERCIAL

## 2024-01-01 ENCOUNTER — DOCUMENTATION ONLY (OUTPATIENT)
Dept: OTHER | Facility: CLINIC | Age: 84
End: 2024-01-01

## 2024-01-01 ENCOUNTER — APPOINTMENT (OUTPATIENT)
Dept: GENERAL RADIOLOGY | Facility: CLINIC | Age: 84
DRG: 552 | End: 2024-01-01
Attending: PHYSICIAN ASSISTANT
Payer: COMMERCIAL

## 2024-01-01 ENCOUNTER — APPOINTMENT (OUTPATIENT)
Dept: OCCUPATIONAL THERAPY | Facility: CLINIC | Age: 84
DRG: 563 | End: 2024-01-01
Attending: HOSPITALIST
Payer: COMMERCIAL

## 2024-01-01 ENCOUNTER — DOCUMENTATION ONLY (OUTPATIENT)
Dept: ORTHOPEDICS | Facility: CLINIC | Age: 84
End: 2024-01-01

## 2024-01-01 VITALS
HEIGHT: 67 IN | BODY MASS INDEX: 26.57 KG/M2 | HEART RATE: 65 BPM | SYSTOLIC BLOOD PRESSURE: 139 MMHG | WEIGHT: 169.3 LBS | OXYGEN SATURATION: 96 % | TEMPERATURE: 97.6 F | RESPIRATION RATE: 18 BRPM | DIASTOLIC BLOOD PRESSURE: 78 MMHG

## 2024-01-01 VITALS
SYSTOLIC BLOOD PRESSURE: 139 MMHG | RESPIRATION RATE: 16 BRPM | DIASTOLIC BLOOD PRESSURE: 60 MMHG | TEMPERATURE: 97.3 F | OXYGEN SATURATION: 98 % | HEART RATE: 60 BPM | WEIGHT: 172.9 LBS | BODY MASS INDEX: 27.14 KG/M2 | HEIGHT: 67 IN

## 2024-01-01 VITALS
BODY MASS INDEX: 26.56 KG/M2 | RESPIRATION RATE: 18 BRPM | TEMPERATURE: 97.4 F | HEIGHT: 67 IN | SYSTOLIC BLOOD PRESSURE: 138 MMHG | DIASTOLIC BLOOD PRESSURE: 74 MMHG | OXYGEN SATURATION: 97 % | WEIGHT: 169.2 LBS | HEART RATE: 77 BPM

## 2024-01-01 VITALS
WEIGHT: 170.86 LBS | DIASTOLIC BLOOD PRESSURE: 66 MMHG | BODY MASS INDEX: 26.82 KG/M2 | HEART RATE: 60 BPM | SYSTOLIC BLOOD PRESSURE: 142 MMHG | RESPIRATION RATE: 19 BRPM | HEIGHT: 67 IN | TEMPERATURE: 99.2 F | OXYGEN SATURATION: 97 %

## 2024-01-01 VITALS
TEMPERATURE: 98.2 F | SYSTOLIC BLOOD PRESSURE: 149 MMHG | RESPIRATION RATE: 16 BRPM | BODY MASS INDEX: 26.3 KG/M2 | DIASTOLIC BLOOD PRESSURE: 71 MMHG | OXYGEN SATURATION: 94 % | HEART RATE: 96 BPM | WEIGHT: 167.6 LBS | HEIGHT: 67 IN

## 2024-01-01 VITALS
TEMPERATURE: 97.8 F | SYSTOLIC BLOOD PRESSURE: 138 MMHG | DIASTOLIC BLOOD PRESSURE: 74 MMHG | RESPIRATION RATE: 18 BRPM | HEIGHT: 67 IN | OXYGEN SATURATION: 94 % | HEART RATE: 82 BPM | BODY MASS INDEX: 26.27 KG/M2 | WEIGHT: 167.4 LBS

## 2024-01-01 VITALS
HEIGHT: 67 IN | TEMPERATURE: 97.7 F | BODY MASS INDEX: 26.87 KG/M2 | OXYGEN SATURATION: 97 % | WEIGHT: 171.2 LBS | HEART RATE: 66 BPM | SYSTOLIC BLOOD PRESSURE: 152 MMHG | DIASTOLIC BLOOD PRESSURE: 77 MMHG | RESPIRATION RATE: 18 BRPM

## 2024-01-01 VITALS
OXYGEN SATURATION: 98 % | RESPIRATION RATE: 16 BRPM | DIASTOLIC BLOOD PRESSURE: 60 MMHG | HEART RATE: 60 BPM | WEIGHT: 172.9 LBS | BODY MASS INDEX: 27.14 KG/M2 | HEIGHT: 67 IN | SYSTOLIC BLOOD PRESSURE: 139 MMHG | TEMPERATURE: 97.3 F

## 2024-01-01 VITALS
BODY MASS INDEX: 26.89 KG/M2 | RESPIRATION RATE: 18 BRPM | SYSTOLIC BLOOD PRESSURE: 133 MMHG | TEMPERATURE: 97.7 F | WEIGHT: 171.3 LBS | OXYGEN SATURATION: 96 % | HEART RATE: 65 BPM | DIASTOLIC BLOOD PRESSURE: 54 MMHG | HEIGHT: 67 IN

## 2024-01-01 VITALS
TEMPERATURE: 97.8 F | HEIGHT: 67 IN | OXYGEN SATURATION: 95 % | WEIGHT: 160.1 LBS | DIASTOLIC BLOOD PRESSURE: 66 MMHG | BODY MASS INDEX: 25.13 KG/M2 | SYSTOLIC BLOOD PRESSURE: 131 MMHG | RESPIRATION RATE: 18 BRPM | HEART RATE: 73 BPM

## 2024-01-01 VITALS
HEART RATE: 72 BPM | HEIGHT: 67 IN | BODY MASS INDEX: 25.33 KG/M2 | TEMPERATURE: 97.8 F | RESPIRATION RATE: 18 BRPM | DIASTOLIC BLOOD PRESSURE: 74 MMHG | WEIGHT: 161.4 LBS | OXYGEN SATURATION: 94 % | SYSTOLIC BLOOD PRESSURE: 129 MMHG

## 2024-01-01 VITALS
OXYGEN SATURATION: 97 % | WEIGHT: 166.4 LBS | TEMPERATURE: 97.8 F | RESPIRATION RATE: 16 BRPM | HEIGHT: 67 IN | BODY MASS INDEX: 26.12 KG/M2 | SYSTOLIC BLOOD PRESSURE: 149 MMHG | HEART RATE: 79 BPM | DIASTOLIC BLOOD PRESSURE: 78 MMHG

## 2024-01-01 VITALS
OXYGEN SATURATION: 91 % | RESPIRATION RATE: 17 BRPM | SYSTOLIC BLOOD PRESSURE: 129 MMHG | HEART RATE: 83 BPM | TEMPERATURE: 97.9 F | DIASTOLIC BLOOD PRESSURE: 61 MMHG

## 2024-01-01 VITALS
RESPIRATION RATE: 18 BRPM | OXYGEN SATURATION: 94 % | SYSTOLIC BLOOD PRESSURE: 130 MMHG | HEIGHT: 67 IN | HEART RATE: 70 BPM | BODY MASS INDEX: 25.82 KG/M2 | WEIGHT: 164.5 LBS | TEMPERATURE: 98.2 F | DIASTOLIC BLOOD PRESSURE: 78 MMHG

## 2024-01-01 VITALS
DIASTOLIC BLOOD PRESSURE: 78 MMHG | BODY MASS INDEX: 26.35 KG/M2 | OXYGEN SATURATION: 95 % | HEART RATE: 74 BPM | HEIGHT: 67 IN | TEMPERATURE: 97.8 F | RESPIRATION RATE: 18 BRPM | WEIGHT: 167.9 LBS | SYSTOLIC BLOOD PRESSURE: 140 MMHG

## 2024-01-01 VITALS
DIASTOLIC BLOOD PRESSURE: 62 MMHG | OXYGEN SATURATION: 98 % | WEIGHT: 172.9 LBS | RESPIRATION RATE: 16 BRPM | BODY MASS INDEX: 27.14 KG/M2 | HEIGHT: 67 IN | SYSTOLIC BLOOD PRESSURE: 144 MMHG | HEART RATE: 73 BPM | TEMPERATURE: 97.3 F

## 2024-01-01 VITALS
BODY MASS INDEX: 27.14 KG/M2 | RESPIRATION RATE: 14 BRPM | DIASTOLIC BLOOD PRESSURE: 53 MMHG | HEART RATE: 60 BPM | SYSTOLIC BLOOD PRESSURE: 122 MMHG | WEIGHT: 172.9 LBS | OXYGEN SATURATION: 95 % | TEMPERATURE: 97.2 F | HEIGHT: 67 IN

## 2024-01-01 VITALS
WEIGHT: 171.2 LBS | TEMPERATURE: 97.5 F | SYSTOLIC BLOOD PRESSURE: 124 MMHG | RESPIRATION RATE: 18 BRPM | HEIGHT: 67 IN | OXYGEN SATURATION: 97 % | BODY MASS INDEX: 26.87 KG/M2 | DIASTOLIC BLOOD PRESSURE: 76 MMHG | HEART RATE: 75 BPM

## 2024-01-01 DIAGNOSIS — R29.6 FALLS FREQUENTLY: ICD-10-CM

## 2024-01-01 DIAGNOSIS — R53.81 PHYSICAL DECONDITIONING: ICD-10-CM

## 2024-01-01 DIAGNOSIS — E11.59 TYPE 2 DIABETES MELLITUS WITH OTHER CIRCULATORY COMPLICATION, WITHOUT LONG-TERM CURRENT USE OF INSULIN (H): ICD-10-CM

## 2024-01-01 DIAGNOSIS — S12.591A OTHER CLOSED NONDISPLACED FRACTURE OF SIXTH CERVICAL VERTEBRA, INITIAL ENCOUNTER (H): ICD-10-CM

## 2024-01-01 DIAGNOSIS — F03.B3 MODERATE DEMENTIA WITH MOOD DISTURBANCE, UNSPECIFIED DEMENTIA TYPE (H): ICD-10-CM

## 2024-01-01 DIAGNOSIS — I65.21 STENOSIS OF RIGHT CAROTID ARTERY: ICD-10-CM

## 2024-01-01 DIAGNOSIS — R53.1 WEAKNESS: ICD-10-CM

## 2024-01-01 DIAGNOSIS — I50.32 CHRONIC HEART FAILURE WITH PRESERVED EJECTION FRACTION (H): ICD-10-CM

## 2024-01-01 DIAGNOSIS — E11.69 TYPE 2 DIABETES MELLITUS WITH OTHER SPECIFIED COMPLICATION, WITHOUT LONG-TERM CURRENT USE OF INSULIN (H): ICD-10-CM

## 2024-01-01 DIAGNOSIS — I50.9 CONGESTIVE HEART FAILURE, UNSPECIFIED HF CHRONICITY, UNSPECIFIED HEART FAILURE TYPE (H): ICD-10-CM

## 2024-01-01 DIAGNOSIS — Z95.0 CARDIAC PACEMAKER IN SITU: Primary | ICD-10-CM

## 2024-01-01 DIAGNOSIS — R45.1 RESTLESSNESS: ICD-10-CM

## 2024-01-01 DIAGNOSIS — I25.10 CORONARY ARTERY DISEASE INVOLVING NATIVE CORONARY ARTERY OF NATIVE HEART WITHOUT ANGINA PECTORIS: ICD-10-CM

## 2024-01-01 DIAGNOSIS — S42.201A DISPLACED FRACTURE OF PROXIMAL END OF RIGHT HUMERUS: Primary | ICD-10-CM

## 2024-01-01 DIAGNOSIS — D64.9 ANEMIA, UNSPECIFIED TYPE: Primary | ICD-10-CM

## 2024-01-01 DIAGNOSIS — Z51.5 HOSPICE CARE PATIENT: ICD-10-CM

## 2024-01-01 DIAGNOSIS — Z95.3 S/P TAVR (TRANSCATHETER AORTIC VALVE REPLACEMENT), BIOPROSTHETIC: ICD-10-CM

## 2024-01-01 DIAGNOSIS — Z95.0 CARDIAC PACEMAKER IN SITU: ICD-10-CM

## 2024-01-01 DIAGNOSIS — R79.89 ELEVATED TROPONIN: ICD-10-CM
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
## 2024-01-01 DIAGNOSIS — F41.9 ANXIETY: ICD-10-CM

## 2024-01-01 DIAGNOSIS — Z95.2 S/P TAVR (TRANSCATHETER AORTIC VALVE REPLACEMENT): ICD-10-CM

## 2024-01-01 DIAGNOSIS — G47.00 INSOMNIA, UNSPECIFIED TYPE: ICD-10-CM

## 2024-01-01 DIAGNOSIS — I10 ESSENTIAL (PRIMARY) HYPERTENSION: ICD-10-CM

## 2024-01-01 DIAGNOSIS — F03.B3 MODERATE DEMENTIA WITH MOOD DISTURBANCE, UNSPECIFIED DEMENTIA TYPE (H): Primary | ICD-10-CM

## 2024-01-01 DIAGNOSIS — H35.3230 BILATERAL EXUDATIVE AGE-RELATED MACULAR DEGENERATION, UNSPECIFIED STAGE (H): ICD-10-CM

## 2024-01-01 DIAGNOSIS — S32.011A CLOSED STABLE BURST FRACTURE OF FIRST LUMBAR VERTEBRA, INITIAL ENCOUNTER (H): ICD-10-CM

## 2024-01-01 DIAGNOSIS — S42.201A DISPLACED FRACTURE OF PROXIMAL END OF RIGHT HUMERUS: ICD-10-CM

## 2024-01-01 DIAGNOSIS — I10 ESSENTIAL HYPERTENSION: ICD-10-CM

## 2024-01-01 DIAGNOSIS — D64.9 ANEMIA, UNSPECIFIED: ICD-10-CM

## 2024-01-01 DIAGNOSIS — S32.010D COMPRESSION FRACTURE OF L1 VERTEBRA WITH ROUTINE HEALING, SUBSEQUENT ENCOUNTER: ICD-10-CM

## 2024-01-01 DIAGNOSIS — E08.65 DIABETES MELLITUS DUE TO UNDERLYING CONDITION WITH HYPERGLYCEMIA (H): ICD-10-CM

## 2024-01-01 DIAGNOSIS — D64.9 ANEMIA, UNSPECIFIED TYPE: ICD-10-CM

## 2024-01-01 DIAGNOSIS — S12.501D CLOSED NONDISPLACED FRACTURE OF SIXTH CERVICAL VERTEBRA WITH ROUTINE HEALING, UNSPECIFIED FRACTURE MORPHOLOGY, SUBSEQUENT ENCOUNTER: ICD-10-CM

## 2024-01-01 DIAGNOSIS — I50.32 CHRONIC HEART FAILURE WITH PRESERVED EJECTION FRACTION (H): Primary | ICD-10-CM

## 2024-01-01 DIAGNOSIS — F32.1 DEPRESSION, MAJOR, SINGLE EPISODE, MODERATE (H): ICD-10-CM
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
## 2024-01-01 DIAGNOSIS — I44.2 COMPLETE HEART BLOCK (H): ICD-10-CM

## 2024-01-01 DIAGNOSIS — K59.01 SLOW TRANSIT CONSTIPATION: ICD-10-CM

## 2024-01-01 DIAGNOSIS — R05.9 COUGH, UNSPECIFIED: ICD-10-CM

## 2024-01-01 DIAGNOSIS — F03.B4 MODERATE DEMENTIA WITH ANXIETY, UNSPECIFIED DEMENTIA TYPE (H): Primary | ICD-10-CM
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
## 2024-01-01 DIAGNOSIS — F41.9 ANXIETY: Primary | ICD-10-CM

## 2024-01-01 DIAGNOSIS — F03.C4 SEVERE DEMENTIA WITH ANXIETY, UNSPECIFIED DEMENTIA TYPE (H): ICD-10-CM

## 2024-01-01 DIAGNOSIS — Z95.3 STATUS POST TRANSCATHETER AORTIC VALVE REPLACEMENT (TAVR) USING BIOPROSTHESIS: ICD-10-CM
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
## 2024-01-01 DIAGNOSIS — I50.9 CONGESTIVE HEART FAILURE, UNSPECIFIED HF CHRONICITY, UNSPECIFIED HEART FAILURE TYPE (H): Primary | ICD-10-CM

## 2024-01-01 DIAGNOSIS — R60.0 PERIPHERAL EDEMA: ICD-10-CM

## 2024-01-01 DIAGNOSIS — I44.2 CHB (COMPLETE HEART BLOCK) (H): ICD-10-CM

## 2024-01-01 DIAGNOSIS — F03.90 DEMENTIA, UNSPECIFIED DEMENTIA SEVERITY, UNSPECIFIED DEMENTIA TYPE, UNSPECIFIED WHETHER BEHAVIORAL, PSYCHOTIC, OR MOOD DISTURBANCE OR ANXIETY (H): ICD-10-CM

## 2024-01-01 DIAGNOSIS — R58 ECCHYMOSIS: ICD-10-CM

## 2024-01-01 DIAGNOSIS — T79.6XXA TRAUMATIC RHABDOMYOLYSIS, INITIAL ENCOUNTER (H): ICD-10-CM

## 2024-01-01 DIAGNOSIS — Z95.3 S/P TAVR (TRANSCATHETER AORTIC VALVE REPLACEMENT), BIOPROSTHETIC: Primary | ICD-10-CM

## 2024-01-01 DIAGNOSIS — S42.201A CLOSED FRACTURE OF PROXIMAL END OF RIGHT HUMERUS, UNSPECIFIED FRACTURE MORPHOLOGY, INITIAL ENCOUNTER: ICD-10-CM

## 2024-01-01 DIAGNOSIS — R29.6 FALLS FREQUENTLY: Primary | ICD-10-CM

## 2024-01-01 DIAGNOSIS — I50.9 HEART FAILURE, UNSPECIFIED (H): ICD-10-CM

## 2024-01-01 DIAGNOSIS — E78.5 DYSLIPIDEMIA: ICD-10-CM

## 2024-01-01 DIAGNOSIS — E11.9 TYPE 2 DIABETES MELLITUS WITHOUT COMPLICATIONS (H): ICD-10-CM

## 2024-01-01 DIAGNOSIS — F03.B4 MODERATE DEMENTIA WITH ANXIETY, UNSPECIFIED DEMENTIA TYPE (H): ICD-10-CM

## 2024-01-01 DIAGNOSIS — R22.31 LOCALIZED SWELLING OF RIGHT UPPER EXTREMITY: ICD-10-CM

## 2024-01-01 DIAGNOSIS — G47.00 INSOMNIA: Primary | ICD-10-CM

## 2024-01-01 LAB
ALBUMIN SERPL BCG-MCNC: 4 G/DL (ref 3.5–5.2)
ALBUMIN UR-MCNC: 20 MG/DL
ALP SERPL-CCNC: 103 U/L (ref 40–150)
ALT SERPL W P-5'-P-CCNC: 13 U/L (ref 0–70)
ANION GAP SERPL CALCULATED.3IONS-SCNC: 10 MMOL/L (ref 7–15)
ANION GAP SERPL CALCULATED.3IONS-SCNC: 11 MMOL/L (ref 7–15)
ANION GAP SERPL CALCULATED.3IONS-SCNC: 8 MMOL/L (ref 7–15)
ANION GAP SERPL CALCULATED.3IONS-SCNC: 9 MMOL/L (ref 7–15)
APPEARANCE UR: CLEAR
AST SERPL W P-5'-P-CCNC: 52 U/L (ref 0–45)
ATRIAL RATE - MUSE: 62 BPM
BASOPHILS # BLD AUTO: 0 10E3/UL (ref 0–0.2)
BASOPHILS NFR BLD AUTO: 0 %
BASOPHILS NFR BLD AUTO: 1 %
BILIRUB SERPL-MCNC: 0.8 MG/DL
BILIRUB UR QL STRIP: NEGATIVE
BUN SERPL-MCNC: 10.4 MG/DL (ref 8–23)
BUN SERPL-MCNC: 11.6 MG/DL (ref 8–23)
BUN SERPL-MCNC: 12.8 MG/DL (ref 8–23)
BUN SERPL-MCNC: 13.7 MG/DL (ref 8–23)
BUN SERPL-MCNC: 14.5 MG/DL (ref 8–23)
BUN SERPL-MCNC: 17.3 MG/DL (ref 8–23)
BUN SERPL-MCNC: 20.8 MG/DL (ref 8–23)
BUN SERPL-MCNC: 22 MG/DL (ref 8–23)
CALCIUM SERPL-MCNC: 8.5 MG/DL (ref 8.8–10.2)
CALCIUM SERPL-MCNC: 8.7 MG/DL (ref 8.8–10.2)
CALCIUM SERPL-MCNC: 8.9 MG/DL (ref 8.8–10.4)
CALCIUM SERPL-MCNC: 8.9 MG/DL (ref 8.8–10.4)
CALCIUM SERPL-MCNC: 9.1 MG/DL (ref 8.8–10.2)
CALCIUM SERPL-MCNC: 9.1 MG/DL (ref 8.8–10.4)
CALCIUM SERPL-MCNC: 9.3 MG/DL (ref 8.8–10.2)
CALCIUM SERPL-MCNC: 9.4 MG/DL (ref 8.8–10.2)
CHLORIDE SERPL-SCNC: 100 MMOL/L (ref 98–107)
CHLORIDE SERPL-SCNC: 101 MMOL/L (ref 98–107)
CHLORIDE SERPL-SCNC: 102 MMOL/L (ref 98–107)
CHLORIDE SERPL-SCNC: 103 MMOL/L (ref 98–107)
CHLORIDE SERPL-SCNC: 104 MMOL/L (ref 98–107)
CHLORIDE SERPL-SCNC: 105 MMOL/L (ref 98–107)
CHLORIDE SERPL-SCNC: 105 MMOL/L (ref 98–107)
CHLORIDE SERPL-SCNC: 99 MMOL/L (ref 98–107)
CK SERPL-CCNC: 1128 U/L (ref 39–308)
CK SERPL-CCNC: 313 U/L (ref 39–308)
CK SERPL-CCNC: 747 U/L (ref 39–308)
COLOR UR AUTO: YELLOW
CREAT SERPL-MCNC: 0.55 MG/DL (ref 0.67–1.17)
CREAT SERPL-MCNC: 0.57 MG/DL (ref 0.67–1.17)
CREAT SERPL-MCNC: 0.61 MG/DL (ref 0.67–1.17)
CREAT SERPL-MCNC: 0.68 MG/DL (ref 0.67–1.17)
CREAT SERPL-MCNC: 0.74 MG/DL (ref 0.67–1.17)
CREAT SERPL-MCNC: 0.76 MG/DL (ref 0.67–1.17)
CREAT SERPL-MCNC: 0.78 MG/DL (ref 0.67–1.17)
CREAT SERPL-MCNC: 1.08 MG/DL (ref 0.67–1.17)
DEPRECATED HCO3 PLAS-SCNC: 25 MMOL/L (ref 22–29)
DEPRECATED HCO3 PLAS-SCNC: 26 MMOL/L (ref 22–29)
DEPRECATED HCO3 PLAS-SCNC: 28 MMOL/L (ref 22–29)
DIASTOLIC BLOOD PRESSURE - MUSE: NORMAL MMHG
EGFRCR SERPLBLD CKD-EPI 2021: 67 ML/MIN/1.73M2
EGFRCR SERPLBLD CKD-EPI 2021: 87 ML/MIN/1.73M2
EGFRCR SERPLBLD CKD-EPI 2021: 88 ML/MIN/1.73M2
EGFRCR SERPLBLD CKD-EPI 2021: 89 ML/MIN/1.73M2
EGFRCR SERPLBLD CKD-EPI 2021: >90 ML/MIN/1.73M2
EOSINOPHIL # BLD AUTO: 0 10E3/UL (ref 0–0.7)
EOSINOPHIL NFR BLD AUTO: 0 %
ERYTHROCYTE [DISTWIDTH] IN BLOOD BY AUTOMATED COUNT: 13.6 % (ref 10–15)
ERYTHROCYTE [DISTWIDTH] IN BLOOD BY AUTOMATED COUNT: 13.7 % (ref 10–15)
ERYTHROCYTE [DISTWIDTH] IN BLOOD BY AUTOMATED COUNT: 13.8 % (ref 10–15)
ERYTHROCYTE [DISTWIDTH] IN BLOOD BY AUTOMATED COUNT: 13.8 % (ref 10–15)
ERYTHROCYTE [DISTWIDTH] IN BLOOD BY AUTOMATED COUNT: 14 % (ref 10–15)
ERYTHROCYTE [DISTWIDTH] IN BLOOD BY AUTOMATED COUNT: 14.1 % (ref 10–15)
ERYTHROCYTE [DISTWIDTH] IN BLOOD BY AUTOMATED COUNT: 14.2 % (ref 10–15)
ERYTHROCYTE [DISTWIDTH] IN BLOOD BY AUTOMATED COUNT: 14.4 % (ref 10–15)
ERYTHROCYTE [DISTWIDTH] IN BLOOD BY AUTOMATED COUNT: 14.6 % (ref 10–15)
ERYTHROCYTE [DISTWIDTH] IN BLOOD BY AUTOMATED COUNT: 15 % (ref 10–15)
FLUAV RNA SPEC QL NAA+PROBE: NEGATIVE
FLUBV RNA RESP QL NAA+PROBE: NEGATIVE
GLUCOSE BLDC GLUCOMTR-MCNC: 107 MG/DL (ref 70–99)
GLUCOSE BLDC GLUCOMTR-MCNC: 114 MG/DL (ref 70–99)
GLUCOSE BLDC GLUCOMTR-MCNC: 114 MG/DL (ref 70–99)
GLUCOSE BLDC GLUCOMTR-MCNC: 117 MG/DL (ref 70–99)
GLUCOSE BLDC GLUCOMTR-MCNC: 118 MG/DL (ref 70–99)
GLUCOSE BLDC GLUCOMTR-MCNC: 120 MG/DL (ref 70–99)
GLUCOSE BLDC GLUCOMTR-MCNC: 122 MG/DL (ref 70–99)
GLUCOSE BLDC GLUCOMTR-MCNC: 124 MG/DL (ref 70–99)
GLUCOSE BLDC GLUCOMTR-MCNC: 125 MG/DL (ref 70–99)
GLUCOSE BLDC GLUCOMTR-MCNC: 129 MG/DL (ref 70–99)
GLUCOSE BLDC GLUCOMTR-MCNC: 131 MG/DL (ref 70–99)
GLUCOSE BLDC GLUCOMTR-MCNC: 133 MG/DL (ref 70–99)
GLUCOSE BLDC GLUCOMTR-MCNC: 133 MG/DL (ref 70–99)
GLUCOSE BLDC GLUCOMTR-MCNC: 137 MG/DL (ref 70–99)
GLUCOSE BLDC GLUCOMTR-MCNC: 142 MG/DL (ref 70–99)
GLUCOSE BLDC GLUCOMTR-MCNC: 148 MG/DL (ref 70–99)
GLUCOSE BLDC GLUCOMTR-MCNC: 155 MG/DL (ref 70–99)
GLUCOSE BLDC GLUCOMTR-MCNC: 156 MG/DL (ref 70–99)
GLUCOSE BLDC GLUCOMTR-MCNC: 167 MG/DL (ref 70–99)
GLUCOSE BLDC GLUCOMTR-MCNC: 169 MG/DL (ref 70–99)
GLUCOSE BLDC GLUCOMTR-MCNC: 170 MG/DL (ref 70–99)
GLUCOSE BLDC GLUCOMTR-MCNC: 173 MG/DL (ref 70–99)
GLUCOSE BLDC GLUCOMTR-MCNC: 181 MG/DL (ref 70–99)
GLUCOSE SERPL-MCNC: 111 MG/DL (ref 70–99)
GLUCOSE SERPL-MCNC: 116 MG/DL (ref 70–99)
GLUCOSE SERPL-MCNC: 130 MG/DL (ref 70–99)
GLUCOSE SERPL-MCNC: 143 MG/DL (ref 70–99)
GLUCOSE SERPL-MCNC: 166 MG/DL (ref 70–99)
GLUCOSE SERPL-MCNC: 167 MG/DL (ref 70–99)
GLUCOSE SERPL-MCNC: 96 MG/DL (ref 70–99)
GLUCOSE UR STRIP-MCNC: NEGATIVE MG/DL
HBA1C MFR BLD: 6 %
HBA1C MFR BLD: 6.4 %
HCO3 SERPL-SCNC: 25 MMOL/L (ref 22–29)
HCO3 SERPL-SCNC: 27 MMOL/L (ref 22–29)
HCO3 SERPL-SCNC: 27 MMOL/L (ref 22–29)
HCT VFR BLD AUTO: 27.6 % (ref 40–53)
HCT VFR BLD AUTO: 29 % (ref 40–53)
HCT VFR BLD AUTO: 29.5 % (ref 40–53)
HCT VFR BLD AUTO: 30.1 % (ref 40–53)
HCT VFR BLD AUTO: 30.2 % (ref 40–53)
HCT VFR BLD AUTO: 31.1 % (ref 40–53)
HCT VFR BLD AUTO: 31.2 % (ref 40–53)
HCT VFR BLD AUTO: 31.3 % (ref 40–53)
HCT VFR BLD AUTO: 35 % (ref 40–53)
HCT VFR BLD AUTO: 35.4 % (ref 40–53)
HGB BLD-MCNC: 10.1 G/DL (ref 13.3–17.7)
HGB BLD-MCNC: 10.2 G/DL (ref 13.3–17.7)
HGB BLD-MCNC: 10.2 G/DL (ref 13.3–17.7)
HGB BLD-MCNC: 10.5 G/DL (ref 13.3–17.7)
HGB BLD-MCNC: 10.6 G/DL (ref 13.3–17.7)
HGB BLD-MCNC: 11.1 G/DL (ref 13.3–17.7)
HGB BLD-MCNC: 11.3 G/DL (ref 13.3–17.7)
HGB BLD-MCNC: 11.6 G/DL (ref 13.3–17.7)
HGB BLD-MCNC: 9.2 G/DL (ref 13.3–17.7)
HGB BLD-MCNC: 9.4 G/DL (ref 13.3–17.7)
HGB BLD-MCNC: 9.6 G/DL (ref 13.3–17.7)
HGB UR QL STRIP: NEGATIVE
IMM GRANULOCYTES # BLD: 0 10E3/UL
IMM GRANULOCYTES # BLD: 0 10E3/UL
IMM GRANULOCYTES # BLD: 0.1 10E3/UL
IMM GRANULOCYTES NFR BLD: 0 %
IMM GRANULOCYTES NFR BLD: 0 %
IMM GRANULOCYTES NFR BLD: 1 %
INTERPRETATION ECG - MUSE: NORMAL
KETONES UR STRIP-MCNC: 10 MG/DL
LEUKOCYTE ESTERASE UR QL STRIP: NEGATIVE
LVEF ECHO: NORMAL
LYMPHOCYTES # BLD AUTO: 1.1 10E3/UL (ref 0.8–5.3)
LYMPHOCYTES # BLD AUTO: 1.1 10E3/UL (ref 0.8–5.3)
LYMPHOCYTES # BLD AUTO: 1.2 10E3/UL (ref 0.8–5.3)
LYMPHOCYTES # BLD AUTO: 1.2 10E3/UL (ref 0.8–5.3)
LYMPHOCYTES # BLD AUTO: 1.4 10E3/UL (ref 0.8–5.3)
LYMPHOCYTES NFR BLD AUTO: 10 %
LYMPHOCYTES NFR BLD AUTO: 13 %
LYMPHOCYTES NFR BLD AUTO: 24 %
LYMPHOCYTES NFR BLD AUTO: 8 %
LYMPHOCYTES NFR BLD AUTO: 9 %
MCH RBC QN AUTO: 30.8 PG (ref 26.5–33)
MCH RBC QN AUTO: 30.9 PG (ref 26.5–33)
MCH RBC QN AUTO: 31.2 PG (ref 26.5–33)
MCH RBC QN AUTO: 31.5 PG (ref 26.5–33)
MCH RBC QN AUTO: 31.9 PG (ref 26.5–33)
MCH RBC QN AUTO: 33.1 PG (ref 26.5–33)
MCHC RBC AUTO-ENTMCNC: 31.2 G/DL (ref 31.5–36.5)
MCHC RBC AUTO-ENTMCNC: 31.9 G/DL (ref 31.5–36.5)
MCHC RBC AUTO-ENTMCNC: 32.8 G/DL (ref 31.5–36.5)
MCHC RBC AUTO-ENTMCNC: 33.1 G/DL (ref 31.5–36.5)
MCHC RBC AUTO-ENTMCNC: 33.1 G/DL (ref 31.5–36.5)
MCHC RBC AUTO-ENTMCNC: 33.5 G/DL (ref 31.5–36.5)
MCHC RBC AUTO-ENTMCNC: 33.6 G/DL (ref 31.5–36.5)
MCHC RBC AUTO-ENTMCNC: 33.8 G/DL (ref 31.5–36.5)
MCHC RBC AUTO-ENTMCNC: 34 G/DL (ref 31.5–36.5)
MCHC RBC AUTO-ENTMCNC: 34.1 G/DL (ref 31.5–36.5)
MCV RBC AUTO: 100 FL (ref 78–100)
MCV RBC AUTO: 102 FL (ref 78–100)
MCV RBC AUTO: 93 FL (ref 78–100)
MCV RBC AUTO: 94 FL (ref 78–100)
MCV RBC AUTO: 97 FL (ref 78–100)
MCV RBC AUTO: 97 FL (ref 78–100)
MCV RBC AUTO: 98 FL (ref 78–100)
MDC_IDC_LEAD_CONNECTION_STATUS: NORMAL
MDC_IDC_LEAD_CONNECTION_STATUS: NORMAL
MDC_IDC_LEAD_IMPLANT_DT: NORMAL
MDC_IDC_LEAD_IMPLANT_DT: NORMAL
MDC_IDC_LEAD_LOCATION: NORMAL
MDC_IDC_LEAD_LOCATION: NORMAL
MDC_IDC_LEAD_LOCATION_DETAIL_1: NORMAL
MDC_IDC_LEAD_LOCATION_DETAIL_1: NORMAL
MDC_IDC_LEAD_MFG: NORMAL
MDC_IDC_LEAD_MFG: NORMAL
MDC_IDC_LEAD_MODEL: NORMAL
MDC_IDC_LEAD_MODEL: NORMAL
MDC_IDC_LEAD_POLARITY_TYPE: NORMAL
MDC_IDC_LEAD_POLARITY_TYPE: NORMAL
MDC_IDC_LEAD_SERIAL: NORMAL
MDC_IDC_LEAD_SERIAL: NORMAL
MDC_IDC_MSMT_BATTERY_DTM: NORMAL
MDC_IDC_MSMT_BATTERY_REMAINING_LONGEVITY: 144 MO
MDC_IDC_MSMT_BATTERY_RRT_TRIGGER: 2.62
MDC_IDC_MSMT_BATTERY_STATUS: NORMAL
MDC_IDC_MSMT_BATTERY_VOLTAGE: 3.15 V
MDC_IDC_MSMT_LEADCHNL_RA_IMPEDANCE_VALUE: 304 OHM
MDC_IDC_MSMT_LEADCHNL_RA_IMPEDANCE_VALUE: 456 OHM
MDC_IDC_MSMT_LEADCHNL_RA_PACING_THRESHOLD_AMPLITUDE: 1 V
MDC_IDC_MSMT_LEADCHNL_RA_PACING_THRESHOLD_PULSEWIDTH: 0.4 MS
MDC_IDC_MSMT_LEADCHNL_RA_SENSING_INTR_AMPL: 2.75 MV
MDC_IDC_MSMT_LEADCHNL_RA_SENSING_INTR_AMPL: 2.75 MV
MDC_IDC_MSMT_LEADCHNL_RV_IMPEDANCE_VALUE: 494 OHM
MDC_IDC_MSMT_LEADCHNL_RV_IMPEDANCE_VALUE: 608 OHM
MDC_IDC_MSMT_LEADCHNL_RV_PACING_THRESHOLD_AMPLITUDE: 0.62 V
MDC_IDC_MSMT_LEADCHNL_RV_PACING_THRESHOLD_PULSEWIDTH: 0.4 MS
MDC_IDC_MSMT_LEADCHNL_RV_SENSING_INTR_AMPL: 18.75 MV
MDC_IDC_MSMT_LEADCHNL_RV_SENSING_INTR_AMPL: 18.75 MV
MDC_IDC_PG_IMPLANT_DTM: NORMAL
MDC_IDC_PG_MFG: NORMAL
MDC_IDC_PG_MODEL: NORMAL
MDC_IDC_PG_SERIAL: NORMAL
MDC_IDC_PG_TYPE: NORMAL
MDC_IDC_SESS_CLINIC_NAME: NORMAL
MDC_IDC_SESS_DTM: NORMAL
MDC_IDC_SESS_TYPE: NORMAL
MDC_IDC_SET_BRADY_AT_MODE_SWITCH_RATE: 171 {BEATS}/MIN
MDC_IDC_SET_BRADY_HYSTRATE: NORMAL
MDC_IDC_SET_BRADY_LOWRATE: 60 {BEATS}/MIN
MDC_IDC_SET_BRADY_MAX_SENSOR_RATE: 130 {BEATS}/MIN
MDC_IDC_SET_BRADY_MAX_TRACKING_RATE: 130 {BEATS}/MIN
MDC_IDC_SET_BRADY_MODE: NORMAL
MDC_IDC_SET_BRADY_PAV_DELAY_LOW: 180 MS
MDC_IDC_SET_BRADY_SAV_DELAY_LOW: 150 MS
MDC_IDC_SET_LEADCHNL_RA_PACING_AMPLITUDE: 1.5 V
MDC_IDC_SET_LEADCHNL_RA_PACING_ANODE_ELECTRODE_1: NORMAL
MDC_IDC_SET_LEADCHNL_RA_PACING_ANODE_LOCATION_1: NORMAL
MDC_IDC_SET_LEADCHNL_RA_PACING_CAPTURE_MODE: NORMAL
MDC_IDC_SET_LEADCHNL_RA_PACING_CATHODE_ELECTRODE_1: NORMAL
MDC_IDC_SET_LEADCHNL_RA_PACING_CATHODE_LOCATION_1: NORMAL
MDC_IDC_SET_LEADCHNL_RA_PACING_POLARITY: NORMAL
MDC_IDC_SET_LEADCHNL_RA_PACING_PULSEWIDTH: 0.4 MS
MDC_IDC_SET_LEADCHNL_RA_SENSING_ANODE_ELECTRODE_1: NORMAL
MDC_IDC_SET_LEADCHNL_RA_SENSING_ANODE_LOCATION_1: NORMAL
MDC_IDC_SET_LEADCHNL_RA_SENSING_CATHODE_ELECTRODE_1: NORMAL
MDC_IDC_SET_LEADCHNL_RA_SENSING_CATHODE_LOCATION_1: NORMAL
MDC_IDC_SET_LEADCHNL_RA_SENSING_POLARITY: NORMAL
MDC_IDC_SET_LEADCHNL_RA_SENSING_SENSITIVITY: 0.3 MV
MDC_IDC_SET_LEADCHNL_RV_PACING_AMPLITUDE: 2 V
MDC_IDC_SET_LEADCHNL_RV_PACING_ANODE_ELECTRODE_1: NORMAL
MDC_IDC_SET_LEADCHNL_RV_PACING_ANODE_LOCATION_1: NORMAL
MDC_IDC_SET_LEADCHNL_RV_PACING_CAPTURE_MODE: NORMAL
MDC_IDC_SET_LEADCHNL_RV_PACING_CATHODE_ELECTRODE_1: NORMAL
MDC_IDC_SET_LEADCHNL_RV_PACING_CATHODE_LOCATION_1: NORMAL
MDC_IDC_SET_LEADCHNL_RV_PACING_POLARITY: NORMAL
MDC_IDC_SET_LEADCHNL_RV_PACING_PULSEWIDTH: 0.4 MS
MDC_IDC_SET_LEADCHNL_RV_SENSING_ANODE_ELECTRODE_1: NORMAL
MDC_IDC_SET_LEADCHNL_RV_SENSING_ANODE_LOCATION_1: NORMAL
MDC_IDC_SET_LEADCHNL_RV_SENSING_CATHODE_ELECTRODE_1: NORMAL
MDC_IDC_SET_LEADCHNL_RV_SENSING_CATHODE_LOCATION_1: NORMAL
MDC_IDC_SET_LEADCHNL_RV_SENSING_POLARITY: NORMAL
MDC_IDC_SET_LEADCHNL_RV_SENSING_SENSITIVITY: 0.9 MV
MDC_IDC_SET_ZONE_DETECTION_INTERVAL: 350 MS
MDC_IDC_SET_ZONE_DETECTION_INTERVAL: 400 MS
MDC_IDC_SET_ZONE_STATUS: NORMAL
MDC_IDC_SET_ZONE_STATUS: NORMAL
MDC_IDC_SET_ZONE_TYPE: NORMAL
MDC_IDC_SET_ZONE_VENDOR_TYPE: NORMAL
MDC_IDC_STAT_AT_BURDEN_PERCENT: 0 %
MDC_IDC_STAT_AT_DTM_END: NORMAL
MDC_IDC_STAT_AT_DTM_START: NORMAL
MDC_IDC_STAT_BRADY_AP_VP_PERCENT: 34.18 %
MDC_IDC_STAT_BRADY_AP_VS_PERCENT: 0.03 %
MDC_IDC_STAT_BRADY_AS_VP_PERCENT: 60.1 %
MDC_IDC_STAT_BRADY_AS_VS_PERCENT: 5.69 %
MDC_IDC_STAT_BRADY_DTM_END: NORMAL
MDC_IDC_STAT_BRADY_DTM_START: NORMAL
MDC_IDC_STAT_BRADY_RA_PERCENT_PACED: 36.57 %
MDC_IDC_STAT_BRADY_RV_PERCENT_PACED: 94.28 %
MDC_IDC_STAT_EPISODE_RECENT_COUNT: 0
MDC_IDC_STAT_EPISODE_RECENT_COUNT_DTM_END: NORMAL
MDC_IDC_STAT_EPISODE_RECENT_COUNT_DTM_START: NORMAL
MDC_IDC_STAT_EPISODE_TOTAL_COUNT: 0
MDC_IDC_STAT_EPISODE_TOTAL_COUNT: 1
MDC_IDC_STAT_EPISODE_TOTAL_COUNT_DTM_END: NORMAL
MDC_IDC_STAT_EPISODE_TOTAL_COUNT_DTM_START: NORMAL
MDC_IDC_STAT_EPISODE_TYPE: NORMAL
MDC_IDC_STAT_TACHYTHERAPY_RECENT_DTM_END: NORMAL
MDC_IDC_STAT_TACHYTHERAPY_RECENT_DTM_START: NORMAL
MDC_IDC_STAT_TACHYTHERAPY_TOTAL_DTM_END: NORMAL
MDC_IDC_STAT_TACHYTHERAPY_TOTAL_DTM_START: NORMAL
MONOCYTES # BLD AUTO: 0.9 10E3/UL (ref 0–1.3)
MONOCYTES # BLD AUTO: 1 10E3/UL (ref 0–1.3)
MONOCYTES NFR BLD AUTO: 12 %
MONOCYTES NFR BLD AUTO: 15 %
MONOCYTES NFR BLD AUTO: 6 %
MONOCYTES NFR BLD AUTO: 8 %
MONOCYTES NFR BLD AUTO: 8 %
MUCOUS THREADS #/AREA URNS LPF: PRESENT /LPF
NEUTROPHILS # BLD AUTO: 12.2 10E3/UL (ref 1.6–8.3)
NEUTROPHILS # BLD AUTO: 3.3 10E3/UL (ref 1.6–8.3)
NEUTROPHILS # BLD AUTO: 6 10E3/UL (ref 1.6–8.3)
NEUTROPHILS # BLD AUTO: 9.1 10E3/UL (ref 1.6–8.3)
NEUTROPHILS # BLD AUTO: 9.5 10E3/UL (ref 1.6–8.3)
NEUTROPHILS NFR BLD AUTO: 60 %
NEUTROPHILS NFR BLD AUTO: 75 %
NEUTROPHILS NFR BLD AUTO: 81 %
NEUTROPHILS NFR BLD AUTO: 82 %
NEUTROPHILS NFR BLD AUTO: 85 %
NITRATE UR QL: NEGATIVE
NRBC # BLD AUTO: 0 10E3/UL
NRBC BLD AUTO-RTO: 0 /100
NT-PROBNP SERPL-MCNC: 3098 PG/ML (ref 0–1800)
P AXIS - MUSE: 78 DEGREES
PH UR STRIP: 5 [PH] (ref 5–7)
PLATELET # BLD AUTO: 107 10E3/UL (ref 150–450)
PLATELET # BLD AUTO: 133 10E3/UL (ref 150–450)
PLATELET # BLD AUTO: 134 10E3/UL (ref 150–450)
PLATELET # BLD AUTO: 151 10E3/UL (ref 150–450)
PLATELET # BLD AUTO: 153 10E3/UL (ref 150–450)
PLATELET # BLD AUTO: 154 10E3/UL (ref 150–450)
PLATELET # BLD AUTO: 201 10E3/UL (ref 150–450)
PLATELET # BLD AUTO: 260 10E3/UL (ref 150–450)
PLATELET # BLD AUTO: 423 10E3/UL (ref 150–450)
PLATELET # BLD AUTO: 86 10E3/UL (ref 150–450)
POTASSIUM SERPL-SCNC: 3.5 MMOL/L (ref 3.4–5.3)
POTASSIUM SERPL-SCNC: 3.5 MMOL/L (ref 3.4–5.3)
POTASSIUM SERPL-SCNC: 3.7 MMOL/L (ref 3.4–5.3)
POTASSIUM SERPL-SCNC: 4 MMOL/L (ref 3.4–5.3)
POTASSIUM SERPL-SCNC: 4.2 MMOL/L (ref 3.4–5.3)
POTASSIUM SERPL-SCNC: 4.2 MMOL/L (ref 3.4–5.3)
POTASSIUM SERPL-SCNC: 4.5 MMOL/L (ref 3.4–5.3)
POTASSIUM SERPL-SCNC: 4.7 MMOL/L (ref 3.4–5.3)
PR INTERVAL - MUSE: 244 MS
PROCALCITONIN SERPL IA-MCNC: 1.2 NG/ML
PROT SERPL-MCNC: 7.3 G/DL (ref 6.4–8.3)
QRS DURATION - MUSE: 120 MS
QT - MUSE: 466 MS
QTC - MUSE: 472 MS
R AXIS - MUSE: 52 DEGREES
RBC # BLD AUTO: 2.84 10E6/UL (ref 4.4–5.9)
RBC # BLD AUTO: 2.88 10E6/UL (ref 4.4–5.9)
RBC # BLD AUTO: 2.9 10E6/UL (ref 4.4–5.9)
RBC # BLD AUTO: 3.08 10E6/UL (ref 4.4–5.9)
RBC # BLD AUTO: 3.21 10E6/UL (ref 4.4–5.9)
RBC # BLD AUTO: 3.31 10E6/UL (ref 4.4–5.9)
RBC # BLD AUTO: 3.33 10E6/UL (ref 4.4–5.9)
RBC # BLD AUTO: 3.36 10E6/UL (ref 4.4–5.9)
RBC # BLD AUTO: 3.66 10E6/UL (ref 4.4–5.9)
RBC # BLD AUTO: 3.72 10E6/UL (ref 4.4–5.9)
RBC URINE: 1 /HPF
RSV RNA SPEC NAA+PROBE: NEGATIVE
SARS-COV-2 RNA RESP QL NAA+PROBE: NEGATIVE
SODIUM SERPL-SCNC: 135 MMOL/L (ref 135–145)
SODIUM SERPL-SCNC: 136 MMOL/L (ref 135–145)
SODIUM SERPL-SCNC: 137 MMOL/L (ref 135–145)
SODIUM SERPL-SCNC: 138 MMOL/L (ref 135–145)
SODIUM SERPL-SCNC: 139 MMOL/L (ref 135–145)
SODIUM SERPL-SCNC: 140 MMOL/L (ref 135–145)
SP GR UR STRIP: 1.03 (ref 1–1.03)
SYSTOLIC BLOOD PRESSURE - MUSE: NORMAL MMHG
T AXIS - MUSE: 74 DEGREES
TROPONIN T SERPL HS-MCNC: 26 NG/L
TROPONIN T SERPL HS-MCNC: 29 NG/L
UROBILINOGEN UR STRIP-MCNC: NORMAL MG/DL
VENTRICULAR RATE- MUSE: 62 BPM
VIT B12 SERPL-MCNC: 1698 PG/ML (ref 232–1245)
VIT D+METAB SERPL-MCNC: 26 NG/ML (ref 20–50)
VIT D+METAB SERPL-MCNC: 37 NG/ML (ref 20–50)
WBC # BLD AUTO: 11 10E3/UL (ref 4–11)
WBC # BLD AUTO: 11.3 10E3/UL (ref 4–11)
WBC # BLD AUTO: 11.6 10E3/UL (ref 4–11)
WBC # BLD AUTO: 14.3 10E3/UL (ref 4–11)
WBC # BLD AUTO: 4.4 10E3/UL (ref 4–11)
WBC # BLD AUTO: 4.4 10E3/UL (ref 4–11)
WBC # BLD AUTO: 5.6 10E3/UL (ref 4–11)
WBC # BLD AUTO: 5.6 10E3/UL (ref 4–11)
WBC # BLD AUTO: 5.8 10E3/UL (ref 4–11)
WBC # BLD AUTO: 6.8 10E3/UL (ref 4–11)
WBC # BLD AUTO: 8.1 10E3/UL (ref 4–11)
WBC URINE: 1 /HPF

## 2024-01-01 PROCEDURE — 36415 COLL VENOUS BLD VENIPUNCTURE: CPT | Performed by: HOSPITALIST

## 2024-01-01 PROCEDURE — 36415 COLL VENOUS BLD VENIPUNCTURE: CPT | Performed by: INTERNAL MEDICINE

## 2024-01-01 PROCEDURE — 250N000013 HC RX MED GY IP 250 OP 250 PS 637

## 2024-01-01 PROCEDURE — 84145 PROCALCITONIN (PCT): CPT | Performed by: INTERNAL MEDICINE

## 2024-01-01 PROCEDURE — 99349 HOME/RES VST EST MOD MDM 40: CPT | Performed by: NURSE PRACTITIONER

## 2024-01-01 PROCEDURE — 258N000003 HC RX IP 258 OP 636: Performed by: HOSPITALIST

## 2024-01-01 PROCEDURE — 70450 CT HEAD/BRAIN W/O DYE: CPT

## 2024-01-01 PROCEDURE — 93296 REM INTERROG EVL PM/IDS: CPT | Performed by: INTERNAL MEDICINE

## 2024-01-01 PROCEDURE — 80048 BASIC METABOLIC PNL TOTAL CA: CPT | Performed by: HOSPITALIST

## 2024-01-01 PROCEDURE — 99309 SBSQ NF CARE MODERATE MDM 30: CPT | Performed by: NURSE PRACTITIONER

## 2024-01-01 PROCEDURE — 99310 SBSQ NF CARE HIGH MDM 45: CPT | Performed by: NURSE PRACTITIONER

## 2024-01-01 PROCEDURE — 99309 SBSQ NF CARE MODERATE MDM 30: CPT | Performed by: INTERNAL MEDICINE

## 2024-01-01 PROCEDURE — 99232 SBSQ HOSP IP/OBS MODERATE 35: CPT | Performed by: HOSPITALIST

## 2024-01-01 PROCEDURE — 85027 COMPLETE CBC AUTOMATED: CPT | Mod: ORL | Performed by: NURSE PRACTITIONER

## 2024-01-01 PROCEDURE — 93306 TTE W/DOPPLER COMPLETE: CPT | Mod: 26 | Performed by: INTERNAL MEDICINE

## 2024-01-01 PROCEDURE — 82607 VITAMIN B-12: CPT | Mod: ORL | Performed by: NURSE PRACTITIONER

## 2024-01-01 PROCEDURE — 99223 1ST HOSP IP/OBS HIGH 75: CPT | Performed by: HOSPITALIST

## 2024-01-01 PROCEDURE — 72141 MRI NECK SPINE W/O DYE: CPT

## 2024-01-01 PROCEDURE — P9604 ONE-WAY ALLOW PRORATED TRIP: HCPCS | Mod: ORL | Performed by: NURSE PRACTITIONER

## 2024-01-01 PROCEDURE — 87637 SARSCOV2&INF A&B&RSV AMP PRB: CPT | Mod: ORL | Performed by: NURSE PRACTITIONER

## 2024-01-01 PROCEDURE — L0172 CERV COL SR FOAM 2PC PRE OTS: HCPCS

## 2024-01-01 PROCEDURE — 99285 EMERGENCY DEPT VISIT HI MDM: CPT | Mod: 25

## 2024-01-01 PROCEDURE — 72148 MRI LUMBAR SPINE W/O DYE: CPT

## 2024-01-01 PROCEDURE — 85025 COMPLETE CBC W/AUTO DIFF WBC: CPT | Performed by: HOSPITALIST

## 2024-01-01 PROCEDURE — 36415 COLL VENOUS BLD VENIPUNCTURE: CPT | Performed by: EMERGENCY MEDICINE

## 2024-01-01 PROCEDURE — 250N000013 HC RX MED GY IP 250 OP 250 PS 637: Performed by: PHYSICIAN ASSISTANT

## 2024-01-01 PROCEDURE — 97530 THERAPEUTIC ACTIVITIES: CPT | Mod: GP

## 2024-01-01 PROCEDURE — 81001 URINALYSIS AUTO W/SCOPE: CPT | Performed by: EMERGENCY MEDICINE

## 2024-01-01 PROCEDURE — 120N000001 HC R&B MED SURG/OB

## 2024-01-01 PROCEDURE — 36415 COLL VENOUS BLD VENIPUNCTURE: CPT | Mod: ORL | Performed by: NURSE PRACTITIONER

## 2024-01-01 PROCEDURE — 250N000013 HC RX MED GY IP 250 OP 250 PS 637: Performed by: HOSPITALIST

## 2024-01-01 PROCEDURE — 83880 ASSAY OF NATRIURETIC PEPTIDE: CPT | Performed by: EMERGENCY MEDICINE

## 2024-01-01 PROCEDURE — 85048 AUTOMATED LEUKOCYTE COUNT: CPT | Performed by: INTERNAL MEDICINE

## 2024-01-01 PROCEDURE — 85004 AUTOMATED DIFF WBC COUNT: CPT | Performed by: EMERGENCY MEDICINE

## 2024-01-01 PROCEDURE — 99207 PR APP CREDIT; MD BILLING SHARED VISIT: CPT | Performed by: PHYSICIAN ASSISTANT

## 2024-01-01 PROCEDURE — 82550 ASSAY OF CK (CPK): CPT | Performed by: HOSPITALIST

## 2024-01-01 PROCEDURE — 73030 X-RAY EXAM OF SHOULDER: CPT | Mod: RT

## 2024-01-01 PROCEDURE — 99239 HOSP IP/OBS DSCHRG MGMT >30: CPT | Performed by: HOSPITALIST

## 2024-01-01 PROCEDURE — 72128 CT CHEST SPINE W/O DYE: CPT

## 2024-01-01 PROCEDURE — 80048 BASIC METABOLIC PNL TOTAL CA: CPT | Performed by: EMERGENCY MEDICINE

## 2024-01-01 PROCEDURE — 83036 HEMOGLOBIN GLYCOSYLATED A1C: CPT | Mod: ORL | Performed by: NURSE PRACTITIONER

## 2024-01-01 PROCEDURE — 82306 VITAMIN D 25 HYDROXY: CPT | Performed by: HOSPITALIST

## 2024-01-01 PROCEDURE — 85025 COMPLETE CBC W/AUTO DIFF WBC: CPT | Performed by: EMERGENCY MEDICINE

## 2024-01-01 PROCEDURE — 99222 1ST HOSP IP/OBS MODERATE 55: CPT | Performed by: HOSPITALIST

## 2024-01-01 PROCEDURE — L3670 SO ACRO/CLAV CAN WEB PRE OTS: HCPCS

## 2024-01-01 PROCEDURE — 82565 ASSAY OF CREATININE: CPT | Mod: ORL | Performed by: NURSE PRACTITIONER

## 2024-01-01 PROCEDURE — 82550 ASSAY OF CK (CPK): CPT | Performed by: EMERGENCY MEDICINE

## 2024-01-01 PROCEDURE — G0463 HOSPITAL OUTPT CLINIC VISIT: HCPCS | Mod: 25

## 2024-01-01 PROCEDURE — 97116 GAIT TRAINING THERAPY: CPT | Mod: GP

## 2024-01-01 PROCEDURE — 82306 VITAMIN D 25 HYDROXY: CPT | Mod: ORL | Performed by: NURSE PRACTITIONER

## 2024-01-01 PROCEDURE — 258N000003 HC RX IP 258 OP 636: Performed by: EMERGENCY MEDICINE

## 2024-01-01 PROCEDURE — 85018 HEMOGLOBIN: CPT | Mod: ORL | Performed by: NURSE PRACTITIONER

## 2024-01-01 PROCEDURE — 72131 CT LUMBAR SPINE W/O DYE: CPT

## 2024-01-01 PROCEDURE — 97535 SELF CARE MNGMENT TRAINING: CPT | Mod: GO

## 2024-01-01 PROCEDURE — 72125 CT NECK SPINE W/O DYE: CPT

## 2024-01-01 PROCEDURE — 999N000198 HC STATISTIC WOC PT EDUCATION, 16-30 MIN

## 2024-01-01 PROCEDURE — 71045 X-RAY EXAM CHEST 1 VIEW: CPT

## 2024-01-01 PROCEDURE — 80053 COMPREHEN METABOLIC PANEL: CPT | Performed by: EMERGENCY MEDICINE

## 2024-01-01 PROCEDURE — 97110 THERAPEUTIC EXERCISES: CPT | Mod: GO

## 2024-01-01 PROCEDURE — 96360 HYDRATION IV INFUSION INIT: CPT

## 2024-01-01 PROCEDURE — 80048 BASIC METABOLIC PNL TOTAL CA: CPT | Mod: ORL | Performed by: NURSE PRACTITIONER

## 2024-01-01 PROCEDURE — 99305 1ST NF CARE MODERATE MDM 35: CPT | Performed by: INTERNAL MEDICINE

## 2024-01-01 PROCEDURE — 250N000012 HC RX MED GY IP 250 OP 636 PS 637: Performed by: HOSPITALIST

## 2024-01-01 PROCEDURE — 85014 HEMATOCRIT: CPT | Performed by: HOSPITALIST

## 2024-01-01 PROCEDURE — 97166 OT EVAL MOD COMPLEX 45 MIN: CPT | Mod: GO | Performed by: OCCUPATIONAL THERAPIST

## 2024-01-01 PROCEDURE — 93005 ELECTROCARDIOGRAM TRACING: CPT

## 2024-01-01 PROCEDURE — 72040 X-RAY EXAM NECK SPINE 2-3 VW: CPT

## 2024-01-01 PROCEDURE — 84484 ASSAY OF TROPONIN QUANT: CPT | Performed by: EMERGENCY MEDICINE

## 2024-01-01 PROCEDURE — 72100 X-RAY EXAM L-S SPINE 2/3 VWS: CPT

## 2024-01-01 PROCEDURE — 97535 SELF CARE MNGMENT TRAINING: CPT | Mod: GO | Performed by: OCCUPATIONAL THERAPIST

## 2024-01-01 PROCEDURE — 97161 PT EVAL LOW COMPLEX 20 MIN: CPT | Mod: GP

## 2024-01-01 PROCEDURE — 85027 COMPLETE CBC AUTOMATED: CPT | Performed by: HOSPITALIST

## 2024-01-01 PROCEDURE — 97530 THERAPEUTIC ACTIVITIES: CPT | Mod: GO

## 2024-01-01 PROCEDURE — 73502 X-RAY EXAM HIP UNI 2-3 VIEWS: CPT

## 2024-01-01 PROCEDURE — 93288 INTERROG EVL PM/LDLS PM IP: CPT

## 2024-01-01 PROCEDURE — 250N000011 HC RX IP 250 OP 636: Performed by: HOSPITALIST

## 2024-01-01 PROCEDURE — 99239 HOSP IP/OBS DSCHRG MGMT >30: CPT | Performed by: INTERNAL MEDICINE

## 2024-01-01 PROCEDURE — 93306 TTE W/DOPPLER COMPLETE: CPT

## 2024-01-01 PROCEDURE — 93294 REM INTERROG EVL PM/LDLS PM: CPT | Performed by: INTERNAL MEDICINE

## 2024-01-01 PROCEDURE — 84484 ASSAY OF TROPONIN QUANT: CPT | Performed by: HOSPITALIST

## 2024-01-01 RX ORDER — PANTOPRAZOLE SODIUM 40 MG/1
40 TABLET, DELAYED RELEASE ORAL
Status: DISCONTINUED | OUTPATIENT
Start: 2024-01-01 | End: 2024-01-01 | Stop reason: HOSPADM

## 2024-01-01 RX ORDER — SODIUM CHLORIDE 9 MG/ML
INJECTION, SOLUTION INTRAVENOUS CONTINUOUS
Status: ACTIVE | OUTPATIENT
Start: 2024-01-01 | End: 2024-01-01

## 2024-01-01 RX ORDER — LANOLIN ALCOHOL/MO/W.PET/CERES
3 CREAM (GRAM) TOPICAL
Status: DISCONTINUED | OUTPATIENT
Start: 2024-01-01 | End: 2024-01-01 | Stop reason: HOSPADM

## 2024-01-01 RX ORDER — AMOXICILLIN 250 MG
2 CAPSULE ORAL 2 TIMES DAILY PRN
Status: DISCONTINUED | OUTPATIENT
Start: 2024-01-01 | End: 2024-01-01 | Stop reason: HOSPADM

## 2024-01-01 RX ORDER — AMOXICILLIN 250 MG
2 CAPSULE ORAL DAILY PRN
Status: CANCELLED | OUTPATIENT
Start: 2024-01-01

## 2024-01-01 RX ORDER — LIDOCAINE 40 MG/G
CREAM TOPICAL
Status: DISCONTINUED | OUTPATIENT
Start: 2024-01-01 | End: 2024-01-01 | Stop reason: HOSPADM

## 2024-01-01 RX ORDER — ACETAMINOPHEN 650 MG/1
650 SUPPOSITORY RECTAL EVERY 4 HOURS PRN
Status: DISCONTINUED | OUTPATIENT
Start: 2024-01-01 | End: 2024-01-01 | Stop reason: HOSPADM

## 2024-01-01 RX ORDER — NALOXONE HYDROCHLORIDE 0.4 MG/ML
0.2 INJECTION, SOLUTION INTRAMUSCULAR; INTRAVENOUS; SUBCUTANEOUS
Status: DISCONTINUED | OUTPATIENT
Start: 2024-01-01 | End: 2024-01-01 | Stop reason: HOSPADM

## 2024-01-01 RX ORDER — ONDANSETRON 2 MG/ML
4 INJECTION INTRAMUSCULAR; INTRAVENOUS EVERY 6 HOURS PRN
Status: DISCONTINUED | OUTPATIENT
Start: 2024-01-01 | End: 2024-01-01 | Stop reason: HOSPADM

## 2024-01-01 RX ORDER — CYCLOSPORINE 0.5 MG/ML
1 EMULSION OPHTHALMIC 2 TIMES DAILY PRN
Status: DISCONTINUED | OUTPATIENT
Start: 2024-01-01 | End: 2024-01-01

## 2024-01-01 RX ORDER — ASCORBIC ACID 500 MG
500 TABLET ORAL DAILY
Status: DISCONTINUED | OUTPATIENT
Start: 2024-01-01 | End: 2024-01-01 | Stop reason: HOSPADM

## 2024-01-01 RX ORDER — ASPIRIN 81 MG/1
81 TABLET, CHEWABLE ORAL DAILY
Status: DISCONTINUED | OUTPATIENT
Start: 2024-01-01 | End: 2024-01-01 | Stop reason: HOSPADM

## 2024-01-01 RX ORDER — HYDROCORTISONE 10 MG/G
CREAM TOPICAL 4 TIMES DAILY PRN
Status: ON HOLD | COMMUNITY
End: 2024-01-01

## 2024-01-01 RX ORDER — SODIUM CHLORIDE 9 MG/ML
INJECTION, SOLUTION INTRAVENOUS CONTINUOUS
Status: DISCONTINUED | OUTPATIENT
Start: 2024-01-01 | End: 2024-01-01

## 2024-01-01 RX ORDER — ACETAMINOPHEN 325 MG/1
650 TABLET ORAL
DISCHARGE
Start: 2024-01-01 | End: 2024-01-01 | Stop reason: DRUGHIGH

## 2024-01-01 RX ORDER — CALCIUM CARBONATE 500 MG/1
1000 TABLET, CHEWABLE ORAL 4 TIMES DAILY PRN
Status: DISCONTINUED | OUTPATIENT
Start: 2024-01-01 | End: 2024-01-01 | Stop reason: HOSPADM

## 2024-01-01 RX ORDER — CARBOXYMETHYLCELLULOSE SODIUM 5 MG/ML
1 SOLUTION/ DROPS OPHTHALMIC
Status: DISCONTINUED | OUTPATIENT
Start: 2024-01-01 | End: 2024-01-01 | Stop reason: HOSPADM

## 2024-01-01 RX ORDER — FUROSEMIDE 40 MG
40 TABLET ORAL DAILY
Start: 2024-01-01 | End: 2024-01-01

## 2024-01-01 RX ORDER — DEXTROSE MONOHYDRATE 25 G/50ML
25-50 INJECTION, SOLUTION INTRAVENOUS
Status: DISCONTINUED | OUTPATIENT
Start: 2024-01-01 | End: 2024-01-01 | Stop reason: HOSPADM

## 2024-01-01 RX ORDER — HYDROMORPHONE HCL IN WATER/PF 6 MG/30 ML
0.2 PATIENT CONTROLLED ANALGESIA SYRINGE INTRAVENOUS
Status: DISCONTINUED | OUTPATIENT
Start: 2024-01-01 | End: 2024-01-01 | Stop reason: HOSPADM

## 2024-01-01 RX ORDER — METOPROLOL SUCCINATE 25 MG/1
25 TABLET, EXTENDED RELEASE ORAL DAILY
Status: DISCONTINUED | OUTPATIENT
Start: 2024-01-01 | End: 2024-01-01 | Stop reason: HOSPADM

## 2024-01-01 RX ORDER — AMOXICILLIN 250 MG
1 CAPSULE ORAL 2 TIMES DAILY PRN
Status: DISCONTINUED | OUTPATIENT
Start: 2024-01-01 | End: 2024-01-01 | Stop reason: HOSPADM

## 2024-01-01 RX ORDER — NICOTINE POLACRILEX 4 MG
15-30 LOZENGE BUCCAL
Status: DISCONTINUED | OUTPATIENT
Start: 2024-01-01 | End: 2024-01-01 | Stop reason: HOSPADM

## 2024-01-01 RX ORDER — POLYETHYLENE GLYCOL 3350 17 G/17G
17 POWDER, FOR SOLUTION ORAL DAILY
Status: DISCONTINUED | OUTPATIENT
Start: 2024-01-01 | End: 2024-01-01 | Stop reason: HOSPADM

## 2024-01-01 RX ORDER — AMOXICILLIN 250 MG
2 CAPSULE ORAL DAILY PRN
Status: DISCONTINUED | OUTPATIENT
Start: 2024-01-01 | End: 2024-01-01 | Stop reason: HOSPADM

## 2024-01-01 RX ORDER — FERROUS SULFATE 325(65) MG
325 TABLET ORAL EVERY OTHER DAY
Status: SHIPPED
Start: 2024-01-01 | End: 2024-01-01

## 2024-01-01 RX ORDER — GUAIFENESIN/DEXTROMETHORPHAN 100-10MG/5
10 SYRUP ORAL EVERY 4 HOURS PRN
Status: DISCONTINUED | OUTPATIENT
Start: 2024-01-01 | End: 2024-01-01 | Stop reason: HOSPADM

## 2024-01-01 RX ORDER — LORAZEPAM 2 MG/ML
0.5 INJECTION INTRAMUSCULAR
Status: DISCONTINUED | OUTPATIENT
Start: 2024-01-01 | End: 2024-01-01 | Stop reason: HOSPADM

## 2024-01-01 RX ORDER — HYDROXYZINE HYDROCHLORIDE 25 MG/1
25 TABLET, FILM COATED ORAL AT BEDTIME
Status: SHIPPED
Start: 2024-01-01

## 2024-01-01 RX ORDER — ROSUVASTATIN CALCIUM 20 MG/1
20 TABLET, COATED ORAL AT BEDTIME
Status: DISCONTINUED | OUTPATIENT
Start: 2024-01-01 | End: 2024-01-01 | Stop reason: HOSPADM

## 2024-01-01 RX ORDER — ACETAMINOPHEN 325 MG/1
650 TABLET ORAL
Status: DISCONTINUED | OUTPATIENT
Start: 2024-01-01 | End: 2024-01-01 | Stop reason: HOSPADM

## 2024-01-01 RX ORDER — ONDANSETRON 4 MG/1
4 TABLET, ORALLY DISINTEGRATING ORAL EVERY 6 HOURS PRN
Status: DISCONTINUED | OUTPATIENT
Start: 2024-01-01 | End: 2024-01-01 | Stop reason: HOSPADM

## 2024-01-01 RX ORDER — TRAZODONE HYDROCHLORIDE 50 MG/1
TABLET, FILM COATED ORAL
Qty: 13 TABLET | Refills: 12 | Status: SHIPPED | OUTPATIENT
Start: 2024-01-01 | End: 2024-01-01

## 2024-01-01 RX ORDER — ACETAMINOPHEN 325 MG/1
650 TABLET ORAL EVERY 4 HOURS PRN
Status: DISCONTINUED | OUTPATIENT
Start: 2024-01-01 | End: 2024-01-01 | Stop reason: HOSPADM

## 2024-01-01 RX ORDER — HYDROMORPHONE HCL IN WATER/PF 6 MG/30 ML
0.4 PATIENT CONTROLLED ANALGESIA SYRINGE INTRAVENOUS
Status: DISCONTINUED | OUTPATIENT
Start: 2024-01-01 | End: 2024-01-01 | Stop reason: HOSPADM

## 2024-01-01 RX ORDER — CITALOPRAM HYDROBROMIDE 20 MG/1
20 TABLET ORAL AT BEDTIME
Status: DISCONTINUED | OUTPATIENT
Start: 2024-01-01 | End: 2024-01-01 | Stop reason: HOSPADM

## 2024-01-01 RX ORDER — VITAMIN B COMPLEX
25 TABLET ORAL DAILY
Status: DISCONTINUED | OUTPATIENT
Start: 2024-01-01 | End: 2024-01-01 | Stop reason: HOSPADM

## 2024-01-01 RX ORDER — SERTRALINE HYDROCHLORIDE 25 MG/1
25 TABLET, FILM COATED ORAL DAILY
Status: DISCONTINUED | OUTPATIENT
Start: 2024-01-01 | End: 2024-01-01 | Stop reason: HOSPADM

## 2024-01-01 RX ORDER — CITALOPRAM HYDROBROMIDE 20 MG/1
20 TABLET ORAL AT BEDTIME
COMMUNITY
Start: 2024-01-01

## 2024-01-01 RX ORDER — OXYCODONE HYDROCHLORIDE 5 MG/1
5 TABLET ORAL EVERY 4 HOURS PRN
Status: DISCONTINUED | OUTPATIENT
Start: 2024-01-01 | End: 2024-01-01 | Stop reason: HOSPADM

## 2024-01-01 RX ORDER — TRAZODONE HYDROCHLORIDE 50 MG/1
25 TABLET, FILM COATED ORAL EVERY MORNING
COMMUNITY

## 2024-01-01 RX ORDER — NALOXONE HYDROCHLORIDE 0.4 MG/ML
0.4 INJECTION, SOLUTION INTRAMUSCULAR; INTRAVENOUS; SUBCUTANEOUS
Status: DISCONTINUED | OUTPATIENT
Start: 2024-01-01 | End: 2024-01-01 | Stop reason: HOSPADM

## 2024-01-01 RX ORDER — TRAZODONE HYDROCHLORIDE 50 MG/1
25 TABLET, FILM COATED ORAL 2 TIMES DAILY
COMMUNITY
Start: 2024-01-01 | End: 2024-01-01 | Stop reason: DRUGHIGH

## 2024-01-01 RX ORDER — HYDROXYZINE HYDROCHLORIDE 25 MG/1
25 TABLET, FILM COATED ORAL AT BEDTIME
Status: DISCONTINUED | OUTPATIENT
Start: 2024-01-01 | End: 2024-01-01 | Stop reason: HOSPADM

## 2024-01-01 RX ORDER — LANOLIN ALCOHOL/MO/W.PET/CERES
1000 CREAM (GRAM) TOPICAL DAILY
Status: DISCONTINUED | OUTPATIENT
Start: 2024-01-01 | End: 2024-01-01 | Stop reason: HOSPADM

## 2024-01-01 RX ORDER — TRAZODONE HYDROCHLORIDE 50 MG/1
50 TABLET, FILM COATED ORAL AT BEDTIME
Status: DISCONTINUED | OUTPATIENT
Start: 2024-01-01 | End: 2024-01-01 | Stop reason: HOSPADM

## 2024-01-01 RX ORDER — ACETAMINOPHEN 500 MG
1000 TABLET ORAL 3 TIMES DAILY
COMMUNITY
Start: 2024-01-01

## 2024-01-01 RX ORDER — TRAZODONE HYDROCHLORIDE 50 MG/1
50 TABLET, FILM COATED ORAL AT BEDTIME
COMMUNITY

## 2024-01-01 RX ADMIN — ACETAMINOPHEN 650 MG: 325 TABLET, FILM COATED ORAL at 12:00

## 2024-01-01 RX ADMIN — PANTOPRAZOLE SODIUM 40 MG: 40 TABLET, DELAYED RELEASE ORAL at 13:59

## 2024-01-01 RX ADMIN — OXYCODONE HYDROCHLORIDE AND ACETAMINOPHEN 500 MG: 500 TABLET ORAL at 08:46

## 2024-01-01 RX ADMIN — OXYCODONE HYDROCHLORIDE AND ACETAMINOPHEN 500 MG: 500 TABLET ORAL at 08:49

## 2024-01-01 RX ADMIN — ACETAMINOPHEN 650 MG: 325 TABLET, FILM COATED ORAL at 17:58

## 2024-01-01 RX ADMIN — SODIUM CHLORIDE: 9 INJECTION, SOLUTION INTRAVENOUS at 21:35

## 2024-01-01 RX ADMIN — CYANOCOBALAMIN TAB 1000 MCG 1000 MCG: 1000 TAB at 08:49

## 2024-01-01 RX ADMIN — METOPROLOL SUCCINATE 25 MG: 25 TABLET, EXTENDED RELEASE ORAL at 10:06

## 2024-01-01 RX ADMIN — ROSUVASTATIN CALCIUM 20 MG: 20 TABLET, FILM COATED ORAL at 21:21

## 2024-01-01 RX ADMIN — Medication 25 MCG: at 08:49

## 2024-01-01 RX ADMIN — SODIUM CHLORIDE 1000 ML: 9 INJECTION, SOLUTION INTRAVENOUS at 13:24

## 2024-01-01 RX ADMIN — SODIUM CHLORIDE: 9 INJECTION, SOLUTION INTRAVENOUS at 16:47

## 2024-01-01 RX ADMIN — ASPIRIN 81 MG CHEWABLE TABLET 81 MG: 81 TABLET CHEWABLE at 08:49

## 2024-01-01 RX ADMIN — POLYETHYLENE GLYCOL 3350 17 G: 17 POWDER, FOR SOLUTION ORAL at 08:50

## 2024-01-01 RX ADMIN — POLYETHYLENE GLYCOL 3350 17 G: 17 POWDER, FOR SOLUTION ORAL at 08:46

## 2024-01-01 RX ADMIN — ASPIRIN 81 MG CHEWABLE TABLET 81 MG: 81 TABLET CHEWABLE at 10:06

## 2024-01-01 RX ADMIN — PANTOPRAZOLE SODIUM 40 MG: 40 TABLET, DELAYED RELEASE ORAL at 07:19

## 2024-01-01 RX ADMIN — INSULIN ASPART 1 UNITS: 100 INJECTION, SOLUTION INTRAVENOUS; SUBCUTANEOUS at 12:00

## 2024-01-01 RX ADMIN — ACETAMINOPHEN 650 MG: 325 TABLET, FILM COATED ORAL at 22:50

## 2024-01-01 RX ADMIN — HYDROMORPHONE HYDROCHLORIDE 0.2 MG: 0.2 INJECTION, SOLUTION INTRAMUSCULAR; INTRAVENOUS; SUBCUTANEOUS at 03:53

## 2024-01-01 RX ADMIN — OXYCODONE HYDROCHLORIDE AND ACETAMINOPHEN 500 MG: 500 TABLET ORAL at 10:06

## 2024-01-01 RX ADMIN — SODIUM CHLORIDE: 9 INJECTION, SOLUTION INTRAVENOUS at 14:24

## 2024-01-01 RX ADMIN — ACETAMINOPHEN 650 MG: 325 TABLET, FILM COATED ORAL at 00:40

## 2024-01-01 RX ADMIN — ACETAMINOPHEN 650 MG: 325 TABLET, FILM COATED ORAL at 08:46

## 2024-01-01 RX ADMIN — Medication 25 MCG: at 08:46

## 2024-01-01 RX ADMIN — SERTRALINE HYDROCHLORIDE 25 MG: 25 TABLET ORAL at 08:49

## 2024-01-01 RX ADMIN — ACETAMINOPHEN 650 MG: 325 TABLET, FILM COATED ORAL at 17:38

## 2024-01-01 RX ADMIN — PANTOPRAZOLE SODIUM 40 MG: 40 TABLET, DELAYED RELEASE ORAL at 06:49

## 2024-01-01 RX ADMIN — PANTOPRAZOLE SODIUM 40 MG: 40 TABLET, DELAYED RELEASE ORAL at 06:47

## 2024-01-01 RX ADMIN — HYDROXYZINE HYDROCHLORIDE 25 MG: 25 TABLET, FILM COATED ORAL at 21:21

## 2024-01-01 RX ADMIN — SERTRALINE HYDROCHLORIDE 25 MG: 25 TABLET ORAL at 08:46

## 2024-01-01 RX ADMIN — INSULIN ASPART 1 UNITS: 100 INJECTION, SOLUTION INTRAVENOUS; SUBCUTANEOUS at 04:39

## 2024-01-01 RX ADMIN — METOPROLOL SUCCINATE 25 MG: 25 TABLET, EXTENDED RELEASE ORAL at 08:49

## 2024-01-01 RX ADMIN — SERTRALINE HYDROCHLORIDE 25 MG: 25 TABLET ORAL at 10:07

## 2024-01-01 RX ADMIN — PANTOPRAZOLE SODIUM 40 MG: 40 TABLET, DELAYED RELEASE ORAL at 06:44

## 2024-01-01 RX ADMIN — ACETAMINOPHEN 650 MG: 325 TABLET, FILM COATED ORAL at 12:02

## 2024-01-01 RX ADMIN — CYANOCOBALAMIN TAB 1000 MCG 1000 MCG: 1000 TAB at 10:06

## 2024-01-01 RX ADMIN — Medication 25 MCG: at 10:06

## 2024-01-01 RX ADMIN — TRAZODONE HYDROCHLORIDE 50 MG: 50 TABLET ORAL at 21:21

## 2024-01-01 RX ADMIN — ASPIRIN 81 MG CHEWABLE TABLET 81 MG: 81 TABLET CHEWABLE at 08:46

## 2024-01-01 RX ADMIN — ACETAMINOPHEN 650 MG: 325 TABLET, FILM COATED ORAL at 10:06

## 2024-01-01 RX ADMIN — CITALOPRAM HYDROBROMIDE 20 MG: 20 TABLET ORAL at 21:21

## 2024-01-01 RX ADMIN — POLYETHYLENE GLYCOL 3350 17 G: 17 POWDER, FOR SOLUTION ORAL at 10:06

## 2024-01-01 RX ADMIN — TRAZODONE HYDROCHLORIDE 25 MG: 50 TABLET ORAL at 09:23

## 2024-01-01 RX ADMIN — ACETAMINOPHEN 650 MG: 325 TABLET, FILM COATED ORAL at 08:48

## 2024-01-01 RX ADMIN — ACETAMINOPHEN 650 MG: 325 TABLET, FILM COATED ORAL at 13:00

## 2024-01-01 RX ADMIN — CYANOCOBALAMIN TAB 1000 MCG 1000 MCG: 1000 TAB at 08:46

## 2024-01-01 RX ADMIN — ROSUVASTATIN CALCIUM 20 MG: 20 TABLET, FILM COATED ORAL at 21:35

## 2024-01-01 RX ADMIN — METOPROLOL SUCCINATE 25 MG: 25 TABLET, EXTENDED RELEASE ORAL at 08:46

## 2024-01-01 RX ADMIN — ACETAMINOPHEN 650 MG: 325 TABLET, FILM COATED ORAL at 17:52

## 2024-01-01 RX ADMIN — SODIUM CHLORIDE: 9 INJECTION, SOLUTION INTRAVENOUS at 05:15

## 2024-01-01 ASSESSMENT — ACTIVITIES OF DAILY LIVING (ADL)
ADLS_ACUITY_SCORE: 39
ADLS_ACUITY_SCORE: 41
ADLS_ACUITY_SCORE: 56
ADLS_ACUITY_SCORE: 41
ADLS_ACUITY_SCORE: 41
ADLS_ACUITY_SCORE: 39
ADLS_ACUITY_SCORE: 41
ADLS_ACUITY_SCORE: 59
ADLS_ACUITY_SCORE: 61
ADLS_ACUITY_SCORE: 61
ADLS_ACUITY_SCORE: 57
ADLS_ACUITY_SCORE: 45
ADLS_ACUITY_SCORE: 39
ADLS_ACUITY_SCORE: 45
ADLS_ACUITY_SCORE: 61
ADLS_ACUITY_SCORE: 45
ADLS_ACUITY_SCORE: 61
ADLS_ACUITY_SCORE: 50
ADLS_ACUITY_SCORE: 57
ADLS_ACUITY_SCORE: 41
ADLS_ACUITY_SCORE: 59
ADLS_ACUITY_SCORE: 45
ADLS_ACUITY_SCORE: 58
ADLS_ACUITY_SCORE: 61
ADLS_ACUITY_SCORE: 39
ADLS_ACUITY_SCORE: 57
ADLS_ACUITY_SCORE: 61
ADLS_ACUITY_SCORE: 47
ADLS_ACUITY_SCORE: 61
ADLS_ACUITY_SCORE: 45
ADLS_ACUITY_SCORE: 59
ADLS_ACUITY_SCORE: 45
ADLS_ACUITY_SCORE: 57
ADLS_ACUITY_SCORE: 45
ADLS_ACUITY_SCORE: 45
ADLS_ACUITY_SCORE: 61
ADLS_ACUITY_SCORE: 45
ADLS_ACUITY_SCORE: 39
ADLS_ACUITY_SCORE: 39
ADLS_ACUITY_SCORE: 57
ADLS_ACUITY_SCORE: 61
ADLS_ACUITY_SCORE: 59
ADLS_ACUITY_SCORE: 57
ADLS_ACUITY_SCORE: 57
ADLS_ACUITY_SCORE: 61
ADLS_ACUITY_SCORE: 57
ADLS_ACUITY_SCORE: 57
ADLS_ACUITY_SCORE: 45
ADLS_ACUITY_SCORE: 45
ADLS_ACUITY_SCORE: 61
ADLS_ACUITY_SCORE: 61
ADLS_ACUITY_SCORE: 58
ADLS_ACUITY_SCORE: 61
ADLS_ACUITY_SCORE: 45
ADLS_ACUITY_SCORE: 61
ADLS_ACUITY_SCORE: 45
ADLS_ACUITY_SCORE: 39
ADLS_ACUITY_SCORE: 41
ADLS_ACUITY_SCORE: 61
ADLS_ACUITY_SCORE: 57
ADLS_ACUITY_SCORE: 45
ADLS_ACUITY_SCORE: 61
ADLS_ACUITY_SCORE: 39
ADLS_ACUITY_SCORE: 61
ADLS_ACUITY_SCORE: 59
ADLS_ACUITY_SCORE: 57
ADLS_ACUITY_SCORE: 61
ADLS_ACUITY_SCORE: 45
ADLS_ACUITY_SCORE: 57
ADLS_ACUITY_SCORE: 59
ADLS_ACUITY_SCORE: 41
ADLS_ACUITY_SCORE: 59
ADLS_ACUITY_SCORE: 57
ADLS_ACUITY_SCORE: 39

## 2024-01-01 ASSESSMENT — ENCOUNTER SYMPTOMS: CONFUSION: 1

## 2024-01-01 ASSESSMENT — COLUMBIA-SUICIDE SEVERITY RATING SCALE - C-SSRS
6. HAVE YOU EVER DONE ANYTHING, STARTED TO DO ANYTHING, OR PREPARED TO DO ANYTHING TO END YOUR LIFE?: NO
1. IN THE PAST MONTH, HAVE YOU WISHED YOU WERE DEAD OR WISHED YOU COULD GO TO SLEEP AND NOT WAKE UP?: NO
2. HAVE YOU ACTUALLY HAD ANY THOUGHTS OF KILLING YOURSELF IN THE PAST MONTH?: NO

## 2024-01-08 NOTE — TELEPHONE ENCOUNTER
Ordered omeprazole 20 mg daily due to decrease in hgb.   Repeat hgb in 1 month  LESTER Otoole CNP on 1/8/2024 at 4:49 PM

## 2024-01-08 NOTE — TELEPHONE ENCOUNTER
Orders relayed to facility nurse on 1/8/24    Francia Degroot RN    ----- Message from LESTER Sanders CNP sent at 1/8/2024  4:50 PM CST -----  Please start omeprazole 20 mg daily by mouth due to anemia  Repeat hgb in 1 month    LESTER Otoole CNP on 1/8/2024 at 4:50 PM

## 2024-01-11 PROBLEM — E11.59 TYPE 2 DIABETES MELLITUS WITH OTHER CIRCULATORY COMPLICATION, WITHOUT LONG-TERM CURRENT USE OF INSULIN (H): Status: ACTIVE | Noted: 2024-01-01

## 2024-01-11 PROBLEM — I50.9 CONGESTIVE HEART FAILURE, UNSPECIFIED HF CHRONICITY, UNSPECIFIED HEART FAILURE TYPE (H): Status: ACTIVE | Noted: 2024-01-01

## 2024-01-11 PROBLEM — F03.B3 MODERATE DEMENTIA WITH MOOD DISTURBANCE, UNSPECIFIED DEMENTIA TYPE (H): Status: ACTIVE | Noted: 2023-07-23

## 2024-01-11 PROBLEM — I44.2 COMPLETE HEART BLOCK (H): Status: ACTIVE | Noted: 2024-01-01

## 2024-01-11 NOTE — PROGRESS NOTES
[facility] GERIATRICS  Chief Complaint   Patient presents with    penitentiary Regulatory     [facility] Medical Record Number:  [id number]  Place of Service where encounter took place:  [facility]) [[id number]]    HPI:    Erick Shahid  is 83 year old (1940), who is being seen today for a federally mandated E/M visit.     Patient's past medical history includes     Dementia   heart failure with preserved ejection fraction   aortic stenosis s/p TAVR 23 mm Allison ROBERTO valve on 8/2023  Complete heart block s/p permanent pacemaker  Carotid stenosis  Coronary artery disease   type 2 diabetes  Anemia   Depression  Macular degeneration  Constipation     Patient was in transitional care unit at [place] from 10/17/2023 to 11/14/2023.     In August 2023 he underwent a TAVR which was complicated by a complete heart block.  During hospitalization he had acute mental status changes with word finding deficit and possible left-sided weakness.  His CT was negative.  He was noted to have moderate to severe right carotid stenosis.  In baseline dementia.  He was started on melatonin for vivid dreams.    Stopped melatonin and started trazodone    Started omeprazole due to low hgb     Today patient was seen in his room.  patient denied chest pain, shortness of breath, dizziness, lightheadedness, and a poor appetite.   Nursing has no concerns. BIMS=12/15 ( 8 to 12 suggests moderately impaire) PHQ9=1/27.   Patient needs cuing assistance with ADLs.   appetite is good and consumes a low carb diet.  Per nursing, skin is intact. Code status is DNR/DNI.   In reviewing point click care Blood pressure VS are stable.         ALLERGIES:Patient has no known allergies.  PAST MEDICAL HISTORY:   Past Medical History:   Diagnosis Date    Benign essential hypertension     BPH (benign prostatic hyperplasia)     Chronic heart failure with reduced ejection fraction and diastolic dysfunction (H)     Cognitive impairment     Critical  aortic valve stenosis     Diabetes mellitus, type 2 (H)     on metformin    Dyslipidemia     First degree atrioventricular block     LBBB (left bundle branch block)     Macular degeneration (senile) of retina      PAST SURGICAL HISTORY:   has a past surgical history that includes Coronary Angiogram (N/A, 07/28/2023); Percutaneous Coronary Intervention (N/A, 07/28/2023); Transcatheter Aortic Valve Replacement (08/15/2023); Orif Hip Fracture (Right, 2018); Pacemaker Device & Lead Implant- Right Atrial & Left Ventricular (N/A, 8/15/2023); and Transcatheter Aortic Valve Replacement-Femoral Approach (N/A, 8/15/2023).  FAMILY HISTORY: family history is not on file.  SOCIAL HISTORY:  reports that he has never smoked. He has never used smokeless tobacco. He reports current alcohol use. He reports that he does not use drugs.    MEDICATIONS:  MED REC REQUIRED  Post Medication Reconciliation Status:  Discharge medications reconciled, continue medications without change         Review of your medicines            Accurate as of January 11, 2024 11:27 AM. If you have any questions, ask your nurse or doctor.                CONTINUE these medicines which have NOT CHANGED        Dose / Directions   aspirin 81 MG chewable tablet  Commonly known as: ASA  Used for: S/P TAVR (transcatheter aortic valve replacement), Aortic stenosis, severe      Dose: 81 mg  Take 1 tablet (81 mg) by mouth daily  Quantity: 90 tablet  Refills: 3     cycloSPORINE 0.05 % ophthalmic emulsion  Commonly known as: RESTASIS      Dose: 1 drop  Apply 1 drop to eye 2 times daily as needed for dry eyes  Refills: 0     metFORMIN 1000 MG tablet  Commonly known as: GLUCOPHAGE      Dose: 1 tablet  Take 1 tablet by mouth daily (with dinner)  Refills: 0     metoprolol succinate ER 25 MG 24 hr tablet  Commonly known as: TOPROL XL      Dose: 25 mg  Take 25 mg by mouth daily  Refills: 0     omeprazole 20 MG DR capsule  Commonly known as: PriLOSEC  Used for: Anemia,  "unspecified type      Dose: 20 mg  Take 1 capsule (20 mg) by mouth daily  Refills: 0     polyethylene glycol 17 g packet  Commonly known as: MIRALAX      Dose: 1 packet  Take 1 packet by mouth daily  Refills: 0     rosuvastatin 20 MG tablet  Commonly known as: CRESTOR      Dose: 1 tablet  Take 1 tablet by mouth daily  Refills: 0     senna-docusate 8.6-50 MG tablet  Commonly known as: SENOKOT-S/PERICOLACE      Dose: 2 tablet  Take 2 tablets by mouth daily as needed for constipation  Refills: 0     sertraline 50 MG tablet  Commonly known as: ZOLOFT      Dose: 25 mg  Take 25 mg by mouth daily  Refills: 0     traZODone 50 MG tablet  Commonly known as: DESYREL  Used for: Insomnia      TAKE 1/2 TABLET BY MOUTH AT BEDTIME (25MG) FOR INSOMNIA  Quantity: 13 tablet  Refills: 12     vitamin B-12 1000 MCG tablet  Commonly known as: CYANOCOBALAMIN      Dose: 1,000 mcg  Take 1,000 mcg by mouth daily  Refills: 0     vitamin C 500 MG tablet  Commonly known as: ASCORBIC ACID      Dose: 500 mg  Take 500 mg by mouth daily  Refills: 0     Vitamin D3 25 mcg (1000 units) tablet  Commonly known as: CHOLECALCIFEROL      Dose: 25 mcg  Take 25 mcg by mouth daily  Refills: 0            STOP taking      Melatonin 10 MG Tabs tablet        nystatin [id number] UNIT/GM external powder  Commonly known as: MYCOSTATIN                  Case Management:  I have reviewed the care plan and MDS and do agree with the plan. Patient's desire to return to the community is not present. Information reviewed:  Medications, vital signs, orders, and nursing notes.    ROS:  4 point ROS including Respiratory, CV, GI and , other than that noted in the HPI,  is negative    Vitals:  BP (!) 149/71   Pulse 96   Temp 98.2  F (36.8  C)   Resp 16   Ht 1.702 m (5' 7\")   Wt 76 kg (167 lb 9.6 oz)   SpO2 94%   BMI 26.25 kg/m    Body mass index is 26.25 kg/m .  Exam:  GENERAL APPEARANCE:  Alert, in no distress  RESP:  lungs clear to auscultation , no respiratory " distress  CV:  Palpation and auscultation of heart done , rate-normal  PSYCH:  oriented X 3    Lab/Diagnostic data:   Most Recent 3 CBC's:  Recent Labs   Lab Test 01/08/24  0727 10/20/23  0741 10/13/23  0552   WBC 5.6 6.8 8.4   HGB 11.3* 12.8* 12.7*   MCV 97 97 95    194 148*     Most Recent 3 BMP's:  Recent Labs   Lab Test 01/08/24  0727 10/17/23  1213 10/17/23  0900 10/14/23  1127 10/14/23  1044 10/13/23  1224 10/13/23  0552     --   --   --  136  --  139   POTASSIUM 4.5  --   --   --  4.0  --  3.7   CHLORIDE 104  --   --   --  103  --  100   CO2 25  --   --   --  20*  --  23   BUN 14.5  --   --   --  12.2  --  14.8   CR 0.76  --   --   --  0.61*  --  0.73   ANIONGAP 11  --   --   --  13  --  16*   FERMIN 9.3  --   --   --  9.3  --  9.6   GLC 96 111* 103*   < > 177*   < > 137*    < > = values in this interval not displayed.     Most Recent 2 LFT's:  Recent Labs   Lab Test 10/13/23  0552 07/23/23  1445   AST 24 22   ALT 14 11   ALKPHOS 102 75   BILITOTAL 1.1 1.1     Most Recent Cholesterol Panel:  Recent Labs   Lab Test 10/13/23  0748   CHOL 137   LDL 52   HDL 69   TRIG 80     Most Recent TSH and T4:  Recent Labs   Lab Test 10/13/23  0748   TSH 1.47     Most Recent Hemoglobin A1c:  Recent Labs   Lab Test 07/30/23  0601   A1C 6.0*     Device check   Remote PPM Device Check  AP: 89.8%  : 97.8%  Mode: DDD   Presenting Rhythm: AP/VS  Historical underlying rhythm: SR 60's bpm  Heart Rate: adequate rates per histogram   Sensing: stable  Pacing Threshold: stable  Impedance: stable  Battery Status: 12.4 years  Atrial Arrhythmia: none  Ventricular Arrhythmia: 1 VHR logged. EGM for review shows Vs>As for NSVT lasting 11 beats, rates 150-180 bpm. Episode occurred 11/2/23 at 7:39 pm. Pt is DNR DNI      ASSESSMENT/PLAN  [F03.B3] Moderate dementia with mood disturbance, unspecified dementia type (H)   Comment: Chronic, progressive.     Patient requiring 24-hour skilled nursing care and would be a high risk of  wandering if he were moved off the dementia care unit.   No behavioral issues or emotional distress with changes in environment  Expect further functional and cognitive decline.  Expect weight loss.   Pt was on prn Seroquel at [place].   Plan: Continue with plan of care no changes at this time, adjustment as needed        (H35.3230) Bilateral exudative age-related macular degeneration, unspecified stage (H)  Comment: Chronic.  Receives drops  Plan: Continue with plan of care no changes at this time, adjustment as needed        (F32.1) Depression, major, single episode, moderate (H)  Comment: chronic.  Receives sertraline  Plan: Continue with plan of care no changes at this time, adjustment as needed        (I50.9) Congestive heart failure, unspecified HF chronicity, unspecified heart failure type (H)  (I25.10) Coronary artery disease involving native coronary artery of native heart without angina pectoris  (Z95.0) Cardiac pacemaker in situ  [I44.2] Complete heart block (H)    (Z95.3) Status post transcatheter aortic valve replacement (TAVR) using bioprosthesis  (I65.21) Stenosis of right carotid artery  Comment: chronic.  Currently taking metoprolol, rosuvastatin, aspirin,  Recent device check was WNL   Plan: cardiology follow up in 2/2024      (E11.69) Type 2 diabetes mellitus with other specified complication, without long-term current use of insulin (H)  Comment: chronic.   Currently taking metformin 1000 mg with supper.    Last hgb A1c=6   Plan: hgb A1c in 2/2024        (D64.9) Anemia, unspecified type  Comment: Chronic.  Taking Vitamin B12.   Hgb stable at 11. And was started on omeprazole  Plan: hgb 2/2024      (K59.01) Slow transit constipation  Comment: chronic.   Currently taking miralax  Plan: Continue with plan of care no changes at this time, adjustment as needed        Electronically signed by:      LESTER Otoole CNP on 1/11/2024 at 7:04 PM

## 2024-01-11 NOTE — LETTER
1/11/2024        RE: Erick Shahid  [address]  [facility] [address]        [facility] GERIATRICS  Chief Complaint   Patient presents with     correction Regulatory     [place] Medical Record Number:  [id number]  Place of Service where encounter took place:  [facility] ([facility]) [[id number]]    HPI:    Erick Shahid  is 83 year old (1940), who is being seen today for a federally mandated E/M visit.     Patient's past medical history includes     Dementia   heart failure with preserved ejection fraction   aortic stenosis s/p TAVR 23 mm Allison ROBERTO valve on 8/2023  Complete heart block s/p permanent pacemaker  Carotid stenosis  Coronary artery disease   type 2 diabetes  Anemia   Depression  Macular degeneration  Constipation     Patient was in transitional care unit at [place] from 10/17/2023 to 11/14/2023.     In August 2023 he underwent a TAVR which was complicated by a complete heart block.  During hospitalization he had acute mental status changes with word finding deficit and possible left-sided weakness.  His CT was negative.  He was noted to have moderate to severe right carotid stenosis.  In baseline dementia.  He was started on melatonin for vivid dreams.    Stopped melatonin and started trazodone    Started omeprazole due to low hgb     Today patient was seen in his room.  patient denied chest pain, shortness of breath, dizziness, lightheadedness, and a poor appetite.   Nursing has no concerns. BIMS=12/15 ( 8 to 12 suggests moderately impaire) PHQ9=1/27.   Patient needs cuing assistance with ADLs.   appetite is good and consumes a low carb diet.  Per nursing, skin is intact. Code status is DNR/DNI.   In reviewing point click care Blood pressure VS are stable.         ALLERGIES:Patient has no known allergies.  PAST MEDICAL HISTORY:   Past Medical History:   Diagnosis Date     Benign essential hypertension      BPH (benign prostatic hyperplasia)      Chronic heart failure  with reduced ejection fraction and diastolic dysfunction (H)      Cognitive impairment      Critical aortic valve stenosis      Diabetes mellitus, type 2 (H)     on metformin     Dyslipidemia      First degree atrioventricular block      LBBB (left bundle branch block)      Macular degeneration (senile) of retina      PAST SURGICAL HISTORY:   has a past surgical history that includes Coronary Angiogram (N/A, 07/28/2023); Percutaneous Coronary Intervention (N/A, 07/28/2023); Transcatheter Aortic Valve Replacement (08/15/2023); Orif Hip Fracture (Right, 2018); Pacemaker Device & Lead Implant- Right Atrial & Left Ventricular (N/A, 8/15/2023); and Transcatheter Aortic Valve Replacement-Femoral Approach (N/A, 8/15/2023).  FAMILY HISTORY: family history is not on file.  SOCIAL HISTORY:  reports that he has never smoked. He has never used smokeless tobacco. He reports current alcohol use. He reports that he does not use drugs.    MEDICATIONS:  MED REC REQUIRED  Post Medication Reconciliation Status:  Discharge medications reconciled, continue medications without change         Review of your medicines            Accurate as of January 11, 2024 11:27 AM. If you have any questions, ask your nurse or doctor.                CONTINUE these medicines which have NOT CHANGED        Dose / Directions   aspirin 81 MG chewable tablet  Commonly known as: ASA  Used for: S/P TAVR (transcatheter aortic valve replacement), Aortic stenosis, severe      Dose: 81 mg  Take 1 tablet (81 mg) by mouth daily  Quantity: 90 tablet  Refills: 3     cycloSPORINE 0.05 % ophthalmic emulsion  Commonly known as: RESTASIS      Dose: 1 drop  Apply 1 drop to eye 2 times daily as needed for dry eyes  Refills: 0     metFORMIN 1000 MG tablet  Commonly known as: GLUCOPHAGE      Dose: 1 tablet  Take 1 tablet by mouth daily (with dinner)  Refills: 0     metoprolol succinate ER 25 MG 24 hr tablet  Commonly known as: TOPROL XL      Dose: 25 mg  Take 25 mg by mouth  "daily  Refills: 0     omeprazole 20 MG DR capsule  Commonly known as: PriLOSEC  Used for: Anemia, unspecified type      Dose: 20 mg  Take 1 capsule (20 mg) by mouth daily  Refills: 0     polyethylene glycol 17 g packet  Commonly known as: MIRALAX      Dose: 1 packet  Take 1 packet by mouth daily  Refills: 0     rosuvastatin 20 MG tablet  Commonly known as: CRESTOR      Dose: 1 tablet  Take 1 tablet by mouth daily  Refills: 0     senna-docusate 8.6-50 MG tablet  Commonly known as: SENOKOT-S/PERICOLACE      Dose: 2 tablet  Take 2 tablets by mouth daily as needed for constipation  Refills: 0     sertraline 50 MG tablet  Commonly known as: ZOLOFT      Dose: 25 mg  Take 25 mg by mouth daily  Refills: 0     traZODone 50 MG tablet  Commonly known as: DESYREL  Used for: Insomnia      TAKE 1/2 TABLET BY MOUTH AT BEDTIME (25MG) FOR INSOMNIA  Quantity: 13 tablet  Refills: 12     vitamin B-12 1000 MCG tablet  Commonly known as: CYANOCOBALAMIN      Dose: 1,000 mcg  Take 1,000 mcg by mouth daily  Refills: 0     vitamin C 500 MG tablet  Commonly known as: ASCORBIC ACID      Dose: 500 mg  Take 500 mg by mouth daily  Refills: 0     Vitamin D3 25 mcg (1000 units) tablet  Commonly known as: CHOLECALCIFEROL      Dose: 25 mcg  Take 25 mcg by mouth daily  Refills: 0            STOP taking      Melatonin 10 MG Tabs tablet        nystatin 173646 UNIT/GM external powder  Commonly known as: MYCOSTATIN                  Case Management:  I have reviewed the care plan and MDS and do agree with the plan. Patient's desire to return to the community is not present. Information reviewed:  Medications, vital signs, orders, and nursing notes.    ROS:  4 point ROS including Respiratory, CV, GI and , other than that noted in the HPI,  is negative    Vitals:  BP (!) 149/71   Pulse 96   Temp 98.2  F (36.8  C)   Resp 16   Ht 1.702 m (5' 7\")   Wt 76 kg (167 lb 9.6 oz)   SpO2 94%   BMI 26.25 kg/m    Body mass index is 26.25 kg/m .  Exam:  GENERAL " APPEARANCE:  Alert, in no distress  RESP:  lungs clear to auscultation , no respiratory distress  CV:  Palpation and auscultation of heart done , rate-normal  PSYCH:  oriented X 3    Lab/Diagnostic data:   Most Recent 3 CBC's:  Recent Labs   Lab Test 01/08/24  0727 10/20/23  0741 10/13/23  0552   WBC 5.6 6.8 8.4   HGB 11.3* 12.8* 12.7*   MCV 97 97 95    194 148*     Most Recent 3 BMP's:  Recent Labs   Lab Test 01/08/24  0727 10/17/23  1213 10/17/23  0900 10/14/23  1127 10/14/23  1044 10/13/23  1224 10/13/23  0552     --   --   --  136  --  139   POTASSIUM 4.5  --   --   --  4.0  --  3.7   CHLORIDE 104  --   --   --  103  --  100   CO2 25  --   --   --  20*  --  23   BUN 14.5  --   --   --  12.2  --  14.8   CR 0.76  --   --   --  0.61*  --  0.73   ANIONGAP 11  --   --   --  13  --  16*   FERMIN 9.3  --   --   --  9.3  --  9.6   GLC 96 111* 103*   < > 177*   < > 137*    < > = values in this interval not displayed.     Most Recent 2 LFT's:  Recent Labs   Lab Test 10/13/23  0552 07/23/23  1445   AST 24 22   ALT 14 11   ALKPHOS 102 75   BILITOTAL 1.1 1.1     Most Recent Cholesterol Panel:  Recent Labs   Lab Test 10/13/23  0748   CHOL 137   LDL 52   HDL 69   TRIG 80     Most Recent TSH and T4:  Recent Labs   Lab Test 10/13/23  0748   TSH 1.47     Most Recent Hemoglobin A1c:  Recent Labs   Lab Test 07/30/23  0601   A1C 6.0*     Device check   Remote PPM Device Check  AP: 89.8%  : 97.8%  Mode: DDD   Presenting Rhythm: AP/VS  Historical underlying rhythm: SR 60's bpm  Heart Rate: adequate rates per histogram   Sensing: stable  Pacing Threshold: stable  Impedance: stable  Battery Status: 12.4 years  Atrial Arrhythmia: none  Ventricular Arrhythmia: 1 VHR logged. EGM for review shows Vs>As for NSVT lasting 11 beats, rates 150-180 bpm. Episode occurred 11/2/23 at 7:39 pm. Pt is DNR DNI      ASSESSMENT/PLAN  [F03.B3] Moderate dementia with mood disturbance, unspecified dementia type (H)   Comment: Chronic,  progressive.     Patient requiring 24-hour skilled nursing care and would be a high risk of wandering if he were moved off the dementia care unit.   No behavioral issues or emotional distress with changes in environment  Expect further functional and cognitive decline.  Expect weight loss.   Pt was on prn Seroquel at [place].   Plan: Continue with plan of care no changes at this time, adjustment as needed        (H35.3230) Bilateral exudative age-related macular degeneration, unspecified stage (H)  Comment: Chronic.  Receives drops  Plan: Continue with plan of care no changes at this time, adjustment as needed        (F32.1) Depression, major, single episode, moderate (H)  Comment: chronic.  Receives sertraline  Plan: Continue with plan of care no changes at this time, adjustment as needed        (I50.9) Congestive heart failure, unspecified HF chronicity, unspecified heart failure type (H)  (I25.10) Coronary artery disease involving native coronary artery of native heart without angina pectoris  (Z95.0) Cardiac pacemaker in situ  [I44.2] Complete heart block (H)    (Z95.3) Status post transcatheter aortic valve replacement (TAVR) using bioprosthesis  (I65.21) Stenosis of right carotid artery  Comment: chronic.  Currently taking metoprolol, rosuvastatin, aspirin,  Recent device check was WNL   Plan: cardiology follow up in 2/2024      (E11.69) Type 2 diabetes mellitus with other specified complication, without long-term current use of insulin (H)  Comment: chronic.   Currently taking metformin 1000 mg with supper.    Last hgb A1c=6   Plan: hgb A1c in 2/2024        (D64.9) Anemia, unspecified type  Comment: Chronic.  Taking Vitamin B12.   Hgb stable at 11. And was started on omeprazole  Plan: hgb 2/2024      (K59.01) Slow transit constipation  Comment: chronic.   Currently taking miralax  Plan: Continue with plan of care no changes at this time, adjustment as needed        Electronically signed by:      Yancy PUENTES  LESTER Walker CNP on 1/11/2024 at 7:04 PM             Sincerely,        LESTER Otoole CNP

## 2024-02-02 NOTE — TELEPHONE ENCOUNTER
[facility]    Phone Message    May a detailed message be left on voicemail: yes     Reason for Call: Other: Pt wife would like a call back to discuss appt as pt is in living memory care and wife stated pt is mobile but she is wondering if he needs to come in for this appt or if she needs to cancel     Action Taken: Other: Cardio    Travel Screening: Not Applicable

## 2024-02-05 NOTE — TELEPHONE ENCOUNTER
RN called patient's wife and confirmed we do want patient to come in for his 6 months follow-up. RN offered virtual option to patient's wife and she politely declined. Patient's wife advised RN they would come in for the visit as scheduled.

## 2024-02-08 NOTE — PROGRESS NOTES
"[facility] GERIATRICS    Chief Complaint   Patient presents with    Lab Result Notice    Anemia     HPI:  Erick Shahid is a 83 year old  (1940), who is being seen today for an episodic care visit at: [facility]) [[id number]].       Patient's past medical history includes     Dementia   heart failure with preserved ejection fraction   aortic stenosis s/p TAVR 23 mm Allison ROBERTO valve on 8/2023  Complete heart block s/p permanent pacemaker  Carotid stenosis  Coronary artery disease   type 2 diabetes  Anemia   Depression  Macular degeneration  Constipation       Patient was in transitional care unit at [place] from 10/17/2023 to 11/14/2023.     In August 2023 he underwent a TAVR which was complicated by a complete heart block.  During hospitalization he had acute mental status changes with word finding deficit and possible left-sided weakness.  His CT was negative.  He was noted to have moderate to severe right carotid stenosis.  In baseline dementia.  He was started on melatonin for vivid dreams.     Stopped melatonin and started trazodone     Started omeprazole due to low hgb     Today patient was seen in his room.  patient denied chest pain, shortness of breath, dizziness, lightheadedness, and a poor appetite.  Has Le edema.   Had a fall in 1/2024.     Nursing has no concerns. BIMS=12/15 ( 8 to 12 suggests moderately impaire) PHQ9=1/27.   Patient needs cuing assistance with ADLs.   appetite is good and consumes a low carb diet.  Per nursing, skin is intact. Code status is DNR/DNI.   In reviewing point click care Blood pressure VS are stable.        Allergies, and PMH/PSH reviewed in [facility] today.  REVIEW OF SYSTEMS:  4 point ROS including Respiratory, CV, GI and , other than that noted in the HPI,  is negative    Objective:   BP (!) 152/77   Pulse 66   Temp 97.7  F (36.5  C)   Resp 18   Ht 1.702 m (5' 7\")   Wt 77.7 kg (171 lb 3.2 oz)   SpO2 97%   BMI 26.81 kg/m    GENERAL APPEARANCE:  " Alert, in no distress  RESP:  respiratory effort and palpation of chest normal, no respiratory distress  CV:  Palpation and auscultation of heart done , peripheral edema 2+ in BLE, rate-normal  SKIN:  Inspection of skin and subcutaneous tissue baseline  PSYCH: Mood at baseline    Most Recent 3 CBC's:  Recent Labs   Lab Test 02/05/24  0630 01/08/24  0727 10/20/23  0741 10/13/23  0552   WBC  --  5.6 6.8 8.4   HGB 11.1* 11.3* 12.8* 12.7*   MCV  --  97 97 95   PLT  --  154 194 148*     Most Recent 3 BMP's:  Recent Labs   Lab Test 01/08/24  0727 10/17/23  1213 10/17/23  0900 10/14/23  1127 10/14/23  1044 10/13/23  1224 10/13/23  0552     --   --   --  136  --  139   POTASSIUM 4.5  --   --   --  4.0  --  3.7   CHLORIDE 104  --   --   --  103  --  100   CO2 25  --   --   --  20*  --  23   BUN 14.5  --   --   --  12.2  --  14.8   CR 0.76  --   --   --  0.61*  --  0.73   ANIONGAP 11  --   --   --  13  --  16*   FERMIN 9.3  --   --   --  9.3  --  9.6   GLC 96 111* 103*   < > 177*   < > 137*    < > = values in this interval not displayed.     Most Recent 2 LFT's:  Recent Labs   Lab Test 10/13/23  0552 07/23/23  1445   AST 24 22   ALT 14 11   ALKPHOS 102 75   BILITOTAL 1.1 1.1         Most Recent Cholesterol Panel:  Recent Labs   Lab Test 10/13/23  0748   CHOL 137   LDL 52   HDL 69   TRIG 80     Most Recent TSH and T4:  Recent Labs   Lab Test 10/13/23  0748   TSH 1.47     Most Recent Hemoglobin A1c:  Recent Labs   Lab Test 02/05/24  0630   A1C 6.4*             Assessment/Plan:  [F03.B3] Moderate dementia with mood disturbance, unspecified dementia type (H)   Comment: Chronic, progressive.     Patient requiring 24-hour skilled nursing care and would be a high risk of wandering if he were moved off the dementia care unit.   No behavioral issues or emotional distress with changes in environment  Expect further functional and cognitive decline.  Expect weight loss.   Pt was on prn Seroquel at [place].   he takes trazodone at  night.   Plan: Continue with plan of care no changes at this time, adjustment as needed        (H35.3230) Bilateral exudative age-related macular degeneration, unspecified stage (H)  Comment: Chronic.  Receives drops  Plan: Continue with plan of care no changes at this time, adjustment as needed        (F32.1) Depression, major, single episode, moderate (H)  Comment: chronic.  Receives sertraline  Plan: Continue with plan of care no changes at this time, adjustment as needed        (I50.9) Congestive heart failure, unspecified HF chronicity, unspecified heart failure type (H)  (I25.10) Coronary artery disease involving native coronary artery of native heart without angina pectoris  (Z95.0) Cardiac pacemaker in situ  [I44.2] Complete heart block (H)    (Z95.3) Status post transcatheter aortic valve replacement (TAVR) using bioprosthesis  (I65.21) Stenosis of right carotid artery  Comment: chronic.  Currently taking metoprolol, rosuvastatin, aspirin,  device check as per cardiology  His BP fluctuates from /64-77 mmHg therefore will not increase antihypertensive medications   HAs LE edema   Plan: cardiology follow up in 2/2024   Lymphedema to evaluate and treat     (E11.69) Type 2 diabetes mellitus with other specified complication, without long-term current use of insulin (H)  Comment: chronic.   Currently taking metformin 1000 mg with supper.    Last hgb A1c=6.4 (2/2024)   Plan: Continue with plan of care no changes at this time, adjustment as needed          (D64.9) Anemia, unspecified type  Comment: Chronic.  Taking Vitamin B12.   Hgb stable at 11.1 (2/204)  Takes B12 and omeprazole  Plan: next labs recommend iron studies      (K59.01) Slow transit constipation  Comment: chronic.   Currently taking miralax and prn senna   Plan: Continue with plan of care no changes at this time, adjustment as needed          MED REC REQUIRED  Post Medication Reconciliation Status:  Discharge medications reconciled and  changed, see notes/orders      Electronically signed by:         LESTER Otoole CNP on 2/9/2024 at 8:08 AM

## 2024-02-09 NOTE — PROGRESS NOTES
Erick Shahid  1940      Orders  Lymphedema to evaluate and treat    Yancy Walker, LESTER CNP on 2/9/2024 at 12:34 PM

## 2024-02-09 NOTE — LETTER
2/9/2024        RE: Erick Shahid  [address]  [place] [address]        [facility] GERIATRICS    Chief Complaint   Patient presents with     Lab Result Notice     Anemia     HPI:  Erick Shahid is a 83 year old  (1940), who is being seen today for an episodic care visit at: [facility]) [[id number]].       Patient's past medical history includes     Dementia   heart failure with preserved ejection fraction   aortic stenosis s/p TAVR 23 mm Allison ROBERTO valve on 8/2023  Complete heart block s/p permanent pacemaker  Carotid stenosis  Coronary artery disease   type 2 diabetes  Anemia   Depression  Macular degeneration  Constipation       Patient was in transitional care unit at [place] from 10/17/2023 to 11/14/2023.     In August 2023 he underwent a TAVR which was complicated by a complete heart block.  During hospitalization he had acute mental status changes with word finding deficit and possible left-sided weakness.  His CT was negative.  He was noted to have moderate to severe right carotid stenosis.  In baseline dementia.  He was started on melatonin for vivid dreams.     Stopped melatonin and started trazodone     Started omeprazole due to low hgb     Today patient was seen in his room.  patient denied chest pain, shortness of breath, dizziness, lightheadedness, and a poor appetite.  Has Le edema.   Had a fall in 1/2024.     Nursing has no concerns. BIMS=12/15 ( 8 to 12 suggests moderately impaire) PHQ9=1/27.   Patient needs cuing assistance with ADLs.   appetite is good and consumes a low carb diet.  Per nursing, skin is intact. Code status is DNR/DNI.   In reviewing point click care Blood pressure VS are stable.        Allergies, and PMH/PSH reviewed in [facility] today.  REVIEW OF SYSTEMS:  4 point ROS including Respiratory, CV, GI and , other than that noted in the HPI,  is negative    Objective:   BP (!) 152/77   Pulse 66   Temp 97.7  F (36.5  C)   Resp 18   Ht 1.702  "m (5' 7\")   Wt 77.7 kg (171 lb 3.2 oz)   SpO2 97%   BMI 26.81 kg/m    GENERAL APPEARANCE:  Alert, in no distress  RESP:  respiratory effort and palpation of chest normal, no respiratory distress  CV:  Palpation and auscultation of heart done , peripheral edema 2+ in BLE, rate-normal  SKIN:  Inspection of skin and subcutaneous tissue baseline  PSYCH: Mood at baseline    Most Recent 3 CBC's:  Recent Labs   Lab Test 02/05/24  0630 01/08/24  0727 10/20/23  0741 10/13/23  0552   WBC  --  5.6 6.8 8.4   HGB 11.1* 11.3* 12.8* 12.7*   MCV  --  97 97 95   PLT  --  154 194 148*     Most Recent 3 BMP's:  Recent Labs   Lab Test 01/08/24  0727 10/17/23  1213 10/17/23  0900 10/14/23  1127 10/14/23  1044 10/13/23  1224 10/13/23  0552     --   --   --  136  --  139   POTASSIUM 4.5  --   --   --  4.0  --  3.7   CHLORIDE 104  --   --   --  103  --  100   CO2 25  --   --   --  20*  --  23   BUN 14.5  --   --   --  12.2  --  14.8   CR 0.76  --   --   --  0.61*  --  0.73   ANIONGAP 11  --   --   --  13  --  16*   FERMIN 9.3  --   --   --  9.3  --  9.6   GLC 96 111* 103*   < > 177*   < > 137*    < > = values in this interval not displayed.     Most Recent 2 LFT's:  Recent Labs   Lab Test 10/13/23  0552 07/23/23  1445   AST 24 22   ALT 14 11   ALKPHOS 102 75   BILITOTAL 1.1 1.1         Most Recent Cholesterol Panel:  Recent Labs   Lab Test 10/13/23  0748   CHOL 137   LDL 52   HDL 69   TRIG 80     Most Recent TSH and T4:  Recent Labs   Lab Test 10/13/23  0748   TSH 1.47     Most Recent Hemoglobin A1c:  Recent Labs   Lab Test 02/05/24  0630   A1C 6.4*             Assessment/Plan:  [F03.B3] Moderate dementia with mood disturbance, unspecified dementia type (H)   Comment: Chronic, progressive.     Patient requiring 24-hour skilled nursing care and would be a high risk of wandering if he were moved off the dementia care unit.   No behavioral issues or emotional distress with changes in environment  Expect further functional and cognitive " decline.  Expect weight loss.   Pt was on prn Seroquel at [place].   he takes trazodone at night.   Plan: Continue with plan of care no changes at this time, adjustment as needed        (H35.3230) Bilateral exudative age-related macular degeneration, unspecified stage (H)  Comment: Chronic.  Receives drops  Plan: Continue with plan of care no changes at this time, adjustment as needed        (F32.1) Depression, major, single episode, moderate (H)  Comment: chronic.  Receives sertraline  Plan: Continue with plan of care no changes at this time, adjustment as needed        (I50.9) Congestive heart failure, unspecified HF chronicity, unspecified heart failure type (H)  (I25.10) Coronary artery disease involving native coronary artery of native heart without angina pectoris  (Z95.0) Cardiac pacemaker in situ  [I44.2] Complete heart block (H)    (Z95.3) Status post transcatheter aortic valve replacement (TAVR) using bioprosthesis  (I65.21) Stenosis of right carotid artery  Comment: chronic.  Currently taking metoprolol, rosuvastatin, aspirin,  device check as per cardiology  His BP fluctuates from /64-77 mmHg therefore will not increase antihypertensive medications   HAs LE edema   Plan: cardiology follow up in 2/2024   Lymphedema to evaluate and treat     (E11.69) Type 2 diabetes mellitus with other specified complication, without long-term current use of insulin (H)  Comment: chronic.   Currently taking metformin 1000 mg with supper.    Last hgb A1c=6.4 (2/2024)   Plan: Continue with plan of care no changes at this time, adjustment as needed          (D64.9) Anemia, unspecified type  Comment: Chronic.  Taking Vitamin B12.   Hgb stable at 11.1 (2/204)  Takes B12 and omeprazole  Plan: next labs recommend iron studies      (K59.01) Slow transit constipation  Comment: chronic.   Currently taking miralax and prn senna   Plan: Continue with plan of care no changes at this time, adjustment as needed          MED REC  REQUIRED  Post Medication Reconciliation Status:  Discharge medications reconciled and changed, see notes/orders      Electronically signed by:         LESTER Otoole CNP on 2/9/2024 at 8:08 AM       Erick Shahid  1940      Orders  Lymphedema to evaluate and treat    LESTER Otoole CNP on 2/9/2024 at 12:34 PM      Sincerely,        LESTER Otoole CNP

## 2024-02-13 PROBLEM — T79.6XXA TRAUMATIC RHABDOMYOLYSIS, INITIAL ENCOUNTER (H): Status: ACTIVE | Noted: 2024-01-01

## 2024-02-13 PROBLEM — S42.201A CLOSED FRACTURE OF PROXIMAL END OF RIGHT HUMERUS, UNSPECIFIED FRACTURE MORPHOLOGY, INITIAL ENCOUNTER: Status: ACTIVE | Noted: 2024-01-01
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
## 2024-02-13 PROBLEM — R79.89 ELEVATED TROPONIN: Status: ACTIVE | Noted: 2024-01-01

## 2024-02-13 NOTE — ED PROVIDER NOTES
History     Chief Complaint:  Fall and Confusion       HPI   Erick Shahid is a 83 year old male who has some memory issues lives in a memory care unit who 2 days ago had fallen.  It is not clear how long he was on the ground but staff helped him up.  Today they noticed increased bruising to the right shoulder and he seemed to be more confused than his normal baseline dementia.  Blood sugar was 174 per EMS.  Patient is hard of hearing.  He does not complain of a headache and does not know if he hit his head.  History is limited due to the patient's dementia and is obtained mostly from EMS.  He is not on blood thinners.  He is DNR/DNI.      Independent Historian:   EMS - They report above    Review of External Notes:   I reviewed his assisted living geriatric note from 2/9/2024      Medications:    aspirin (ASA) 81 MG chewable tablet  cycloSPORINE (RESTASIS) 0.05 % ophthalmic emulsion  metFORMIN (GLUCOPHAGE) 1000 MG tablet  metoprolol succinate ER (TOPROL XL) 25 MG 24 hr tablet  omeprazole (PRILOSEC) 20 MG DR capsule  polyethylene glycol (MIRALAX) 17 g packet  rosuvastatin (CRESTOR) 20 MG tablet  senna-docusate (SENOKOT-S/PERICOLACE) 8.6-50 MG tablet  sertraline (ZOLOFT) 50 MG tablet  traZODone (DESYREL) 25 mg TABS half-tab  vitamin B-12 (CYANOCOBALAMIN) 1000 MCG tablet  vitamin C (ASCORBIC ACID) 500 MG tablet  Vitamin D3 (CHOLECALCIFEROL) 25 mcg (1000 units) tablet        Past Medical History:    Past Medical History:   Diagnosis Date    Benign essential hypertension     BPH (benign prostatic hyperplasia)     Chronic heart failure with reduced ejection fraction and diastolic dysfunction (H)     Cognitive impairment     Critical aortic valve stenosis     Diabetes mellitus, type 2 (H)     Dyslipidemia     First degree atrioventricular block     LBBB (left bundle branch block)     Macular degeneration (senile) of retina        Past Surgical History:    Past Surgical History:   Procedure Laterality Date    CV  "CORONARY ANGIOGRAM N/A 07/28/2023    Procedure: Coronary Angiogram;  Surgeon: Mando Field MD;  Location: [facility] CARDIAC CATH LAB    CV PCI N/A 07/28/2023    Procedure: Percutaneous Coronary Intervention;  Surgeon: Mando Field MD;  Location: [facility] CARDIAC CATH LAB    CV TRANSCATHETER AORTIC VALVE REPLACEMENT  08/15/2023    CV TRANSCATHETER AORTIC VALVE REPLACEMENT-FEMORAL APPROACH N/A 8/15/2023    Procedure: Transcatheter Aortic Valve Replacement-Femoral Approach;  Surgeon: Bossman Felipe MD;  Location:  HEART CARDIAC CATH LAB    EP PACEMAKER DEVICE & LEAD IMPLANT- RIGHT ATRIAL & LEFT VENTRICULAR N/A 8/15/2023    Procedure: Pacemaker Device & Lead Implant- Right Atrial & Left Ventricular;  Surgeon: Oscar Mendoza MD;  Location:  HEART CARDIAC CATH LAB    ORIF HIP FRACTURE Right 2018        Physical Exam   Patient Vitals for the past 24 hrs:   BP Temp Temp src Pulse Resp SpO2 Height Weight   02/13/24 1226 (!) 146/66 98.9  F (37.2  C) Oral 67 16 99 % 1.702 m (5' 7\") 73 kg (161 lb)        Physical Exam  Nursing note and vitals reviewed.    Constitutional:  Appears comfortable.    HENT:                Nose normal.  No discharge.  I do not see any obvious facial or scalp trauma.     Oral mucosa is moist.  He is hard of hearing and has a hearing aid in  Eyes:    Conjunctivae are normal without injection.  Pupils are equal.  Cardiovascular:  Normal rate, regular rhythm with normal S1 and S2.      Normal heart sounds and peripheral pulses 2+ and equal.    Pulmonary:  Effort normal and breath sounds clear to auscultation bilaterally.    No respiratory distress.    GI:    Soft. No distension and no mass. No tenderness.   Musculoskeletal:  He can move his legs but has chronic edema in both legs slightly greater in the right than the left 1-2+.  Upper extremities has edema as well in both arms.  Right shoulder is very swollen tender and ecchymotic.  He has limited range of motion due to pain. "  Elbow and forearm are normal.  Left forearm and elbow area reveals some bruising but he has full range of motion of his left arm without tenderness.  No pain in his neck.  Neurological:   Alert and appropriate to the moment but obvious memory loss. No focal weakness.  Skin:    Skin is warm and dry. No rash noted.  Bruising to the left arm and right shoulder as noted above.  Psychiatric:   Behavior is normal.  Patient is pleasant.      Emergency Department Course   ECG    ECG results from 02/13/24   EKG 12-lead, tracing only     Value    Systolic Blood Pressure     Diastolic Blood Pressure     Ventricular Rate 62    Atrial Rate 62    LA Interval 244    QRS Duration 120        QTc 472    P Axis 78    R AXIS 52    T Axis 74    Interpretation ECG      Atrial-sensed ventricular-paced rhythm with prolonged AV conduction  Abnormal ECG  When compared with ECG of 13-OCT-2023 06:25,  Vent. rate has decreased BY  10 BPM           Imaging:  CT Head w/o Contrast   Final Result   IMPRESSION:      1. No evidence of acute intracranial hemorrhage, mass, or herniation.   2. Mild diffuse parenchymal volume loss and white matter changes   likely due to chronic microvascular ischemic disease.         CAROLINE CRYSTAL MD            SYSTEM ID:  [id number]      XR Shoulder Right G/E 3 Views   Final Result   IMPRESSION:   Acute impacted and displaced proximal humerus fracture. Bones are   demineralized. Pacemaker leads. Degenerative changes glenohumeral and   acromioclavicular joints.       NOTE: ABNORMAL REPORT      THE DICTATION ABOVE DESCRIBES AN ABNORMAL REPORT FOR WHICH FOLLOW-UP   IS NEEDED.      ASHLEE QUESADA MD            SYSTEM ID:  [id number]             Laboratory:  Labs Ordered and Resulted from Time of ED Arrival to Time of ED Departure   COMPREHENSIVE METABOLIC PANEL - Abnormal       Result Value    Sodium 136      Potassium 4.2      Carbon Dioxide (CO2) 28      Anion Gap 8      Urea Nitrogen 17.3      Creatinine 0.74       GFR Estimate 89      Calcium 9.4      Chloride 100      Glucose 167 (*)     Alkaline Phosphatase 103      AST 52 (*)     ALT 13      Protein Total 7.3      Albumin 4.0      Bilirubin Total 0.8     TROPONIN T, HIGH SENSITIVITY - Abnormal    Troponin T, High Sensitivity 29 (*)    ROUTINE UA WITH MICROSCOPIC REFLEX TO CULTURE - Abnormal    Color Urine Yellow      Appearance Urine Clear      Glucose Urine Negative      Bilirubin Urine Negative      Ketones Urine 10 (*)     Specific Gravity Urine 1.026      Blood Urine Negative      pH Urine 5.0      Protein Albumin Urine 20 (*)     Urobilinogen Urine Normal      Nitrite Urine Negative      Leukocyte Esterase Urine Negative      Mucus Urine Present (*)     RBC Urine 1      WBC Urine 1     CK TOTAL - Abnormal    CK 1,128 (*)    CBC WITH PLATELETS AND DIFFERENTIAL - Abnormal    WBC Count 8.1      RBC Count 3.72 (*)     Hemoglobin 11.6 (*)     Hematocrit 35.0 (*)     MCV 94      MCH 31.2      MCHC 33.1      RDW 14.2      Platelet Count 153      % Neutrophils 75      % Lymphocytes 13      % Monocytes 12      % Eosinophils 0      % Basophils 0      % Immature Granulocytes 0      NRBCs per 100 WBC 0      Absolute Neutrophils 6.0      Absolute Lymphocytes 1.1      Absolute Monocytes 1.0      Absolute Eosinophils 0.0      Absolute Basophils 0.0      Absolute Immature Granulocytes 0.0      Absolute NRBCs 0.0     NT PROBNP INPATIENT - Abnormal    N terminal Pro BNP Inpatient 3,098 (*)         Procedures   none    Emergency Department Course & Assessments:             Interventions:  Medications   sodium chloride 0.9 % infusion ( Intravenous $New Bag 2/13/24 1424)   sodium chloride 0.9% BOLUS 1,000 mL (0 mLs Intravenous Stopped 2/13/24 1424)        Assessments:  1300    Independent Interpretation (X-rays, CTs, rhythm strip):  I reviewed his shoulder x-ray and he has an impacted displaced proximal humerus fracture.  CT has been ordered of his head     Consultations/Discussion of  Management or Tests:  I discussed the case with Dr. Jacobs who be the admitting doctor.       Social Determinants of Health affecting care:   None    Disposition:  The patient was admitted to the hospital under the care of Dr. jacobs.     Impression & Plan      Medical Decision Making:  Patient comes in from a care center after he was found on the floor couple days ago.  They apparently helped him up but it is unclear how long he was on the ground.  It could have been many hours or overnight.  He has been a lot more confused today so they sent him in by ambulance but they noticed his right shoulder was bruised and did not want to use his arm.  X-ray shows an impacted displaced proximal humerus fracture.  He did not have any evidence of injuring his head but certainly could have hit his head as he has memory loss issues and does not remember falling.  It is unclear if he uses a walker.  CT of his head is negative but with the fact that his CK is elevated suggesting some rhabdomyolysis, his troponin is elevated, he has dementia, and broke his shoulder he needs to come in the hospital for IV fluids and further evaluation and management.  Ortho can be consulted.  I discussed the case with the hospitalist and the patient's renal function is normal at this point but will need to follow this along.  Saline is ordered to hydrate his kidneys with the rhabdo.  His troponin is also elevated at 29, will need to get a troponin later.  His BNP is up at 3098.  He has a lot of edema in the upper and lower extremities and may need some diuresis.  It sounds like his edema is chronic.       Diagnosis:    ICD-10-CM    1. Fall, initial encounter  W19.XXXA       2. Closed fracture of proximal end of right humerus, unspecified fracture morphology, initial encounter  S42.201A       3. Traumatic rhabdomyolysis, initial encounter (H24)  T79.6XXA       4. Elevated troponin  R79.89       5. Peripheral edema  R60.9     Both upper and lower  extremities           Discharge Medications:  New Prescriptions    No medications on file          Jennifer Ray MD  2/13/2024   Jennifer Ray MD Powell, Tracy Alan, MD  02/13/24 [id number]

## 2024-02-13 NOTE — ED TRIAGE NOTES
Per EMS, coming from memory [facility]. Staff found patient down two days ago. Unwitnessed, but denies head injury or LOC. Bruising upper right arm. No other complaints. Staff reports increased confusion from baseline. Bs 174. VSS en route. [facility]

## 2024-02-13 NOTE — H&P
[facility]    History and Physical - Hospitalist Service       Date of Admission:  2/13/2024    Assessment & Plan      Erick Shahid is a 83 year old male with past medical history of hypertension, cognitive impairment/dementia, type II diabetes mellitus, hyperlipidemia, left bundle branch block, aortic stenosis (s/p TAVR), congestive heart failure, pacemaker, benign prostatic hypertrophy, admitted 2/13/2024 with increased confusion, unwitnessed fall at home.    Consults - Orthopedics    S/p unwitnessed fall prior to admission   Acute impacted and displaced right proximal humerus fracture  -Unwitnessed fall at skilled care facility reportedly 2 days prior to admission with unknown length of time on the floor; no report of syncope or cardiopulmonary complaints  -ER labs with elevated CK, 1128, glucose 167, BNP 3098, troponin 29, hemoglobin 11.6, normal WBC and platelets.  Serum creatinine 0.74, potassium 4.2, AST 52.  -X-ray right shoulder with acute impacted and displaced proximal humerus fracture  -Noncontrast head CT negative for acute intracranial changes with mild diffuse parenchymal volume loss and white matter changes  -EKG with paced rhythm, rate 62 bpm  -Admit inpatient  -Telemetry  -Consider pacemaker (PPM) interrogation, monitor on telemetry for now; no reported history of syncope, fall appeared mechanical in nature  -Orthopedics consult, new right humerus fracture  -Pain control, see hospital orders  -Physical therapy (PT), occupational therapy (OT) consults  -Social work consult, may need transitional care unit on discharge  -AM labs as ordered    Elevated creatinine kinase   -CK 1128 on admission, serum Cr 0.74  -Increased risk for developing acute rhabdomyolysis  -IV fluids 0.9 NS, monitor serum Cr, urine output, I/O's, daily weights  -Elevated BNP on admission, monitor closely for signs or symptoms of fluid overload, history of CHF, see below  -Avoid non-steroidal  The patient is a 86y Male complaining of weakness. anti-inflammatory drugs (NSAIDs)  -HOLD prior to admission metformin, see below  -AM basic profile with CK    Type II diabetes mellitus  -HbA1c 6.4% on 2/5/2024  -Monitor blood sugars  -Insulin sliding scale as ordered, adjust as needed  -HOLD prior to admission metformin; monitor serum Cr   -Diabetic diet  Recent Labs   Lab 02/13/24  1322   *     Essential hypertension  -Monitor blood pressure and heart rate  -continue prior to admission metoprolol XL    Hyperlipidemia  -Continue prior to admission rosuvastatin (Crestor)    Gastroesophageal reflux disease  -Continue prior to admission omeprazole or equivalent PPI    Elevated troponin  Elevated BNP  Aortic stenosis, s/p TAVR  History of congestive heart failure (CHF)  S/p pacemaker (PPM)  -ECHO 9/22/2023 status post TAVR, normal left ventricular systolic function, EF 55 to 60%  -Troponin on admission 29, BNP 3098  -Admission EKG paced rhythm  -No cardiopulmonary complaints on admission, oxygen saturations satisfactory on room air  -Monitor for signs and symptoms of fluid overload; past history of CHF  -Telemetry as above  -Recheck troponin, monitor  -Daily aspirin as prior to admission; continue beta-blocker, statin  -Consider pacemaker interrogation as noted above; outpatient follow-up with cardiology    Anemia, normocytic  -Admission hemoglobin 11.6, MCV 94  -Chronic anemia, hemoglobin baseline 11.3 to 12.8  -Trauma involving right shoulder on admission with recent fall; ecchymoses on exam  -Monitor, follow-up with primary clinic provider   Recent Labs   Lab 02/13/24  1322   HGB 11.6*     History of constipation  -Continue prior to admission polyethylene glycol (Miralax)   -As needed senna-docusate as ordered    History of depression  History of insomnia  -Continue prior to admission sertraline  -HOLD prior to admission trazodone with recent fall, reassess daily    Weakness and deconditioning  -Physical therapy (PT), occupational therapy (OT)  "consulted  -Fall precautions    Disposition  -Estimated length of stay 48 hours  -Anticipated discharge to transitional care unit   -Social work consulted  -Patient's wife, Zainab Shahid, called and updated with care plan afternoon of admission    Change in hospitalist provider tomorrow with one of my [facility] hospitalist colleagues assuming care.        Diet: Moderate Consistent Carb (60 g CHO per Meal) Diet  DVT Prophylaxis: Pneumatic Compression Devices  Pablo Catheter: Not present  Lines: None     Cardiac Monitoring: None  Code Status:  DNR, DNI per POLST document on admission    Clinically Significant Risk Factors Present on Admission                # Drug Induced Platelet Defect: home medication list includes an antiplatelet medication    # Chronic heart failure with preserved ejection fraction: heart failure noted on problem list and last echo with EF >50%  # Dementia: noted on problem list    # Overweight: Estimated body mass index is 25.22 kg/m  as calculated from the following:    Height as of this encounter: 1.702 m (5' 7\").    Weight as of this encounter: 73 kg (161 lb).         # Financial/Environmental Concerns:     # Pacemaker present       Disposition Plan      Expected Discharge Date: 02/16/2024,  3:00 PM                Josue Mercado MD  Hospitalist Service  [facility]  Securely message with [employer] (more info)  Text page via [employer] Paging/Directory     ______________________________________________________________________    Chief Complaint   Unwitnessed fall, new right humerus fracture, elevated CK, elevated troponin, history of cognitive impairment    History is obtained from the patient, ER provider, chart review     History of Present Illness   Erick Shahid is a 83 year old male who presents from [place] home with a fall 2 days prior to admission, unwitnessed.  When asked how he is doing patient states \"I am doing okay\".  He goes on to say \"I " "fell\" approximately 2 days prior to admission.  History of cognitive impairment/dementia with limited history regarding recent details.  He does recall hitting the \"back of a cabinet\" with no loss of consciousness.  He did strike his right shoulder with subsequent right shoulder pain over the past couple of days.  Denies chest pain or shortness of breath.  No syncope reported.  After the fall patient states \"I laid there for a while\" he states for perhaps an hour before help arrived.  Appetite described as \"normal\".  Questionable mild weight loss.  No nausea or vomiting.    In the emergency room, afebrile with blood pressure mildly elevated.  Oxygen saturations satisfactory on room air.  -ER labs with elevated CK, 1128, glucose 167, BNP 3098, troponin 29, hemoglobin 11.6, normal WBC and platelets.  Serum creatinine 0.74, potassium 4.2, AST 52.  -X-ray right shoulder with acute impacted and displaced proximal humerus fracture  -Noncontrast head CT negative for acute intracranial changes with mild diffuse parenchymal volume loss and white matter changes  -EKG showed paced rhythm, rate 62 bpm  -Hospital admission for unwitnessed fall, new right humerus fracture, and elevated CK with concerns of developing rhabdomyolysis    Past Medical History    Past Medical History:   Diagnosis Date    Benign essential hypertension     BPH (benign prostatic hyperplasia)     Chronic heart failure with reduced ejection fraction and diastolic dysfunction (H)     Cognitive impairment     Critical aortic valve stenosis     Diabetes mellitus, type 2 (H)     on metformin    Dyslipidemia     First degree atrioventricular block     LBBB (left bundle branch block)     Macular degeneration (senile) of retina        Past Surgical History   Past Surgical History:   Procedure Laterality Date    CV CORONARY ANGIOGRAM N/A 07/28/2023    Procedure: Coronary Angiogram;  Surgeon: Mando Field MD;  Location: [facility] CARDIAC CATH LAB    CV PCI " N/A 07/28/2023    Procedure: Percutaneous Coronary Intervention;  Surgeon: Mando Field MD;  Location:  HEART CARDIAC CATH LAB    CV TRANSCATHETER AORTIC VALVE REPLACEMENT  08/15/2023    CV TRANSCATHETER AORTIC VALVE REPLACEMENT-FEMORAL APPROACH N/A 8/15/2023    Procedure: Transcatheter Aortic Valve Replacement-Femoral Approach;  Surgeon: Bossman Felipe MD;  Location:  HEART CARDIAC CATH LAB    EP PACEMAKER DEVICE & LEAD IMPLANT- RIGHT ATRIAL & LEFT VENTRICULAR N/A 8/15/2023    Procedure: Pacemaker Device & Lead Implant- Right Atrial & Left Ventricular;  Surgeon: Oscar Mendoza MD;  Location:  HEART CARDIAC CATH LAB    ORIF HIP FRACTURE Right 2018       Prior to Admission Medications   Prior to Admission Medications   Prescriptions Last Dose Informant Patient Reported? Taking?   Vitamin D3 (CHOLECALCIFEROL) 25 mcg (1000 units) tablet 2/13/2024 at 0800 Spouse/Significant Other Yes Yes   Sig: Take 25 mcg by mouth daily   aspirin (ASA) 81 MG chewable tablet 2/13/2024 at 0800  No Yes   Sig: Take 1 tablet (81 mg) by mouth daily   cycloSPORINE (RESTASIS) 0.05 % ophthalmic emulsion  Spouse/Significant Other Yes Yes   Sig: Apply 1 drop to eye 2 times daily as needed for dry eyes   metFORMIN (GLUCOPHAGE) 1000 MG tablet 2/12/2024 at 1800 Spouse/Significant Other Yes Yes   Sig: Take 1,000 mg by mouth daily (with dinner)   metoprolol succinate ER (TOPROL XL) 25 MG 24 hr tablet 2/13/2024 at 0800 Spouse/Significant Other Yes Yes   Sig: Take 25 mg by mouth daily   omeprazole (PRILOSEC) 20 MG DR capsule 2/13/2024 at 0800  No Yes   Sig: Take 1 capsule (20 mg) by mouth daily   polyethylene glycol (MIRALAX) 17 g packet 2/13/2024 at 0800  Yes Yes   Sig: Take 1 packet by mouth daily   rosuvastatin (CRESTOR) 20 MG tablet 2/12/2024 at 2000 Spouse/Significant Other Yes Yes   Sig: Take 20 mg by mouth at bedtime   senna-docusate (SENOKOT-S/PERICOLACE) 8.6-50 MG tablet   Yes Yes   Sig: Take 2 tablets by mouth daily as  needed for constipation   sertraline (ZOLOFT) 50 MG tablet 2/13/2024 at 0800  Yes Yes   Sig: Take 25 mg by mouth daily   traZODone (DESYREL) 25 mg TABS half-tab 2/12/2024 at 2000  Yes Yes   Sig: Take 25 mg by mouth at bedtime   vitamin B-12 (CYANOCOBALAMIN) 1000 MCG tablet 2/13/2024 at 81119 Spouse/Significant Other Yes Yes   Sig: Take 1,000 mcg by mouth daily   vitamin C (ASCORBIC ACID) 500 MG tablet 2/13/2024 at 0800 Spouse/Significant Other Yes Yes   Sig: Take 500 mg by mouth daily      Facility-Administered Medications: None        Review of Systems    Review of systems otherwise negative and per HPI above    Social History   I have reviewed this patient's social history and updated it with pertinent information if needed.  Social History     Tobacco Use    Smoking status: Never    Smokeless tobacco: Never   Vaping Use    Vaping Use: Never used   Substance Use Topics    Alcohol use: Yes     Comment: few drinks per year - very rare    Drug use: Never     Allergies   No Known Allergies     Physical Exam   Vital Signs: Temp: 98.9  F (37.2  C) Temp src: Oral BP: (!) 146/66 Pulse: 67   Resp: 16 SpO2: 99 % O2 Device: None (Room air)    Weight: 161 lbs 0 oz    GENERAL awake and alert, lying in bed in no immediate distress, very hard of hearing  EYES pupils equal, round; no conjunctival injection or jaundice  ENT mucous membranes mildly dry without lesions or exudates  LYMPH no cervical, submandibular, or supraclavicular adenopathy  LUNGS no wheezes or crackles  HEART S1,S2 with RRR, no rubs or gallops, no murmurs  ABDOMEN soft, non-tender; no guarding, rebound, or rigidity  MUSCULOSKELETAL swelling and bruising involving the right shoulder and proximal humerus with some ecchymoses noted, recent fall prior to admission  PULSES dorsalis pedis pulses present, symmetric  SKIN warm and dry  NEURO moves upper and lower extremities spontaneously and to command; no focal weakness appreciated, with limited testing of the right  arm due to humerus fracture; hand grasp appears equal and symmetric; sensation intact to light touch upper and lower extremities  PSYCHIATRIC awake and alert; answers questions and follows simple commands; hard of hearing at baseline    Medical Decision Making             Data     I have personally reviewed the following data over the past 24 hrs:    8.1  \   11.6 (L)   / 153     136 100 17.3 /  167 (H)   4.2 28 0.74 \     ALT: 13 AST: 52 (H) AP: 103 TBILI: 0.8   ALB: 4.0 TOT PROTEIN: 7.3 LIPASE: N/A     Trop: 29 (H) BNP: 3,098 (H)       Imaging results reviewed over the past 24 hrs:   Recent Results (from the past 24 hour(s))   XR Shoulder Right G/E 3 Views    Narrative    SHOULDER RIGHT THREE OR MORE VIEWS 2/13/2024 1:51 PM     HISTORY: Fall with pain swelling and bruising.    COMPARISON: None.       Impression    IMPRESSION:  Acute impacted and displaced proximal humerus fracture. Bones are  demineralized. Pacemaker leads. Degenerative changes glenohumeral and  acromioclavicular joints.     NOTE: ABNORMAL REPORT    THE DICTATION ABOVE DESCRIBES AN ABNORMAL REPORT FOR WHICH FOLLOW-UP  IS NEEDED.    ASHLEE QUESADA MD         SYSTEM ID:  [id number]   CT Head w/o Contrast    Narrative    CT SCAN OF THE HEAD WITHOUT CONTRAST   2/13/2024 2:24 PM     HISTORY: Fall, has dementia, decreased mental status today.    TECHNIQUE: Axial images of the head and coronal reformations without  IV contrast material. Radiation dose for this scan was reduced using  automated exposure control, adjustment of the mA and/or kV according  to patient size, or iterative reconstruction technique.    COMPARISON: Brain MRI 10/13/2023.    FINDINGS: There is no evidence of intracranial hemorrhage, mass, acute  infarct or anomaly. The ventricles are normal in size, shape and  configuration. Mild diffuse parenchymal volume loss. Mild patchy  periventricular white matter hypodensities which are nonspecific, but  likely related to chronic  microvascular ischemic disease.     The visualized portions of the sinuses and mastoids appear normal. The  bony calvarium and bones of the skull base appear intact.       Impression    IMPRESSION:     1. No evidence of acute intracranial hemorrhage, mass, or herniation.  2. Mild diffuse parenchymal volume loss and white matter changes  likely due to chronic microvascular ischemic disease.      CAROLINE CRYSTAL MD         SYSTEM ID:  [id number]

## 2024-02-13 NOTE — TELEPHONE ENCOUNTER
Pt having more falls    Stop trazodone  Decrease metformin to 500 mg daily   Get orthostatic BP     LESTER Otoole CNP on 2/13/2024 at 9:04 AM

## 2024-02-13 NOTE — ED NOTES
[facility]  ED Nurse Handoff Report    ED Chief complaint: Fall and Confusion      ED Diagnosis:   Final diagnoses:   Fall, initial encounter   Closed fracture of proximal end of right humerus, unspecified fracture morphology, initial encounter   Traumatic rhabdomyolysis, initial encounter (H24)   Elevated troponin       Code Status: Full Code    Allergies: No Known Allergies    Patient Story: pt has some memory issues lives in a memory care unit who 2 days ago had fallen. It is not clear how long he was on the ground but staff helped him up. Today they noticed increased bruising to the right shoulder and he seemed to be more confused than his normal baseline dementia. Blood sugar was 174 per EMS. Patient is hard of hearing   Focused Assessment:  weakness and right arm brusising     Treatments and/or interventions provided:   Labs Ordered and Resulted from Time of ED Arrival to Time of ED Departure   COMPREHENSIVE METABOLIC PANEL - Abnormal       Result Value    Sodium 136      Potassium 4.2      Carbon Dioxide (CO2) 28      Anion Gap 8      Urea Nitrogen 17.3      Creatinine 0.74      GFR Estimate 89      Calcium 9.4      Chloride 100      Glucose 167 (*)     Alkaline Phosphatase 103      AST 52 (*)     ALT 13      Protein Total 7.3      Albumin 4.0      Bilirubin Total 0.8     TROPONIN T, HIGH SENSITIVITY - Abnormal    Troponin T, High Sensitivity 29 (*)    ROUTINE UA WITH MICROSCOPIC REFLEX TO CULTURE - Abnormal    Color Urine Yellow      Appearance Urine Clear      Glucose Urine Negative      Bilirubin Urine Negative      Ketones Urine 10 (*)     Specific Gravity Urine 1.026      Blood Urine Negative      pH Urine 5.0      Protein Albumin Urine 20 (*)     Urobilinogen Urine Normal      Nitrite Urine Negative      Leukocyte Esterase Urine Negative      Mucus Urine Present (*)     RBC Urine 1      WBC Urine 1     CK TOTAL - Abnormal    CK 1,128 (*)    CBC WITH PLATELETS AND DIFFERENTIAL - Abnormal  "   WBC Count 8.1      RBC Count 3.72 (*)     Hemoglobin 11.6 (*)     Hematocrit 35.0 (*)     MCV 94      MCH 31.2      MCHC 33.1      RDW 14.2      Platelet Count 153      % Neutrophils 75      % Lymphocytes 13      % Monocytes 12      % Eosinophils 0      % Basophils 0      % Immature Granulocytes 0      NRBCs per 100 WBC 0      Absolute Neutrophils 6.0      Absolute Lymphocytes 1.1      Absolute Monocytes 1.0      Absolute Eosinophils 0.0      Absolute Basophils 0.0      Absolute Immature Granulocytes 0.0      Absolute NRBCs 0.0     NT PROBNP INPATIENT      Medications   sodium chloride 0.9 % infusion ( Intravenous $New Bag 2/13/24 1424)   sodium chloride 0.9% BOLUS 1,000 mL (0 mLs Intravenous Stopped 2/13/24 1424)      XR Shoulder Right G/E 3 Views   Preliminary Result   IMPRESSION:   Acute impacted and displaced proximal humerus fracture. Bones are   demineralized. Pacemaker leads. Degenerative changes glenohumeral and   acromioclavicular joints.       NOTE: ABNORMAL REPORT      THE DICTATION ABOVE DESCRIBES AN ABNORMAL REPORT FOR WHICH FOLLOW-UP   IS NEEDED.      CT Head w/o Contrast    (Results Pending)        Patient's response to treatments and/or interventions: tolerated     To be done/followed up on inpatient unit:  na    Does this patient have any cognitive concerns?: Forgetful    Activity level - Baseline/Home:  Unknown  Activity Level - Current:   Unknown    Patient's Preferred language: English   Needed?: No    Isolation: None  Infection: Not Applicable  Patient tested for COVID 19 prior to admission: NO  Bariatric?: No    Vital Signs:   Vitals:    02/13/24 1226   BP: (!) 146/66   Pulse: 67   Resp: 16   Temp: 98.9  F (37.2  C)   TempSrc: Oral   SpO2: 99%   Weight: 73 kg (161 lb)   Height: 1.702 m (5' 7\")       Cardiac Rhythm:     Was the PSS-3 completed:   Yes  What interventions are required if any?               Family Comments:   OBS brochure/video discussed/provided to patient/family: " N/A              Name of person given brochure if not patient:               Relationship to patient:     For the majority of the shift this patient's behavior was Green.   Behavioral interventions performed were .    ED NURSE PHONE NUMBER: [phone]

## 2024-02-13 NOTE — PLAN OF CARE
Orthopedic Plan of Care    Orthopedic consulted noted for a right proximal humerus fracture with impaction and displacement after an unwitnessed fall at his [facility] on 2/11/24. Radiographs demonstrate intact glenohumeral joint on the axillary view. Given alignment, age of the patient, and extensive cardiac history, recommend nonoperative management of the right proximal humerus fracture as follows:    -Sling for comfort for the RUE. Okay to remove for hygiene, skin checks, and elbow ROM 3-5 times a day.   -No lifting more than a full coffee cup with the right upper extremity. Avoid use of a walker.   -Okay for elbow, wrist, and digital ROM. Can begin Codman's exercises to the right shoulder in the next 2 weeks as pain allows.  -PT and OT consults recommended.   -SW consult for discharge planning recommended.  -Continue pain control per primary team. Utilize cold compresses.  -Follow up with a shoulder specialist at [facility], such as Dr. Bernardo Sanchez, Dr. Tom Stevens, or Dr. Piter Lyons, in 2 weeks for repeat imaging and reevaluation of the right shoulder. Please call [phone] to schedule.     Formal consult to follow.    Cierra Castañeda PA-C  [facility]

## 2024-02-13 NOTE — PHARMACY-ADMISSION MEDICATION HISTORY
Pharmacist Admission Medication History    Admission medication history is complete. The information provided in this note is only as accurate as the sources available at the time of the update.    Information Source(s): Facility (U/NH/) medication list/MAR via N/A    Changes made to PTA medication list:  Added: None  Deleted: None  Changed: metformin 500 mg every day > 1000 mg every day     Allergies reviewed with patient and updates made in EHR: unable to assess    Medication History Completed By: Joanna Leung RPH 2/13/2024 2:31 PM    Prior to Admission medications    Medication Sig Last Dose Taking? Auth Provider Long Term End Date   aspirin (ASA) 81 MG chewable tablet Take 1 tablet (81 mg) by mouth daily 2/13/2024 at 0800 Yes Sheri Davis CNP No    cycloSPORINE (RESTASIS) 0.05 % ophthalmic emulsion Apply 1 drop to eye 2 times daily as needed for dry eyes  Yes Unknown, Entered By History     metFORMIN (GLUCOPHAGE) 1000 MG tablet Take 1,000 mg by mouth daily (with dinner) 2/12/2024 at 1800 Yes Unknown, Entered By History Yes    metoprolol succinate ER (TOPROL XL) 25 MG 24 hr tablet Take 25 mg by mouth daily 2/13/2024 at 0800 Yes Reported, Patient No    omeprazole (PRILOSEC) 20 MG DR capsule Take 1 capsule (20 mg) by mouth daily 2/13/2024 at 0800 Yes Yancy Walker APRN CNP     polyethylene glycol (MIRALAX) 17 g packet Take 1 packet by mouth daily 2/13/2024 at 0800 Yes Reported, Patient     rosuvastatin (CRESTOR) 20 MG tablet Take 20 mg by mouth at bedtime 2/12/2024 at 2000 Yes Unknown, Entered By History Yes    senna-docusate (SENOKOT-S/PERICOLACE) 8.6-50 MG tablet Take 2 tablets by mouth daily as needed for constipation  Yes Reported, Patient     sertraline (ZOLOFT) 50 MG tablet Take 25 mg by mouth daily 2/13/2024 at 0800 Yes Unknown, Entered By History Yes    traZODone (DESYREL) 25 mg TABS half-tab Take 25 mg by mouth at bedtime 2/12/2024 at 2000 Yes Unknown, Entered By History No     vitamin B-12 (CYANOCOBALAMIN) 1000 MCG tablet Take 1,000 mcg by mouth daily 2/13/2024 at 34428 Yes Reported, Patient     vitamin C (ASCORBIC ACID) 500 MG tablet Take 500 mg by mouth daily 2/13/2024 at 0800 Yes Reported, Patient     Vitamin D3 (CHOLECALCIFEROL) 25 mcg (1000 units) tablet Take 25 mcg by mouth daily 2/13/2024 at 0800 Yes Unknown, Entered By History

## 2024-02-13 NOTE — PROGRESS NOTES
RECEIVING UNIT ED HANDOFF REVIEW    ED Nurse Handoff Report was reviewed by: Lou Foreman on February 13, 2024 at 4:22 PM

## 2024-02-13 NOTE — ED NOTES
Bed: ED14  Expected date:   Expected time:   Means of arrival:   Comments:  [age]M increased confusion, shoulder pain after fall

## 2024-02-14 NOTE — CONSULTS
"[facility] Nurse Inpatient Assessment     Consulted for: Wound sacral wound     Summary: patient with POA skin damage to buttock, stable 2/14 on initial exam     Patient History (according to provider note(s):      \"83 year old male with past medical history of hypertension, cognitive impairment/dementia, type II diabetes mellitus, hyperlipidemia, left bundle branch block, aortic stenosis (s/p TAVR), congestive heart failure, pacemaker, benign prostatic hypertrophy, admitted 2/13/2024 with increased confusion, unwitnessed fall at home. \"    Assessment:      Areas visualized during today's visit: Focused: and Sacrum/coccyx    Wound location: buttock gluteal cleft     Last photo: 2/14  Wound due to: Moisture Associated Skin Damage (MASD) with pressure component POA   Wound history/plan of care: sacral mepilex   Wound base:  dermis     Palpation of the wound bed: normal      Drainage: scant     Description of drainage: serosanguinous     Measurements (length x width x depth, in cm):4  x 2.5  x  0.1 cm      Tunneling: N/A     Undermining: N/A  Periwound skin: Intact      Color: normal and consistent with surrounding tissue      Temperature: normal   Odor: none  Pain: no grimacing or signs of discomfort, none  Pain interventions prior to dressing change: patient tolerated well (complaint of non related shoulder pain)  Treatment goal: Heal   STATUS: initial assessment  Supplies ordered: supplies stored on unit and discussed with RN       Treatment Plan:       Wound care  EVERY SHIFT        Comments: Location: buttock   Care: provided qshift by primary RN  1. Cleanse with each incontinence episode with cabrera cleanser and soft dry wipes  2. Apply sacral mepilex over sacrum (lift for routine assessments, cleanse underneath with normal saline, ok to use each up to 5 days)  3. Apply criticaid barrier paste to perianal  4. Apply cardinal covidien pad under patient, do not apply diapers  5. Turn " "left to right in bed, use supine positioning for meals only          Skin care precautions  EFFECTIVE NOW        Comments: Pressure Injury Prevention (PIP) Plan:  If patient is declining pressure injury prevention interventions: Explore reason why and address patient's concerns, Educate on pressure injury risk and prevention intervention(s), If patient is still declining, document \"informed refusal\" , and Ensure Care team is aware ( provider, charge nurse, etc)  Mattress: Follow bed algorithm, reassess daily and order specialty mattress, if indicated.  HOB: Maintain at or below 30 degrees, unless contraindicated  Repositioning in bed: Every 1-2 hours , Left/right positioning; avoid supine, and Raise foot of bed prior to raising head of bed, to reduce patient sliding down (shear)  Heels: Keep elevated off mattress and Pillows under calves  Protective Dressing: Sacral Mepilex for prevention (#545010),  especially for the agitated patient  Chair positioning: Chair cushion (#073091) , Assist patient to reposition hourly, and Do NOT use a donut for sitting (this increases pressure to smaller area and creates a higher potential for injury)    If patient has a buttock pressure injury, or high risk for PI use chair cushion or SPS.  Moisture Management: Perineal cleansing /protection: Follow Incontinence Protocol, Avoid brief in bed, Clean and dry skin folds with bathing , and Moisturize dry skin  Under Devices: Inspect skin under all medical devices during skin inspection , Ensure tubes are stabilized without tension, and Ensure patient is not lying on medical devices or equipment when repositioned  Ask provider to discontinue device when no longer needed.        Orders: Written    RECOMMEND PRIMARY TEAM ORDER: None, at this time  Education provided: importance of repositioning, plan of care, Moisture management, Hygiene, and Off-loading pressure  Discussed plan of care with: Patient and Nurse  WOC nurse follow-up plan: " weekly  Notify [facility] if wound(s) deteriorate.  Nursing to notify the Provider(s) and re-consult the [facility] Nurse if new skin concern.    DATA:     Current support surface: Standard  Low air loss (NEGAR pump, Isolibrium, Pulsate, skin guard, etc)  Containment of urine/stool: Incontinence Protocol and Incontinent pad in bed  BMI: Body mass index is 26.66 kg/m .   Active diet order: Orders Placed This Encounter      Moderate Consistent Carb (60 g CHO per Meal) Diet     Output: I/O last 3 completed shifts:  In: 720 [P.O.:720]  Out: 400 [Urine:400]     Labs:   Recent Labs   Lab 02/14/24  0810 02/13/24  1322   ALBUMIN  --  4.0   HGB 10.5* 11.6*   WBC 5.6 8.1     Pressure injury risk assessment:   Sensory Perception: 4-->no impairment  Moisture: 3-->occasionally moist  Activity: 2-->chairfast  Mobility: 2-->very limited  Nutrition: 2-->probably inadequate  Friction and Shear: 2-->potential problem  Aj Score: 15    Blanca CWOCN   1st choice: Securely message with [employer] ([facility] [employer])   (2nd option: [facility] Office Phone [phone], messages checked periodically Mon-Fri 8a-4p)

## 2024-02-14 NOTE — PROGRESS NOTES
S: Pt. seen at [facility]. Male. Conrado BATES. RX: Shoulder immobilizer right. DX: Right proximal humerus.  O:A: Today I F/D an Ultrasling 3, right, size medium to support humerus. Donning doffing wear and care instructions given to Pt. and Nurse.  P: Pt. to be seen as needed.    Barrington THRASHER, MITESH   stated

## 2024-02-14 NOTE — PLAN OF CARE
Goal Outcome Evaluation:         Pt A&Ox2 disoriented to time & situation. Not oob overnight, turn/ repo. Sling on R shoulder, bruising on RUE as well. External cath in place. Tele SR w/ 1st degree AV block & occasional PVCs. Denies pain, no signs/ symptoms of discomfort. NS @100mL/hr.

## 2024-02-14 NOTE — PROVIDER NOTIFICATION
To Dr Fragoso via [employer] messaging at 1948  Change in tele. Was on EKG AV paced with prolonged AV conduction. On tele now SR with 1st Degree AVB and some PVCs. Please advise, also trop came down slightly from 29 to now 26. Please advise, thanks. I'm signing off, new RN is Brandyn Garces. Not seeing pacer spikes on tele.     MD- What is BP and HR?    RN- HR 60, /45    MD- Continue to monitor, no intervention currently

## 2024-02-14 NOTE — PROGRESS NOTES
02/14/24 1130   Appointment Info   Signing Clinician's Name / Credentials (OT) Olga Lidia Fung, OTR/L   Rehab Comments (OT) (S)  Per ortho--Sling for comfort for the RUE. Okay to remove for hygiene, skin checks, and elbow ROM 3-5 times a day.   -No lifting more than a full coffee cup with the right upper extremity. Avoid use of a walker.   -Okay for elbow, wrist, and digital ROM.   Living Environment   People in Home facility resident   Current Living Arrangements assisted living  (memory care)   Home Accessibility no concerns   Living Environment Comments From [facility]/memory care.   Self-Care   Usual Activity Tolerance moderate   Current Activity Tolerance poor   Equipment Currently Used at Home grab bar, toilet;grab bar, tub/shower;raised toilet seat;shower chair;walker, standard   Fall history within last six months yes   Activity/Exercise/Self-Care Comment Spouse reports pt able to toilet himself at baseline, however unsteady w/ FWW. Recieves assist w/ all other ADLs, IADLs.   General Information   Onset of Illness/Injury or Date of Surgery 02/13/24   Referring Physician Josue Mercado MD   Patient/Family Therapy Goal Statement (OT) None stated   Additional Occupational Profile Info/Pertinent History of Current Problem Erick Shahid is a 83 year old male with past medical history of hypertension, cognitive impairment/dementia, type II diabetes mellitus, hyperlipidemia, left bundle branch block, aortic stenosis (s/p TAVR), congestive heart failure, pacemaker, benign prostatic hypertrophy, admitted 2/13/2024 with increased confusion, unwitnessed fall at home. Found R humerus fx, ortho recommends conservative management   Existing Precautions/Restrictions fall;weight bearing   Right Lower Extremity (Weight-bearing Status) (S)  other (see comments)  (nothing greater than coffee cup)   Cognitive Status Examination   Orientation Status person   Follows Commands follows one-step commands;50-74% accuracy;increased  processing time needed;repetition of directions required;physical/tactile prompts required;verbal cues/prompting required   Memory Deficit short-term memory   Visual Perception   Visual Impairment/Limitations corrective lenses full-time   Pain Assessment   Patient Currently in Pain Yes, see Vital Sign flowsheet   Range of Motion Comprehensive   Comment, General Range of Motion RUE immobilized   Bed Mobility   Bed Mobility supine-sit   Supine-Sit [place] (Bed Mobility) maximum assist (25% patient effort)   Transfers   Transfers sit-stand transfer   Sit-Stand Transfer   Sit-Stand [place] (Transfers) maximum assist (25% patient effort);2 person assist   Activities of Daily Living   BADL Assessment/Intervention upper body dressing;lower body dressing;toileting;grooming   Upper Body Dressing Assessment/Training   [place] Level (Upper Body Dressing) maximum assist (25% patient effort)   Lower Body Dressing Assessment/Training   [place] Level (Lower Body Dressing) dependent (less than 25% patient effort)   Grooming Assessment/Training   [place] Level (Grooming) moderate assist (50% patient effort)   Comment, (Grooming) per clinical judgement   Toileting   [place] Level (Toileting) dependent (less than 25% patient effort)   Clinical Impression   Criteria for Skilled Therapeutic Interventions Met (OT) Yes, treatment indicated   OT Diagnosis decreased ADL independence   OT Problem List-Impairments impacting ADL problems related to;activity tolerance impaired;balance;cognition;mobility;range of motion (ROM);pain   Assessment of Occupational Performance 3-5 Performance Deficits   Identified Performance Deficits dressing, bathing, toileting, functional transfers, grooming   Planned Therapy Interventions (OT) ADL retraining;ROM;cognition;transfer training;progressive activity/exercise   Clinical Decision Making Complexity (OT) detailed assessment/moderate complexity   Risk & Benefits of therapy have  been explained patient;spouse/significant other   OT Total Evaluation Time   OT Eval, Moderate Complexity Minutes (73019) 10   OT Goals   Therapy Frequency (OT) 5 times/week   OT Predicted Duration/Target Date for Goal Attainment 02/23/24   OT Goals Hygiene/Grooming;Upper Body Dressing;Toilet Transfer/Toileting;Lower Body Dressing;OT Goal 1   OT: Hygiene/Grooming supervision/stand-by assist   OT: Upper Body Dressing Moderate assist   OT: Lower Body Dressing Moderate assist   OT: Toilet Transfer/Toileting Moderate assist   OT: Goal 1 Pt will complete 8-10 mins of UE ROM to increase ROM in prep for ADLs.   Interventions   Interventions Quick Adds Self-Care/Home Management   Self-Care/Home Management   Self-Care/Home Mgmt/ADL, Compensatory, Meal Prep Minutes (19532) 57   Symptoms Noted During/After Treatment (Meal Preparation/Planning Training) increased pain   Treatment Detail/Skilled Intervention Eval complete, tx indicated.  Pt required max A to [facility] gown w/ VCs for technique and safety for IV line and tele monitor. Pt required VCs for LE advancement, initiation of task and technique for bed mobility and bed-chair transfer. Pt performed bed mob w/ max A x1. Pt required CGA/min A to maintain seated balance on EOB d/t weakness. VCs for midline position and hand placement. Pt required mod A x2 for bed-chair transfer w/ VCs (see above). Pt w/ decreased command following, unclear if it's hard of hearing or cognitive related. Ed w/ spouse on d/c recommendations and restrictions w/ RUE. Spouse verbalized understanding. Pt left in chair at end of session, handoff to RN, all needs met.   OT Discharge Planning   OT Plan see rehab comments. ROM RUE, BSC transfer, grooming tasks   OT Discharge Recommendation (DC Rec) Transitional Care Facility   OT Rationale for DC Rec Pt significantly below baseline for ADL performance. Limited by immobilized RUE, pain, weakness, decreased cognition. Currently assist x2 for all ADLs,  functional mobility. Recommend TCU to increase independence, safety prior to returning to PLOF.   OT Brief overview of current status assist x2 stand/pivot   Total Session Time   Timed Code Treatment Minutes 24   Total Session Time (sum of timed and untimed services) 34

## 2024-02-14 NOTE — PROGRESS NOTES
[facility]    Medicine Progress Note - Hospitalist Service    Date of Admission:  2/13/2024    Assessment & Plan     Erick Shahid is a 83 year old male with past medical history of hypertension, cognitive impairment/dementia, type II diabetes mellitus, hyperlipidemia, left bundle branch block, aortic stenosis (s/p TAVR), congestive heart failure, pacemaker, benign prostatic hypertrophy, admitted 2/13/2024 with increased confusion, unwitnessed fall at home.        S/p unwitnessed fall prior to admission   Acute impacted and displaced right proximal humerus fracture  -Unwitnessed fall at skilled care facility reportedly 2 days prior to admission with unknown length of time on the floor; no report of syncope or cardiopulmonary complaints  -ER labs with elevated CK, 1128, glucose 167, BNP 3098, troponin 29, hemoglobin 11.6, normal WBC and platelets.  Serum creatinine 0.74, potassium 4.2, AST 52.  -X-ray right shoulder with acute impacted and displaced proximal humerus fracture  -Noncontrast head CT negative for acute intracranial changes with mild diffuse parenchymal volume loss and white matter changes  -EKG with paced rhythm, rate 62 bpm  -Orthopedics consulted and they have recommended nonoperative management of the right proximal humerus with sling for comfort, physical therapy, Occupational Therapy and they have ordered activity restriction and details are in the note  -Continue with physical therapy and Occupational Therapy   -Follow as outpatient with shoulder specialist and [facility] such as Dr. Bernardo Sanchez, Dr. Tom Stevens, or Dr. Piter Lyons, in 2 weeks for repeat imaging and reevaluation of the right shoulder.   -Continue with pain control  -Check vitamin D level       Elevated creatinine kinase   Mild nontraumatic rhabdomyolysis  -CK 1128 on admission, serum Cr 0.74  -Patient was started on gentle IV fluids with normal saline and fluid management has been a  little on the conservative side given his history of heart failure and elevated proBNP  -CK levels this morning is 747  - will decrease rate of fluids to 50cc per hr given heart failure history     Type II diabetes mellitus  -HbA1c 6.4% on 2/5/2024  -PTA Home regimen includes metformin  -Hold oral hypoglycemic agents while in the hospital  -Continue with moderate dose sliding scale and diabetic diet  -Blood sugar is 124      Essential hypertension  -Monitor blood pressure and heart rate  -continue prior to admission metoprolol XL     Hyperlipidemia  -hold  rosuvastatin (Crestor) 20 mg daily for now     Gastroesophageal reflux disease  -Continue prior to admission omeprazole or equivalent PPI     Elevated troponin likely due to demand  Elevated BNP  Aortic stenosis, s/p TAVR  History of  diastolic heart failure (CHF)  S/p pacemaker (PPM)  -ECHO 9/22/2023 status post TAVR, normal left ventricular systolic function, EF 55 to 60%  -Troponin on admission 29 and repeat trended down to 26, BNP 3098  -Admission EKG paced rhythm  -Mild elevation in troponin can be due to elevated CK and fall and mildly elevated proBNP  -On exam he is on room air, not in respiratory distress and no chest pain on admission and monitor closely  -Continue with PTA aspirin 81 mg , metoprolol XL 25 mg daily and hold Crestor 20 mg daily  -Will order pacemaker interrogation and echo  -Continue to follow as outpatient with cardiology team       Anemia, normocytic  -Chronic anemia, hemoglobin baseline 11.1 to 12.8  --Admission hemoglobin 11.6, MCV 94  -Hemoglobin is 10.5  - no robert or hematemesis but he does have ecchymosis present over the site  -Will continue to monitor     History of constipation  -Continue prior to admission polyethylene glycol (Miralax)   -As needed senna Colace available as needed       History of depression  History of insomnia  Dementia  -Continue prior to admission sertraline 25 mg daily  -Will hold PTA trazodone with  "recent fall, reassess daily     Weakness and deconditioning  -Physical therapy, Occupational Therapy consulted     Disposition  -Anticipated discharge to transitional care unit   -Social work consulted  -Patient's wife, Zainab Shahid, called and updated with care plan          Diet: Moderate Consistent Carb (60 g CHO per Meal) Diet    DVT Prophylaxis: Pneumatic Compression Devices  Pablo Catheter: Not present  Lines: None     Cardiac Monitoring: ACTIVE order. Indication: unwitnessed fall, history of pacemaker  Code Status: No CPR- Do NOT Intubate      Clinically Significant Risk Factors Present on Admission                # Drug Induced Platelet Defect: home medication list includes an antiplatelet medication    # Chronic heart failure with preserved ejection fraction: heart failure noted on problem list and last echo with EF >50%  # Dementia: noted on problem list    # Overweight: Estimated body mass index is 26.66 kg/m  as calculated from the following:    Height as of this encounter: 1.702 m (5' 7\").    Weight as of this encounter: 77.2 kg (170 lb 3.1 oz).       # Financial/Environmental Concerns:     # Pacemaker present       Disposition Plan      Expected Discharge Date: 02/16/2024,  3:00 PM                  Jessica Colón MD  Hospitalist Service  [facility]  Securely message with [employer] (more info)  Text page via [facility] Paging/Directory   ______________________________________________________________________    Interval History     Saw the patient this morning and he was laying in the bed and he is following commands and he is aware that month is February, place as hospital.  He did tell me that he lives in assisted facility and the wife lives at home.  Patient had a fall and was down for unknown.  Of time.  He is following commands.  His nurse was present when I saw him.  Continues to deny shortness of breath, chest discomfort or palpitation    Discussed with patient's nurse, case " management team and also discussed with the wife.  The wife did tell me that the place where patient lives they also have facility for transitional care and she would like if patient is able to go there    Physical Exam   Vital Signs: Temp: 98.2  F (36.8  C) Temp src: Oral BP: 135/59 Pulse: 78   Resp: 16 SpO2: 96 % O2 Device: None (Room air)    Weight: 170 lbs 3.12 oz        General: AO x 2 near baseline  Neck: Neck is supple   Respiratory: Lungs are clear to auscultation bilaterally with no wheeze or crackles   Cardiovascular: Regular rate , S1 and S2 normal with no murmer or rubs or gallops  Abdomen:   soft , non tender , non distended and bowel sound present   Skin: No skin rashes or lesions to inspection or palpation.  Neurologic:  facial droop  Musculoskeletal: Normal Range of motion over upper and lower extremities bilaterally except the right upper which is restricted and has ecchymosis present and is in sling  Psychiatric: cooperative      Medical Decision Making       Time spent in care of patient is 45 minutes and I reviewed his labs including CBC, basic metabolic panel, CK and discussed plan of care in detail with the patient, nursing staff, case management team and reviewed note from the orthopedics and also discussed with his family including his wife at 106 PM.       Data     I have personally reviewed the following data over the past 24 hrs:    5.6  \   10.5 (L)   / 133 (L)     137 102 10.4 /  130 (H)   3.5 25 0.55 (L) \     ALT: 13 AST: 52 (H) AP: 103 TBILI: 0.8   ALB: 4.0 TOT PROTEIN: 7.3 LIPASE: N/A     Trop: 26 (H) BNP: 3,098 (H)       Imaging results reviewed over the past 24 hrs:   Recent Results (from the past 24 hour(s))   XR Shoulder Right G/E 3 Views    Narrative    SHOULDER RIGHT THREE OR MORE VIEWS 2/13/2024 1:51 PM     HISTORY: Fall with pain swelling and bruising.    COMPARISON: None.       Impression    IMPRESSION:  Acute impacted and displaced proximal humerus fracture. Bones  are  demineralized. Pacemaker leads. Degenerative changes glenohumeral and  acromioclavicular joints.     NOTE: ABNORMAL REPORT    THE DICTATION ABOVE DESCRIBES AN ABNORMAL REPORT FOR WHICH FOLLOW-UP  IS NEEDED.    ASHLEE QUESADA MD         SYSTEM ID:  [id number]   CT Head w/o Contrast    Narrative    CT SCAN OF THE HEAD WITHOUT CONTRAST   2/13/2024 2:24 PM     HISTORY: Fall, has dementia, decreased mental status today.    TECHNIQUE: Axial images of the head and coronal reformations without  IV contrast material. Radiation dose for this scan was reduced using  automated exposure control, adjustment of the mA and/or kV according  to patient size, or iterative reconstruction technique.    COMPARISON: Brain MRI 10/13/2023.    FINDINGS: There is no evidence of intracranial hemorrhage, mass, acute  infarct or anomaly. The ventricles are normal in size, shape and  configuration. Mild diffuse parenchymal volume loss. Mild patchy  periventricular white matter hypodensities which are nonspecific, but  likely related to chronic microvascular ischemic disease.     The visualized portions of the sinuses and mastoids appear normal. The  bony calvarium and bones of the skull base appear intact.       Impression    IMPRESSION:     1. No evidence of acute intracranial hemorrhage, mass, or herniation.  2. Mild diffuse parenchymal volume loss and white matter changes  likely due to chronic microvascular ischemic disease.      CAROLINE CRYSTAL MD         SYSTEM ID:  [id number]

## 2024-02-14 NOTE — PLAN OF CARE
"Goal Outcome Evaluation:    Denies CP, SOB. All pt needs met at this time. Sling applied per order to RUE. RN called Lab at [phone] and they said that his lab draw was \"claimed\" and that someone would be up soon. RN put in sticky note to see if team tomorrow wants a WOC consult for pre-existing wound on sacrum per wife.   "

## 2024-02-14 NOTE — CONSULTS
[facility]    Orthopedic Consultation    Erick Shahid MRN# [id number]   Age: 83 year old YOB: 1940     Date of Admission:  2/13/2024    Reason for consult: Right proximal humerus fracture        Requesting physician: Josue Mercado MD        Level of consult: One-time consult to assist in determining a diagnosis and to recommend an appropriate treatment plan           Assessment and Plan:   Assessment:   Right proximal humerus fracture   History of cognitive impairment/dementia       Plan:   Recommend nonoperative management of the right proximal humerus fracture as follows:     -Sling for comfort. Ordering shoulder immobilizer for better fit (no cushion) OK to allow arm to hang at side, gravity will pull humerus into alignment.   - NWB RUE, OK for platform walker if needed.   -Okay for elbow, wrist, and digital ROM. Can begin Codman's exercises to the right shoulder in the next 2 weeks as pain allows.  -PT and OT consults recommended.   -Continue pain control per primary team.       -Follow up with a shoulder specialist at [facility], such as Dr. Bernardo Sanchez, Dr. Tom Stevens, or Dr. Piter Lyons, in 2 weeks for repeat imaging and reevaluation of the right shoulder. Please call [phone] to schedule.     Please contact orthopedic trauma team if any questions or concerns arise.           Chief Complaint:   Right proximal humerus fracture.          History of Present Illness per chart review:   This patient is a 83 year old male who presents with the following condition requiring a hospital admission:    (W19.XXXA) Fall, initial encounter  (S42.201A) Closed fracture of proximal end of right humerus, unspecified fracture morphology, initial encounter  (T79.6XXA) Traumatic rhabdomyolysis, initial encounter (H24)  (R79.89) Elevated troponin  (R60.9) Peripheral edema        Orthopedic consulted noted for a right proximal humerus fracture with  impaction and displacement after an unwitnessed fall at his [facility] on 2/11/24.  Past medical history of hypertension, cognitive impairment/dementia, type II diabetes mellitus, hyperlipidemia, left bundle branch block, aortic stenosis (s/p TAVR), congestive heart failure, pacemaker, benign prostatic hypertrophy, admitted 2/13/2024 with increased confusion,             Past Medical History:     Past Medical History:   Diagnosis Date    Benign essential hypertension     BPH (benign prostatic hyperplasia)     Chronic heart failure with reduced ejection fraction and diastolic dysfunction (H)     Cognitive impairment     Critical aortic valve stenosis     Diabetes mellitus, type 2 (H)     on metformin    Dyslipidemia     First degree atrioventricular block     LBBB (left bundle branch block)     Macular degeneration (senile) of retina              Past Surgical History:     Past Surgical History:   Procedure Laterality Date    CV CORONARY ANGIOGRAM N/A 07/28/2023    Procedure: Coronary Angiogram;  Surgeon: Mando Field MD;  Location: [facility] CATH LAB    CV PCI N/A 07/28/2023    Procedure: Percutaneous Coronary Intervention;  Surgeon: Mando Field MD;  Location: [facility] CATH LAB    CV TRANSCATHETER AORTIC VALVE REPLACEMENT  08/15/2023    CV TRANSCATHETER AORTIC VALVE REPLACEMENT-FEMORAL APPROACH N/A 8/15/2023    Procedure: Transcatheter Aortic Valve Replacement-Femoral Approach;  Surgeon: Bossman Felipe MD;  Location: [facility]ARD[facility] CATH LAB    EP PACEMAKER DEVICE & LEAD IMPLANT- RIGHT ATRIAL & LEFT VENTRICULAR N/A 8/15/2023    Procedure: Pacemaker Device & Lead Implant- Right Atrial & Left Ventricular;  Surgeon: Oscar Mendoza MD;  Location: [facility] CATH LAB    ORIF HIP FRACTURE Right 2018             Social History:     Social History     Tobacco Use    Smoking status: Never    Smokeless tobacco: Never   Substance Use Topics    Alcohol use: Yes     Comment: few drinks  per year - very rare             Family History:   No family history on file.          Immunizations:     VACCINE/DOSE   Diptheria   DPT   DTAP   HBIG   Hepatitis A   Hepatitis B   HIB   Influenza   Measles   Meningococcal   MMR   Mumps   Pneumococcal   Polio   Rubella   Small Pox   TDAP   Varicella   Zoster             Allergies:   No Known Allergies          Medications:     Current Facility-Administered Medications   Medication    acetaminophen (TYLENOL) tablet 650 mg    Or    acetaminophen (TYLENOL) Suppository 650 mg    acetaminophen (TYLENOL) tablet 650 mg    aspirin (ASA) chewable tablet 81 mg    carboxymethylcellulose PF (REFRESH PLUS) 0.5 % ophthalmic solution 1 drop    cyanocobalamin (VITAMIN B-12) tablet 1,000 mcg    glucose gel 15-30 g    Or    dextrose 50 % injection 25-50 mL    Or    glucagon injection 1 mg    insulin aspart (NovoLOG) injection (RAPID ACTING)    insulin aspart (NovoLOG) injection (RAPID ACTING)    lidocaine (LMX4) cream    lidocaine 1 % 0.1-1 mL    metoprolol succinate ER (TOPROL XL) 24 hr tablet 25 mg    ondansetron (ZOFRAN ODT) ODT tab 4 mg    Or    ondansetron (ZOFRAN) injection 4 mg    pantoprazole (PROTONIX) EC tablet 40 mg    polyethylene glycol (MIRALAX) Packet 17 g    rosuvastatin (CRESTOR) tablet 20 mg    senna-docusate (SENOKOT-S/PERICOLACE) 8.6-50 MG per tablet 1 tablet    Or    senna-docusate (SENOKOT-S/PERICOLACE) 8.6-50 MG per tablet 2 tablet    senna-docusate (SENOKOT-S/PERICOLACE) 8.6-50 MG per tablet 2 tablet    sertraline (ZOLOFT) tablet 25 mg    sodium chloride (PF) 0.9% PF flush 3 mL    sodium chloride (PF) 0.9% PF flush 3 mL    sodium chloride 0.9 % infusion    vitamin C (ASCORBIC ACID) tablet 500 mg    Vitamin D3 (CHOLECALCIFEROL) tablet 25 mcg             Review of Systems:   Not obtained, baseline dementia       Physical Exam:   All vitals have been reviewed  Patient Vitals for the past 24 hrs:   BP Temp Temp src Pulse Resp SpO2 Weight   02/14/24 0700 135/59  98.2  F (36.8  C) Oral 78 16 96 % --   02/14/24 0500 -- -- -- -- -- -- 77.2 kg (170 lb 3.1 oz)   02/14/24 0430 136/65 99.2  F (37.3  C) Oral 73 16 96 % --   02/13/24 2134 -- 99.2  F (37.3  C) Oral -- 14 96 % --   02/13/24 1953 112/45 -- -- 60 -- 98 % --   02/13/24 1645 (!) 141/65 98.3  F (36.8  C) Axillary 77 16 99 % --       Intake/Output Summary (Last 24 hours) at 2/14/2024 1250  Last data filed at 2/14/2024 0500  Gross per 24 hour   Intake 480 ml   Output 400 ml   Net 80 ml       Constitutional: Alert to self  Respiratory: Unlabored breathing no audible wheeze  Cardiovascular: Regular rate and rhythm per pulses.  Lymph/Hematologic: No lymphadenopathy in areas examined.  Musculoskeletal: Moderate swelling noted around fracture site of right upper extremity. No open skin, or wounds. Mild ecchymosis. No surrounding erythema. Sensation intact along the axillary nerve. Difficulty following commands, baseline dementia. Able to flex and extend digits,wrist. Sensation intact in all distributions. Compartments of UE are soft. RUE NVI. Left upper extremity is NVI, motion intact. No gross deformities, no pain on palpation.             Data:   All laboratory data reviewed  Results for orders placed or performed during the hospital encounter of 02/13/24   CT Head w/o Contrast     Status: None    Narrative    CT SCAN OF THE HEAD WITHOUT CONTRAST   2/13/2024 2:24 PM     HISTORY: Fall, has dementia, decreased mental status today.    TECHNIQUE: Axial images of the head and coronal reformations without  IV contrast material. Radiation dose for this scan was reduced using  automated exposure control, adjustment of the mA and/or kV according  to patient size, or iterative reconstruction technique.    COMPARISON: Brain MRI 10/13/2023.    FINDINGS: There is no evidence of intracranial hemorrhage, mass, acute  infarct or anomaly. The ventricles are normal in size, shape and  configuration. Mild diffuse parenchymal volume loss. Mild  patchy  periventricular white matter hypodensities which are nonspecific, but  likely related to chronic microvascular ischemic disease.     The visualized portions of the sinuses and mastoids appear normal. The  bony calvarium and bones of the skull base appear intact.       Impression    IMPRESSION:     1. No evidence of acute intracranial hemorrhage, mass, or herniation.  2. Mild diffuse parenchymal volume loss and white matter changes  likely due to chronic microvascular ischemic disease.      CAROLINE CRYSTAL MD         SYSTEM ID:  [id number]   XR Shoulder Right G/E 3 Views     Status: None    Narrative    SHOULDER RIGHT THREE OR MORE VIEWS 2/13/2024 1:51 PM     HISTORY: Fall with pain swelling and bruising.    COMPARISON: None.       Impression    IMPRESSION:  Acute impacted and displaced proximal humerus fracture. Bones are  demineralized. Pacemaker leads. Degenerative changes glenohumeral and  acromioclavicular joints.     NOTE: ABNORMAL REPORT    THE DICTATION ABOVE DESCRIBES AN ABNORMAL REPORT FOR WHICH FOLLOW-UP  IS NEEDED.    ASHLEE QUESADA MD         SYSTEM ID:  [id number]   Comprehensive metabolic panel     Status: Abnormal   Result Value Ref Range    Sodium 136 135 - 145 mmol/L    Potassium 4.2 3.4 - 5.3 mmol/L    Carbon Dioxide (CO2) 28 22 - 29 mmol/L    Anion Gap 8 7 - 15 mmol/L    Urea Nitrogen 17.3 8.0 - 23.0 mg/dL    Creatinine 0.74 0.67 - 1.17 mg/dL    GFR Estimate 89 >60 mL/min/1.73m2    Calcium 9.4 8.8 - 10.2 mg/dL    Chloride 100 98 - 107 mmol/L    Glucose 167 (H) 70 - 99 mg/dL    Alkaline Phosphatase 103 40 - 150 U/L    AST 52 (H) 0 - 45 U/L    ALT 13 0 - 70 U/L    Protein Total 7.3 6.4 - 8.3 g/dL    Albumin 4.0 3.5 - 5.2 g/dL    Bilirubin Total 0.8 <=1.2 mg/dL   Troponin T, High Sensitivity     Status: Abnormal   Result Value Ref Range    Troponin T, High Sensitivity 29 (H) <=22 ng/L   UA with Microscopic reflex to Culture     Status: Abnormal    Specimen: Urine, Catheter   Result Value Ref  Range    Color Urine Yellow Colorless, Straw, Light Yellow, Yellow    Appearance Urine Clear Clear    Glucose Urine Negative Negative mg/dL    Bilirubin Urine Negative Negative    Ketones Urine 10 (A) Negative mg/dL    Specific Gravity Urine 1.026 1.003 - 1.035    Blood Urine Negative Negative    pH Urine 5.0 5.0 - 7.0    Protein Albumin Urine 20 (A) Negative mg/dL    Urobilinogen Urine Normal Normal, 2.0 mg/dL    Nitrite Urine Negative Negative    Leukocyte Esterase Urine Negative Negative    Mucus Urine Present (A) None Seen /LPF    RBC Urine 1 <=2 /HPF    WBC Urine 1 <=5 /HPF    Narrative    Urine Culture not indicated   CK total     Status: Abnormal   Result Value Ref Range    CK 1,128 (HH) 39 - 308 U/L   CBC with platelets and differential     Status: Abnormal   Result Value Ref Range    WBC Count 8.1 4.0 - 11.0 10e3/uL    RBC Count 3.72 (L) 4.40 - 5.90 10e6/uL    Hemoglobin 11.6 (L) 13.3 - 17.7 g/dL    Hematocrit 35.0 (L) 40.0 - 53.0 %    MCV 94 78 - 100 fL    MCH 31.2 26.5 - 33.0 pg    MCHC 33.1 31.5 - 36.5 g/dL    RDW 14.2 10.0 - 15.0 %    Platelet Count 153 150 - 450 10e3/uL    % Neutrophils 75 %    % Lymphocytes 13 %    % Monocytes 12 %    % Eosinophils 0 %    % Basophils 0 %    % Immature Granulocytes 0 %    NRBCs per 100 WBC 0 <1 /100    Absolute Neutrophils 6.0 1.6 - 8.3 10e3/uL    Absolute Lymphocytes 1.1 0.8 - 5.3 10e3/uL    Absolute Monocytes 1.0 0.0 - 1.3 10e3/uL    Absolute Eosinophils 0.0 0.0 - 0.7 10e3/uL    Absolute Basophils 0.0 0.0 - 0.2 10e3/uL    Absolute Immature Granulocytes 0.0 <=0.4 10e3/uL    Absolute NRBCs 0.0 10e3/uL   Nt probnp inpatient (BNP)     Status: Abnormal   Result Value Ref Range    N terminal Pro BNP Inpatient 3,098 (H) 0 - 1,800 pg/mL   Troponin T, High Sensitivity     Status: Abnormal   Result Value Ref Range    Troponin T, High Sensitivity 26 (H) <=22 ng/L   Glucose by meter     Status: Abnormal   Result Value Ref Range    GLUCOSE BY METER POCT 131 (H) 70 - 99 mg/dL    Glucose by meter     Status: Abnormal   Result Value Ref Range    GLUCOSE BY METER POCT 142 (H) 70 - 99 mg/dL   Basic metabolic panel     Status: Abnormal   Result Value Ref Range    Sodium 137 135 - 145 mmol/L    Potassium 3.5 3.4 - 5.3 mmol/L    Chloride 102 98 - 107 mmol/L    Carbon Dioxide (CO2) 25 22 - 29 mmol/L    Anion Gap 10 7 - 15 mmol/L    Urea Nitrogen 10.4 8.0 - 23.0 mg/dL    Creatinine 0.55 (L) 0.67 - 1.17 mg/dL    GFR Estimate >90 >60 mL/min/1.73m2    Calcium 8.7 (L) 8.8 - 10.2 mg/dL    Glucose 130 (H) 70 - 99 mg/dL   Glucose by meter     Status: Abnormal   Result Value Ref Range    GLUCOSE BY METER POCT 125 (H) 70 - 99 mg/dL   CK total     Status: Abnormal   Result Value Ref Range     (H) 39 - 308 U/L   Glucose by meter     Status: Abnormal   Result Value Ref Range    GLUCOSE BY METER POCT 124 (H) 70 - 99 mg/dL   CBC with platelets and differential     Status: Abnormal   Result Value Ref Range    WBC Count 5.6 4.0 - 11.0 10e3/uL    RBC Count 3.33 (L) 4.40 - 5.90 10e6/uL    Hemoglobin 10.5 (L) 13.3 - 17.7 g/dL    Hematocrit 31.3 (L) 40.0 - 53.0 %    MCV 94 78 - 100 fL    MCH 31.5 26.5 - 33.0 pg    MCHC 33.5 31.5 - 36.5 g/dL    RDW 14.0 10.0 - 15.0 %    Platelet Count 133 (L) 150 - 450 10e3/uL    % Neutrophils 60 %    % Lymphocytes 24 %    % Monocytes 15 %    % Eosinophils 0 %    % Basophils 1 %    % Immature Granulocytes 0 %    NRBCs per 100 WBC 0 <1 /100    Absolute Neutrophils 3.3 1.6 - 8.3 10e3/uL    Absolute Lymphocytes 1.4 0.8 - 5.3 10e3/uL    Absolute Monocytes 0.9 0.0 - 1.3 10e3/uL    Absolute Eosinophils 0.0 0.0 - 0.7 10e3/uL    Absolute Basophils 0.0 0.0 - 0.2 10e3/uL    Absolute Immature Granulocytes 0.0 <=0.4 10e3/uL    Absolute NRBCs 0.0 10e3/uL   Glucose by meter     Status: Abnormal   Result Value Ref Range    GLUCOSE BY METER POCT 122 (H) 70 - 99 mg/dL   EKG 12-lead, tracing only     Status: None   Result Value Ref Range    Systolic Blood Pressure  mmHg    Diastolic Blood  Pressure  mmHg    Ventricular Rate 62 BPM    Atrial Rate 62 BPM    DC Interval 244 ms    QRS Duration 120 ms     ms    QTc 472 ms    P Axis 78 degrees    R AXIS 52 degrees    T Axis 74 degrees    Interpretation ECG       Atrial-sensed ventricular-paced rhythm with prolonged AV conduction  Abnormal ECG  When compared with ECG of 13-OCT-2023 06:25,  Vent. rate has decreased BY  10 BPM  Confirmed by GENERATED REPORT, COMPUTER ([id number]),  Becca Ibarra ([id number]) on 2/14/2024 5:37:59 AM     CBC with platelets differential     Status: Abnormal    Narrative    The following orders were created for panel order CBC with platelets differential.  Procedure                               Abnormality         Status                     ---------                               -----------         ------                     CBC with platelets and d...[[id number]]  Abnormal            Final result                 Please view results for these tests on the individual orders.   CBC with platelets differential     Status: Abnormal    Narrative    The following orders were created for panel order CBC with platelets differential.  Procedure                               Abnormality         Status                     ---------                               -----------         ------                     CBC with platelets and d...[[id number]]  Abnormal            Final result                 Please view results for these tests on the individual orders.          Attestation:  Amount of time performed on this consult: 40 minutes.    Sona Camp PA-C  [facility]

## 2024-02-14 NOTE — UTILIZATION REVIEW
"  Admission Status; Secondary Review Determination         Under the authority of the Utilization Management Committee, the utilization review process indicated a secondary review on the above patient.  The review outcome is based on review of the medical records, discussions with staff, and applying clinical experience noted on the date of the review.        (xxx)      Inpatient Status Appropriate - This patient's medical care is consistent with medical management for inpatient care and reasonable inpatient medical practice.      () Observation Status Appropriate - This patient does not meet hospital inpatient criteria and is placed in observation status. If this patient's primary payer is Medicare and was admitted as an inpatient, Condition Code 44 should be used and patient status changed to \"observation\".   () Admission Status NOT Appropriate - This patient's medical care is not consistent with medical management for Inpatient or Observation Status.          RATIONALE FOR DETERMINATION     Erick Shahid is an 83 year old male with past medical history of hypertension, cognitive impairment/dementia, type II diabetes mellitus, hyperlipidemia, left bundle branch block, aortic stenosis (s/p TAVR), congestive heart failure, pacemaker, benign prostatic hypertrophy who was admitted 2/13/2024 with increased confusion and unwitnessed fall.  He was found to have CK elevation of 1128 and BNP 3098.  Xray right shoulder with acute impacted and displaced proximal humerus fracture.  CT negative for acute intracranial changes.  He is admitted for close clinical monitoring, IVF, pain management, ortho consultation and further evaluation.  At the time of admission it is reasonable to anticipate a hospital stay o  at least 2 midnights.  IP status is appropriate.     The severity of illness, intensity of service provided, expected LOS and risk for adverse outcome make the care complex, high risk and appropriate for hospital " admission.        The information on this document is developed by the utilization review team in order for the business office to ensure compliance.  This only denotes the appropriateness of proper admission status and does not reflect the quality of care rendered.         The definitions of Inpatient Status and Observation Status used in making the determination above are those provided in the CMS Coverage Manual, Chapter 1 and Chapter 6, section 70.4.      Sincerely,     Rachel Benavidez MD  Physician Advisor   Utilization Review/ Case Management  [facility].

## 2024-02-14 NOTE — DISCHARGE INSTRUCTIONS
WOUND CARES  Location: buttock   Care: provided qshift by primary RN  1. Cleanse with each incontinence episode with cabrera cleanser and soft dry wipes  2. Apply sacral mepilex over sacrum (lift for routine assessments, cleanse underneath with normal saline, ok to use each up to 5 days)  3. Apply criticaid barrier paste to perianal  4. Apply cardinal covidien pad under patient, do not apply diapers  5. Turn left to right in bed, use supine positioning for meals only

## 2024-02-14 NOTE — PROGRESS NOTES
Pt A&O x2/3, baseline confused. Up x2 with pato walker and GB, up in chair. VSS.RA. Tele: NSR.BG checks. NS infusing. Wears sling on R shoulder, NWB. Pain managed with Tyl. Takes pills whole. Incontinent of B&B. External cath in place. BM x2. Barrier cream applied to sacrum.

## 2024-02-15 NOTE — PROGRESS NOTES
02/14/24 1343   Appointment Info   Signing Clinician's Name / Credentials (PT) Clinton Marie, PT, DPT   Rehab Comments (PT) (S)  Right proximal humerus fracture with impaction and displacement - using pato walker at this time to maintain NWB status to RUE. Orders from PA on 2/14 include: -Sling for comfort. Ordering shoulder immobilizer for better fit (no cushion) OK to allow arm to hang at side, gravity will pull humerus into alignment.   - NWB RUE, OK for platform walker if needed.   -Okay for elbow, wrist, and digital ROM. Can begin Codman's exercises to the right shoulder in the next 2 weeks as pain allows.   Living Environment   People in Home facility resident   Current Living Arrangements assisted living  (memory care)   Home Accessibility no concerns   Living Environment Comments Facility resident at [facility]/memory care   Self-Care   Usual Activity Tolerance moderate   Current Activity Tolerance poor   Equipment Currently Used at Home grab bar, toilet;grab bar, tub/shower;raised toilet seat;shower chair;walker, standard   Fall history within last six months yes   Number of times patient has fallen within last six months 1   Activity/Exercise/Self-Care Comment Spouse reports pt able to toilet himself at baseline, however unsteady w/ FWW. Recieves assist w/ all other ADLs, IADLs.   General Information   Onset of Illness/Injury or Date of Surgery 02/13/24   Referring Physician Josue Mercado MD   Patient/Family Therapy Goals Statement (PT) not assessed   Pertinent History of Current Problem (include personal factors and/or comorbidities that impact the POC) 83 year old male with past medical history of hypertension, cognitive impairment/dementia, type II diabetes mellitus, hyperlipidemia, left bundle branch block, aortic stenosis (s/p TAVR), congestive heart failure, pacemaker, benign prostatic hypertrophy, admitted 2/13/2024 with increased confusion, unwitnessed fall at home.   Existing  Precautions/Restrictions fall;weight bearing   Weight-Bearing Status - RUE nonweight-bearing   General Observations NWB to RUE. Using pato walker at this time to avoid WB through R shoulder to allow for optimal healing. Patient with baseline dementia and Craig.   Cognition   Affect/Mental Status (Cognition) WFL   Orientation Status (Cognition) oriented to;person;place   Follows Commands (Cognition) follows one-step commands   Cognitive Status Comments baseline dementia, Craig   Pain Assessment   Patient Currently in Pain Yes, see Vital Sign flowsheet  (R shoulder pain)   Strength (Manual Muscle Testing)   Strength Comments RUE strength impaired   Bed Mobility   Comment, (Bed Mobility) seated in recliner at beginning of session,   Transfers   Comment, (Transfers) sit<>stand with min assist x1 and pato-walker   Gait/Stairs (Locomotion)   Comment, (Gait/Stairs) ambulation with FWW and min assist x1   Balance   Balance Comments impaired balance requiring pato-walker and assist   Sensory Examination   Sensory Perception patient reports no sensory changes   Clinical Impression   Criteria for Skilled Therapeutic Intervention Yes, treatment indicated   PT Diagnosis (PT) impaired mobility   Influenced by the following impairments weakness, reduced activity tolerance, increased pain levels, impaired balance, cognitive deficits at baseline   Functional limitations due to impairments impaired functional mobility, impaired gait, transfers, bed mobility   Clinical Presentation (PT Evaluation Complexity) stable   Clinical Presentation Rationale clinical judgement   Clinical Decision Making (Complexity) low complexity   Planned Therapy Interventions (PT) balance training;bed mobility training;gait training;neuromuscular re-education;patient/family education;strengthening;transfer training;progressive activity/exercise   Risk & Benefits of therapy have been explained evaluation/treatment results reviewed;care plan/treatment goals  "reviewed;risks/benefits reviewed;current/potential barriers reviewed;participants voiced agreement with care plan;participants included;patient;spouse/significant other   PT Total Evaluation Time   PT Eval, Low Complexity Minutes (62824) 9   Physical Therapy Goals   PT Frequency 5x/week   PT Predicted Duration/Target Date for Goal Attainment 02/21/24   PT Goals Bed Mobility;Transfers;Gait   PT: Bed Mobility Supervision/stand-by assist;Supine to/from sit   PT: Transfers Supervision/stand-by assist;Sit to/from stand;Assistive device   PT: Gait Supervision/stand-by assist;Reinaldo walker;150 feet   Interventions   Interventions Quick Adds Gait Training;Therapeutic Activity   Therapeutic Activity   Therapeutic Activities: dynamic activities to improve functional performance Minutes (05978) 11   Symptoms Noted During/After Treatment Fatigue;Increased pain   Treatment Detail/Skilled Intervention Patient seated in recliner at beginning of session with spouse present, agreeable to participate in PT. Sling on RUE at beginning and throughout session, educated on proper sling placement. Educated patient and spouse on NWB to RUE. VSS when monitored during session. Time during session for retrieval of reinaldo walker. Per PA note on 2/13, stated \"avoid use of walker\". New PA note on 2/14 which stated \"OK for platform walker if needed\". Focused session on NWB to RUE and use of reinaldo walker on LUE due to proximal humerus fracture with impaction and displacment. Cues and visual demonstration for sit<>stand with reinaldo walker. Patient performing sit<>stand with reinaldo walker and min assist x1, noted posterior lean requiring facilitation for anterior weight shift. Patient completed bout of ambulation and seated in recliner at end of session with spouse present. Call light next to patient and chair alarm on.   Gait Training   Gait Training Minutes (49900) 11   Symptoms Noted During/After Treatment (Gait Training) fatigue;increased pain   Treatment " Detail/Skilled Intervention Patient completed bout of ambulation for ~24' with pato walker and min-mod assist x1. Prior to ambulation, cues and visual demonstration for proper sequencing of ambulation with use of pato walker. Utilizing step to pattern throughout. Intermittent instability requiring min assist x1 to correct. Assist with pato walker management throughout bout to advance occasionally and to stabilize. Cues to avoid moving feet and pato walker at same time. Cues for sequencing to back up to recliner and reach back for armrest prior to sitting down. Spouse reporting that patient has difficulty with vision secondary to macular degeneration.   Distance in Feet 24'   PT Discharge Planning   PT Plan progress gait distance, repeated sit<>stands, standing balance, assess bed mobility   PT Discharge Recommendation (DC Rec) Transitional Care Facility   PT Rationale for DC Rec Patient below baseline functional mobility. Lives at [facility] and receives assist for ADL's and IADL's, but is able to ambulate with FWW at mod I at baseline. Present with deficits in mobility secondary to weakness, NWB to RUE, and increased pain levels requiring assist x1-2 for transfers and ambulation. Recommend TCU for improvement of strength, activity tolerance, balance, and independence with functional mobility.   PT Brief overview of current status nursing staff assist x2 and pato walker for transfers   PT Equipment Needed at Discharge walker, pato   Total Session Time   Timed Code Treatment Minutes 22   Total Session Time (sum of timed and untimed services) 31

## 2024-02-15 NOTE — PROGRESS NOTES
Pt A&O x1/2. Confused. Drowsy. Up x2 with pato walker and GB. Up in chair. VSS.RA. Tele: SR. BG checks, insulin given. Wears sling on R shoulder. NWB. Denied pain. External cath in place with adequate amount of output. BM x1. Barrier cream and mepilex applied to sacrum. TCU discharge pending.

## 2024-02-15 NOTE — PROGRESS NOTES
A&O to self only. VSS on RA. R shoulder sling is on.  Pt confused, unable to answer sensation questions. Able to wiggle fingers, pulse palpable and fingers are warm. Voiding per external catheter. Peripheral IV saline locked. Pain managed with Tylenol. Up with assistance of 2 with pato walker and gait belt. Barrier cream applied to redness, coccyx wound.  Meplex applied to sacral area. Tuen and reposition every 2 hours

## 2024-02-15 NOTE — PROGRESS NOTES
[facility]    Medicine Progress Note - Hospitalist Service    Date of Admission:  2/13/2024    Assessment & Plan     Erick Shahid is a 83 year old male with past medical history of hypertension, cognitive impairment/dementia, type II diabetes mellitus, hyperlipidemia, left bundle branch block, aortic stenosis (s/p TAVR), congestive heart failure, pacemaker, benign prostatic hypertrophy, admitted 2/13/2024 with increased confusion, unwitnessed fall at home.        S/p unwitnessed fall prior to admission   Acute impacted and displaced right proximal humerus fracture  -Unwitnessed fall at skilled care facility reportedly 2 days prior to admission with unknown length of time on the floor; no report of syncope or cardiopulmonary complaints  -ER labs with elevated CK, 1128, glucose 167, BNP 3098, troponin 29, hemoglobin 11.6, normal WBC and platelets.  Serum creatinine 0.74, potassium 4.2, AST 52.  -X-ray right shoulder with acute impacted and displaced proximal humerus fracture  -Noncontrast head CT negative for acute intracranial changes with mild diffuse parenchymal volume loss and white matter changes  -EKG with paced rhythm, rate 62 bpm  -Vitamin D levels are adequate  -Orthopedics consulted and they have recommended nonoperative management of the right proximal humerus with sling for comfort, physical therapy, Occupational Therapy and they have ordered activity restriction and details are in the note  -Orthotics did give shoulder immobilizer to the patient on 2/14-  -Follow as outpatient with shoulder specialist and [facility] such as Dr. Bernardo Sanchez, Dr. Tom Stevens, or Dr. Piter Lyons, in 2 weeks for repeat imaging and reevaluation of the right shoulder.   -Continue with pain control  -Will need to go to transitional care unit and physical therapy and Occupational Therapy did see the patient       Elevated creatinine kinase   Mild nontraumatic rhabdomyolysis-  resolved  -CK 1128 on admission, serum Cr 0.74  -Patient was started on gentle IV fluids with normal saline and fluid management has been a little on the conservative side given his history of heart failure and elevated proBNP  -CK levels this morning is near normal at 313  -No need for ongoing IV fluid     Type II diabetes mellitus  -HbA1c 6.4% on 2/5/2024  -PTA Home regimen includes metformin  -Hold oral hypoglycemic agents while in the hospital  -Continue with moderate dose sliding scale and diabetic diet  -Blood sugar is 107     Essential hypertension  -Blood pressure is stable at 132/57 with pulse rate of 60  -continue prior to admission metoprolol XL     Hyperlipidemia  -hold  rosuvastatin (Crestor) 20 mg daily for now given elevated CK levels on admission     Gastroesophageal reflux disease  -Continue prior to admission omeprazole or equivalent PPI     Elevated troponin likely due to demand  Elevated BNP  Aortic stenosis, s/p TAVR  History of  diastolic heart failure (CHF)  S/p pacemaker (PPM)  -ECHO 9/22/2023 status post TAVR, normal left ventricular systolic function, EF 55 to 60%  -Troponin on admission 29 and repeat trended down to 26, BNP 3098  -Admission EKG paced rhythm  -Mild elevation in troponin can be due to elevated CK and fall and mildly elevated proBNP  -On exam he is on room air, not in respiratory distress and no chest pain on admission and monitor closely  -Echo done on 2/14 shows EF of 55 to 60%, grade 1 or early diastolic dysfunction, no wall motion changes, trace aortic regurgitation and bioprosthetic aortic valve is well-seated  -Pacemaker was interrogated on 2/14/2024 and will review the report  -Continue with PTA aspirin 81 mg , metoprolol XL 25 mg daily and hold Crestor 20 mg daily  -Continue to follow as outpatient with cardiology team as scheduled       Anemia, normocytic  -Chronic anemia, hemoglobin baseline 11.1 to 12.8  --Admission hemoglobin 11.6, MCV 94  -Hemoglobin is  "10.5-10.1  - no robert or hematemesis but he does have ecchymosis present over the site  -Will continue to monitor     History of constipation  -Continue prior to admission polyethylene glycol (Miralax)   -As needed senna Colace available as needed       History of depression  History of insomnia  Dementia  -Continue prior to admission sertraline 25 mg daily  -restart PTA trazodone     Weakness and deconditioning  -Physical therapy, Occupational Therapy on board    Sacral wound due to moisture associated skin damage-POA  -Wound care consulted and appreciate input     Disposition  -He has been accepted to transitional care unit and if his hemoglobin is stable then he can be discharged on 2/16/2024  -Discussed with case management team/social work and they have been updated    Family communication  -I did call his wife at 1:04 PM at [phone] and spoke with her at 2 pm           Diet: Moderate Consistent Carb (60 g CHO per Meal) Diet    DVT Prophylaxis: Pneumatic Compression Devices  Pablo Catheter: Not present  Lines: None     Cardiac Monitoring: ACTIVE order. Indication: unwitnessed fall, history of pacemaker  Code Status: No CPR- Do NOT Intubate      Clinically Significant Risk Factors                   # Chronic heart failure with preserved ejection fraction: heart failure noted on problem list and last echo with EF >50%    # Dementia: noted on problem list    # Overweight: Estimated body mass index is 26.66 kg/m  as calculated from the following:    Height as of this encounter: 1.702 m (5' 7\").    Weight as of this encounter: 77.2 kg (170 lb 3.1 oz)., PRESENT ON ADMISSION       # Financial/Environmental Concerns:     # Pacemaker present       Disposition Plan     Expected Discharge Date: 02/16/2024,  3:00 PM                  Jessica Colón MD  Hospitalist Service  [facility]  Securely message with [employer] (more info)  Text page via [employer] Paging/Directory "   ______________________________________________________________________    Interval History     Seen today and patient was sitting in the chair and was wearing the orthotic brace and his mentation seems to be similar as yesterday and he is aware that he is in the hospital, was able to tell me the name of the wife and did not seem to be in any discomfort or shortness of breath.  I did discuss with patient, nurse    Physical Exam   Vital Signs: Temp: 98.9  F (37.2  C) Temp src: Axillary BP: 132/57 Pulse: 61   Resp: (!) 4 SpO2: 97 % O2 Device: None (Room air)    Weight: 170 lbs 3.12 oz        General: AO x 2 near baseline  Respiratory: ctla   Cardiovascular: Regular rate , S1 and S2 normal with no murmer or rubs or gallops  Abdomen:   soft , non tender  Skin: Sacral wound  Neurologic:  facial droop  Musculoskeletal: Normal Range of motion over upper and lower extremities bilaterally except the right upper which is restricted and has ecchymosis present and is in sling  Psychiatric: cooperative      Medical Decision Making       Time spent in care of patient is 45 minutes and I reviewed his labs including CBC, basic metabolic panel, CK and reviewed echo report and discussed plan of care in detail with the patient, nursing staff, case management and his wife       Data     I have personally reviewed the following data over the past 24 hrs:    4.4  \   10.1 (L)   / 134 (L)     139 105 11.6 /  148 (H)   3.5 26 0.57 (L) \       Imaging results reviewed over the past 24 hrs:   Recent Results (from the past 24 hour(s))   Echocardiogram Complete   Result Value    LVEF  55-60%    Narrative    [id number]  [id number]  [id number]  [id number]^MARIO^VANESSA     [facility]  Echocardiography Laboratory  [address] [place] [id number]     Name: BALTA SIMEON  MRN: [id number]  : 1940  Study Date: 2024 02:42 PM  Age: 83 yrs  Gender: Male  Patient Location: [facility]  Reason For Study: Aortic Valve  Replacement  Ordering Physician: VANESSA MARTIN  Referring Physician: Yancy Walker  Performed By: Rachell Bonilla RDCS     BSA: 1.9 m2  Height: 67 in  Weight: 170 lb  HR: 60  BP: 135/59 mmHg  ______________________________________________________________________________  Procedure  Complete Portable Echo Adult.  ______________________________________________________________________________  Interpretation Summary     The visual ejection fraction is 55-60%.  Grade I or early diastolic dysfunction.  No regional wall motion abnormalities noted.  There is a bioprosthetic aortic valve.  The prosthetic aortic valve is well-seated.  There is trace aortic regurgitation.  Mean aortic gradient 14mmHg,  ______________________________________________________________________________  Left Ventricle  The left ventricle is normal in size. There is normal left ventricular wall  thickness. The visual ejection fraction is 55-60%. Grade I or early diastolic  dysfunction. No regional wall motion abnormalities noted.     Right Ventricle  There is a catheter/pacemaker lead seen in the right ventricle. The right  ventricle is normal in size and function.     Atria  Normal left atrial size. Right atrial size is normal. There is no color  Doppler evidence of an atrial shunt.     Mitral Valve  The mitral valve leaflets are mildly thickened. There is trace mitral  regurgitation.     Tricuspid Valve  There is trace tricuspid regurgitation. IVC diameter and respiratory changes  fall into an intermediate range suggesting an RA pressure of 8 mmHg. The right  ventricular systolic pressure is approximated at 26.4 mmHg plus the right  atrial pressure.     Aortic Valve  There is trace aortic regurgitation. The mean AoV pressure gradient is 14.0  mmHg. The prosthetic aortic valve is well-seated. There is a bioprosthetic  aortic valve.     Pulmonic Valve  There is trace pulmonic valvular regurgitation.     Vessels  Normal size aorta. The aortic root  is normal size.     Pericardium  There is no pericardial effusion.     Rhythm  Sinus rhythm was noted.  ______________________________________________________________________________  MMode/2D Measurements & Calculations  IVSd: 1.0 cm     LVIDd: 4.6 cm  LVIDs: 3.9 cm  LVPWd: 0.84 cm  FS: 14.9 %  LV mass(C)d: 148.5 grams  LV mass(C)dI: 78.7 grams/m2  Ao root diam: 3.0 cm  LVOT diam: 2.0 cm  LVOT area: 3.1 cm2  Ao root diam index Ht(cm/m): 1.7  Ao root diam index BSA (cm/m2): 1.6  LA Volume (BP): 43.7 ml  LA Volume Index (BP): 23.1 ml/m2  RWT: 0.36     TAPSE: 2.6 cm     Doppler Measurements & Calculations  MV E max terrence: 76.5 cm/sec  MV A max terrence: 102.0 cm/sec  MV E/A: 0.75  MV dec time: 0.33 sec  Ao V2 max: 248.0 cm/sec  Ao max P.0 mmHg  Ao V2 mean: 171.0 cm/sec  Ao mean P.0 mmHg  Ao V2 VTI: 57.6 cm  SHALOM(I,D): 1.3 cm2  SHALOM(V,D): 1.3 cm2  LV V1 max P.4 mmHg  LV V1 max: 105.0 cm/sec  LV V1 VTI: 23.3 cm  SV(LVOT): 72.4 ml  SI(LVOT): 38.4 ml/m2  PA acc time: 0.13 sec  TR max terrence: 255.6 cm/sec  TR max P.4 mmHg  AV Terrence Ratio (DI): 0.42  SHALOM Index (cm2/m2): 0.67  E/E' avg: 10.8  Lateral E/e': 11.5  Medial E/e': 10.0  RV S Terrence: 13.7 cm/sec     ______________________________________________________________________________  Report approved by: Katherine Martinez 2024 04:38 PM

## 2024-02-15 NOTE — CONSULTS
Care Management Initial Consult    General Information  Assessment completed with: Spouse or significant other, Wilda  Type of CM/SW Visit: Initial Assessment    Primary Care Provider verified and updated as needed: Yes (Spoke with Daxa nurse at [facility].  Physician (Marianela lawrence) NP (Yancy gallegos))   Readmission within the last 30 days: no previous admission in last 30 days      Reason for Consult: discharge planning  Advance Care Planning: Advance Care Planning Reviewed: present on chart     General Information Comments: Patient resides at [facility] memory care    Communication Assessment  Patient's communication style: spoken language (English or Bilingual)             Cognitive  Cognitive/Neuro/Behavioral: .WDL except, orientation  Level of Consciousness: alert, confused  Arousal Level: opens eyes spontaneously  Orientation: disoriented to, place, time, situation  Mood/Behavior: calm  Best Language: 0 - No aphasia  Speech: clear    Living Environment:   People in home: alone     Current living Arrangements: assisted living  Name of Facility: [facility]   Able to return to prior arrangements: yes (TCU)  Living Arrangement Comments: Patient receives cares and a memory care unit that is locked in [place].  Patient receives assistance with bathing, dressing, grooming, med management, and setting up appointments.  Patient is independent with a walker although nursing staff has to provide reminders frequently.    Family/Social Support:  Care provided by: other (see comments) (Facility staff)  Provides care for: no one, unable/limited ability to care for self  Marital Status:   Wife, Facility resident(s)/Staff  Wilda       Description of Support System: Involved, Supportive         Current Resources:   Patient receiving home care services: No     Community Resources: None  Equipment currently used at home: walker, standard  Supplies currently used at home:  None    Employment/Financial:  Employment Status: retired        Financial Concerns: none   Referral to Financial Worker: No       Does the patient's insurance plan have a 3 day qualifying hospital stay waiver?  Yes     Which insurance plan 3 day waiver is available? Alternative insurance waiver    Will the waiver be used for post-acute placement? Undetermined at this time    Lifestyle & Psychosocial Needs:  Social Determinants of Health     Food Insecurity: Not on file   Depression: Not on file   Housing Stability: Not on file   Tobacco Use: Low Risk  (11/15/2023)    Patient History     Smoking Tobacco Use: Never     Smokeless Tobacco Use: Never     Passive Exposure: Not on file   Financial Resource Strain: Not on file   Alcohol Use: Not on file   Transportation Needs: Not on file   Physical Activity: Not on file   Interpersonal Safety: Not on file   Stress: Not on file   Social Connections: Not on file       Functional Status:  Prior to admission patient needed assistance:   Dependent ADLs:: Ambulation-walker, Bathing, Dressing, Grooming  Dependent IADLs:: Medication Management, Money Management, Transportation, Laundry, Cooking, Cleaning, Shopping, Meal Preparation, Incontinence       Mental Health Status:  Mental Health Status: No Current Concerns       Chemical Dependency Status:  Chemical Dependency Status: No Current Concerns             Values/Beliefs:  Spiritual, Cultural Beliefs, Yazdanism Practices, Values that affect care: no               Additional Information:  CM initial assessment    Per H&P,  Erick Shahid is a 83 year old male with past medical history of hypertension, cognitive impairment/dementia, type II diabetes mellitus, hyperlipidemia, left bundle branch block, aortic stenosis (s/p TAVR), congestive heart failure, pacemaker, benign prostatic hypertrophy, admitted 2/13/2024 with increased confusion, unwitnessed fall at home.     Care management was consulted for discharge  planning/disposition.  Writer met with patient's spouse in the room to complete assessment due to patient's history of dementia.      Prior to consult writer sent referrals over to a few TCU's as the recommendation per therapist was for the patient to go to a TCU.  Writer was made aware upon further chart review that the patient comes from [facility].  Per [facility],  received a message from Toribio at [facility] that they are excepting the patient and the patient comes from their facility.    Juicer met with the patient's spouse at bedside and updated her on the acceptance over at [facility].  During the conversation Dr. Diggs called the patient's spouse and stated that depending on some medical circumstances the patient could be ready tomorrow.  Patient's spouse inquired with the writer where the patient would receive his therapies.  Patient's spouse expressed concern that the patient's assisted living memory care had a COVID outbreak.    Juicer contacted Toribio at [facility] ([phone]) who stated that they will plan on placing the patient in their TCU for the time there.  Toribio stated that they would be able to accept the patient tomorrow between 10 AM and 6 PM in a semiprivate room.    Writer will update the patient's spouse.  Patient's spouse requested writer to set up transportation writer educated her on potential out-of-pocket costs.  Depending on the patient's assist level or whether or not family/friends are physically able to ambulate the patient, would determine if the patient is safe to be transported by family. Otherwise, the writer would set up transportation with  [employer]. There are 2 types of methods for transporation depending on what the patient is able to manage. A W/C ride is a little 90$ flat fee with about $6 dollars per mile added. A Stretcher ride is about 1025$ with $25 added  per mile. The writer will completed a PCS form but still cannot guarantee full coverage on the  ride   Patient's spouse consented    Care Management will continue to follow for discharge planning.          DEISY Stout  [facility]  Social Work

## 2024-02-16 NOTE — PROGRESS NOTES
Care Management Follow Up    Length of Stay (days): 3    Expected Discharge Date: 02/16/2024     Concerns to be Addressed: all concerns addressed in this encounter     Patient plan of care discussed at interdisciplinary rounds: Yes    Anticipated Discharge Disposition: Skilled Nursing Facility, Transitional Care     Anticipated Discharge Services: None  Anticipated Discharge DME: None    Patient/family educated on Medicare website which has current facility and service quality ratings: no  Education Provided on the Discharge Plan: Yes  Patient/Family in Agreement with the Plan: yes    Referrals Placed by CM/SW:    Private pay costs discussed: transportation costs    Additional Information:  Writer was approached by Dr. Colón who updated the writer the patient is potentially ready just awaiting a couple results.    Writer completed [employer] auth.  Auth #[id number].  Dates are between 02/16 - 02/22.  Writer sent this information via in basket to Toribio at [facility].    Writer was updated by Dr. Colón that the patient is medically ready to go.    Writer spoke with the patient with spouse at bedside who are okay with the writer setting up transportation ride.  Writer spoke with bedside nurse who stated the patient should be okay to go in a wheelchair ride.    Writer spoke with Toribio johnson at [facility] and agreed to set up a ride at 1400.    Writer set up an health transport wheelchair ride between [id number].    Nursing staff is aware.  Orders were sent.  PAS was completed by .        DEISY Stout  [facility]  Social Work

## 2024-02-16 NOTE — PLAN OF CARE
Goal Outcome Evaluation:      Plan of Care Reviewed With: patient, spouse, child           Pt alert and oriented to self. Denied SOB, CP, N/V. Incontinent of bladder and continent of stool. Medium soft BM today. Max assist of one with GB and quad cane. On Tele discharging back to [facility]. ALLISON on RUE. Sacral meplex in place, barrier cream applied. Wife and daughter updated on labs, device check and discharge plan.

## 2024-02-16 NOTE — PROGRESS NOTES
Pt is alert to self only. Drowsy. VSS on RA. R shoulder sling on . Able to wiggle fingers/ fingers are warm and pulses palpable of R hand/arm. Voiding per external catheter. Up with 2 with pato walker and gait belt. Meplex to sacral area. Barrier cream applied to coccyx wound. Turn and reposition every 2 hours. Peripheral saline locked. Tele, SR. Ventricular paced

## 2024-02-16 NOTE — DISCHARGE SUMMARY
"[facility]  Hospitalist Discharge Summary      Date of Admission:  2/13/2024  Date of Discharge:  2/16/2024  Discharging Provider: Jessica Colón MD  Discharge Service: Hospitalist Service    Discharge Diagnoses     S/p unwitnessed fall prior to admission   Acute impacted and displaced right proximal humerus fracture  Elevated CK-resolved  Mild rhabdomyolysis-resolved  Mildly elevated troponin likely due to demand      Clinically Significant Risk Factors     # Overweight: Estimated body mass index is 26.76 kg/m  as calculated from the following:    Height as of this encounter: 1.702 m (5' 7\").    Weight as of this encounter: 77.5 kg (170 lb 13.7 oz).       Follow-ups Needed After Discharge   Follow-up Appointments     Follow Up and recommended labs and tests      Follow up with long term physician.  The following labs/tests are   recommended: CBC and basic metabolic.  Follow-up with [facility] is 1 to 2 weeks        Follow-up and recommended labs and tests       -Follow up with a shoulder specialist at [facility], such as   Dr. Bernardo Sanchez, Dr. Tom Stevens, or Dr. Piter Lyons, in 2 weeks   for repeat imaging and reevaluation of the right shoulder. Please call   [phone] to schedule.        {Additional follow-up instructions/to-do's for PCP        Unresulted Labs Ordered in the Past 30 Days of this Admission       No orders found from 1/14/2024 to 2/14/2024.        These results will be followed up by     Discharge Disposition   Discharged to  TCU   Condition at discharge: Stable    Hospital Course     Erick Shahid is a 83 year old male with past medical history of hypertension, cognitive impairment/dementia, type II diabetes mellitus, hyperlipidemia, left bundle branch block, aortic stenosis (s/p TAVR), congestive heart failure, pacemaker, benign prostatic hypertrophy, admitted 2/13/2024 with increased confusion, unwitnessed fall at home.      "   S/p unwitnessed fall prior to admission   Acute impacted and displaced right proximal humerus fracture  -Unwitnessed fall at skilled care facility reportedly 2 days prior to admission with unknown length of time on the floor; no report of syncope or cardiopulmonary complaints  -ER labs with elevated CK, 1128, glucose 167, BNP 3098, troponin 29, hemoglobin 11.6, normal WBC and platelets.  Serum creatinine 0.74, potassium 4.2, AST 52.  -X-ray right shoulder with acute impacted and displaced proximal humerus fracture  -Noncontrast head CT negative for acute intracranial changes with mild diffuse parenchymal volume loss and white matter changes  -EKG with paced rhythm, rate 62 bpm  -Vitamin D levels are normal  -Orthopedics consulted and they have recommended nonoperative management of the right proximal humerus with sling for comfort, physical therapy, Occupational Therapy and they have ordered activity restriction and details are in the note  -Orthotics did give shoulder immobilizer to the patient on 2/14-  -Follow as outpatient with shoulder specialist and [facility] such as Dr. Bernardo Sanchez, Dr. Tom Stevens, or Dr. Piter Lyons, in 2 weeks for repeat imaging and reevaluation of the right shoulder.   -Seen by physical therapy, Occupational Therapy and case management team and patient excepted to transitional care unit and on day of discharge patient, wife and daughter were in agreement        Elevated creatinine kinase   Mild nontraumatic rhabdomyolysis- resolved  -CK 1128 on admission, serum Cr 0.74  -Patient was treated with IV fluids and his CK normalized and IV fluids were discontinued     Type II diabetes mellitus  -HbA1c 6.4% on 2/5/2024  -Continue with PTA metformin at TCU  -Blood sugars monitored and were stable during the hospitalist     Essential hypertension  -Blood pressure is stable at 132/57 with pulse rate of 60  -continue prior to admission metoprolol XL      Hyperlipidemia  -Continue with PTA rosuvastatin (Crestor) 20 mg daily      Gastroesophageal reflux disease  -Continue prior to admission omeprazole I     Elevated troponin likely due to demand  Elevated BNP  Aortic stenosis, s/p TAVR  History of  diastolic heart failure (CHF)  S/p pacemaker (PPM)  -ECHO 9/22/2023 status post TAVR, normal left ventricular systolic function, EF 55 to 60%  -Troponin on admission 29 and repeat trended down to 26, BNP 3098  -Admission EKG paced rhythm  -Mild elevation in troponin can be due to elevated CK and fall and mildly elevated proBNP  -On exam he is on room air, not in respiratory distress and no chest pain on admission and monitor closely  -Echo done on 2/14 shows EF of 55 to 60%, grade 1 or early diastolic dysfunction, no wall motion changes, trace aortic regurgitation and bioprosthetic aortic valve is well-seated  -Pacemaker was interrogated on 2/14/2024 and did not show any evidence of arrhythmia  -Continue with PTA aspirin 81 mg , metoprolol XL 25 mg daily and hold Crestor 20 mg daily  -Continue to follow as outpatient with cardiology team as scheduled        Anemia, normocytic  -Chronic anemia, hemoglobin baseline 11.1 to 12.8  --Admission hemoglobin 11.6, MCV 94  -Hemoglobin is 10.5-10.1  - no robert or hematemesis but he does have ecchymosis present over the site  -Will continue to monitor     History of constipation  -Continue prior to admission polyethylene glycol (Miralax)   -As needed senna Colace available as needed        History of depression  History of insomnia  Dementia  -Continue prior to admission sertraline 25 mg daily and trazodone  -On day of discharge patient was at baseline mentation     Weakness and deconditioning  -Physical therapy, Occupational Therapy on board     Sacral wound due to moisture associated skin damage-POA  -Wound care consulted and appreciate input       Consultations This Hospital Stay   ORTHOPEDIC SURGERY IP CONSULT  PHYSICAL THERAPY  ADULT IP CONSULT  OCCUPATIONAL THERAPY ADULT IP CONSULT  CARE MANAGEMENT / SOCIAL WORK IP CONSULT  WOUND OSTOMY CONTINENCE NURSE  IP CONSULT  ORTHOSIS EXTREMITY UPPER REFERRAL IP CONSULT  PHYSICAL THERAPY ADULT IP CONSULT  OCCUPATIONAL THERAPY ADULT IP CONSULT    Code Status   No CPR- Do NOT Intubate    Time Spent on this Encounter   IJessica MD, personally saw the patient today and spent greater than 30 minutes discharging this patient.       Jessica Colón MD  [facility]  [address] [address]  Phone: [phone]  Fax: [phone]  ______________________________________________________________________    Physical Exam   Vital Signs: Temp: 99.2  F (37.3  C) Temp src: Oral BP: (!) 142/66 Pulse: 60   Resp: 19 SpO2: 97 % O2 Device: None (Room air)    Weight: 170 lbs 13.7 oz    General: AO x 2 near baseline  Respiratory: ctla   Cardiovascular: Regular rate , S1 and S2 normal with no murmer or rubs or gallops  Abdomen:   soft , non tender  Skin: Sacral wound  Neurologic:  No facial droop  Musculoskeletal: Normal Range of motion over upper and lower extremities bilaterally except the right upper  and has immobilizer in place  Psychiatric: cooperative           Primary Care Physician   Yancy Walker    Discharge Orders      Follow-up and recommended labs and tests     -Follow up with a shoulder specialist at [facility], such as Dr. Bernardo Sanchez, Dr. Tom Stevens, or Dr. Piter Lyons, in 2 weeks for repeat imaging and reevaluation of the right shoulder. Please call [phone] to schedule.     General info for SNF    Length of Stay Estimate: Short Term Care: Estimated # of Days <30  Condition at Discharge: Improving  Level of care:skilled   Rehabilitation Potential: Good  Admission H&P remains valid and up-to-date: Yes  Recent Chemotherapy: N/A  Use Nursing Home Standing Orders: Yes     Mantoux instructions    Give two-step Mantoux  (PPD) Per Facility Policy Yes     Follow Up and recommended labs and tests    Follow up with care home physician.  The following labs/tests are recommended: CBC and basic metabolic.  Follow-up with [facility] is 1 to 2 weeks     Reason for your hospital stay    Shoulder fracture and fall     Glucose monitor nursing POCT    Before meals and at bedtime     Activity - Up with nursing assistance    Sling for comfort. O shoulder immobilizer for better fit (no cushion) OK to allow arm to hang at side, gravity will pull humerus into alignment.   - NWB RUSEVEN, OK for platform walker if needed.   -Okay for elbow, wrist, and digital ROM. Can begin Codman's exercises to the right shoulder in the next 2 weeks as pain allows.     Physical Therapy Adult Consult    Evaluate and treat as clinically indicated.    Reason: Fracture of the shoulder     Occupational Therapy Adult Consult    Evaluate and treat as clinically indicated.    Reason: Fracture of the shoulder     Fall precautions     Diet    Follow this diet upon discharge: Orders Placed This Encounter      Moderate Consistent Carb (60 g CHO per Meal) Diet       Significant Results and Procedures   Most Recent 3 CBC's:  Recent Labs   Lab Test 02/16/24  1126 02/15/24  0826 02/14/24  0810   WBC 4.4 4.4 5.6   HGB 10.6* 10.1* 10.5*   MCV 93 94 94    134* 133*     Most Recent 3 BMP's:  Recent Labs   Lab Test 02/16/24  0835 02/16/24  0558 02/16/24  0218 02/15/24  1125 02/15/24  0826 02/14/24  1147 02/14/24  0810 02/13/24  1701 02/13/24  1322   NA  --   --   --   --  139  --  137  --  136   POTASSIUM  --   --   --   --  3.5  --  3.5  --  4.2   CHLORIDE  --   --   --   --  105  --  102  --  100   CO2  --   --   --   --  26  --  25  --  28   BUN  --   --   --   --  11.6  --  10.4  --  17.3   CR  --   --   --   --  0.57*  --  0.55*  --  0.74   ANIONGAP  --   --   --   --  8  --  10  --  8   FERMIN  --   --   --   --  8.5*  --  8.7*  --  9.4   * 120* 118*   < > 116*    < > 130*   < > 167*    < > = values in this interval not displayed.     Most Recent 2 LFT's:  Recent Labs   Lab Test 02/13/24  1322 10/13/23  0552   AST 52* 24   ALT 13 14   ALKPHOS 103 102   BILITOTAL 0.8 1.1     Most Recent 3 INR's:  Recent Labs   Lab Test 10/13/23  0552 08/15/23  1258 07/27/23  1827   INR 1.03 1.17* 1.07     Most Recent INR's and Anticoagulation Dosing History:  Anticoagulation Dose History          Latest Ref Rng & Units 7/27/2023 8/15/2023 10/13/2023   Recent Dosing and Labs   INR 0.85 - 1.15 1.07  1.17  1.03      Most Recent 3 Creatinines:  Recent Labs   Lab Test 02/15/24  0826 02/14/24  0810 02/13/24  1322   CR 0.57* 0.55* 0.74   ,   Results for orders placed or performed during the hospital encounter of 02/13/24   CT Head w/o Contrast    Narrative    CT SCAN OF THE HEAD WITHOUT CONTRAST   2/13/2024 2:24 PM     HISTORY: Fall, has dementia, decreased mental status today.    TECHNIQUE: Axial images of the head and coronal reformations without  IV contrast material. Radiation dose for this scan was reduced using  automated exposure control, adjustment of the mA and/or kV according  to patient size, or iterative reconstruction technique.    COMPARISON: Brain MRI 10/13/2023.    FINDINGS: There is no evidence of intracranial hemorrhage, mass, acute  infarct or anomaly. The ventricles are normal in size, shape and  configuration. Mild diffuse parenchymal volume loss. Mild patchy  periventricular white matter hypodensities which are nonspecific, but  likely related to chronic microvascular ischemic disease.     The visualized portions of the sinuses and mastoids appear normal. The  bony calvarium and bones of the skull base appear intact.       Impression    IMPRESSION:     1. No evidence of acute intracranial hemorrhage, mass, or herniation.  2. Mild diffuse parenchymal volume loss and white matter changes  likely due to chronic microvascular ischemic disease.      CAROLINE CRYSTAL MD         SYSTEM  ID:  [id number]   XR Shoulder Right G/E 3 Views    Narrative    SHOULDER RIGHT THREE OR MORE VIEWS 2024 1:51 PM     HISTORY: Fall with pain swelling and bruising.    COMPARISON: None.       Impression    IMPRESSION:  Acute impacted and displaced proximal humerus fracture. Bones are  demineralized. Pacemaker leads. Degenerative changes glenohumeral and  acromioclavicular joints.     NOTE: ABNORMAL REPORT    THE DICTATION ABOVE DESCRIBES AN ABNORMAL REPORT FOR WHICH FOLLOW-UP  IS NEEDED.    ASHLEE QUESADA MD         SYSTEM ID:  [id number]   Echocardiogram Complete     Value    LVEF  55-60%    Narrative    [id number]  [id number]  [id number]  [id number]^MARIO^VANESSA     [facility]  Echocardiography Laboratory  [facility] [place] [address]     Name: BALTA SIMEON  MRN: [id number]  : 1940  Study Date: 2024 02:42 PM  Age: 83 yrs  Gender: Male  Patient Location: [facility]  Reason For Study: Aortic Valve Replacement  Ordering Physician: VANESSA MARTIN  Referring Physician: Yancy Walker  Performed By: Rachell Bonilla RDCS     BSA: 1.9 m2  Height: 67 in  Weight: 170 lb  HR: 60  BP: 135/59 mmHg  ______________________________________________________________________________  Procedure  Complete Portable Echo Adult.  ______________________________________________________________________________  Interpretation Summary     The visual ejection fraction is 55-60%.  Grade I or early diastolic dysfunction.  No regional wall motion abnormalities noted.  There is a bioprosthetic aortic valve.  The prosthetic aortic valve is well-seated.  There is trace aortic regurgitation.  Mean aortic gradient 14mmHg,  ______________________________________________________________________________  Left Ventricle  The left ventricle is normal in size. There is normal left ventricular wall  thickness. The visual ejection fraction is 55-60%. Grade I or early diastolic  dysfunction. No regional wall motion  abnormalities noted.     Right Ventricle  There is a catheter/pacemaker lead seen in the right ventricle. The right  ventricle is normal in size and function.     Atria  Normal left atrial size. Right atrial size is normal. There is no color  Doppler evidence of an atrial shunt.     Mitral Valve  The mitral valve leaflets are mildly thickened. There is trace mitral  regurgitation.     Tricuspid Valve  There is trace tricuspid regurgitation. IVC diameter and respiratory changes  fall into an intermediate range suggesting an RA pressure of 8 mmHg. The right  ventricular systolic pressure is approximated at 26.4 mmHg plus the right  atrial pressure.     Aortic Valve  There is trace aortic regurgitation. The mean AoV pressure gradient is 14.0  mmHg. The prosthetic aortic valve is well-seated. There is a bioprosthetic  aortic valve.     Pulmonic Valve  There is trace pulmonic valvular regurgitation.     Vessels  Normal size aorta. The aortic root is normal size.     Pericardium  There is no pericardial effusion.     Rhythm  Sinus rhythm was noted.  ______________________________________________________________________________  MMode/2D Measurements & Calculations  IVSd: 1.0 cm     LVIDd: 4.6 cm  LVIDs: 3.9 cm  LVPWd: 0.84 cm  FS: 14.9 %  LV mass(C)d: 148.5 grams  LV mass(C)dI: 78.7 grams/m2  Ao root diam: 3.0 cm  LVOT diam: 2.0 cm  LVOT area: 3.1 cm2  Ao root diam index Ht(cm/m): 1.7  Ao root diam index BSA (cm/m2): 1.6  LA Volume (BP): 43.7 ml  LA Volume Index (BP): 23.1 ml/m2  RWT: 0.36     TAPSE: 2.6 cm     Doppler Measurements & Calculations  MV E max leesa: 76.5 cm/sec  MV A max leesa: 102.0 cm/sec  MV E/A: 0.75  MV dec time: 0.33 sec  Ao V2 max: 248.0 cm/sec  Ao max P.0 mmHg  Ao V2 mean: 171.0 cm/sec  Ao mean P.0 mmHg  Ao V2 VTI: 57.6 cm  SHALOM(I,D): 1.3 cm2  SHALOM(V,D): 1.3 cm2  LV V1 max P.4 mmHg  LV V1 max: 105.0 cm/sec  LV V1 VTI: 23.3 cm  SV(LVOT): 72.4 ml  SI(LVOT): 38.4 ml/m2  PA acc time: 0.13 sec  TR  max terrence: 255.6 cm/sec  TR max P.4 mmHg  AV Terrence Ratio (DI): 0.42  SHALOM Index (cm2/m2): 0.67  E/E' avg: 10.8  Lateral E/e': 11.5  Medial E/e': 10.0  RV S Terrence: 13.7 cm/sec     ______________________________________________________________________________  Report approved by: Katherine Martinez 2024 04:38 PM             Discharge Medications   Current Discharge Medication List        START taking these medications    Details   acetaminophen (TYLENOL) 325 MG tablet Take 2 tablets (650 mg) by mouth 3 times daily (with meals)    Associated Diagnoses: Closed fracture of proximal end of right humerus, unspecified fracture morphology, initial encounter           CONTINUE these medications which have NOT CHANGED    Details   aspirin (ASA) 81 MG chewable tablet Take 1 tablet (81 mg) by mouth daily  Qty: 90 tablet, Refills: 3    Associated Diagnoses: S/P TAVR (transcatheter aortic valve replacement); Aortic stenosis, severe      cycloSPORINE (RESTASIS) 0.05 % ophthalmic emulsion Apply 1 drop to eye 2 times daily as needed for dry eyes      metFORMIN (GLUCOPHAGE) 1000 MG tablet Take 1,000 mg by mouth daily (with dinner)      metoprolol succinate ER (TOPROL XL) 25 MG 24 hr tablet Take 25 mg by mouth daily      omeprazole (PRILOSEC) 20 MG DR capsule Take 1 capsule (20 mg) by mouth daily    Associated Diagnoses: Anemia, unspecified type      polyethylene glycol (MIRALAX) 17 g packet Take 1 packet by mouth daily      rosuvastatin (CRESTOR) 20 MG tablet Take 20 mg by mouth at bedtime      senna-docusate (SENOKOT-S/PERICOLACE) 8.6-50 MG tablet Take 2 tablets by mouth daily as needed for constipation      sertraline (ZOLOFT) 50 MG tablet Take 25 mg by mouth daily      traZODone (DESYREL) 25 mg TABS half-tab Take 25 mg by mouth at bedtime      vitamin B-12 (CYANOCOBALAMIN) 1000 MCG tablet Take 1,000 mcg by mouth daily      vitamin C (ASCORBIC ACID) 500 MG tablet Take 500 mg by mouth daily      Vitamin D3 (CHOLECALCIFEROL) 25 mcg  (1000 units) tablet Take 25 mcg by mouth daily           Allergies   No Known Allergies

## 2024-02-16 NOTE — PLAN OF CARE
Physical Therapy Discharge Summary    Reason for therapy discharge:    Discharged to transitional care facility.    Progress towards therapy goal(s). See goals on Care Plan in [facility] electronic health record for goal details.  Goals not met.  Barriers to achieving goals:   discharge from facility.    Therapy recommendation(s):    Patient below baseline functional mobility. Lives at [facility] and receives assist for ADL's and IADL's, but is able to ambulate with FWW at mod I at baseline. Present with deficits in mobility secondary to weakness, NWB to RUE, and increased pain levels requiring assist x1-2 for transfers and ambulation. Recommend TCU for improvement of strength, activity tolerance, balance, and independence with functional mobility.

## 2024-02-16 NOTE — PROGRESS NOTES
Care Management Discharge Note    Discharge Date: 02/16/2024       Discharge Disposition: Skilled Nursing Facility, Transitional Care    Discharge Services: None    Discharge DME: None    Discharge Transportation: agency    Private pay costs discussed: transportation costs    Does the patient's insurance plan have a 3 day qualifying hospital stay waiver?  Yes     Which insurance plan 3 day waiver is available? Alternative insurance waiver    Will the waiver be used for post-acute placement? No    PAS Confirmation Code: [id number].  Patient/family educated on Medicare website which has current facility and service quality ratings: no    Education Provided on the Discharge Plan: Yes  Persons Notified of Discharge Plans: Patient, spouse, TCU, [facility] nursing staff  Patient/Family in Agreement with the Plan: yes    Handoff Referral Completed: No    Additional Information:  Patient will discharge from [facility] to [facility] with [facility] transport wheelchair at [phone].  Please refer to  progress notes for further details.      DEISY Stout  [facility]  Social Work

## 2024-02-16 NOTE — PROGRESS NOTES
Care Management Follow Up    Length of Stay (days): 3    Expected Discharge Date: 02/16/2024     Concerns to be Addressed: Pre-Admission Screening     Patient plan of care discussed at interdisciplinary rounds: No    Anticipated Discharge Disposition: Skilled Nursing Facility, Transitional Care     Anticipated Discharge Services: Therapy  Anticipated Discharge DME: None    Patient/family educated on Medicare website which has current facility and service quality ratings: no  Education Provided on the Discharge Plan: N/A  Patient/Family in Agreement with the Plan: N/A    Referrals Placed by CM/SW:  [facility]  Private pay costs discussed: Not applicable    Additional Information:  Completed the pre-admission screening and faxed it to [facility].      PAS-RR    D: Per DHS regulation, SW completed and submitted PAS-RR to [place] [employer] Direct Connect via the [facility].  PAS-RR confirmation # is : [id number].    I: SW spoke with patient's family and they are aware a PAS-RR has been submitted.  SW reviewed with patient's family that they may be contacted for a follow up appointment within 10 days of hospital discharge if their SNF stay is < 30 days.  Contact information for [facility] was also provided.    A: Patient's family verbalized understanding.    P: Further questions may be directed to [facility] at #[phone], option #4 for PAS-RR staff.        MARII Tripp, [facility]    [phone]  [facility]

## 2024-02-17 NOTE — PROGRESS NOTES
Connected Care Resource Center    Background: Transitional Care Management program identified per system criteria and reviewed by Connected Care Resource Center team for possible outreach.    Assessment: Upon chart review, CCRC Team member will not proceed with patient outreach related to this episode of Transitional Care Management program due to reason below:    Non-[facility] TCU: CCRC team member noted patient discharged to TCU/ARU/LTACH. Patient is not established with a [facility] currently supported by Primary Care-Care Coordination therefore handoff to Primary Care-Care Coordination is not appropriate at this time.    Plan: Transitional Care Management episode addressed appropriately per reason noted above.      MARISOL Mccullough  Connected Care Resource [place], [facility]    *Connected Care Resource Team does NOT follow patient ongoing. Referrals are identified based on internal discharge reports and the outreach is to ensure patient has an understanding of their discharge instructions.

## 2024-02-19 NOTE — PROGRESS NOTES
*[address] [place] [address] GERIATRICS    PRIMARY CARE PROVIDER AND CLINIC:  LESTER Otoole CNP, [address] [facility] [id number]  Chief Complaint   Patient presents with    Hospital F/U      [facility] Medical Record Number:  [id number]  Place of Service where encounter took place:  [facility] (TCU) [[id number]]    Erick Shahid  is a 83 year old  (1940), admitted to the above facility from  [facility]. Hospital stay 2/13/24 through 2/16/24..         Patient's past medical history includes   acute impacted and displaced proximal right humerus fracture (2/2023)  Dementia   heart failure with preserved ejection fraction   aortic stenosis s/p TAVR 23 mm Allison ROBERTO valve on 8/2023  Complete heart block s/p permanent pacemaker  Carotid stenosis  Coronary artery disease   type 2 diabetes  Anemia   Depression  Macular degeneration  Constipation      On 2/13/2024, patient was admitted after having an unwitnessed fall 2 days prior.  At admission in the emergency room his CK was elevated to 1128, BNP was 3098, troponin 29 white blood cells and platelets.  An x-ray of the shoulder revealed an acute impacted and displaced proximal humerus fracture and orthopedics recommended nonsurgical intervention with a sling for comfort.   Noncontrast head CT negative for acute intracranial changes with mild diffuse parenchymal volume loss and white matter changes.  Echo done on 2/14 shows EF of 55 to 60%, grade 1 or early diastolic dysfunction, no wall motion changes, trace aortic regurgitation and bioprosthetic aortic valve is well-seated.  Pacemaker was interrogated on 2/14/2024 and did not show any evidence of arrhythmia due to the elevated CK his Crestor was held.      Today his lungs were coarse.  His right arm was swollen with ecchymosis.  He was not responding clearly to questions    CODE STATUS/ADVANCE DIRECTIVES DISCUSSION:  Prior  DNR / DNI  ALLERGIES: No Known Allergies    PAST MEDICAL HISTORY:   Past Medical History:   Diagnosis Date    Benign essential hypertension     BPH (benign prostatic hyperplasia)     Chronic heart failure with reduced ejection fraction and diastolic dysfunction (H)     Cognitive impairment     Critical aortic valve stenosis     Diabetes mellitus, type 2 (H)     on metformin    Dyslipidemia     First degree atrioventricular block     LBBB (left bundle branch block)     Macular degeneration (senile) of retina       PAST SURGICAL HISTORY:   has a past surgical history that includes Coronary Angiogram (N/A, 07/28/2023); Percutaneous Coronary Intervention (N/A, 07/28/2023); Transcatheter Aortic Valve Replacement (08/15/2023); Orif Hip Fracture (Right, 2018); Pacemaker Device & Lead Implant- Right Atrial & Left Ventricular (N/A, 8/15/2023); and Transcatheter Aortic Valve Replacement-Femoral Approach (N/A, 8/15/2023).  FAMILY HISTORY: family history is not on file.  SOCIAL HISTORY:   reports that he has never smoked. He has never used smokeless tobacco. He reports current alcohol use. He reports that he does not use drugs.  Patient's living condition: lives in an assisted living facility    Post Discharge Medication Reconciliation Status:   MED REC REQUIRED  Post Medication Reconciliation Status:  Discharge medications reconciled and changed, see notes/orders       Current Outpatient Medications   Medication Sig    acetaminophen (TYLENOL) 325 MG tablet Take 2 tablets (650 mg) by mouth 3 times daily (with meals)    aspirin (ASA) 81 MG chewable tablet Take 1 tablet (81 mg) by mouth daily    cycloSPORINE (RESTASIS) 0.05 % ophthalmic emulsion Apply 1 drop to eye 2 times daily as needed for dry eyes    omeprazole (PRILOSEC) 20 MG DR capsule Take 1 capsule (20 mg) by mouth daily    polyethylene glycol (MIRALAX) 17 g packet Take 1 packet by mouth daily    rosuvastatin (CRESTOR) 20 MG tablet Take 20 mg by mouth at bedtime    senna-docusate (SENOKOT-S/PERICOLACE) 8.6-50 MG  "tablet Take 2 tablets by mouth daily as needed for constipation    sertraline (ZOLOFT) 50 MG tablet Take 25 mg by mouth daily    traZODone (DESYREL) 25 mg TABS half-tab Take 25 mg by mouth at bedtime    vitamin B-12 (CYANOCOBALAMIN) 1000 MCG tablet Take 1,000 mcg by mouth daily    vitamin C (ASCORBIC ACID) 500 MG tablet Take 500 mg by mouth daily    Vitamin D3 (CHOLECALCIFEROL) 25 mcg (1000 units) tablet Take 25 mcg by mouth daily     No current facility-administered medications for this visit.       ROS:  Unobtainable secondary to cognitive impairment.     Vitals:  /60   Pulse 60   Temp 97.3  F (36.3  C)   Resp 16   Ht 1.702 m (5' 7\")   Wt 78.4 kg (172 lb 14.4 oz)   SpO2 98%   BMI 27.08 kg/m    Exam:  GENERAL APPEARANCE:  Alert, not talking coherently  RESP:  respiratory effort and palpation of chest normal, crackles bilaterally  CV:  Palpation and auscultation of heart done , \"2 pedal pulses in bilateral arms\" , peripheral edema 3+ in right arm  SKIN:  Ecchymosis on right arm with 3+ swelling  PSYCH:  insight and judgement impaired, memory impaired     Lab/Diagnostic data:  Most Recent 3 CBC's:  Recent Labs   Lab Test 02/16/24  1126 02/15/24  0826 02/14/24  0810   WBC 4.4 4.4 5.6   HGB 10.6* 10.1* 10.5*   MCV 93 94 94    134* 133*     Most Recent 3 BMP's:  Recent Labs   Lab Test 02/16/24  0835 02/16/24  0558 02/16/24  0218 02/15/24  1125 02/15/24  0826 02/14/24  1147 02/14/24  0810 02/13/24  1701 02/13/24  1322   NA  --   --   --   --  139  --  137  --  136   POTASSIUM  --   --   --   --  3.5  --  3.5  --  4.2   CHLORIDE  --   --   --   --  105  --  102  --  100   CO2  --   --   --   --  26  --  25  --  28   BUN  --   --   --   --  11.6  --  10.4  --  17.3   CR  --   --   --   --  0.57*  --  0.55*  --  0.74   ANIONGAP  --   --   --   --  8  --  10  --  8   FERMIN  --   --   --   --  8.5*  --  8.7*  --  9.4   * 120* 118*   < > 116*   < > 130*   < > 167*    < > = values in this interval not " displayed.     Most Recent 2 LFT's:  Recent Labs   Lab Test 02/13/24  1322 10/13/23  0552   AST 52* 24   ALT 13 14   ALKPHOS 103 102   BILITOTAL 0.8 1.1     Most Recent 3 BNP's:  Recent Labs   Lab Test 02/13/24  1322 07/27/23  1827   NTBNPI 3,098*  --    NTBNP  --  2,019*     Most Recent Cholesterol Panel:  Recent Labs   Lab Test 10/13/23  0748   CHOL 137   LDL 52   HDL 69   TRIG 80     Most Recent TSH and T4:  Recent Labs   Lab Test 10/13/23  0748   TSH 1.47     Most Recent Hemoglobin A1c:  Recent Labs   Lab Test 02/05/24  0630   A1C 6.4*       ASSESSMENT/PLAN:  (S42.201A) Displaced fracture of proximal end of right humerus  (primary encounter diagnosis)  R53.1) Weakness  (R53.81) Physical deconditioning  (R58) Ecchymosis  (R22.31) Localized swelling of right upper extremity  Comment:   Fell  Displaced fracture of right humerus and non-operative recommendations  Plan:   Follow up with a shoulder specialist at [facility], such as Dr. Bernardo Sanchez, Dr. Tom Stevens, or Dr. Piter Lyons, in 2 weeks for repeat imaging and reevaluation of the right shoulder.   Sling for comfort. O shoulder immobilizer for better fit (no cushion) OK to allow arm to hang at side, gravity will pull humerus into alignment.   - NWB RUE, OK for platform walker if needed.   -Okay for elbow, wrist, and digital ROM. Can begin Codman's exercises to the right shoulder in the next 2 weeks as pain allows  Therapy to evaluate and treat      (I50.9) Congestive heart failure, unspecified HF chronicity, unspecified heart failure type (H)  (I44.2) Complete heart block (H)  (I25.10) Coronary artery disease involving native coronary artery of native heart without angina pectoris  (Z95.0) Cardiac pacemaker in situ  (Z95.3) Status post transcatheter aortic valve replacement (TAVR) using bioprosthesis  (I65.21) Stenosis of right carotid artery  Comment: chronic.  Currently taking metoprolol, rosuvastatin, aspirin,  HAs LE edema   Bilateral  crackles in Lungs  Plan:   Start lasix 40 mg daily  Stop metoprolol   BMP and CBC next lab day       (E11.59) Type 2 diabetes mellitus with other circulatory complication, without long-term current use of insulin (H)  Comment: chronic.   Currently taking metformin 1000 mg with supper.    Last hgb A1c=6.4 (2/2024)   Plan: stop metformin  Change blood sugar checks to alternating times.       (D64.9) Anemia, unspecified type  Comment: Chronic.  Taking Vitamin B12.   Plan: CBC next lab day   Consider iron studies when hgb stabilizes      (F32.1) Depression, major, single episode, moderate (H)  Comment: chronic   Taking sertraline  Plan: Continue with plan of care no changes at this time, adjustment as needed      (K59.01) Slow transit constipation  Comment: chronic.   Currently taking MiraLAX and prn senna   Plan: Continue with plan of care no changes at this time, adjustment as needed        [F03.B3] Moderate dementia with mood disturbance, unspecified dementia type (H)   Comment: Chronic, progressive.     prior to TCU admission, patient requiring 24-hour skilled nursing care.    Since TCU admission, No behavioral issues or emotional distress with changes in environment  Expect further functional and cognitive decline.    Plan: consider discontinuing trazodone.            Total time spent with patient visit at the skilled nursing facility was 45 min including patient visit, review of past records, and [facility] records.     Electronically signed by:        LESTER Otoole CNP on 2/19/2024 at 11:58 AM

## 2024-02-19 NOTE — TELEPHONE ENCOUNTER
Erick Shahid  1940    Orders  Discontinue blood sugars 4x/daily  Start blood sugars daily with alternating times  Discontinue metformin  Discontinue metoprolol    LESTER Otoole CNP on 2/19/2024 at 6:31 AM

## 2024-02-19 NOTE — PLAN OF CARE
Occupational Therapy Discharge Summary    Reason for therapy discharge:    Discharged to transitional care facility.    Progress towards therapy goal(s). See goals on Care Plan in [facility] electronic health record for goal details.  Goals partially met.  Barriers to achieving goals:   discharge from facility.    Therapy recommendation(s):    Continued therapy is recommended.  Rationale/Recommendations:  Pt significantly below baseline for ADL performance. Limited by immobilized RUE, pain, weakness, decreased cognition. Currently assist x2 for all ADLs, functional mobility. Recommend TCU to increase independence, safety prior to returning to PLOF.

## 2024-02-20 NOTE — PROGRESS NOTES
"[facility] GERIATRICS    Chief Complaint   Patient presents with    Lung sounds    Hypertension     HPI:  Erick Shahid is a 83 year old  (1940), who is being seen today for an episodic care visit at: [facility] ([facility]) [[id number]].       VS stable   Per nursing, Patient requires max assist with all ADLs. Min A x 1 with feeding at meals. MOD A X 1 bed mobility.   Incontinent of both B & B  On 2/19/24, D/C Metformin, Metoprolol started Lasix , Labs ordered for next Lab day.  Sitting up today   Slightly confused  Denied pain  Lung clear    Allergies, and PMH/PSH reviewed in EPIC today.  REVIEW OF SYSTEMS:  4 point ROS including Respiratory, CV, GI and , other than that noted in the HPI,  is negative    Objective:   /60   Pulse 60   Temp 97.3  F (36.3  C)   Resp 16   Ht 1.702 m (5' 7\")   Wt 78.4 kg (172 lb 14.4 oz)   SpO2 98%   BMI 27.08 kg/m    GENERAL APPEARANCE:  Alert, in no distress  RESP:  lungs clear to auscultation , no respiratory distress  CV:  Palpation and auscultation of heart done , rate-normal  PSYCH:  insight and judgement impaired    Recent labs in EPIC reviewed by me today.     Assessment/Plan:       (I50.9) Congestive heart failure, unspecified HF chronicity, unspecified heart failure type (H)  (I44.2) Complete heart block (H)  (I25.10) Coronary artery disease involving native coronary artery of native heart without angina pectoris  (Z95.0) Cardiac pacemaker in situ  (Z95.3) Status post transcatheter aortic valve replacement (TAVR) using bioprosthesis  (I65.21) Stenosis of right carotid artery  Comment: chronic.  Currently taking rosuvastatin, aspirin,  On 2/19/24, right arm was swollen, he had Bilateral crackles in Lungs and was started on lasix 40 mg daily. Metoprolol stopped  Today lungs clear,   Plan:   BMP and CBC next lab day      [F03.B3] Moderate dementia with mood disturbance, unspecified dementia type (H)   Comment: Chronic, progressive.     prior " to TCU admission, patient requiring 24-hour skilled nursing care.    Since TCU admission, No behavioral issues or emotional distress with changes in environment  Expect further functional and cognitive decline.    Confused today but more alert  Plan: consider discontinuing trazodone.      MED REC REQUIRED  Post Medication Reconciliation Status:  Medication reconciliation previously completed during another office visit      Electronically signed by:     LESTER Otoole CNP on 2/20/2024 at 9:26 PM

## 2024-02-20 NOTE — LETTER
"    2/20/2024        RE: Erick Shahid  [address]  [place] [address]        [facility] GERIATRICS    Chief Complaint   Patient presents with     Lung sounds     Hypertension     HPI:  Erick Shahid is a 83 year old  (1940), who is being seen today for an episodic care visit at: [facility] ([facility]) [[id number]].       VS stable   Per nursing, Patient requires max assist with all ADLs. Min A x 1 with feeding at meals. MOD A X 1 bed mobility.   Incontinent of both B & B  On 2/19/24, D/C Metformin, Metoprolol started Lasix , Labs ordered for next Lab day.  Sitting up today   Slightly confused  Denied pain  Lung clear    Allergies, and PMH/PSH reviewed in EPIC today.  REVIEW OF SYSTEMS:  4 point ROS including Respiratory, CV, GI and , other than that noted in the HPI,  is negative    Objective:   /60   Pulse 60   Temp 97.3  F (36.3  C)   Resp 16   Ht 1.702 m (5' 7\")   Wt 78.4 kg (172 lb 14.4 oz)   SpO2 98%   BMI 27.08 kg/m    GENERAL APPEARANCE:  Alert, in no distress  RESP:  lungs clear to auscultation , no respiratory distress  CV:  Palpation and auscultation of heart done , rate-normal  PSYCH:  insight and judgement impaired    Recent labs in [facility] reviewed by me today.     Assessment/Plan:       (I50.9) Congestive heart failure, unspecified HF chronicity, unspecified heart failure type (H)  (I44.2) Complete heart block (H)  (I25.10) Coronary artery disease involving native coronary artery of native heart without angina pectoris  (Z95.0) Cardiac pacemaker in situ  (Z95.3) Status post transcatheter aortic valve replacement (TAVR) using bioprosthesis  (I65.21) Stenosis of right carotid artery  Comment: chronic.  Currently taking rosuvastatin, aspirin,  On 2/19/24, right arm was swollen, he had Bilateral crackles in Lungs and was started on lasix 40 mg daily. Metoprolol stopped  Today lungs clear,   Plan:   BMP and CBC next lab day      [F03.B3] Moderate dementia " with mood disturbance, unspecified dementia type (H)   Comment: Chronic, progressive.     prior to TCU admission, patient requiring 24-hour skilled nursing care.    Since TCU admission, No behavioral issues or emotional distress with changes in environment  Expect further functional and cognitive decline.    Confused today but more alert  Plan: consider discontinuing trazodone.      MED REC REQUIRED  Post Medication Reconciliation Status:  Medication reconciliation previously completed during another office visit      Electronically signed by:     LESTER Otoole CNP on 2/20/2024 at 9:26 PM         Sincerely,        LESTER Otoole CNP

## 2024-02-21 NOTE — PROGRESS NOTES
[facility] GERIATRICS  REGULATORY VISIT  February 21, 2024    [facility] Medical Record Number:  [id number]  Place of Service where encounter took place:  [facility] (TCU) [[id number]]      Chief Complaint   Patient presents with    Hospital F/U    Nursing Home Regulatory       HPI:    Erick Shahid is a 83 year old  (1940), who is being seen today for a federally mandated E/M visit at [facility]. He lives in [facility], but is currently in the TCU after a hospitalization at [facility] from 2/13/24 to 2/16/24 where he was admitted with increased confusion and an unwitnessed fall in the AL. Imaging with acute impacted and displaced R proximal humerus fracture for which orthopedic surgery recommended non-op management and outpatient follow up.     At the TCU, noted to be volume up with crackles on lung exam on 2/19. PTA metoprolol discontinued and he was started on furosemide. PTA metformin also discontinued given recent A1c.     Today, Mr Shahid is seen in his room with family present. He is a limited historian with paucity of speech. Family does not think he's having any pain. Reviewed BPs, blood sugars. No acute concerns today. No concerns per discussion with nursing,     ALLERGIES:  No Known Allergies     Past Medical, Surgical, Family and Social History: Reviewed and updated in EPIC.    MEDICATIONS:  Post Discharge Medication Reconciliation Status: discharge medications reconciled and changed, per note/orders.  Current Outpatient Medications   Medication Sig Dispense Refill    acetaminophen (TYLENOL) 325 MG tablet Take 2 tablets (650 mg) by mouth 3 times daily (with meals)      aspirin (ASA) 81 MG chewable tablet Take 1 tablet (81 mg) by mouth daily 90 tablet 3    furosemide (LASIX) 40 MG tablet Take 1 tablet (40 mg) by mouth daily      omeprazole (PRILOSEC) 20 MG DR capsule Take 1 capsule (20 mg) by mouth daily      polyethylene glycol  "(MIRALAX) 17 g packet Take 1 packet by mouth daily      rosuvastatin (CRESTOR) 20 MG tablet Take 20 mg by mouth at bedtime      senna-docusate (SENOKOT-S/PERICOLACE) 8.6-50 MG tablet Take 2 tablets by mouth daily as needed for constipation      sertraline (ZOLOFT) 50 MG tablet Take 25 mg by mouth daily      traZODone (DESYREL) 25 mg TABS half-tab Take 25 mg by mouth at bedtime      vitamin B-12 (CYANOCOBALAMIN) 1000 MCG tablet Take 1,000 mcg by mouth daily      vitamin C (ASCORBIC ACID) 500 MG tablet Take 500 mg by mouth daily      Vitamin D3 (CHOLECALCIFEROL) 25 mcg (1000 units) tablet Take 25 mcg by mouth daily       Medications reviewed:  Medications reconciled to facility chart and changes were made to reflect current medications as identified as above med list. Below are the changes that were made:   Medications stopped since last EPIC medication reconciliation:   There are no discontinued medications.  Medications started since last [facility] medication reconciliation:  No orders of the defined types were placed in this encounter.    REVIEW OF SYSTEMS:  Unable to be obtained due to cognitive impairment or aphasia.     PHYSICAL EXAM:  /53   Pulse 60   Temp 97.2  F (36.2  C)   Resp 14   Ht 1.702 m (5' 7\")   Wt 78.4 kg (172 lb 14.4 oz)   SpO2 95%   BMI 27.08 kg/m    Gen: sitting in wheelchair, alert, cooperative and in no acute distress  Resp: breathing non labored, no tachypnea; would be difficult to auscultate with immobilizer and position in WC  Ext: no LE edema  Neuro: CX II-XII grossly in tact; ROM in all four extremities grossly in tact  Psych: memory, judgement and insight impaired     LABS/IMAGING: Reviewed as per Epic and/or [facility]    ASSESSMENT / PLAN:    Right Proximal Humerus Fracture  Secondary to a fall that presumably occurred on 2/11/24. Ortho recommended non-op management. Pain appears to be controlled.   -- NWB to RUE with sling/immobilzer for comfort  -- analgesia with APAP 650 " mg TID with meals  -- PT/OT  -- follow up with ortho/[facility] as scheduled on 2/29/24    CAD, HFpEF (EF 60-65%), HTN, HLD  Aortic Stenosis s/p TAVR (Aug 2023)  Complete Heart Block s/p PPM (Aug 2023)  SBPs 120s-130s. HR 60s-70s. Weight 173 lbs x1. Metoprolol discontinued and furosemide started on 2/19 at TCU given some hypervolemia.   -- ASA 81 mg daily,   -- furosemide 40 mg daily - new on 2/19; BMP today looks good  -- rosuvastatin 20 mg at bedtime   -- follow BPs and adjust medications as needed  -- follow up with cardiology as scheduled on 3/28/24    Dementia Without Behavioral Disturbance  Mild Major Recurrent Depression  Lives in memory care in [facility]  -- OT following for cog eval  -- sertraline 25 mg daily    DM, Type II  Hgb A1c 6.4. Had been on metformin, this was discontinued at TCU on 2/19.   -- sugars PRN  -- A1c goal is >9, if any goal at all, given his age and greater risk of hypoglycemia    Insomnia  -- trazodone 25 mg at bedtime     GERD  -- omeprazole 20 mg daily     Slow Transit Constipation  -- Miralax 17g daily and Senna-S 2 tabs daily PRN   -- adjust bowel regimen as needed    Fall  Physical Deconditioning  In setting of hospitalization and underlying medical conditions  -- ongoing PT/OT    Electronically signed by  Maya Sampson MD

## 2024-02-21 NOTE — LETTER
2/21/2024        RE: Erick Shahid  [address]  [place] [id number]        [facility] GERIATRICS  REGULATORY VISIT  February 21, 2024    [facility] Medical Record Number:  [id number]  Place of Service where encounter took place:  [facility] (TCU) [[id number]]      Chief Complaint   Patient presents with     Hospital F/U     Nursing Home Regulatory       HPI:    Erick Shahid is a 83 year old  (1940), who is being seen today for a federally mandated E/M visit at [facility]. He lives in [facility], but is currently in the TCU after a hospitalization at [facility] from 2/13/24 to 2/16/24 where he was admitted with increased confusion and an unwitnessed fall in the AL. Imaging with acute impacted and displaced R proximal humerus fracture for which orthopedic surgery recommended non-op management and outpatient follow up.     At the TCU, noted to be volume up with crackles on lung exam on 2/19. PTA metoprolol discontinued and he was started on furosemide. PTA metformin also discontinued given recent A1c.     Today, Mr Shahid is seen in his room with family present. He is a limited historian with paucity of speech. Family does not think he's having any pain. Reviewed BPs, blood sugars. No acute concerns today. No concerns per discussion with nursing,     ALLERGIES:  No Known Allergies     Past Medical, Surgical, Family and Social History: Reviewed and updated in EPIC.    MEDICATIONS:  Post Discharge Medication Reconciliation Status: discharge medications reconciled and changed, per note/orders.  Current Outpatient Medications   Medication Sig Dispense Refill     acetaminophen (TYLENOL) 325 MG tablet Take 2 tablets (650 mg) by mouth 3 times daily (with meals)       aspirin (ASA) 81 MG chewable tablet Take 1 tablet (81 mg) by mouth daily 90 tablet 3     furosemide (LASIX) 40 MG tablet Take 1 tablet (40 mg) by mouth daily        "omeprazole (PRILOSEC) 20 MG DR capsule Take 1 capsule (20 mg) by mouth daily       polyethylene glycol (MIRALAX) 17 g packet Take 1 packet by mouth daily       rosuvastatin (CRESTOR) 20 MG tablet Take 20 mg by mouth at bedtime       senna-docusate (SENOKOT-S/PERICOLACE) 8.6-50 MG tablet Take 2 tablets by mouth daily as needed for constipation       sertraline (ZOLOFT) 50 MG tablet Take 25 mg by mouth daily       traZODone (DESYREL) 25 mg TABS half-tab Take 25 mg by mouth at bedtime       vitamin B-12 (CYANOCOBALAMIN) 1000 MCG tablet Take 1,000 mcg by mouth daily       vitamin C (ASCORBIC ACID) 500 MG tablet Take 500 mg by mouth daily       Vitamin D3 (CHOLECALCIFEROL) 25 mcg (1000 units) tablet Take 25 mcg by mouth daily       Medications reviewed:  Medications reconciled to facility chart and changes were made to reflect current medications as identified as above med list. Below are the changes that were made:   Medications stopped since last EPIC medication reconciliation:   There are no discontinued medications.  Medications started since last [facility] medication reconciliation:  No orders of the defined types were placed in this encounter.    REVIEW OF SYSTEMS:  Unable to be obtained due to cognitive impairment or aphasia.     PHYSICAL EXAM:  /53   Pulse 60   Temp 97.2  F (36.2  C)   Resp 14   Ht 1.702 m (5' 7\")   Wt 78.4 kg (172 lb 14.4 oz)   SpO2 95%   BMI 27.08 kg/m    Gen: sitting in wheelchair, alert, cooperative and in no acute distress  Resp: breathing non labored, no tachypnea; would be difficult to auscultate with immobilizer and position in WC  Ext: no LE edema  Neuro: CX II-XII grossly in tact; ROM in all four extremities grossly in tact  Psych: memory, judgement and insight impaired     LABS/IMAGING: Reviewed as per Epic and/or [facility]    ASSESSMENT / PLAN:    Right Proximal Humerus Fracture  Secondary to a fall that presumably occurred on 2/11/24. Ortho recommended non-op management. " Pain appears to be controlled.   -- NWB to RUE with sling/immobilzer for comfort  -- analgesia with APAP 650 mg TID with meals  -- PT/OT  -- follow up with ortho/[facility] as scheduled on 2/29/24    CAD, HFpEF (EF 60-65%), HTN, HLD  Aortic Stenosis s/p TAVR (Aug 2023)  Complete Heart Block s/p PPM (Aug 2023)  SBPs 120s-130s. HR 60s-70s. Weight 173 lbs x1. Metoprolol discontinued and furosemide started on 2/19 at TCU given some hypervolemia.   -- ASA 81 mg daily,   -- furosemide 40 mg daily - new on 2/19; BMP today looks good  -- rosuvastatin 20 mg at bedtime   -- follow BPs and adjust medications as needed  -- follow up with cardiology as scheduled on 3/28/24    Dementia Without Behavioral Disturbance  Mild Major Recurrent Depression  Lives in memory care in [facility]  -- OT following for cog eval  -- sertraline 25 mg daily    DM, Type II  Hgb A1c 6.4. Had been on metformin, this was discontinued at TCU on 2/19.   -- sugars PRN  -- A1c goal is >9, if any goal at all, given his age and greater risk of hypoglycemia    Insomnia  -- trazodone 25 mg at bedtime     GERD  -- omeprazole 20 mg daily     Slow Transit Constipation  -- Miralax 17g daily and Senna-S 2 tabs daily PRN   -- adjust bowel regimen as needed    Fall  Physical Deconditioning  In setting of hospitalization and underlying medical conditions  -- ongoing PT/OT    Electronically signed by  Maya Sampson MD              Sincerely,        Maya Sampson MD

## 2024-02-23 NOTE — PROGRESS NOTES
No Epic Interface Required    Pt presented to ED after fall .  X-ray right shoulder with acute impacted and displaced proximal humerus fracture  No contributing arrhythmia's. Stable Battery and lead measurements.  Chester RN

## 2024-02-23 NOTE — LETTER
2/23/2024        RE: Erick Shahid  [address]  [place] [address]         [facility] GERIATRICS        Visit Type: RECHECK     Facility:   [facility] ([facility]) [[id number]]         History of Present Illness: Erick Shahid is a 83 year old male     Patient's past medical history includes   acute impacted and displaced proximal right humerus fracture (2/2023)  Dementia   heart failure with preserved ejection fraction   aortic stenosis s/p TAVR 23 mm Allison ROBERTO valve on 8/2023  Complete heart block s/p permanent pacemaker  Carotid stenosis  Coronary artery disease   type 2 diabetes  Anemia   Depression  Macular degeneration  Constipation        On 2/13/2024, patient was admitted after having an unwitnessed fall 2 days prior.  At admission in the emergency room his CK was elevated to 1128, BNP was 3098, troponin 29 white blood cells and platelets.  An x-ray of the shoulder revealed an acute impacted and displaced proximal humerus fracture and orthopedics recommended nonsurgical intervention with a sling for comfort.   Noncontrast head CT negative for acute intracranial changes with mild diffuse parenchymal volume loss and white matter changes.  Echo done on 2/14 shows EF of 55 to 60%, grade 1 or early diastolic dysfunction, no wall motion changes, trace aortic regurgitation and bioprosthetic aortic valve is well-seated.  Pacemaker was interrogated on 2/14/2024 and did not show any evidence of arrhythmia due to the elevated CK his Crestor was held.     Today patient was up walking with therapies.  His arm was in a brace./Sling  He does not follow conversation well  Vital signs stable    Current Outpatient Medications   Medication Sig Dispense Refill     acetaminophen (TYLENOL) 325 MG tablet Take 2 tablets (650 mg) by mouth 3 times daily (with meals)       aspirin (ASA) 81 MG chewable tablet Take 1 tablet (81 mg) by mouth daily 90 tablet 3     furosemide (LASIX) 40 MG tablet  "Take 1 tablet (40 mg) by mouth daily       omeprazole (PRILOSEC) 20 MG DR capsule Take 1 capsule (20 mg) by mouth daily       polyethylene glycol (MIRALAX) 17 g packet Take 1 packet by mouth daily       rosuvastatin (CRESTOR) 20 MG tablet Take 20 mg by mouth at bedtime       senna-docusate (SENOKOT-S/PERICOLACE) 8.6-50 MG tablet Take 2 tablets by mouth daily as needed for constipation       sertraline (ZOLOFT) 50 MG tablet Take 25 mg by mouth daily       traZODone (DESYREL) 25 mg TABS half-tab Take 25 mg by mouth at bedtime       vitamin B-12 (CYANOCOBALAMIN) 1000 MCG tablet Take 1,000 mcg by mouth daily       vitamin C (ASCORBIC ACID) 500 MG tablet Take 500 mg by mouth daily       Vitamin D3 (CHOLECALCIFEROL) 25 mcg (1000 units) tablet Take 25 mcg by mouth daily         ALLERGIES:Patient has no known allergies.    Vitals:  BP (!) 144/62   Pulse 73   Temp 97.3  F (36.3  C)   Resp 16   Ht 1.702 m (5' 7\")   Wt 78.4 kg (172 lb 14.4 oz)   SpO2 98%   BMI 27.08 kg/m    Body mass index is 27.08 kg/m .    Review of Systems   Reason unable to perform ROS: Cognitive impairment.   Psychiatric/Behavioral:  Positive for confusion.           Physical Exam  Vitals and nursing note reviewed.   Neurological:      Mental Status: He is alert.     Review of systems difficult due to cognitive impairment      Labs:     Most Recent 3 CBC's:  Recent Labs   Lab Test 02/21/24  0626 02/16/24  1126 02/15/24  0826   WBC 5.8 4.4 4.4   HGB 10.2* 10.6* 10.1*   MCV 94 93 94    151 134*     Most Recent 3 BMP's:  Recent Labs   Lab Test 02/21/24  0626 02/16/24  0835 02/16/24  0558 02/15/24  1125 02/15/24  0826 02/14/24  1147 02/14/24  0810     --   --   --  139  --  137   POTASSIUM 3.7  --   --   --  3.5  --  3.5   CHLORIDE 103  --   --   --  105  --  102   CO2 25  --   --   --  26  --  25   BUN 13.7  --   --   --  11.6  --  10.4   CR 0.61*  --   --   --  0.57*  --  0.55*   ANIONGAP 10  --   --   --  8  --  10   FERMIN 9.1  --   --   " --  8.5*  --  8.7*   * 117* 120*   < > 116*   < > 130*    < > = values in this interval not displayed.     Most Recent 2 LFT's:  Recent Labs   Lab Test 02/13/24  1322 10/13/23  0552   AST 52* 24   ALT 13 14   ALKPHOS 103 102   BILITOTAL 0.8 1.1       Assessment/Plan:    (I50.9) Congestive heart failure, unspecified HF chronicity, unspecified heart failure type (H)  (I44.2) Complete heart block (H)  (I25.10) Coronary artery disease involving native coronary artery of native heart without angina pectoris  (Z95.0) Cardiac pacemaker in situ  (Z95.3) Status post transcatheter aortic valve replacement (TAVR) using bioprosthesis  Comment: chronic.  Currently taking metoprolol, rosuvastatin, aspirin,  HAs LE edema which is chronic  On 2/19/2024 bilateral crackles in Lungs and received 40 of Lasix  Blood pressure variable  I personally reviewed BMP dated 2/21/2024 and CBC dated 2/21/2024 which are stable  Metoprolol was discontinued when furosemide was started  Plan:   Discontinue furosemide  Monitor daily weights x 7 days  Monitor blood pressures        Electronically signed by:LESTER Otoole CNP    MEDICATIONS:  MED REC REQUIRED  Post Medication Reconciliation Status:  Medication reconciliation previously completed during another office visit                Erick Shahid  1940      Orders  Discontinue furosemide  Daily weights x 7 days due to heart failure.  If weight is above 175 pounds please notify nurse practitioner  If blood pressures above 150 mmHg x 3 please notify nurse practitioner    LESTER Otoole CNP on 2/24/2024 at 7:14 AM      Sincerely,        LESTER Otoole CNP

## 2024-02-24 NOTE — PROGRESS NOTES
Erick Shahid  1940      Orders  Discontinue furosemide  Daily weights x 7 days due to heart failure.  If weight is above 175 pounds please notify nurse practitioner  If blood pressures above 150 mmHg x 3 please notify nurse practitioner    LESTER Otoole CNP on 2/24/2024 at 7:14 AM

## 2024-02-24 NOTE — PROGRESS NOTES
[facility] GERIATRICS        Visit Type: RECHECK     Facility:   [facility] ([facility]) [[id number]]         History of Present Illness: Erick Shahid is a 83 year old male     Patient's past medical history includes   acute impacted and displaced proximal right humerus fracture (2/2023)  Dementia   heart failure with preserved ejection fraction   aortic stenosis s/p TAVR 23 mm Allison ROBERTO valve on 8/2023  Complete heart block s/p permanent pacemaker  Carotid stenosis  Coronary artery disease   type 2 diabetes  Anemia   Depression  Macular degeneration  Constipation        On 2/13/2024, patient was admitted after having an unwitnessed fall 2 days prior.  At admission in the emergency room his CK was elevated to 1128, BNP was 3098, troponin 29 white blood cells and platelets.  An x-ray of the shoulder revealed an acute impacted and displaced proximal humerus fracture and orthopedics recommended nonsurgical intervention with a sling for comfort.   Noncontrast head CT negative for acute intracranial changes with mild diffuse parenchymal volume loss and white matter changes.  Echo done on 2/14 shows EF of 55 to 60%, grade 1 or early diastolic dysfunction, no wall motion changes, trace aortic regurgitation and bioprosthetic aortic valve is well-seated.  Pacemaker was interrogated on 2/14/2024 and did not show any evidence of arrhythmia due to the elevated CK his Crestor was held.     Today patient was up walking with therapies.  His arm was in a brace./Sling  He does not follow conversation well  Vital signs stable    Current Outpatient Medications   Medication Sig Dispense Refill    acetaminophen (TYLENOL) 325 MG tablet Take 2 tablets (650 mg) by mouth 3 times daily (with meals)      aspirin (ASA) 81 MG chewable tablet Take 1 tablet (81 mg) by mouth daily 90 tablet 3    furosemide (LASIX) 40 MG tablet Take 1 tablet (40 mg) by mouth daily      omeprazole (PRILOSEC) 20 MG DR capsule Take 1 capsule (20  "mg) by mouth daily      polyethylene glycol (MIRALAX) 17 g packet Take 1 packet by mouth daily      rosuvastatin (CRESTOR) 20 MG tablet Take 20 mg by mouth at bedtime      senna-docusate (SENOKOT-S/PERICOLACE) 8.6-50 MG tablet Take 2 tablets by mouth daily as needed for constipation      sertraline (ZOLOFT) 50 MG tablet Take 25 mg by mouth daily      traZODone (DESYREL) 25 mg TABS half-tab Take 25 mg by mouth at bedtime      vitamin B-12 (CYANOCOBALAMIN) 1000 MCG tablet Take 1,000 mcg by mouth daily      vitamin C (ASCORBIC ACID) 500 MG tablet Take 500 mg by mouth daily      Vitamin D3 (CHOLECALCIFEROL) 25 mcg (1000 units) tablet Take 25 mcg by mouth daily         ALLERGIES:Patient has no known allergies.    Vitals:  BP (!) 144/62   Pulse 73   Temp 97.3  F (36.3  C)   Resp 16   Ht 1.702 m (5' 7\")   Wt 78.4 kg (172 lb 14.4 oz)   SpO2 98%   BMI 27.08 kg/m    Body mass index is 27.08 kg/m .    Review of Systems   Reason unable to perform ROS: Cognitive impairment.   Psychiatric/Behavioral:  Positive for confusion.           Physical Exam  Vitals and nursing note reviewed.   Neurological:      Mental Status: He is alert.     Review of systems difficult due to cognitive impairment      Labs:     Most Recent 3 CBC's:  Recent Labs   Lab Test 02/21/24  0626 02/16/24  1126 02/15/24  0826   WBC 5.8 4.4 4.4   HGB 10.2* 10.6* 10.1*   MCV 94 93 94    151 134*     Most Recent 3 BMP's:  Recent Labs   Lab Test 02/21/24  0626 02/16/24  0835 02/16/24  0558 02/15/24  1125 02/15/24  0826 02/14/24  1147 02/14/24  0810     --   --   --  139  --  137   POTASSIUM 3.7  --   --   --  3.5  --  3.5   CHLORIDE 103  --   --   --  105  --  102   CO2 25  --   --   --  26  --  25   BUN 13.7  --   --   --  11.6  --  10.4   CR 0.61*  --   --   --  0.57*  --  0.55*   ANIONGAP 10  --   --   --  8  --  10   FERMIN 9.1  --   --   --  8.5*  --  8.7*   * 117* 120*   < > 116*   < > 130*    < > = values in this interval not displayed. "     Most Recent 2 LFT's:  Recent Labs   Lab Test 02/13/24  1322 10/13/23  0552   AST 52* 24   ALT 13 14   ALKPHOS 103 102   BILITOTAL 0.8 1.1       Assessment/Plan:    (I50.9) Congestive heart failure, unspecified HF chronicity, unspecified heart failure type (H)  (I44.2) Complete heart block (H)  (I25.10) Coronary artery disease involving native coronary artery of native heart without angina pectoris  (Z95.0) Cardiac pacemaker in situ  (Z95.3) Status post transcatheter aortic valve replacement (TAVR) using bioprosthesis  Comment: chronic.  Currently taking metoprolol, rosuvastatin, aspirin,  HAs LE edema which is chronic  On 2/19/2024 bilateral crackles in Lungs and received 40 of Lasix  Blood pressure variable  I personally reviewed BMP dated 2/21/2024 and CBC dated 2/21/2024 which are stable  Metoprolol was discontinued when furosemide was started  Plan:   Discontinue furosemide  Monitor daily weights x 7 days  Monitor blood pressures        Electronically signed by:LESTER Otoole CNP    MEDICATIONS:  MED REC REQUIRED  Post Medication Reconciliation Status:  Medication reconciliation previously completed during another office visit

## 2024-02-26 NOTE — PROGRESS NOTES
[facility] GERIATRICS      Visit Type: RECHECK     Facility:   [facility] ([facility]) [[id number]]         History of Present Illness: Erick Shahid is a 83 year old male     Patient's past medical history includes   acute impacted and displaced proximal right humerus fracture (2/2023)  Dementia   heart failure with preserved ejection fraction   aortic stenosis s/p TAVR 23 mm Allison ROBERTO valve on 8/2023  Complete heart block s/p permanent pacemaker  Carotid stenosis  Coronary artery disease   type 2 diabetes  Anemia   Depression  Macular degeneration  Constipation        On 2/13/2024, patient was admitted after having an unwitnessed fall 2 days prior.  At admission in the emergency room his CK was elevated to 1128, BNP was 3098, troponin 29 white blood cells and platelets.  An x-ray of the shoulder revealed an acute impacted and displaced proximal humerus fracture and orthopedics recommended nonsurgical intervention with a sling for comfort.   Noncontrast head CT negative for acute intracranial changes with mild diffuse parenchymal volume loss and white matter changes.  Echo done on 2/14 shows EF of 55 to 60%, grade 1 or early diastolic dysfunction, no wall motion changes, trace aortic regurgitation and bioprosthetic aortic valve is well-seated.  Pacemaker was interrogated on 2/14/2024 and did not show any evidence of arrhythmia due to the elevated CK his Crestor was held.        Today he was transferred to LTC  Pt difficult to understand today    His wife was present  VS stable.   Per nursing, Pt. requires Total dependence with bathing, upper and lower dressing, and toileting. He is Ax1 with feeding, MAX Ax1 with grooming, and oral cares. Ambulates with PT ONLY. MOD Ax1 with bed mobility and transfers. Uses a wheelchair for locomotion.   Incontinent of bowel and bladder    Current Outpatient Medications   Medication Sig Dispense Refill    acetaminophen (TYLENOL) 325 MG tablet Take 2 tablets (650  "mg) by mouth 3 times daily (with meals)      aspirin (ASA) 81 MG chewable tablet Take 1 tablet (81 mg) by mouth daily 90 tablet 3    omeprazole (PRILOSEC) 20 MG DR capsule Take 1 capsule (20 mg) by mouth daily      polyethylene glycol (MIRALAX) 17 g packet Take 1 packet by mouth daily      rosuvastatin (CRESTOR) 20 MG tablet Take 20 mg by mouth at bedtime      senna-docusate (SENOKOT-S/PERICOLACE) 8.6-50 MG tablet Take 2 tablets by mouth daily as needed for constipation      sertraline (ZOLOFT) 50 MG tablet Take 25 mg by mouth daily      traZODone (DESYREL) 25 mg TABS half-tab Take 25 mg by mouth at bedtime      vitamin B-12 (CYANOCOBALAMIN) 1000 MCG tablet Take 1,000 mcg by mouth daily      vitamin C (ASCORBIC ACID) 500 MG tablet Take 500 mg by mouth daily      Vitamin D3 (CHOLECALCIFEROL) 25 mcg (1000 units) tablet Take 25 mcg by mouth daily         ALLERGIES:Patient has no known allergies.    Vitals:  /54   Pulse 65   Temp 97.7  F (36.5  C)   Resp 18   Ht 1.702 m (5' 7\")   Wt 77.7 kg (171 lb 4.8 oz)   SpO2 96%   BMI 26.83 kg/m    Body mass index is 26.83 kg/m .    Review of Systems   All other systems reviewed and are negative.         Physical Exam  Vitals and nursing note reviewed.   Pulmonary:      Effort: Pulmonary effort is normal.   Neurological:      Mental Status: He is alert.   Psychiatric:         Mood and Affect: Affect is flat.         Behavior: Behavior is slowed.         Cognition and Memory: Cognition is impaired. Memory is impaired.           Labs:     Most Recent 3 CBC's:  Recent Labs   Lab Test 02/21/24  0626 02/16/24  1126 02/15/24  0826   WBC 5.8 4.4 4.4   HGB 10.2* 10.6* 10.1*   MCV 94 93 94    151 134*     Most Recent 3 BMP's:  Recent Labs   Lab Test 02/21/24  0626 02/16/24  0835 02/16/24  0558 02/15/24  1125 02/15/24  0826 02/14/24  1147 02/14/24  0810     --   --   --  139  --  137   POTASSIUM 3.7  --   --   --  3.5  --  3.5   CHLORIDE 103  --   --   --  105  --  " 102   CO2 25  --   --   --  26  --  25   BUN 13.7  --   --   --  11.6  --  10.4   CR 0.61*  --   --   --  0.57*  --  0.55*   ANIONGAP 10  --   --   --  8  --  10   FERMIN 9.1  --   --   --  8.5*  --  8.7*   * 117* 120*   < > 116*   < > 130*    < > = values in this interval not displayed.     Most Recent 2 LFT's:  Recent Labs   Lab Test 02/13/24  1322 10/13/23  0552   AST 52* 24   ALT 13 14   ALKPHOS 103 102   BILITOTAL 0.8 1.1     Most Recent 3 INR's:  Recent Labs   Lab Test 10/13/23  0552 08/15/23  1258 07/27/23  1827   INR 1.03 1.17* 1.07     Most Recent Cholesterol Panel:  Recent Labs   Lab Test 10/13/23  0748   CHOL 137   LDL 52   HDL 69   TRIG 80       Most Recent Hemoglobin A1c:  Recent Labs   Lab Test 02/05/24  0630   A1C 6.4*         Assessment/Plan:  (S42.201A) Displaced fracture of proximal end of right humerus  (primary encounter diagnosis)  R53.1) Weakness  (R53.81) Physical deconditioning  Comment:   Fell in [facility]   Displaced fracture of right humerus and non-operative recommendations  Moved to LTC  Plan:   Follow up with a shoulder specialist at [facility],: -Orthopedics Date & Time of Appointment: 2/29/24 @ 2:15  Sling for comfort. shoulder immobilizer for better fit (no cushion) OK to allow arm to hang at side, gravity will pull humerus into alignment.   - NWB RUE, OK for platform walker if needed.   -Okay for elbow, wrist, and digital ROM.   Working with Therapy             (E11.59) Type 2 diabetes mellitus with other circulatory complication, without long-term current use of insulin (H)  Comment: chronic.   Currently taking metformin 1000 mg with supper.    Last hgb A1c=6.4 (2/2024)   Metformin discontinued on 2/2024  blood sugar checks to alternating times.   Plan: Continue with plan of care no changes at this time, adjustment as needed       [F03.B3] Moderate dementia with mood disturbance, unspecified dementia type (H)   Comment: Chronic, progressive.     prior to TCU  admission, patient requiring 24-hour skilled nursing care in a locked senior living.  Now moved to LTC.       No behavioral issues or emotional distress with changes in environment  Expect further functional and cognitive decline.    Plan: Discontinue trazodone.            Electronically signed by:  LESTER Otoole CNP       MEDICATIONS:  MED REC REQUIRED  Medication reconciled at previous office visit

## 2024-02-26 NOTE — LETTER
2/26/2024        RE: Erick Shahid  [address]  [facility] [address]         [facility]      Visit Type: RECHECK     Facility:   [facility] (TCU) [[id number]]         History of Present Illness: Erick Shahid is a 83 year old male     Patient's past medical history includes   acute impacted and displaced proximal right humerus fracture (2/2023)  Dementia   heart failure with preserved ejection fraction   aortic stenosis s/p TAVR 23 mm Allison ROBERTO valve on 8/2023  Complete heart block s/p permanent pacemaker  Carotid stenosis  Coronary artery disease   type 2 diabetes  Anemia   Depression  Macular degeneration  Constipation        On 2/13/2024, patient was admitted after having an unwitnessed fall 2 days prior.  At admission in the emergency room his CK was elevated to 1128, BNP was 3098, troponin 29 white blood cells and platelets.  An x-ray of the shoulder revealed an acute impacted and displaced proximal humerus fracture and orthopedics recommended nonsurgical intervention with a sling for comfort.   Noncontrast head CT negative for acute intracranial changes with mild diffuse parenchymal volume loss and white matter changes.  Echo done on 2/14 shows EF of 55 to 60%, grade 1 or early diastolic dysfunction, no wall motion changes, trace aortic regurgitation and bioprosthetic aortic valve is well-seated.  Pacemaker was interrogated on 2/14/2024 and did not show any evidence of arrhythmia due to the elevated CK his Crestor was held.        Today he was transferred to LTC  Pt difficult to understand today    His wife was present  VS stable.   Per nursing, Pt. requires Total dependence with bathing, upper and lower dressing, and toileting. He is Ax1 with feeding, MAX Ax1 with grooming, and oral cares. Ambulates with PT ONLY. MOD Ax1 with bed mobility and transfers. Uses a wheelchair for locomotion.   Incontinent of bowel and bladder    Current Outpatient  "Medications   Medication Sig Dispense Refill     acetaminophen (TYLENOL) 325 MG tablet Take 2 tablets (650 mg) by mouth 3 times daily (with meals)       aspirin (ASA) 81 MG chewable tablet Take 1 tablet (81 mg) by mouth daily 90 tablet 3     omeprazole (PRILOSEC) 20 MG DR capsule Take 1 capsule (20 mg) by mouth daily       polyethylene glycol (MIRALAX) 17 g packet Take 1 packet by mouth daily       rosuvastatin (CRESTOR) 20 MG tablet Take 20 mg by mouth at bedtime       senna-docusate (SENOKOT-S/PERICOLACE) 8.6-50 MG tablet Take 2 tablets by mouth daily as needed for constipation       sertraline (ZOLOFT) 50 MG tablet Take 25 mg by mouth daily       traZODone (DESYREL) 25 mg TABS half-tab Take 25 mg by mouth at bedtime       vitamin B-12 (CYANOCOBALAMIN) 1000 MCG tablet Take 1,000 mcg by mouth daily       vitamin C (ASCORBIC ACID) 500 MG tablet Take 500 mg by mouth daily       Vitamin D3 (CHOLECALCIFEROL) 25 mcg (1000 units) tablet Take 25 mcg by mouth daily         ALLERGIES:Patient has no known allergies.    Vitals:  /54   Pulse 65   Temp 97.7  F (36.5  C)   Resp 18   Ht 1.702 m (5' 7\")   Wt 77.7 kg (171 lb 4.8 oz)   SpO2 96%   BMI 26.83 kg/m    Body mass index is 26.83 kg/m .    Review of Systems   All other systems reviewed and are negative.         Physical Exam  Vitals and nursing note reviewed.   Pulmonary:      Effort: Pulmonary effort is normal.   Neurological:      Mental Status: He is alert.   Psychiatric:         Mood and Affect: Affect is flat.         Behavior: Behavior is slowed.         Cognition and Memory: Cognition is impaired. Memory is impaired.           Labs:     Most Recent 3 CBC's:  Recent Labs   Lab Test 02/21/24  0626 02/16/24  1126 02/15/24  0826   WBC 5.8 4.4 4.4   HGB 10.2* 10.6* 10.1*   MCV 94 93 94    151 134*     Most Recent 3 BMP's:  Recent Labs   Lab Test 02/21/24  0626 02/16/24  0835 02/16/24  0558 02/15/24  1125 02/15/24  0826 02/14/24  1147 02/14/24  0810 "     --   --   --  139  --  137   POTASSIUM 3.7  --   --   --  3.5  --  3.5   CHLORIDE 103  --   --   --  105  --  102   CO2 25  --   --   --  26  --  25   BUN 13.7  --   --   --  11.6  --  10.4   CR 0.61*  --   --   --  0.57*  --  0.55*   ANIONGAP 10  --   --   --  8  --  10   FERMIN 9.1  --   --   --  8.5*  --  8.7*   * 117* 120*   < > 116*   < > 130*    < > = values in this interval not displayed.     Most Recent 2 LFT's:  Recent Labs   Lab Test 02/13/24  1322 10/13/23  0552   AST 52* 24   ALT 13 14   ALKPHOS 103 102   BILITOTAL 0.8 1.1     Most Recent 3 INR's:  Recent Labs   Lab Test 10/13/23  0552 08/15/23  1258 07/27/23  1827   INR 1.03 1.17* 1.07     Most Recent Cholesterol Panel:  Recent Labs   Lab Test 10/13/23  0748   CHOL 137   LDL 52   HDL 69   TRIG 80       Most Recent Hemoglobin A1c:  Recent Labs   Lab Test 02/05/24  0630   A1C 6.4*         Assessment/Plan:  (S42.201A) Displaced fracture of proximal end of right humerus  (primary encounter diagnosis)  R53.1) Weakness  (R53.81) Physical deconditioning  Comment:   Fell in [facility]   Displaced fracture of right humerus and non-operative recommendations  Moved to LTC  Plan:   Follow up with a shoulder specialist at [facility],: -Orthopedics Date & Time of Appointment: 2/29/24 @ 2:15  Sling for comfort. shoulder immobilizer for better fit (no cushion) OK to allow arm to hang at side, gravity will pull humerus into alignment.   - NWB RUE, OK for platform walker if needed.   -Okay for elbow, wrist, and digital ROM.   Working with Therapy             (E11.59) Type 2 diabetes mellitus with other circulatory complication, without long-term current use of insulin (H)  Comment: chronic.   Currently taking metformin 1000 mg with supper.    Last hgb A1c=6.4 (2/2024)   Metformin discontinued on 2/2024  blood sugar checks to alternating times.   Plan: Continue with plan of care no changes at this time, adjustment as needed       [F03.B3]  Moderate dementia with mood disturbance, unspecified dementia type (H)   Comment: Chronic, progressive.     prior to TCU admission, patient requiring 24-hour skilled nursing care in a locked [facility].  Now moved to LTC.       No behavioral issues or emotional distress with changes in environment  Expect further functional and cognitive decline.    Plan: Discontinue trazodone.            Electronically signed by:  LESTER Otoole CNP       MEDICATIONS:  MED REC REQUIRED  Medication reconciled at previous office visit                   Sincerely,        LESTER Otoole CNP

## 2024-03-07 NOTE — LETTER
3/7/2024        RE: Erick Shahid  [address]  [facility] [address]         [facility]      Code Status:  DNR  Visit Type: Discontinue trazodone and Establish Care     Facility:   [facility] () [[id number]]         History of Present Illness: Erick Shahid is a 83 year old male     Patient's past medical history includes   acute impacted and displaced proximal right humerus fracture (2/2023)  Dementia   heart failure with preserved ejection fraction   aortic stenosis s/p TAVR 23 mm Allison ROBERTO valve on 8/2023  Complete heart block s/p permanent pacemaker  Carotid stenosis  Coronary artery disease   type 2 diabetes  Anemia   Depression  Macular degeneration  Constipation    On 2/13/2024, patient was admitted after having an unwitnessed fall 2 days prior.  At admission in the emergency room his CK was elevated to 1128, BNP was 3098, troponin 29 white blood cells and platelets.  An x-ray of the shoulder revealed an acute impacted and displaced proximal humerus fracture and orthopedics recommended nonsurgical intervention with a sling for comfort.   Noncontrast head CT negative for acute intracranial changes with mild diffuse parenchymal volume loss and white matter changes.  Echo done on 2/14 shows EF of 55 to 60%, grade 1 or early diastolic dysfunction, no wall motion changes, trace aortic regurgitation and bioprosthetic aortic valve is well-seated.  Pacemaker was interrogated on 2/14/2024 and did not show any evidence of arrhythmia due to the elevated CK his Crestor was held.     On 2/26/2024, he was moved to LTC and his trazadone was stopped.     On 2/29/2024, saw ortho who place a shoulder immobilizer     Today patient was pleasant.  He has cognitive impairment and not able to complete review of systems  Has had a 10# weight loss in the past few weeks  Per nursing,  Pt. needs AX1 with grab bars for bed mobility, AX1 with feeding due to fractured R hand. Max  "Ax1 with bathing, dressing, grooming, oral cares, toileting, and transfers. AX1 with locomotion using a wheel chair.  ambulation wit 2WW and feeding. Pain is well managed with scheduled Tylenol.    Current Outpatient Medications   Medication Sig Dispense Refill     acetaminophen (TYLENOL) 325 MG tablet Take 2 tablets (650 mg) by mouth 3 times daily (with meals)       aspirin (ASA) 81 MG chewable tablet Take 1 tablet (81 mg) by mouth daily 90 tablet 3     omeprazole (PRILOSEC) 20 MG DR capsule Take 1 capsule (20 mg) by mouth daily       polyethylene glycol (MIRALAX) 17 g packet Take 1 packet by mouth daily       rosuvastatin (CRESTOR) 20 MG tablet Take 20 mg by mouth at bedtime       senna-docusate (SENOKOT-S/PERICOLACE) 8.6-50 MG tablet Take 2 tablets by mouth daily as needed for constipation       sertraline (ZOLOFT) 50 MG tablet Take 25 mg by mouth daily       vitamin B-12 (CYANOCOBALAMIN) 1000 MCG tablet Take 1,000 mcg by mouth daily       vitamin C (ASCORBIC ACID) 500 MG tablet Take 500 mg by mouth daily       Vitamin D3 (CHOLECALCIFEROL) 25 mcg (1000 units) tablet Take 25 mcg by mouth daily         ALLERGIES:Patient has no known allergies.    Vitals:  /78   Pulse 70   Temp 98.2  F (36.8  C)   Resp 18   Ht 1.702 m (5' 7\")   Wt 74.6 kg (164 lb 8 oz)   SpO2 94%   BMI 25.76 kg/m    Body mass index is 25.76 kg/m .    Review of Systems   All other systems reviewed and are negative.         Physical Exam  Vitals and nursing note reviewed.   Constitutional:       Comments: Walking  Right arm in sling   Cardiovascular:      Rate and Rhythm: Normal rate.   Pulmonary:      Effort: Pulmonary effort is normal.   Neurological:      Mental Status: He is alert.   Psychiatric:         Cognition and Memory: Cognition is impaired. Memory is impaired.     Labs:     Most Recent 3 CBC's:  Recent Labs   Lab Test 02/21/24  0626 02/16/24  1126 02/15/24  0826   WBC 5.8 4.4 4.4   HGB 10.2* 10.6* 10.1*   MCV 94 93 94   PLT " 260 151 134*     Most Recent 3 BMP's:  Recent Labs   Lab Test 02/21/24  0626 02/16/24  0835 02/16/24  0558 02/15/24  1125 02/15/24  0826 02/14/24  1147 02/14/24  0810     --   --   --  139  --  137   POTASSIUM 3.7  --   --   --  3.5  --  3.5   CHLORIDE 103  --   --   --  105  --  102   CO2 25  --   --   --  26  --  25   BUN 13.7  --   --   --  11.6  --  10.4   CR 0.61*  --   --   --  0.57*  --  0.55*   ANIONGAP 10  --   --   --  8  --  10   FERMIN 9.1  --   --   --  8.5*  --  8.7*   * 117* 120*   < > 116*   < > 130*    < > = values in this interval not displayed.               Assessment/Plan:  (S42.201A) Displaced fracture of proximal end of right humerus  (primary encounter diagnosis)  R53.1) Weakness  (R53.81) Physical deconditioning  Comment:   Fell in [facility]   Displaced fracture of right humerus and non-operative recommendations  Moved to LTC  Plan:   Started a shoulder immobilizer x 4 weeks on 2/29  Follow up with ortho [facility] Date & Time of Appointment: 4/29/24 2:15pm                [F03.B3] Moderate dementia with mood disturbance, unspecified dementia type (H)   Comment: Chronic, progressive.     prior to TCU admission, patient [facility] 24-hour [facility].  Now moved to LTC.       No behavioral issues or emotional distress with changes in environment  Expect further functional and cognitive decline.    No changes since trazodone stopped on 2/2024  Plan: Continue with plan of care no changes at this time, adjustment as needed          Electronically signed by:      LESTER Otoole CNP     MEDICATIONS:  MED REC REQUIRED  Post Medication Reconciliation Status:  Discharge medications reconciled and changed, see notes/orders                  Sincerely,        LESTER Otoole CNP

## 2024-03-07 NOTE — LETTER
3/7/2024        RE: Erick Shahid  [address]  [place] [address]         [facility]      Code Status:  {CODE STATUS:127338}  Visit Type: No chief complaint on file.     Facility:   No question data found.         History of Present Illness: Erick Shahid is a 83 year old male     Patient's past medical history includes   acute impacted and displaced proximal right humerus fracture (2/2023)  Dementia   heart failure with preserved ejection fraction   aortic stenosis s/p TAVR 23 mm Allison ROBERTO valve on 8/2023  Complete heart block s/p permanent pacemaker  Carotid stenosis  Coronary artery disease   type 2 diabetes  Anemia   Depression  Macular degeneration  Constipation    On 2/13/2024, patient was admitted after having an unwitnessed fall 2 days prior.  At admission in the emergency room his CK was elevated to 1128, BNP was 3098, troponin 29 white blood cells and platelets.  An x-ray of the shoulder revealed an acute impacted and displaced proximal humerus fracture and orthopedics recommended nonsurgical intervention with a sling for comfort.   Noncontrast head CT negative for acute intracranial changes with mild diffuse parenchymal volume loss and white matter changes.  Echo done on 2/14 shows EF of 55 to 60%, grade 1 or early diastolic dysfunction, no wall motion changes, trace aortic regurgitation and bioprosthetic aortic valve is well-seated.  Pacemaker was interrogated on 2/14/2024 and did not show any evidence of arrhythmia due to the elevated CK his Crestor was held.     On 2/26/2024, he was moved to [facility] and his trazadone was stopped.     On 2/29/2024, saw ortho who place a shoulder immobilizer     Today ***  Has had a 10# weight loss in the past few weeks  Per nursing,  Pt. needs AX1 with grab bars for bed mobility, AX1 with feeding due to fractured R hand. Max Ax1 with bathing, dressing, grooming, oral cares, toileting, and transfers. AX1 with locomotion using  a wheel chair.  ambulation wit 2WW and feeding. Pain is well managed with scheduled Tylenol.    Current Outpatient Medications   Medication Sig Dispense Refill    acetaminophen (TYLENOL) 325 MG tablet Take 2 tablets (650 mg) by mouth 3 times daily (with meals)      aspirin (ASA) 81 MG chewable tablet Take 1 tablet (81 mg) by mouth daily 90 tablet 3    omeprazole (PRILOSEC) 20 MG DR capsule Take 1 capsule (20 mg) by mouth daily      polyethylene glycol (MIRALAX) 17 g packet Take 1 packet by mouth daily      rosuvastatin (CRESTOR) 20 MG tablet Take 20 mg by mouth at bedtime      senna-docusate (SENOKOT-S/PERICOLACE) 8.6-50 MG tablet Take 2 tablets by mouth daily as needed for constipation      sertraline (ZOLOFT) 50 MG tablet Take 25 mg by mouth daily      vitamin B-12 (CYANOCOBALAMIN) 1000 MCG tablet Take 1,000 mcg by mouth daily      vitamin C (ASCORBIC ACID) 500 MG tablet Take 500 mg by mouth daily      Vitamin D3 (CHOLECALCIFEROL) 25 mcg (1000 units) tablet Take 25 mcg by mouth daily         ALLERGIES:Patient has no known allergies.    Vitals:  There were no vitals taken for this visit.  There is no height or weight on file to calculate BMI.    Review of Systems   ***    Physical Exam  ***    Labs:     Most Recent 3 CBC's:  Recent Labs   Lab Test 02/21/24  0626 02/16/24  1126 02/15/24  0826   WBC 5.8 4.4 4.4   HGB 10.2* 10.6* 10.1*   MCV 94 93 94    151 134*     Most Recent 3 BMP's:  Recent Labs   Lab Test 02/21/24  0626 02/16/24  0835 02/16/24  0558 02/15/24  1125 02/15/24  0826 02/14/24  1147 02/14/24  0810     --   --   --  139  --  137   POTASSIUM 3.7  --   --   --  3.5  --  3.5   CHLORIDE 103  --   --   --  105  --  102   CO2 25  --   --   --  26  --  25   BUN 13.7  --   --   --  11.6  --  10.4   CR 0.61*  --   --   --  0.57*  --  0.55*   ANIONGAP 10  --   --   --  8  --  10   FERMIN 9.1  --   --   --  8.5*  --  8.7*   * 117* 120*   < > 116*   < > 130*    < > = values in this interval not  displayed.     Most Recent 2 LFT's:  Recent Labs   Lab Test 02/13/24  1322 10/13/23  0552   AST 52* 24   ALT 13 14   ALKPHOS 103 102   BILITOTAL 0.8 1.1     Most Recent 3 INR's:  Recent Labs   Lab Test 10/13/23  0552 08/15/23  1258 07/27/23  1827   INR 1.03 1.17* 1.07     Most Recent Cholesterol Panel:  Recent Labs   Lab Test 10/13/23  0748   CHOL 137   LDL 52   HDL 69   TRIG 80     Most Recent TSH and T4:  Recent Labs   Lab Test 10/13/23  0748   TSH 1.47           Assessment/Plan:  (S42.201A) Displaced fracture of proximal end of right humerus  (primary encounter diagnosis)  R53.1) Weakness  (R53.81) Physical deconditioning  Comment:   Fell in [facility]   Displaced fracture of right humerus and non-operative recommendations  Moved to LTC  Plan:   Started a shoulder immobilizer x 4 weeks on 2/29  Follow up with ortho [facility] Date & Time of Appointment: 4/29/24 2:15pm                [F03.B3] Moderate dementia with mood disturbance, unspecified dementia type (H)   Comment: Chronic, progressive.     prior to TCU admission, patient requiring 24-hour skilled nursing care in a locked [facility].  Now moved to LTC.       No behavioral issues or emotional distress with changes in environment  Expect further functional and cognitive decline.    No changes since trazodone stopped on 2/2024  Plan: Continue with plan of care no changes at this time, adjustment as needed          Electronically signed by:LESTER Otoole CNP***    MEDICATIONS:  MED REC REQUIRED{TIP  Click the link below to document or use med rec list, use list to pull in response :[id number]}  Post Medication Reconciliation Status:  Discharge medications reconciled and changed, see notes/orders                   [facility]      Code Status:  DNR  Visit Type: Discontinue trazodone     Facility:   [facility] ([facility] [[id number]]         History of Present Illness: Erick Shahid is a 83 year old male     Patient's past medical history includes    acute impacted and displaced proximal right humerus fracture (2/2023)  Dementia   heart failure with preserved ejection fraction   aortic stenosis s/p TAVR 23 mm Allison ROBERTO valve on 8/2023  Complete heart block s/p permanent pacemaker  Carotid stenosis  Coronary artery disease   type 2 diabetes  Anemia   Depression  Macular degeneration  Constipation    On 2/13/2024, patient was admitted after having an unwitnessed fall 2 days prior.  At admission in the emergency room his CK was elevated to 1128, BNP was 3098, troponin 29 white blood cells and platelets.  An x-ray of the shoulder revealed an acute impacted and displaced proximal humerus fracture and orthopedics recommended nonsurgical intervention with a sling for comfort.   Noncontrast head CT negative for acute intracranial changes with mild diffuse parenchymal volume loss and white matter changes.  Echo done on 2/14 shows EF of 55 to 60%, grade 1 or early diastolic dysfunction, no wall motion changes, trace aortic regurgitation and bioprosthetic aortic valve is well-seated.  Pacemaker was interrogated on 2/14/2024 and did not show any evidence of arrhythmia due to the elevated CK his Crestor was held.     On 2/26/2024, he was moved to [facility] and his trazadone was stopped.     On 2/29/2024, saw ortho who place a shoulder immobilizer     Today ***  Has had a 10# weight loss in the past few weeks  Per nursing,  Pt. needs AX1 with grab bars for bed mobility, AX1 with feeding due to fractured R hand. Max Ax1 with bathing, dressing, grooming, oral cares, toileting, and transfers. AX1 with locomotion using a wheel chair.  ambulation wit 2WW and feeding. Pain is well managed with scheduled Tylenol.    Current Outpatient Medications   Medication Sig Dispense Refill    acetaminophen (TYLENOL) 325 MG tablet Take 2 tablets (650 mg) by mouth 3 times daily (with meals)      aspirin (ASA) 81 MG chewable tablet Take 1 tablet (81 mg) by mouth daily 90 tablet 3    omeprazole  "(PRILOSEC) 20 MG DR capsule Take 1 capsule (20 mg) by mouth daily      polyethylene glycol (MIRALAX) 17 g packet Take 1 packet by mouth daily      rosuvastatin (CRESTOR) 20 MG tablet Take 20 mg by mouth at bedtime      senna-docusate (SENOKOT-S/PERICOLACE) 8.6-50 MG tablet Take 2 tablets by mouth daily as needed for constipation      sertraline (ZOLOFT) 50 MG tablet Take 25 mg by mouth daily      vitamin B-12 (CYANOCOBALAMIN) 1000 MCG tablet Take 1,000 mcg by mouth daily      vitamin C (ASCORBIC ACID) 500 MG tablet Take 500 mg by mouth daily      Vitamin D3 (CHOLECALCIFEROL) 25 mcg (1000 units) tablet Take 25 mcg by mouth daily         ALLERGIES:Patient has no known allergies.    Vitals:  /78   Pulse 70   Temp 98.2  F (36.8  C)   Resp 18   Ht 1.702 m (5' 7\")   Wt 74.6 kg (164 lb 8 oz)   SpO2 94%   BMI 25.76 kg/m    Body mass index is 25.76 kg/m .    Review of Systems   ***    Physical Exam  ***    Labs:     Most Recent 3 CBC's:  Recent Labs   Lab Test 02/21/24  0626 02/16/24  1126 02/15/24  0826   WBC 5.8 4.4 4.4   HGB 10.2* 10.6* 10.1*   MCV 94 93 94    151 134*     Most Recent 3 BMP's:  Recent Labs   Lab Test 02/21/24  0626 02/16/24  0835 02/16/24  0558 02/15/24  1125 02/15/24  0826 02/14/24  1147 02/14/24  0810     --   --   --  139  --  137   POTASSIUM 3.7  --   --   --  3.5  --  3.5   CHLORIDE 103  --   --   --  105  --  102   CO2 25  --   --   --  26  --  25   BUN 13.7  --   --   --  11.6  --  10.4   CR 0.61*  --   --   --  0.57*  --  0.55*   ANIONGAP 10  --   --   --  8  --  10   FERMIN 9.1  --   --   --  8.5*  --  8.7*   * 117* 120*   < > 116*   < > 130*    < > = values in this interval not displayed.     Most Recent 2 LFT's:  Recent Labs   Lab Test 02/13/24  1322 10/13/23  0552   AST 52* 24   ALT 13 14   ALKPHOS 103 102   BILITOTAL 0.8 1.1     Most Recent 3 INR's:  Recent Labs   Lab Test 10/13/23  0552 08/15/23  1258 07/27/23  1827   INR 1.03 1.17* 1.07     Most Recent " Cholesterol Panel:  Recent Labs   Lab Test 10/13/23  0748   CHOL 137   LDL 52   HDL 69   TRIG 80     Most Recent TSH and T4:  Recent Labs   Lab Test 10/13/23  0748   TSH 1.47           Assessment/Plan:  (S42.201A) Displaced fracture of proximal end of right humerus  (primary encounter diagnosis)  R53.1) Weakness  (R53.81) Physical deconditioning  Comment:   Fell in care home   Displaced fracture of right humerus and non-operative recommendations  Moved to LTC  Plan:   Started a shoulder immobilizer x 4 weeks on 2/29  Follow up with ortho [facility] Date & Time of Appointment: 4/29/24 2:15pm                [F03.B3] Moderate dementia with mood disturbance, unspecified dementia type (H)   Comment: Chronic, progressive.     prior to TCU admission, patient requiring 24-hour skilled nursing care in a locked care home.  Now moved to LTC.       No behavioral issues or emotional distress with changes in environment  Expect further functional and cognitive decline.    No changes since trazodone stopped on 2/2024  Plan: Continue with plan of care no changes at this time, adjustment as needed          Electronically signed by:Nirali Lazar***    MEDICATIONS:  MED REC REQUIRED{TIP  Click the link below to document or use med rec list, use list to pull in response :[id number]}  Post Medication Reconciliation Status:  Discharge medications reconciled and changed, see notes/orders                     Sincerely,        LESTER Otoole CNP

## 2024-03-07 NOTE — PROGRESS NOTES
[facility]IATR[facility]      Code Status:  DNR  Visit Type: Discontinue trazodone and Establish Care     Facility:   [facility] () [[id number]]         History of Present Illness: Erick Shahid is a 83 year old male     Patient's past medical history includes   acute impacted and displaced proximal right humerus fracture (2/2023)  Dementia   heart failure with preserved ejection fraction   aortic stenosis s/p TAVR 23 mm Allison ROBERTO valve on 8/2023  Complete heart block s/p permanent pacemaker  Carotid stenosis  Coronary artery disease   type 2 diabetes  Anemia   Depression  Macular degeneration  Constipation    On 2/13/2024, patient was admitted after having an unwitnessed fall 2 days prior.  At admission in the emergency room his CK was elevated to 1128, BNP was 3098, troponin 29 white blood cells and platelets.  An x-ray of the shoulder revealed an acute impacted and displaced proximal humerus fracture and orthopedics recommended nonsurgical intervention with a sling for comfort.   Noncontrast head CT negative for acute intracranial changes with mild diffuse parenchymal volume loss and white matter changes.  Echo done on 2/14 shows EF of 55 to 60%, grade 1 or early diastolic dysfunction, no wall motion changes, trace aortic regurgitation and bioprosthetic aortic valve is well-seated.  Pacemaker was interrogated on 2/14/2024 and did not show any evidence of arrhythmia due to the elevated CK his Crestor was held.     On 2/26/2024, he was moved to LTC and his trazadone was stopped.     On 2/29/2024, saw ortho who place a shoulder immobilizer     Today patient was pleasant.  He has cognitive impairment and not able to complete review of systems  Has had a 10# weight loss in the past few weeks  Per nursing,  Pt. needs AX1 with grab bars for bed mobility, AX1 with feeding due to fractured R hand. Max Ax1 with bathing, dressing, grooming, oral cares, toileting, and transfers. AX1 with locomotion  "using a wheel chair.  ambulation wit 2WW and feeding. Pain is well managed with scheduled Tylenol.    Current Outpatient Medications   Medication Sig Dispense Refill    acetaminophen (TYLENOL) 325 MG tablet Take 2 tablets (650 mg) by mouth 3 times daily (with meals)      aspirin (ASA) 81 MG chewable tablet Take 1 tablet (81 mg) by mouth daily 90 tablet 3    omeprazole (PRILOSEC) 20 MG DR capsule Take 1 capsule (20 mg) by mouth daily      polyethylene glycol (MIRALAX) 17 g packet Take 1 packet by mouth daily      rosuvastatin (CRESTOR) 20 MG tablet Take 20 mg by mouth at bedtime      senna-docusate (SENOKOT-S/PERICOLACE) 8.6-50 MG tablet Take 2 tablets by mouth daily as needed for constipation      sertraline (ZOLOFT) 50 MG tablet Take 25 mg by mouth daily      vitamin B-12 (CYANOCOBALAMIN) 1000 MCG tablet Take 1,000 mcg by mouth daily      vitamin C (ASCORBIC ACID) 500 MG tablet Take 500 mg by mouth daily      Vitamin D3 (CHOLECALCIFEROL) 25 mcg (1000 units) tablet Take 25 mcg by mouth daily         ALLERGIES:Patient has no known allergies.    Vitals:  /78   Pulse 70   Temp 98.2  F (36.8  C)   Resp 18   Ht 1.702 m (5' 7\")   Wt 74.6 kg (164 lb 8 oz)   SpO2 94%   BMI 25.76 kg/m    Body mass index is 25.76 kg/m .    Review of Systems   All other systems reviewed and are negative.         Physical Exam  Vitals and nursing note reviewed.   Constitutional:       Comments: Walking  Right arm in sling   Cardiovascular:      Rate and Rhythm: Normal rate.   Pulmonary:      Effort: Pulmonary effort is normal.   Neurological:      Mental Status: He is alert.   Psychiatric:         Cognition and Memory: Cognition is impaired. Memory is impaired.     Labs:     Most Recent 3 CBC's:  Recent Labs   Lab Test 02/21/24  0626 02/16/24  1126 02/15/24  0826   WBC 5.8 4.4 4.4   HGB 10.2* 10.6* 10.1*   MCV 94 93 94    151 134*     Most Recent 3 BMP's:  Recent Labs   Lab Test 02/21/24  0626 02/16/24  0835 02/16/24  0558 " 02/15/24  1125 02/15/24  0826 02/14/24  1147 02/14/24  0810     --   --   --  139  --  137   POTASSIUM 3.7  --   --   --  3.5  --  3.5   CHLORIDE 103  --   --   --  105  --  102   CO2 25  --   --   --  26  --  25   BUN 13.7  --   --   --  11.6  --  10.4   CR 0.61*  --   --   --  0.57*  --  0.55*   ANIONGAP 10  --   --   --  8  --  10   FERMIN 9.1  --   --   --  8.5*  --  8.7*   * 117* 120*   < > 116*   < > 130*    < > = values in this interval not displayed.               Assessment/Plan:  (S42.201A) Displaced fracture of proximal end of right humerus  (primary encounter diagnosis)  R53.1) Weakness  (R53.81) Physical deconditioning  Comment:   Fell in residential   Displaced fracture of right humerus and non-operative recommendations  Moved to LTC  Plan:   Started a shoulder immobilizer x 4 weeks on 2/29  Follow up with ortho [facility] Date & Time of Appointment: 4/29/24 2:15pm                [F03.B3] Moderate dementia with mood disturbance, unspecified dementia type (H)   Comment: Chronic, progressive.     prior to TCU admission, patient requiring 24-hour skilled nursing care in a locked residential.  Now moved to LTC.       No behavioral issues or emotional distress with changes in environment  Expect further functional and cognitive decline.    No changes since trazodone stopped on 2/2024  Plan: Continue with plan of care no changes at this time, adjustment as needed          Electronically signed by:      LESTER Otoole CNP     MEDICATIONS:  MED REC REQUIRED  Post Medication Reconciliation Status:  Discharge medications reconciled and changed, see notes/orders

## 2024-03-25 NOTE — TELEPHONE ENCOUNTER
[facility] Triage Nurse Telephone Encounter    Provider: Marianela Mac MD  Facility: [facility] Facility Type:  LT    Caller: Brooke  Call Back Number: [phone]    Allergies:  No Known Allergies     Reason for call: Nurse is reporting that patient had another fall yesterday.  Nurse states that patient is constantly trying to self-transfer.  He won't stay in the wheelchair or the bed.  He's also been calling his family constantly and exit seeking.  The family is requesting the provider to look at his meds to see if there is anything that could be done about his anxiety.      Verbal Order/Direction given by Provider: Discontinue Zoloft.  Start Celexa 20mg daily.  Trazodone 25mg Q 8 hours PRN for anxiety.  Increase Tylenol to 1000mg TID.      Provider giving Order:  Marianela Mac MD    Verbal Order given to: Brooke Eason RN

## 2024-04-02 NOTE — TELEPHONE ENCOUNTER
[facility] TELEPHONE ENCOUNTER    Chief Complaint   Patient presents with    Fall       Erick Shahid is a 83 year old  (1940),Nurse called today to report: Fall at 1905 with noted laceration to scalp. Resolved with pressure and light wound care needs. Vitals stable. Neuro intacts. Confused per baseline. History of chronic falls.     ASSESSMENT/PLAN    HOLD aspirin for 3 days  Monitor neuros and vitals per fall protocol.     Electronically signed by:   LESTER Ortiz CNP

## 2024-04-02 NOTE — LETTER
4/2/2024        RE: Erick Shahid  [address]  [place] [address]        Erick Shahid is a 83 year old male seen April 2, 2024 at [facility] Memory Care unit where he has resided for 5 months (admit to Board and Care 11/2023) seen for regulatory visit and to follow up dementia with anxiety    Pt is seen on the unit up to  and wearing right shoulder immobilizer.   He says very little, not clear if he is having pain     Nursing staff reports a fall yesterday evening.   He is constantly attempting to self transfer, won't stay in the chair or bed.  Calling his family and exit seeking.  Family asked for a change in medications, so sertraline changed to citalopram and trazodone ordered PRN  These measures seemed to help some; today nursing staff reports he has overall been calmer.       By chart review, pt has a history HFpEF, CAD, DM2.  He had critical aortic stenosis which was thought to be causing falls, s/p TAVR in August 2023 which was complicated by complete heart block and he received a dual chamber pacemaker.  He was more confused and then started on sertraline for depression after that hospital and TCU stay.    He had a [facility] hospitalization in October 2023 for aphasia and possible LLE weakness   Workup for stroke, infection, etc was unremarkable.   He was seen by Neurology and thought to have REM sleep disorder, placed on melatonin.  He was discharged to [place] [facility] but unable to regain enough functional status to return home, so transitioned to Memory Care unit.   Pt had a February 2024 [facility] hospitalization following an unwitnessed fall in which he suffered an acute impacted and displaced right proximal humerus fracture.-  Seen by Orthopedics and managed conservatively, with a shoulder immobilizer and activity restrictions.    He was also found to have a CK of 1128, treated with IVF for mild rhabdomyolysis.    Discharged to LT with PHYSICAL THERAPY /OCCUPATIONAL THERAPY     Past  Medical History:   Diagnosis Date     Benign essential hypertension      BPH (benign prostatic hyperplasia)      Chronic heart failure with reduced ejection fraction and diastolic dysfunction (H)      Cognitive impairment      Critical aortic valve stenosis      Diabetes mellitus, type 2 (H)     on metformin     Dyslipidemia      First degree atrioventricular block      LBBB (left bundle branch block)      Macular degeneration (senile) of retina      Past Surgical History:   Procedure Laterality Date     CV CORONARY ANGIOGRAM N/A 07/28/2023    Procedure: Coronary Angiogram;  Surgeon: Mando Field MD;  Location:  HEART CARDIAC CATH LAB     CV PCI N/A 07/28/2023    Procedure: Percutaneous Coronary Intervention;  Surgeon: Mando Field MD;  Location:  HEART CARDIAC CATH LAB     CV TRANSCATHETER AORTIC VALVE REPLACEMENT  08/15/2023     CV TRANSCATHETER AORTIC VALVE REPLACEMENT-FEMORAL APPROACH N/A 8/15/2023    Procedure: Transcatheter Aortic Valve Replacement-Femoral Approach;  Surgeon: Bossman Felipe MD;  Location:  HEART CARDIAC CATH LAB     EP PACEMAKER DEVICE & LEAD IMPLANT- RIGHT ATRIAL & LEFT VENTRICULAR N/A 8/15/2023    Procedure: Pacemaker Device & Lead Implant- Right Atrial & Left Ventricular;  Surgeon: Oscar Mendoza MD;  Location:  HEART CARDIAC CATH LAB     ORIF HIP FRACTURE Right 2018     SH:  Previously lived with his wife KIMBERLYN Lamb apartment in [place].      They have a son Toribio and daughter Lin    Pt is a retired civil and   Non smoker     ROS:  limited by pt's dementia       SLUMS 9/30     Ambulatory with FWW and cuing     Weight in July 2023 was 184 lbs  Wt Readings from Last 5 Encounters:   04/04/24 77.7 kg (171 lb 3.2 oz)   04/02/24 73.2 kg (161 lb 6.4 oz)   03/06/24 74.6 kg (164 lb 8 oz)   02/26/24 77.7 kg (171 lb 4.8 oz)   02/23/24 78.4 kg (172 lb 14.4 oz)      BP Readings from Last 3 Encounters:   04/04/24 124/76   04/02/24 129/74   03/06/24  "130/78      EXAM:  NAD  /74   Pulse 72   Temp 97.8  F (36.6  C)   Resp 18   Ht 1.702 m (5' 7\")   Wt 73.2 kg (161 lb 6.4 oz)   SpO2 94%   BMI 25.28 kg/m     [place]  Abrasion right forehead from fall this AM   NECK: no adenopathy, no asymmetry, masses, or scars and thyroid normal to palpation  RESP: decreased BS, no rales or wheeze   CV: regular rate and rhythm, normal S1 S2, I/VI GABBIE  ABDOMEN:  soft, nontender, no HSM or masses and bowel sounds normal  MS: extremities with trace ankle edema     Wearing right shoulder immobilizer     Neuro: very limited verbal skills  Psych: a little anxious      Lab Results   Component Value Date     02/21/2024    POTASSIUM 3.7 02/21/2024    CHLORIDE 103 02/21/2024    CO2 25 02/21/2024    ANIONGAP 10 02/21/2024     (H) 02/21/2024    BUN 13.7 02/21/2024    CR 0.61 (L) 02/21/2024    GFRESTIMATED >90 02/21/2024    FERMIN 9.1 02/21/2024     Lab Results   Component Value Date    AST 52 02/13/2024      ALBUMIN 4.0 02/13/2024      ALKPHOS 103 02/13/2024      Lab Results   Component Value Date    WBC 5.8 02/21/2024      HGB 10.2 02/21/2024      MCV 94 02/21/2024       02/21/2024      CT SCAN OF THE HEAD WITHOUT CONTRAST   2/13/2024  FINDINGS: There is no evidence of intracranial hemorrhage, mass, acute  infarct or anomaly. The ventricles are normal in size, shape and  configuration. Mild diffuse parenchymal volume loss. Mild patchy  periventricular white matter hypodensities which are nonspecific, but  likely related to chronic microvascular ischemic disease.                                                               IMPRESSION:     1. No evidence of acute intracranial hemorrhage, mass, or herniation.  2. Mild diffuse parenchymal volume loss and white matter changes  likely due to chronic microvascular ischemic disease.    ECHO 2/14/2024   The visual ejection fraction is 55-60%.   Grade I or early diastolic dysfunction.  No regional wall motion abnormalities " noted.  There is a bioprosthetic aortic valve.  The prosthetic aortic valve is well-seated.  There is trace aortic regurgitation.    Mean aortic gradient 14mmHg,      IMP/PLAN:     (W19.XXXA) Fall, initial encounter    Comment: another fall yesterday, with forehead abrasion   Plan: local care, monitor    (S42.201A) Displaced fracture of proximal end of right humerus  Comment: occurred 6 weeks ago   Plan: shoulder immobilizer   Acetaminophen 1000 mg tid for pain   Vit D 25 mcg/day, dietary calcium for bone health  Recheck x-ray for healing, Orthopedics follow up     (R53.81) Physical deconditioning  Comment: with falls   Plan: PHYSICAL THERAPY /OCCUPATIONAL THERAPY for safe transfers and mobility    (I25.10) Coronary artery disease involving native coronary artery of native heart without angina pectoris    (Z95.3) Status post transcatheter aortic valve replacement (TAVR) using bioprosthesis  (I65.21) Stenosis of right carotid artery  Comment: improved symptoms and LV function after TAVR     Plan: aspirin 81 mg/day and rosuvastatin 20 mg/day for secondary prevention   Follow up with Cardiology as scheduled      (E11.59) Type 2 diabetes mellitus with other circulatory complication, without long-term current use of insulin (H)  Comment:   Lab Results   Component Value Date    A1C 6.4 02/05/2024     Plan: metformin discontinued after above lab   Ophthalmology and Podiatry follow up      (F03.B3) Moderate dementia with mood disturbance, unspecified dementia type (H)  (F41.9) Anxiety  Comment: decline in cognitive function with associated anxiety and restlessness     Plan: [facility] Memory Care unit for assist with med admin, meals, activity and secure unit   Citalopram 20 mg/day   Trazodone 25 mg PRN for anxiety and restlessness     Marianela Mac MD       Sincerely,        Marianela Mac MD

## 2024-04-04 NOTE — LETTER
"    4/4/2024        RE: Erick Shahid  [address]  [place] [address]         [facility]      Code Status:  DNR and DNI  Visit Type: Anxiety, FX Follow up , Arm Problem, and MOOD CHANGES     Facility:   [facility] () [[id number]]         History of Present Illness: Erick Shahid is a 83 year old male     On 3/24 pt had a fall and is very anxious.  Discontinued Zoloft. Started Celexa 20mg daily and Trazodone 25mg Q 8 hours PRN for anxiety. Increase Tylenol to 1000mg TID     On 4/1, pt had a fall    Today he is sitting comfortably in his wheelchair in the dining room.  He is noted to be losing weight  Vs stable  Family states he is anxious and calling them   He is noted to have lost his words when doing exam  Current Outpatient Medications   Medication Sig Dispense Refill     acetaminophen (TYLENOL) 500 MG tablet Take 1,000 mg by mouth 3 times daily       aspirin (ASA) 81 MG chewable tablet Take 1 tablet (81 mg) by mouth daily 90 tablet 3     citalopram (CELEXA) 20 MG tablet Take 20 mg by mouth daily       omeprazole (PRILOSEC) 20 MG DR capsule Take 1 capsule (20 mg) by mouth daily       polyethylene glycol (MIRALAX) 17 g packet Take 1 packet by mouth daily       rosuvastatin (CRESTOR) 20 MG tablet Take 20 mg by mouth at bedtime       senna-docusate (SENOKOT-S/PERICOLACE) 8.6-50 MG tablet Take 2 tablets by mouth daily as needed for constipation       traZODone (DESYREL) 50 MG tablet Take 25 mg by mouth every 8 hours as needed for other (anxiety)       vitamin B-12 (CYANOCOBALAMIN) 1000 MCG tablet Take 1,000 mcg by mouth daily       vitamin C (ASCORBIC ACID) 500 MG tablet Take 500 mg by mouth daily       Vitamin D3 (CHOLECALCIFEROL) 25 mcg (1000 units) tablet Take 25 mcg by mouth daily         ALLERGIES:Patient has no known allergies.    Vitals:  /76   Pulse 75   Temp 97.5  F (36.4  C)   Resp 18   Ht 1.702 m (5' 7\")   Wt 77.7 kg (171 lb 3.2 oz)   SpO2 97% "   BMI 26.81 kg/m    Body mass index is 26.81 kg/m .    Review of Systems   Reason unable to perform ROS: aphasia due to dementia.          Physical Exam  Vitals and nursing note reviewed.   Cardiovascular:      Rate and Rhythm: Normal rate.   Pulmonary:      Effort: Pulmonary effort is normal.   Neurological:      Mental Status: He is alert. He is disoriented.   Psychiatric:         Mood and Affect: Mood normal.         Behavior: Behavior normal.           Labs:     Most Recent 3 CBC's:  Recent Labs   Lab Test 02/21/24  0626 02/16/24  1126 02/15/24  0826   WBC 5.8 4.4 4.4   HGB 10.2* 10.6* 10.1*   MCV 94 93 94    151 134*     Most Recent 3 BMP's:  Recent Labs   Lab Test 02/21/24  0626 02/16/24  0835 02/16/24  0558 02/15/24  1125 02/15/24  0826 02/14/24  1147 02/14/24  0810     --   --   --  139  --  137   POTASSIUM 3.7  --   --   --  3.5  --  3.5   CHLORIDE 103  --   --   --  105  --  102   CO2 25  --   --   --  26  --  25   BUN 13.7  --   --   --  11.6  --  10.4   CR 0.61*  --   --   --  0.57*  --  0.55*   ANIONGAP 10  --   --   --  8  --  10   FERMIN 9.1  --   --   --  8.5*  --  8.7*   * 117* 120*   < > 116*   < > 130*    < > = values in this interval not displayed.     Most Recent 2 LFT's:  Recent Labs   Lab Test 02/13/24  1322 10/13/23  0552   AST 52* 24   ALT 13 14   ALKPHOS 103 102   BILITOTAL 0.8 1.1     Most Recent 3 INR's:  Recent Labs   Lab Test 10/13/23  0552 08/15/23  1258 07/27/23  1827   INR 1.03 1.17* 1.07     Most Recent Cholesterol Panel:  Recent Labs   Lab Test 10/13/23  0748   CHOL 137   LDL 52   HDL 69   TRIG 80     Most Recent TSH and T4:  Recent Labs   Lab Test 10/13/23  0748   TSH 1.47     Most Recent Hemoglobin A1c:  Recent Labs   Lab Test 02/05/24  0630   A1C 6.4*         Assessment/Plan:     Weakness  Physical deconditioning  Fall, subsequent encounter  Moderate dementia with anxiety, unspecified dementia type (H)  Pt impulsive and doesn't realize his limitations due to  dementia. Had a fall recently  Fmaiy say he is anxious - calling them but they don't want to disconnect the phone  Plan:  Agree with continuing celexa  Change prn trazodone to schedule trazodone twice daily .          Electronically signed by:    LESTER Otoole CNP on 4/4/2024 at 2:14 PM    MEDICATIONS:  MED REC REQUIRED  Post Medication Reconciliation Status:  Medication reconciliation previously completed during another office visit                  Sincerely,        LESTER Otoole CNP

## 2024-04-04 NOTE — PROGRESS NOTES
" [facility]      Code Status:  DNR and DNI  Visit Type: Anxiety, FX Follow up , Arm Problem, and MOOD CHANGES     Facility:   [facility] () [[id number]]         History of Present Illness: Erick Shahid is a 83 year old male     On 3/24 pt had a fall and is very anxious.  Discontinued Zoloft. Started Celexa 20mg daily and Trazodone 25mg Q 8 hours PRN for anxiety. Increase Tylenol to 1000mg TID     On 4/1, pt had a fall    Today he is sitting comfortably in his wheelchair in the dining room.  He is noted to be losing weight  Vs stable  Family states he is anxious and calling them   He is noted to have lost his words when doing exam  Current Outpatient Medications   Medication Sig Dispense Refill    acetaminophen (TYLENOL) 500 MG tablet Take 1,000 mg by mouth 3 times daily      aspirin (ASA) 81 MG chewable tablet Take 1 tablet (81 mg) by mouth daily 90 tablet 3    citalopram (CELEXA) 20 MG tablet Take 20 mg by mouth daily      omeprazole (PRILOSEC) 20 MG DR capsule Take 1 capsule (20 mg) by mouth daily      polyethylene glycol (MIRALAX) 17 g packet Take 1 packet by mouth daily      rosuvastatin (CRESTOR) 20 MG tablet Take 20 mg by mouth at bedtime      senna-docusate (SENOKOT-S/PERICOLACE) 8.6-50 MG tablet Take 2 tablets by mouth daily as needed for constipation      traZODone (DESYREL) 50 MG tablet Take 25 mg by mouth every 8 hours as needed for other (anxiety)      vitamin B-12 (CYANOCOBALAMIN) 1000 MCG tablet Take 1,000 mcg by mouth daily      vitamin C (ASCORBIC ACID) 500 MG tablet Take 500 mg by mouth daily      Vitamin D3 (CHOLECALCIFEROL) 25 mcg (1000 units) tablet Take 25 mcg by mouth daily         ALLERGIES:Patient has no known allergies.    Vitals:  /76   Pulse 75   Temp 97.5  F (36.4  C)   Resp 18   Ht 1.702 m (5' 7\")   Wt 77.7 kg (171 lb 3.2 oz)   SpO2 97%   BMI 26.81 kg/m    Body mass index is 26.81 kg/m .    Review of Systems   Reason unable to perform ROS: " aphasia due to dementia.          Physical Exam  Vitals and nursing note reviewed.   Cardiovascular:      Rate and Rhythm: Normal rate.   Pulmonary:      Effort: Pulmonary effort is normal.   Neurological:      Mental Status: He is alert. He is disoriented.   Psychiatric:         Mood and Affect: Mood normal.         Behavior: Behavior normal.           Labs:     Most Recent 3 CBC's:  Recent Labs   Lab Test 02/21/24  0626 02/16/24  1126 02/15/24  0826   WBC 5.8 4.4 4.4   HGB 10.2* 10.6* 10.1*   MCV 94 93 94    151 134*     Most Recent 3 BMP's:  Recent Labs   Lab Test 02/21/24  0626 02/16/24  0835 02/16/24  0558 02/15/24  1125 02/15/24  0826 02/14/24  1147 02/14/24  0810     --   --   --  139  --  137   POTASSIUM 3.7  --   --   --  3.5  --  3.5   CHLORIDE 103  --   --   --  105  --  102   CO2 25  --   --   --  26  --  25   BUN 13.7  --   --   --  11.6  --  10.4   CR 0.61*  --   --   --  0.57*  --  0.55*   ANIONGAP 10  --   --   --  8  --  10   FERMIN 9.1  --   --   --  8.5*  --  8.7*   * 117* 120*   < > 116*   < > 130*    < > = values in this interval not displayed.     Most Recent 2 LFT's:  Recent Labs   Lab Test 02/13/24  1322 10/13/23  0552   AST 52* 24   ALT 13 14   ALKPHOS 103 102   BILITOTAL 0.8 1.1     Most Recent 3 INR's:  Recent Labs   Lab Test 10/13/23  0552 08/15/23  1258 07/27/23  1827   INR 1.03 1.17* 1.07     Most Recent Cholesterol Panel:  Recent Labs   Lab Test 10/13/23  0748   CHOL 137   LDL 52   HDL 69   TRIG 80     Most Recent TSH and T4:  Recent Labs   Lab Test 10/13/23  0748   TSH 1.47     Most Recent Hemoglobin A1c:  Recent Labs   Lab Test 02/05/24  0630   A1C 6.4*         Assessment/Plan:     Weakness  Physical deconditioning  Fall, subsequent encounter  Moderate dementia with anxiety, unspecified dementia type (H)  Pt impulsive and doesn't realize his limitations due to dementia. Had a fall recently  Fmaiy say he is anxious - calling them but they don't want to disconnect the  phone  Plan:  Agree with continuing celexa  Change prn trazodone to schedule trazodone twice daily .          Electronically signed by:    LESTER Otoole CNP on 4/4/2024 at 2:14 PM    MEDICATIONS:  MED REC REQUIRED  Post Medication Reconciliation Status:  Medication reconciliation previously completed during another office visit

## 2024-04-08 NOTE — PROGRESS NOTES
16-24 oz de leche entera o 2%  No debe fam bebidas en la noche, no biberon  Puede comer miel para tos  No debe comer nueces enteras porque se puede ahogar  Usa pasta con floro para limpiar los dientes   Ely el asiento del brice mirando para atras hast Erick Shahid is a 83 year old male seen April 2, 2024 at [facility] unit where he has resided for 5 months (admit to Board and Care 11/2023) seen for regulatory visit and to follow up dementia with anxiety    Pt is seen on the unit up to WC and wearing right shoulder immobilizer.   He says very little, not clear if he is having pain     Nursing staff reports a fall yesterday evening.   He is constantly attempting to self transfer, won't stay in the chair or bed.  Calling his family and exit seeking.  Family asked for a change in medications, so sertraline changed to citalopram and trazodone ordered PRN  These measures seemed to help some; today nursing staff reports he has overall been calmer.       By chart review, pt has a history HFpEF, CAD, DM2.  He had critical aortic stenosis which was thought to be causing falls, s/p TAVR in August 2023 which was complicated by complete heart block and he received a dual chamber pacemaker.  He was more confused and then started on sertraline for depression after that hospital and TCU stay.    He had a [facility] hospitalization in October 2023 for aphasia and possible LLE weakness   Workup for stroke, infection, etc was unremarkable.   He was seen by Neurology and thought to have REM sleep disorder, placed on melatonin.  He was discharged to [place] [facility] but unable to regain enough functional status to return home, so transitioned to Memory Care unit.   Pt had a February 2024 [facility] hospitalization following an unwitnessed fall in which he suffered an acute impacted and displaced right proximal humerus fracture.-  Seen by Orthopedics and managed conservatively, with a shoulder immobilizer and activity restrictions.    He was also found to have a CK of 1128, treated with IVF for mild rhabdomyolysis.    Discharged to LT with PHYSICAL THERAPY /OCCUPATIONAL THERAPY     Past Medical History:   Diagnosis Date    Benign essential hypertension     BPH (benign prostatic  "hyperplasia)     Chronic heart failure with reduced ejection fraction and diastolic dysfunction (H)     Cognitive impairment     Critical aortic valve stenosis     Diabetes mellitus, type 2 (H)     on metformin    Dyslipidemia     First degree atrioventricular block     LBBB (left bundle branch block)     Macular degeneration (senile) of retina      Past Surgical History:   Procedure Laterality Date    CV CORONARY ANGIOGRAM N/A 07/28/2023    Procedure: Coronary Angiogram;  Surgeon: Mando Field MD;  Location:  HEART CARDIAC CATH LAB    CV PCI N/A 07/28/2023    Procedure: Percutaneous Coronary Intervention;  Surgeon: Mando Field MD;  Location:  HEART CARDIAC CATH LAB    CV TRANSCATHETER AORTIC VALVE REPLACEMENT  08/15/2023    CV TRANSCATHETER AORTIC VALVE REPLACEMENT-FEMORAL APPROACH N/A 8/15/2023    Procedure: Transcatheter Aortic Valve Replacement-Femoral Approach;  Surgeon: Bossman Felipe MD;  Location:  HEART CARDIAC CATH LAB    EP PACEMAKER DEVICE & LEAD IMPLANT- RIGHT ATRIAL & LEFT VENTRICULAR N/A 8/15/2023    Procedure: Pacemaker Device & Lead Implant- Right Atrial & Left Ventricular;  Surgeon: Oscar Mendoza MD;  Location:  HEART CARDIAC CATH LAB    ORIF HIP FRACTURE Right 2018     SH:  Previously lived with his wife KIMBERLYN Lamb apartment in [place].      They have a son Toribio and daughter Lin    Pt is a retired civil and   Non smoker     ROS:  limited by pt's dementia       SLUMS 9/30     Ambulatory with FWW and cuing     Weight in July 2023 was 184 lbs  Wt Readings from Last 5 Encounters:   04/04/24 77.7 kg (171 lb 3.2 oz)   04/02/24 73.2 kg (161 lb 6.4 oz)   03/06/24 74.6 kg (164 lb 8 oz)   02/26/24 77.7 kg (171 lb 4.8 oz)   02/23/24 78.4 kg (172 lb 14.4 oz)      BP Readings from Last 3 Encounters:   04/04/24 124/76   04/02/24 129/74   03/06/24 130/78      EXAM:  NAD  /74   Pulse 72   Temp 97.8  F (36.6  C)   Resp 18   Ht 1.702 m (5' 7\")   Wt " A esta edad, el damien comienza a ponerse de pie y caminar lateralmente (“cruising” en inglés). Deje el calzado y las medias para cuando el damien esté fuera de la casa: para estar adentro, lo mejor es que jeanette descalzo.      En el chequeo de los 12 meses, el p 73.2 kg (161 lb 6.4 oz)   SpO2 94%   BMI 25.28 kg/m     [place]  Abrasion right forehead from fall this AM   NECK: no adenopathy, no asymmetry, masses, or scars and thyroid normal to palpation  RESP: decreased BS, no rales or wheeze   CV: regular rate and rhythm, normal S1 S2, I/VI GABBIE  ABDOMEN:  soft, nontender, no HSM or masses and bowel sounds normal  MS: extremities with trace ankle edema     Wearing right shoulder immobilizer     Neuro: very limited verbal skills  Psych: a little anxious      Lab Results   Component Value Date     02/21/2024    POTASSIUM 3.7 02/21/2024    CHLORIDE 103 02/21/2024    CO2 25 02/21/2024    ANIONGAP 10 02/21/2024     (H) 02/21/2024    BUN 13.7 02/21/2024    CR 0.61 (L) 02/21/2024    GFRESTIMATED >90 02/21/2024    FERMIN 9.1 02/21/2024     Lab Results   Component Value Date    AST 52 02/13/2024      ALBUMIN 4.0 02/13/2024      ALKPHOS 103 02/13/2024      Lab Results   Component Value Date    WBC 5.8 02/21/2024      HGB 10.2 02/21/2024      MCV 94 02/21/2024       02/21/2024      CT SCAN OF THE HEAD WITHOUT CONTRAST   2/13/2024  FINDINGS: There is no evidence of intracranial hemorrhage, mass, acute  infarct or anomaly. The ventricles are normal in size, shape and  configuration. Mild diffuse parenchymal volume loss. Mild patchy  periventricular white matter hypodensities which are nonspecific, but  likely related to chronic microvascular ischemic disease.                                                               IMPRESSION:     1. No evidence of acute intracranial hemorrhage, mass, or herniation.  2. Mild diffuse parenchymal volume loss and white matter changes  likely due to chronic microvascular ischemic disease.    ECHO 2/14/2024   The visual ejection fraction is 55-60%.   Grade I or early diastolic dysfunction.  No regional wall motion abnormalities noted.  There is a bioprosthetic aortic valve.  The prosthetic aortic valve is well-seated.  There is trace aortic  · Los alimentos sólidos deben ser la jarrell principal de nutrientes para boo hijo. Es recomendable enseñarle que la [place] es lauren bebida y no lauren comida [place]. · Comience a reemplazar el biberón por un vaso con popote para todos los líquidos.  Propóngase regurgitation.    Mean aortic gradient 14mmHg,      IMP/PLAN:     (W19.XXXA) Fall, initial encounter    Comment: another fall yesterday, with forehead abrasion   Plan: local care, monitor    (S42.201A) Displaced fracture of proximal end of right humerus  Comment: occurred 6 weeks ago   Plan: shoulder immobilizer   Acetaminophen 1000 mg tid for pain   Vit D 25 mcg/day, dietary calcium for bone health  Recheck x-ray for healing, Orthopedics follow up     (R53.81) Physical deconditioning  Comment: with falls   Plan: PHYSICAL THERAPY /OCCUPATIONAL THERAPY for safe transfers and mobility    (I25.10) Coronary artery disease involving native coronary artery of native heart without angina pectoris    (Z95.3) Status post transcatheter aortic valve replacement (TAVR) using bioprosthesis  (I65.21) Stenosis of right carotid artery  Comment: improved symptoms and LV function after TAVR     Plan: aspirin 81 mg/day and rosuvastatin 20 mg/day for secondary prevention   Follow up with Cardiology as scheduled      (E11.59) Type 2 diabetes mellitus with other circulatory complication, without long-term current use of insulin (H)  Comment:   Lab Results   Component Value Date    A1C 6.4 02/05/2024     Plan: metformin discontinued after above lab   Ophthalmology and Podiatry follow up      (F03.B3) Moderate dementia with mood disturbance, unspecified dementia type (H)  (F41.9) Anxiety  Comment: decline in cognitive function with associated anxiety and restlessness     Plan: C Memory Care unit for assist with med admin, meals, activity and secure unit   Citalopram 20 mg/day   Trazodone 25 mg PRN for anxiety and restlessness     Marianela Mac MD    · Acostúmbrelo a [employer] cosas todas las noches antes de [place]. Tener lauren rutina para la hora de acostarse ayudará al damien a aprender cuándo ha llegado el momento de irse a dormir.  Procure que el damien se acueste a la misma hora todas las noche · No deje que boo bebé sujete nada que sea pequeño y pueda atragantarlo si llegase a ponérselo en la boca, teetee juguetes, alimentos sólidos y objetos que el damien encuentre en el suelo mientras gatea o camina tomado de los muebles.  Teetee wilber general, si un · Para asegurarse de comprar zapatos que calcen manny, pídale a un empleado que le mida los pies a boo hijo. No compre zapatos que manuel demasiado grandes, para que “le calcen manny cuando boo hijo crezca”.  Si los zapatos no tienen el tamaño adecuado, le será m o Be role models themselves by making healthy eating and daily physical activity the norm for their family.   o Create a home where healthy choices are available and encouraged  o Make it fun – find ways to engage your children such as:  o playing a game of

## 2024-04-09 NOTE — PROGRESS NOTES
" [facility]      Code Status:  DNR  Visit Type: RECHECK     Facility:   [facility] () [[id number]]         History of Present Illness: Erick Shahid is a 83 year old male     Pt's family state pt is anxious and calling them   Per staff,  Resident was restless, getting up from w/c and walking away from the beginning of the shift until he went to bed @1930  On 4/5 trazodone was increased  Today pt was seen playing with his cell phone. He stated he was the last one at the office last night.   No other complaints.     Current Outpatient Medications   Medication Sig Dispense Refill    acetaminophen (TYLENOL) 500 MG tablet Take 1,000 mg by mouth 3 times daily      aspirin (ASA) 81 MG chewable tablet Take 1 tablet (81 mg) by mouth daily 90 tablet 3    citalopram (CELEXA) 20 MG tablet Take 20 mg by mouth daily      omeprazole (PRILOSEC) 20 MG DR capsule Take 1 capsule (20 mg) by mouth daily      polyethylene glycol (MIRALAX) 17 g packet Take 1 packet by mouth daily      rosuvastatin (CRESTOR) 20 MG tablet Take 20 mg by mouth at bedtime      senna-docusate (SENOKOT-S/PERICOLACE) 8.6-50 MG tablet Take 2 tablets by mouth daily as needed for constipation      traZODone (DESYREL) 50 MG tablet Take 25 mg by mouth 2 times daily      vitamin B-12 (CYANOCOBALAMIN) 1000 MCG tablet Take 1,000 mcg by mouth daily      vitamin C (ASCORBIC ACID) 500 MG tablet Take 500 mg by mouth daily      Vitamin D3 (CHOLECALCIFEROL) 25 mcg (1000 units) tablet Take 25 mcg by mouth daily         ALLERGIES:Patient has no known allergies.    Vitals:  BP (!) 149/78   Pulse 79   Temp 97.8  F (36.6  C)   Resp 16   Ht 1.702 m (5' 7\")   Wt 75.5 kg (166 lb 6.4 oz)   SpO2 97%   BMI 26.06 kg/m    Body mass index is 26.06 kg/m .    Review of Systems   All other systems reviewed and are negative.         Physical Exam  Vitals reviewed.   Pulmonary:      Effort: Pulmonary effort is normal.   Neurological:      Mental Status: " Mental status is at baseline.           Labs:     Most Recent 3 CBC's:  Recent Labs   Lab Test 02/21/24  0626 02/16/24  1126 02/15/24  0826   WBC 5.8 4.4 4.4   HGB 10.2* 10.6* 10.1*   MCV 94 93 94    151 134*     Most Recent 3 BMP's:  Recent Labs   Lab Test 02/21/24  0626 02/16/24  0835 02/16/24  0558 02/15/24  1125 02/15/24  0826 02/14/24  1147 02/14/24  0810     --   --   --  139  --  137   POTASSIUM 3.7  --   --   --  3.5  --  3.5   CHLORIDE 103  --   --   --  105  --  102   CO2 25  --   --   --  26  --  25   BUN 13.7  --   --   --  11.6  --  10.4   CR 0.61*  --   --   --  0.57*  --  0.55*   ANIONGAP 10  --   --   --  8  --  10   FERMIN 9.1  --   --   --  8.5*  --  8.7*   * 117* 120*   < > 116*   < > 130*    < > = values in this interval not displayed.       Assessment/Plan:     Moderate dementia with anxiety, unspecified dementia type (H)  Anxiety  Fall, subsequent encounter  Pt has dementia and does not know his limits  Is restless  Having weight loss  Takes citalopram  Plan:  Trazodone increased to BID and is tolerating.   Appeared less restless today  Recommend giving pt different electronic to replace the phone        Electronically signed by:    LESTER Otoole CNP on 4/12/2024 at 4:06 PM      MEDICATIONS:  MED REC REQUIRED  Post Medication Reconciliation Status:  Medication reconciliation previously completed during another office visit

## 2024-04-12 NOTE — LETTER
"    4/12/2024        RE: Erick Shahid  [address]  [facility] [place] [facility] [address]         [facility]      Code Status:  DNR  Visit Type: RECHECK     Facility:   [facility] () [[id number]]         History of Present Illness: Erick Shahid is a 83 year old male     Pt's family state pt is anxious and calling them   Per staff,  Resident was restless, getting up from w/c and walking away from the beginning of the shift until he went to bed @1930  On 4/5 trazodone was increased  Today pt was seen playing with his cell phone. He stated he was the last one at the office last night.   No other complaints.     Current Outpatient Medications   Medication Sig Dispense Refill     acetaminophen (TYLENOL) 500 MG tablet Take 1,000 mg by mouth 3 times daily       aspirin (ASA) 81 MG chewable tablet Take 1 tablet (81 mg) by mouth daily 90 tablet 3     citalopram (CELEXA) 20 MG tablet Take 20 mg by mouth daily       omeprazole (PRILOSEC) 20 MG DR capsule Take 1 capsule (20 mg) by mouth daily       polyethylene glycol (MIRALAX) 17 g packet Take 1 packet by mouth daily       rosuvastatin (CRESTOR) 20 MG tablet Take 20 mg by mouth at bedtime       senna-docusate (SENOKOT-S/PERICOLACE) 8.6-50 MG tablet Take 2 tablets by mouth daily as needed for constipation       traZODone (DESYREL) 50 MG tablet Take 25 mg by mouth 2 times daily       vitamin B-12 (CYANOCOBALAMIN) 1000 MCG tablet Take 1,000 mcg by mouth daily       vitamin C (ASCORBIC ACID) 500 MG tablet Take 500 mg by mouth daily       Vitamin D3 (CHOLECALCIFEROL) 25 mcg (1000 units) tablet Take 25 mcg by mouth daily         ALLERGIES:Patient has no known allergies.    Vitals:  BP (!) 149/78   Pulse 79   Temp 97.8  F (36.6  C)   Resp 16   Ht 1.702 m (5' 7\")   Wt 75.5 kg (166 lb 6.4 oz)   SpO2 97%   BMI 26.06 kg/m    Body mass index is 26.06 kg/m .    Review of Systems   All other systems reviewed and are negative.         Physical " Exam  Vitals reviewed.   Pulmonary:      Effort: Pulmonary effort is normal.   Neurological:      Mental Status: Mental status is at baseline.           Labs:     Most Recent 3 CBC's:  Recent Labs   Lab Test 02/21/24  0626 02/16/24  1126 02/15/24  0826   WBC 5.8 4.4 4.4   HGB 10.2* 10.6* 10.1*   MCV 94 93 94    151 134*     Most Recent 3 BMP's:  Recent Labs   Lab Test 02/21/24  0626 02/16/24  0835 02/16/24  0558 02/15/24  1125 02/15/24  0826 02/14/24  1147 02/14/24  0810     --   --   --  139  --  137   POTASSIUM 3.7  --   --   --  3.5  --  3.5   CHLORIDE 103  --   --   --  105  --  102   CO2 25  --   --   --  26  --  25   BUN 13.7  --   --   --  11.6  --  10.4   CR 0.61*  --   --   --  0.57*  --  0.55*   ANIONGAP 10  --   --   --  8  --  10   FERMIN 9.1  --   --   --  8.5*  --  8.7*   * 117* 120*   < > 116*   < > 130*    < > = values in this interval not displayed.       Assessment/Plan:     Moderate dementia with anxiety, unspecified dementia type (H)  Anxiety  Fall, subsequent encounter  Pt has dementia and does not know his limits  Is restless  Having weight loss  Takes citalopram  Plan:  Trazodone increased to BID and is tolerating.   Appeared less restless today  Recommend giving pt different electronic to replace the phone        Electronically signed by:    LESTER Otoole CNP on 4/12/2024 at 4:06 PM      MEDICATIONS:  MED REC REQUIRED  Post Medication Reconciliation Status:  Medication reconciliation previously completed during another office visit                Sincerely,        LESTER Otoole CNP

## 2024-04-22 NOTE — TELEPHONE ENCOUNTER
[facility] Triage Nurse Telephone Encounter    Provider: LESTER Colvin CNP  Facility: [facility] Facility Type:  [facility]    Caller: Maris  Call Back Number: [phone]    Allergies:  No Known Allergies     Reason for call: Staff is reporting that patient is not sleeping well at night. Currently on Trazodone 25 mg BID.    Verbal Order/Direction given by Provider: Increase HS Trazodone to 50 mg PO. Continue AM Trazodone 25 mg.    Provider giving Order:  LESTER Colvin CNP    Verbal Order given to: Drea Bryant RN

## 2024-05-03 NOTE — PLAN OF CARE
Goal Outcome Evaluation:    Pt. Alert and oriented to self and place. Vital signs stable on RA. Assist of 1 with GBW. Tolerating low sat fat/low NA diet. Lung sounds clear/dim. Bowel sounds +, no BM this shift, incontinent of urine at times. Coccyx injury, dressing CDI, WOC following. Denies pain and nausea. Tele SB with 1st degree AVB.        Medication: allopurinol (ZYLOPRIM) 100 MG tablet   Medication refill denied. Refills are on file at pharmacy.

## 2024-05-21 NOTE — TELEPHONE ENCOUNTER
Erick Shahid  1940      Start hydrOXYzine HCl (ATARAX) 25 MG tablet by mouth at bedtime.  Dx anxiety      Yancy Walker, LESTER CNP on 5/21/2024 at 2:54 PM

## 2024-05-23 NOTE — PROGRESS NOTES
[facility]      Code Status:  DNR  Visit Type: RECHECK     Facility:   [facility] () [[id number]]         History of Present Illness: Erick Shahid is a 83 year old male     Patient's past medical history includes   acute impacted and displaced proximal right humerus fracture (2/2023)  Dementia   heart failure with preserved ejection fraction   aortic stenosis s/p TAVR 23 mm Allison ROBERTO valve on 8/2023  Complete heart block s/p permanent pacemaker  Carotid stenosis  Coronary artery disease   type 2 diabetes  Anemia   Depression  Macular degeneration  Constipation     On 2/13/2024, patient was admitted after having an unwitnessed fall 2 days prior.  At admission in the emergency room his CK was elevated to 1128, BNP was 3098, troponin 29 white blood cells and platelets.  An x-ray of the shoulder revealed an acute impacted and displaced proximal humerus fracture and orthopedics recommended nonsurgical intervention with a sling for comfort.   Noncontrast head CT negative for acute intracranial changes with mild diffuse parenchymal volume loss and white matter changes.  Echo done on 2/14 shows EF of 55 to 60%, grade 1 or early diastolic dysfunction, no wall motion changes, trace aortic regurgitation and bioprosthetic aortic valve is well-seated.  Pacemaker was interrogated on 2/14/2024 and did not show any evidence of arrhythmia due to the elevated CK his Crestor was held.      On 2/26/2024, he was moved to [facility] and his trazodone was stopped.      On 2/29/2024, saw ortho who place a shoulder immobilizer       Today met with pt and his wife.  Pt did not articulate any problems.  Word salad.  Pt's wife states he hasn't been sleeping but has always been restless at night.    On  05/19/24 at 10:18 Pm pt had a fall.   On 5/20, at 4:30 am pt had a fall out of bed   Patient reports weight is stable at 169 pounds  Family is concerned patient is anxious, and restless.  Staff report he does  "not sleep at night    Current Outpatient Medications   Medication Sig Dispense Refill    acetaminophen (TYLENOL) 500 MG tablet Take 1,000 mg by mouth 3 times daily      aspirin (ASA) 81 MG chewable tablet Take 1 tablet (81 mg) by mouth daily 90 tablet 3    citalopram (CELEXA) 20 MG tablet Take 20 mg by mouth daily      hydrOXYzine HCl (ATARAX) 25 MG tablet Take 1 tablet (25 mg) by mouth at bedtime      omeprazole (PRILOSEC) 20 MG DR capsule Take 1 capsule (20 mg) by mouth daily      polyethylene glycol (MIRALAX) 17 g packet Take 1 packet by mouth daily      rosuvastatin (CRESTOR) 20 MG tablet Take 20 mg by mouth at bedtime      senna-docusate (SENOKOT-S/PERICOLACE) 8.6-50 MG tablet Take 2 tablets by mouth daily as needed for constipation      traZODone (DESYREL) 50 MG tablet Take 50 mg by mouth at bedtime Take 25 mg in the AM      vitamin B-12 (CYANOCOBALAMIN) 1000 MCG tablet Take 1,000 mcg by mouth daily      vitamin C (ASCORBIC ACID) 500 MG tablet Take 500 mg by mouth daily      Vitamin D3 (CHOLECALCIFEROL) 25 mcg (1000 units) tablet Take 25 mcg by mouth daily         ALLERGIES:Patient has no known allergies.    Vitals:  /74   Pulse 77   Temp 97.4  F (36.3  C)   Resp 18   Ht 1.702 m (5' 7\")   Wt 76.7 kg (169 lb 3.2 oz)   SpO2 97%   BMI 26.50 kg/m    Body mass index is 26.5 kg/m .    Review of Systems   All other systems reviewed and are negative.         Physical Exam  Vitals reviewed.   Pulmonary:      Effort: Pulmonary effort is normal.   Neurological:      Mental Status: Mental status is at baseline.           Labs:     Most Recent 3 CBC's:  Recent Labs   Lab Test 02/21/24  0626 02/16/24  1126 02/15/24  0826   WBC 5.8 4.4 4.4   HGB 10.2* 10.6* 10.1*   MCV 94 93 94    151 134*     Most Recent 3 BMP's:  Recent Labs   Lab Test 02/21/24  0626 02/16/24  0835 02/16/24  0558 02/15/24  1125 02/15/24  0826 02/14/24  1147 02/14/24  0810     --   --   --  139  --  137   POTASSIUM 3.7  --   --   " --  3.5  --  3.5   CHLORIDE 103  --   --   --  105  --  102   CO2 25  --   --   --  26  --  25   BUN 13.7  --   --   --  11.6  --  10.4   CR 0.61*  --   --   --  0.57*  --  0.55*   ANIONGAP 10  --   --   --  8  --  10   FERMIN 9.1  --   --   --  8.5*  --  8.7*   * 117* 120*   < > 116*   < > 130*    < > = values in this interval not displayed.       Assessment/Plan:     Moderate dementia with mood disturbance, unspecified dementia type (H)  Insomnia, unspecified type  Restlessness  Falls frequently  Pt has dementia and does not know his limits  Is restless  Takes citalopram daily, Trazodone increased to BID and is tolerating.   Staff state pt is not sleeping at night.  Falling in the evening and early morning.  Work at sleep monitor which showed restlessness.   Plan:   Start hydroxyzine  Hourly sleep monitoring x 5 day.   Due to hydroxyzine do PVRs daily x  5 days.  If has urinary retention then increase trazodone and discontinue hydroxyzine          Electronically signed by:    LESTER Otoole CNP         MEDICATIONS:  MED REC REQUIRED  Post Medication Reconciliation Status:  Medication reconciliation previously completed during another office visit

## 2024-05-24 NOTE — LETTER
5/24/2024        RE: Erick Shahid  [address]  [place] [address]         [facility] GERIATRICS      Code Status:  DNR  Visit Type: RECHECK     Facility:   [facility] () [[id number]]         History of Present Illness: Erick Shahid is a 83 year old male     Patient's past medical history includes   acute impacted and displaced proximal right humerus fracture (2/2023)  Dementia   heart failure with preserved ejection fraction   aortic stenosis s/p TAVR 23 mm Allison ROBERTO valve on 8/2023  Complete heart block s/p permanent pacemaker  Carotid stenosis  Coronary artery disease   type 2 diabetes  Anemia   Depression  Macular degeneration  Constipation     On 2/13/2024, patient was admitted after having an unwitnessed fall 2 days prior.  At admission in the emergency room his CK was elevated to 1128, BNP was 3098, troponin 29 white blood cells and platelets.  An x-ray of the shoulder revealed an acute impacted and displaced proximal humerus fracture and orthopedics recommended nonsurgical intervention with a sling for comfort.   Noncontrast head CT negative for acute intracranial changes with mild diffuse parenchymal volume loss and white matter changes.  Echo done on 2/14 shows EF of 55 to 60%, grade 1 or early diastolic dysfunction, no wall motion changes, trace aortic regurgitation and bioprosthetic aortic valve is well-seated.  Pacemaker was interrogated on 2/14/2024 and did not show any evidence of arrhythmia due to the elevated CK his Crestor was held.      On 2/26/2024, he was moved to [facility] and his trazodone was stopped.      On 2/29/2024, saw ortho who place a shoulder immobilizer       Today met with pt and his wife.  Pt did not articulate any problems.  Word salad.  Pt's wife states he hasn't been sleeping but has always been restless at night.    On  05/19/24 at 10:18 Pm pt had a fall.   On 5/20, at 4:30 am pt had a fall out of bed   Patient reports weight is  "stable at 169 pounds  Family is concerned patient is anxious, and restless.  Staff report he does not sleep at night    Current Outpatient Medications   Medication Sig Dispense Refill     acetaminophen (TYLENOL) 500 MG tablet Take 1,000 mg by mouth 3 times daily       aspirin (ASA) 81 MG chewable tablet Take 1 tablet (81 mg) by mouth daily 90 tablet 3     citalopram (CELEXA) 20 MG tablet Take 20 mg by mouth daily       hydrOXYzine HCl (ATARAX) 25 MG tablet Take 1 tablet (25 mg) by mouth at bedtime       omeprazole (PRILOSEC) 20 MG DR capsule Take 1 capsule (20 mg) by mouth daily       polyethylene glycol (MIRALAX) 17 g packet Take 1 packet by mouth daily       rosuvastatin (CRESTOR) 20 MG tablet Take 20 mg by mouth at bedtime       senna-docusate (SENOKOT-S/PERICOLACE) 8.6-50 MG tablet Take 2 tablets by mouth daily as needed for constipation       traZODone (DESYREL) 50 MG tablet Take 50 mg by mouth at bedtime Take 25 mg in the AM       vitamin B-12 (CYANOCOBALAMIN) 1000 MCG tablet Take 1,000 mcg by mouth daily       vitamin C (ASCORBIC ACID) 500 MG tablet Take 500 mg by mouth daily       Vitamin D3 (CHOLECALCIFEROL) 25 mcg (1000 units) tablet Take 25 mcg by mouth daily         ALLERGIES:Patient has no known allergies.    Vitals:  /74   Pulse 77   Temp 97.4  F (36.3  C)   Resp 18   Ht 1.702 m (5' 7\")   Wt 76.7 kg (169 lb 3.2 oz)   SpO2 97%   BMI 26.50 kg/m    Body mass index is 26.5 kg/m .    Review of Systems   All other systems reviewed and are negative.         Physical Exam  Vitals reviewed.   Pulmonary:      Effort: Pulmonary effort is normal.   Neurological:      Mental Status: Mental status is at baseline.           Labs:     Most Recent 3 CBC's:  Recent Labs   Lab Test 02/21/24  0626 02/16/24  1126 02/15/24  0826   WBC 5.8 4.4 4.4   HGB 10.2* 10.6* 10.1*   MCV 94 93 94    151 134*     Most Recent 3 BMP's:  Recent Labs   Lab Test 02/21/24  0626 02/16/24  0835 02/16/24  0558 02/15/24  1125 " 02/15/24  0826 02/14/24  1147 02/14/24  0810     --   --   --  139  --  137   POTASSIUM 3.7  --   --   --  3.5  --  3.5   CHLORIDE 103  --   --   --  105  --  102   CO2 25  --   --   --  26  --  25   BUN 13.7  --   --   --  11.6  --  10.4   CR 0.61*  --   --   --  0.57*  --  0.55*   ANIONGAP 10  --   --   --  8  --  10   FERMIN 9.1  --   --   --  8.5*  --  8.7*   * 117* 120*   < > 116*   < > 130*    < > = values in this interval not displayed.       Assessment/Plan:     Moderate dementia with mood disturbance, unspecified dementia type (H)  Insomnia, unspecified type  Restlessness  Falls frequently  Pt has dementia and does not know his limits  Is restless  Takes citalopram daily, Trazodone increased to BID and is tolerating.   Staff state pt is not sleeping at night.  Falling in the evening and early morning.  Work at sleep monitor which showed restlessness.   Plan:   Start hydroxyzine  Hourly sleep monitoring x 5 day.   Due to hydroxyzine do PVRs daily x  5 days.  If has urinary retention then increase trazodone and discontinue hydroxyzine          Electronically signed by:    LESTER Otoole CNP         MEDICATIONS:  MED REC REQUIRED  Post Medication Reconciliation Status:  Medication reconciliation previously completed during another office visit                Sincerely,        LESTER Otoole CNP

## 2024-05-29 NOTE — PROGRESS NOTES
"[facility] GERIATRICS    Chief Complaint   Patient presents with    RECHECK     HPI:  Erick Shahid is a 84 year old  (1940), who is being seen today for an episodic care visit at: [facility] () [[id number]].     Sleeping at night  Less restless  Less dribbling uring    Allergies, and PMH/PSH reviewed in [facility] today.  REVIEW OF SYSTEMS:  Unobtainable secondary to cognitive impairment.     Objective:   BP (!) 140/78   Pulse 74   Temp 97.8  F (36.6  C)   Resp 18   Ht 1.702 m (5' 7\")   Wt 76.2 kg (167 lb 14.4 oz)   SpO2 95%   BMI 26.30 kg/m    GENERAL APPEARANCE:  Alert, in no distress  RESP:  lungs clear to auscultation , no respiratory distress  PSYCH:  insight and judgement impaired    Recent labs in [facility] reviewed by me today.     Assessment/Plan:     Moderate dementia with mood disturbance, unspecified dementia type (H)  Falls frequently  Anxiety  Insomnia, unspecified type  Slow transit constipation  Pt has dementia and does not know his limits can be restless and then falls.    Takes citalopram daily, Trazodone increased to BID and is tolerating.   Staff state pt is not sleeping at night.  Falling in the evening and early morning.  sleep monitor which showed restlessness.   On 5/24 was started on hydrozyzine 12.5 mg daily and is tolerating  He does not have urinary retention.  His PVRs are less than 100   He is having BMS  Staff have done hourly checks on him overnight and he is sleeping  Plan:   Wife in room and she was updated  Continue with plan of care no changes at this time, adjustment as needed      MED REC REQUIRED  Post Medication Reconciliation Status:  Medication reconciliation previously completed during another office visit      Electronically signed by:     LESTER Otoole CNP on 5/29/2024 at 4:10 PM     "

## 2024-05-29 NOTE — LETTER
"    5/29/2024        RE: Erick Shahid  [address]  [facility] [address]        [facility] GERIATRICS    Chief Complaint   Patient presents with     RECHECK     HPI:  Erick Shahid is a 84 year old  (1940), who is being seen today for an episodic care visit at: [facility] () [[id number]].     Sleeping at night  Less restless  Less dribbling uring    Allergies, and PMH/PSH reviewed in [facility] today.  REVIEW OF SYSTEMS:  Unobtainable secondary to cognitive impairment.     Objective:   BP (!) 140/78   Pulse 74   Temp 97.8  F (36.6  C)   Resp 18   Ht 1.702 m (5' 7\")   Wt 76.2 kg (167 lb 14.4 oz)   SpO2 95%   BMI 26.30 kg/m    GENERAL APPEARANCE:  Alert, in no distress  RESP:  lungs clear to auscultation , no respiratory distress  PSYCH:  insight and judgement impaired    Recent labs in [facility] reviewed by me today.     Assessment/Plan:     Moderate dementia with mood disturbance, unspecified dementia type (H)  Falls frequently  Anxiety  Insomnia, unspecified type  Slow transit constipation  Pt has dementia and does not know his limits can be restless and then falls.    Takes citalopram daily, Trazodone increased to BID and is tolerating.   Staff state pt is not sleeping at night.  Falling in the evening and early morning.  sleep monitor which showed restlessness.   On 5/24 was started on hydrozyzine 12.5 mg daily and is tolerating  He does not have urinary retention.  His PVRs are less than 100   He is having BMS  Staff have done hourly checks on him overnight and he is sleeping  Plan:   Wife in room and she was updated  Continue with plan of care no changes at this time, adjustment as needed      MED REC REQUIRED  Post Medication Reconciliation Status:  Medication reconciliation previously completed during another office visit      Electronically signed by:     ELSTER Otoole CNP on 5/29/2024 at 4:10 PM         Sincerely,        LESTER Otoole CNP      "

## 2024-06-07 NOTE — PROGRESS NOTES
[facility] GERIATRICS  Chief Complaint   Patient presents with    Annual Comprehensive Nursing Home     [facility] Record Number:  [id number]  Place of Service where encounter took place:  [facility] () [[id number]]    HPI:    Erick Shahid  is a 84 year old  (1940), who is being seen today for an annual comprehensive visit. HPI information obtained from: facility chart records, facility staff, patient report, [facility] chart review, Care Everywhere Epic chart review, and family/first contact Pt's wife report.       Patient's past medical history includes   acute impacted and displaced proximal right humerus fracture (2/2023)  Dementia   heart failure with preserved ejection fraction   aortic stenosis s/p TAVR 23 mm Allison ROBERTO valve on 8/2023  Complete heart block s/p permanent pacemaker  Carotid stenosis  Coronary artery disease   type 2 diabetes  Anemia   Depression  Macular degeneration  Constipation     On 2/13/2024, patient was admitted after having an unwitnessed fall 2 days prior.  At admission in the emergency room his CK was elevated to 1128, BNP was 3098, troponin 29 white blood cells and platelets.  An x-ray of the shoulder revealed an acute impacted and displaced proximal humerus fracture and orthopedics recommended nonsurgical intervention with a sling for comfort.   Noncontrast head CT negative for acute intracranial changes with mild diffuse parenchymal volume loss and white matter changes.  Echo done on 2/14 shows EF of 55 to 60%, grade 1 or early diastolic dysfunction, no wall motion changes, trace aortic regurgitation and bioprosthetic aortic valve is well-seated.  Pacemaker was interrogated on 2/14/2024 and did not show any evidence of arrhythmia due to the elevated CK his Crestor was held.      On 2/26/2024, he was moved to LT and his trazodone was stopped.      On 2/29/2024, saw ortho who place a shoulder immobilizer       Today patient was seen in  his room.  Due to cognitive impairment review of systems was limited but patient denied pain. His wife does answer some questions.   Nursing has no concerns. BIMS=2/15 (score 13 to 15 suggests the patient is cognitively intact, 8 to 12 suggests moderately impaired and 0 to 7 suggest severe impairment) PHQ9=2/27.   Patient needs extensive assistance with ADLs, uses a wheelchair.    His appetite is fair and consumes a low carbohydrate diet.  Per nursing, skin is intact. Code status is DNR/DNI.       In reviewing point click care VS and weight stable.     ALLERGIES: Patient has no known allergies.  PAST MEDICAL HISTORY:   Past Medical History:   Diagnosis Date    Benign essential hypertension     BPH (benign prostatic hyperplasia)     Chronic heart failure with reduced ejection fraction and diastolic dysfunction (H)     Cognitive impairment     Critical aortic valve stenosis     Diabetes mellitus, type 2 (H)     on metformin    Dyslipidemia     First degree atrioventricular block     LBBB (left bundle branch block)     Macular degeneration (senile) of retina       PAST SURGICAL HISTORY:  has a past surgical history that includes Coronary Angiogram (N/A, 07/28/2023); Percutaneous Coronary Intervention (N/A, 07/28/2023); Transcatheter Aortic Valve Replacement (08/15/2023); Orif Hip Fracture (Right, 2018); Pacemaker Device & Lead Implant- Right Atrial & Left Ventricular (N/A, 8/15/2023); and Transcatheter Aortic Valve Replacement-Femoral Approach (N/A, 8/15/2023).      Current Outpatient Medications:     acetaminophen (TYLENOL) 500 MG tablet, Take 1,000 mg by mouth 3 times daily, Disp: , Rfl:     aspirin (ASA) 81 MG chewable tablet, Take 1 tablet (81 mg) by mouth daily, Disp: 90 tablet, Rfl: 3    citalopram (CELEXA) 20 MG tablet, Take 20 mg by mouth daily, Disp: , Rfl:     hydrOXYzine HCl (ATARAX) 25 MG tablet, Take 1 tablet (25 mg) by mouth at bedtime, Disp: , Rfl:     omeprazole (PRILOSEC) 20 MG DR capsule, Take 1  capsule (20 mg) by mouth daily, Disp: , Rfl:     polyethylene glycol (MIRALAX) 17 g packet, Take 1 packet by mouth daily, Disp: , Rfl:     rosuvastatin (CRESTOR) 20 MG tablet, Take 20 mg by mouth at bedtime, Disp: , Rfl:     senna-docusate (SENOKOT-S/PERICOLACE) 8.6-50 MG tablet, Take 2 tablets by mouth daily as needed for constipation, Disp: , Rfl:     traZODone (DESYREL) 50 MG tablet, Take 50 mg by mouth at bedtime Take 25 mg in the AM, Disp: , Rfl:     vitamin B-12 (CYANOCOBALAMIN) 1000 MCG tablet, Take 1,000 mcg by mouth daily, Disp: , Rfl:     vitamin C (ASCORBIC ACID) 500 MG tablet, Take 500 mg by mouth daily, Disp: , Rfl:     Vitamin D3 (CHOLECALCIFEROL) 25 mcg (1000 units) tablet, Take 25 mcg by mouth daily, Disp: , Rfl:      MED REC REQUIRED  Post Medication Reconciliation Status:  Discharge medications reconciled and changed, see notes/orders      Case Management:  I have reviewed the facility/SNF care plan/MDS, including the falls risk, nutrition and pain screening. I also reviewed the current immunizations, and preventive care.. Future cancer screening is not clinically indicated secondary to age/goals of care. Patient's desire to return to the community is not assessible due to cognitive impairment. Current Level of Care is appropriate.mhgeroimmunization: Annual Influenza per facility protocol    Advance Directive Discussion:    I reviewed the current advanced directives as reflected in EPIC, the POLST and the facility chart, and verified the congruency of orders . I contacted the first party  and discussed the plan of care. I did not due to cognitive impairment review the advance directives with the resident.     Team Discussion:  I communicated with the appropriate disciplines involved with the Plan of Care: Nursing  .   Patient's goal is: unobtainable secondary to cognitive impairment.  Information reviewed: Medications, vital signs, orders, and nursing notes.    ROS:  Unobtainable secondary to  "cognitive impairment.     Vitals:  /78   Pulse 65   Temp 97.6  F (36.4  C)   Resp 18   Ht 1.702 m (5' 7\")   Wt 76.8 kg (169 lb 4.8 oz)   SpO2 96%   BMI 26.52 kg/m   Body mass index is 26.52 kg/m .  Exam:  GENERAL APPEARANCE:  Alert, in no distress  RESP:  no respiratory distress  CV:  rate-normal  PSYCH:  insight and judgement impaired, memory impaired      Lab/Diagnostic data:   Most Recent 3 CBC's:  Recent Labs   Lab Test 02/21/24  0626 02/16/24  1126 02/15/24  0826   WBC 5.8 4.4 4.4   HGB 10.2* 10.6* 10.1*   MCV 94 93 94    151 134*     Most Recent 3 BMP's:  Recent Labs   Lab Test 02/21/24  0626 02/16/24  0835 02/16/24  0558 02/15/24  1125 02/15/24  0826 02/14/24  1147 02/14/24  0810     --   --   --  139  --  137   POTASSIUM 3.7  --   --   --  3.5  --  3.5   CHLORIDE 103  --   --   --  105  --  102   CO2 25  --   --   --  26  --  25   BUN 13.7  --   --   --  11.6  --  10.4   CR 0.61*  --   --   --  0.57*  --  0.55*   ANIONGAP 10  --   --   --  8  --  10   FERMIN 9.1  --   --   --  8.5*  --  8.7*   * 117* 120*   < > 116*   < > 130*    < > = values in this interval not displayed.     Device check (4/2024)  [employer] (D) Remote PPM Device Check  AP: 37% since 2/14/24; 88% from 12/27/23 to 2/14/24  : 94%  Mode: DDD   Presenting Rhythm: AS/VS  Historical underlying rhythm: SR 60's bpm as of 9/2023  Heart Rate: adequate rates per histogram   Sensing: stable  Pacing Threshold: stable  Impedance: stable  Battery Status: 12 years remaining   Atrial Arrhythmia: none  Ventricular Arrhythmia: none     Care Plan: F/U [facility] ppm in 3 months. OV with cardiology was canceled due to patient being in care facility. LM with results and next appointment. NICOLE Smith     ECHO (2/2024)   Interpretation Summary     The visual ejection fraction is 55-60%.  Grade I or early diastolic dysfunction.  No regional wall motion abnormalities noted.  There is a bioprosthetic aortic valve.  The " prosthetic aortic valve is well-seated.  There is trace aortic regurgitation.  Mean aortic gradient 14mmHg,    ASSESSMENT/PLAN  (S42.201A) Displaced fracture of proximal end of right humerus  )  R53.1) Weakness  (R53.81) Physical deconditioning  Comment:   Fell  Displaced fracture of right humerus and non-operative recommendations  Pt saw [facility] in 4/2024 and he has limited motion of his right arm.  Discontinued to immobilizer.  If arm hurts, he won't use his arm.  No follow up needed  Plan: Continue with plan of care no changes at this time, adjustment as needed        (I50.9) Congestive heart failure, unspecified HF chronicity, unspecified heart failure type (H)  (I44.2) Complete heart block (H)  (I25.10) Coronary artery disease involving native coronary artery of native heart without angina pectoris  (Z95.0) Cardiac pacemaker in situ  (Z95.3) Status post transcatheter aortic valve replacement (TAVR) using bioprosthesis  (I65.21) Stenosis of right carotid artery  Comment: chronic.  Currently taking rosuvastatin, aspirin,  VS and Weight is stable   Plan:   Continue with plan of care no changes at this time, adjustment as needed           (E11.59) Type 2 diabetes mellitus with other circulatory complication, without long-term current use of insulin (H)  Comment: chronic.     Diet controlled  Last hgb A1c=6.4 (2/2024)   Blood sugars have been less than 180   Plan:   Stop taking blood sugars        (D64.9) Anemia, unspecified type  Comment: Chronic.  Taking Vitamin B12.   Plan: CBC with next labs check    Consider iron studies when hgb stabilizes        (F32.1) Depression, major, single episode, moderate (H)  (F41.9) anxiety   Comment: chronic   Taking citalopram, hydrozyzine and trazodone  Plan: Continue with plan of care no changes at this time, adjustment as needed        (K59.01) Slow transit constipation  Comment: chronic.   Currently taking MiraLAX and prn senna    Plan: Continue with plan of care no changes at  this time, adjustment as needed          [F03.C4] Severe dementia with anxiety, unspecified dementia type (H)   Comment: Chronic, progressive.   Patient's last BIMS was 2/15 which indications severe impairment,  Review of systems in HPI difficult to obtain cognitive impairment. Patient requiring 24-hour skilled nursing care and would be a high risk of wandering if he were moved off the dementia care unit.   He has had difficultly sleeping and been restless.  Hydroxyzine and trazodone were started with good success.   Expect further functional and cognitive decline.  Pt did not have urinary retention with start of hydroxyzine  Expect weight loss.     Plan:Continue with plan of care no changes at this time, adjustment as needed         Electronically signed by:      LESTER Otoole CNP

## 2024-06-12 NOTE — LETTER
6/12/2024      Erick Shahid  [address] [facility] ([place], [place]) [address]  [facility] [address]        [facility] GERIATRICS  Chief Complaint   Patient presents with     Annual Comprehensive Nursing Home     [place] Medical Record Number:  [id number]  Place of Service where encounter took place:  [facility] () [[id number]]    HPI:    Erick Shahid  is a 84 year old  (1940), who is being seen today for an annual comprehensive visit. HPI information obtained from: facility chart records, facility staff, patient report, [facility] chart review, Care Everywhere [facility] chart review, and family/first contact Pt's wife report.       Patient's past medical history includes   acute impacted and displaced proximal right humerus fracture (2/2023)  Dementia   heart failure with preserved ejection fraction   aortic stenosis s/p TAVR 23 mm Allison ROBERTO valve on 8/2023  Complete heart block s/p permanent pacemaker  Carotid stenosis  Coronary artery disease   type 2 diabetes  Anemia   Depression  Macular degeneration  Constipation     On 2/13/2024, patient was admitted after having an unwitnessed fall 2 days prior.  At admission in the emergency room his CK was elevated to 1128, BNP was 3098, troponin 29 white blood cells and platelets.  An x-ray of the shoulder revealed an acute impacted and displaced proximal humerus fracture and orthopedics recommended nonsurgical intervention with a sling for comfort.   Noncontrast head CT negative for acute intracranial changes with mild diffuse parenchymal volume loss and white matter changes.  Echo done on 2/14 shows EF of 55 to 60%, grade 1 or early diastolic dysfunction, no wall motion changes, trace aortic regurgitation and bioprosthetic aortic valve is well-seated.  Pacemaker was interrogated on 2/14/2024 and did not show any evidence of arrhythmia due to the elevated CK his Crestor was held.      On 2/26/2024, he was moved to [facility] and his trazodone was stopped.      On  2/29/2024, saw ortho who place a shoulder immobilizer       Today patient was seen in his room.  Due to cognitive impairment review of systems was limited but patient denied pain. His wife does answer some questions.   Nursing has no concerns. BIMS=2/15 (score 13 to 15 suggests the patient is cognitively intact, 8 to 12 suggests moderately impaired and 0 to 7 suggest severe impairment) PHQ9=2/27.   Patient needs extensive assistance with ADLs, uses a wheelchair.    His appetite is fair and consumes a low carbohydrate diet.  Per nursing, skin is intact. Code status is DNR/DNI.       In reviewing point click care VS and weight stable.     ALLERGIES: Patient has no known allergies.  PAST MEDICAL HISTORY:   Past Medical History:   Diagnosis Date     Benign essential hypertension      BPH (benign prostatic hyperplasia)      Chronic heart failure with reduced ejection fraction and diastolic dysfunction (H)      Cognitive impairment      Critical aortic valve stenosis      Diabetes mellitus, type 2 (H)     on metformin     Dyslipidemia      First degree atrioventricular block      LBBB (left bundle branch block)      Macular degeneration (senile) of retina       PAST SURGICAL HISTORY:  has a past surgical history that includes Coronary Angiogram (N/A, 07/28/2023); Percutaneous Coronary Intervention (N/A, 07/28/2023); Transcatheter Aortic Valve Replacement (08/15/2023); Orif Hip Fracture (Right, 2018); Pacemaker Device & Lead Implant- Right Atrial & Left Ventricular (N/A, 8/15/2023); and Transcatheter Aortic Valve Replacement-Femoral Approach (N/A, 8/15/2023).      Current Outpatient Medications:      acetaminophen (TYLENOL) 500 MG tablet, Take 1,000 mg by mouth 3 times daily, Disp: , Rfl:      aspirin (ASA) 81 MG chewable tablet, Take 1 tablet (81 mg) by mouth daily, Disp: 90 tablet, Rfl: 3     citalopram (CELEXA) 20 MG tablet, Take 20 mg by mouth daily, Disp: , Rfl:      hydrOXYzine HCl (ATARAX) 25 MG tablet, Take 1  tablet (25 mg) by mouth at bedtime, Disp: , Rfl:      omeprazole (PRILOSEC) 20 MG DR capsule, Take 1 capsule (20 mg) by mouth daily, Disp: , Rfl:      polyethylene glycol (MIRALAX) 17 g packet, Take 1 packet by mouth daily, Disp: , Rfl:      rosuvastatin (CRESTOR) 20 MG tablet, Take 20 mg by mouth at bedtime, Disp: , Rfl:      senna-docusate (SENOKOT-S/PERICOLACE) 8.6-50 MG tablet, Take 2 tablets by mouth daily as needed for constipation, Disp: , Rfl:      traZODone (DESYREL) 50 MG tablet, Take 50 mg by mouth at bedtime Take 25 mg in the AM, Disp: , Rfl:      vitamin B-12 (CYANOCOBALAMIN) 1000 MCG tablet, Take 1,000 mcg by mouth daily, Disp: , Rfl:      vitamin C (ASCORBIC ACID) 500 MG tablet, Take 500 mg by mouth daily, Disp: , Rfl:      Vitamin D3 (CHOLECALCIFEROL) 25 mcg (1000 units) tablet, Take 25 mcg by mouth daily, Disp: , Rfl:      MED REC REQUIRED  Post Medication Reconciliation Status:  Discharge medications reconciled and changed, see notes/orders      Case Management:  I have reviewed the facility/SNF care plan/MDS, including the falls risk, nutrition and pain screening. I also reviewed the current immunizations, and preventive care.. Future cancer screening is not clinically indicated secondary to age/goals of care. Patient's desire to return to the community is not assessible due to cognitive impairment. Current Level of Care is appropriate.mhgeroimmunization: Annual Influenza per facility protocol    Advance Directive Discussion:    I reviewed the current advanced directives as reflected in EPIC, the POLST and the facility chart, and verified the congruency of orders . I contacted the first party  and discussed the plan of care. I did not due to cognitive impairment review the advance directives with the resident.     Team Discussion:  I communicated with the appropriate disciplines involved with the Plan of Care: Nursing  .   Patient's goal is: unobtainable secondary to cognitive  "impairment.  Information reviewed: Medications, vital signs, orders, and nursing notes.    ROS:  Unobtainable secondary to cognitive impairment.     Vitals:  /78   Pulse 65   Temp 97.6  F (36.4  C)   Resp 18   Ht 1.702 m (5' 7\")   Wt 76.8 kg (169 lb 4.8 oz)   SpO2 96%   BMI 26.52 kg/m   Body mass index is 26.52 kg/m .  Exam:  GENERAL APPEARANCE:  Alert, in no distress  RESP:  no respiratory distress  CV:  rate-normal  PSYCH:  insight and judgement impaired, memory impaired      Lab/Diagnostic data:   Most Recent 3 CBC's:  Recent Labs   Lab Test 02/21/24  0626 02/16/24  1126 02/15/24  0826   WBC 5.8 4.4 4.4   HGB 10.2* 10.6* 10.1*   MCV 94 93 94    151 134*     Most Recent 3 BMP's:  Recent Labs   Lab Test 02/21/24  0626 02/16/24  0835 02/16/24  0558 02/15/24  1125 02/15/24  0826 02/14/24  1147 02/14/24  0810     --   --   --  139  --  137   POTASSIUM 3.7  --   --   --  3.5  --  3.5   CHLORIDE 103  --   --   --  105  --  102   CO2 25  --   --   --  26  --  25   BUN 13.7  --   --   --  11.6  --  10.4   CR 0.61*  --   --   --  0.57*  --  0.55*   ANIONGAP 10  --   --   --  8  --  10   FERMIN 9.1  --   --   --  8.5*  --  8.7*   * 117* 120*   < > 116*   < > 130*    < > = values in this interval not displayed.     Device check (4/2024)  [employer] [place] (D) Remote PPM Device Check  AP: 37% since 2/14/24; 88% from 12/27/23 to 2/14/24  : 94%  Mode: DDD   Presenting Rhythm: AS/VS  Historical underlying rhythm: SR 60's bpm as of 9/2023  Heart Rate: adequate rates per histogram   Sensing: stable  Pacing Threshold: stable  Impedance: stable  Battery Status: 12 years remaining   Atrial Arrhythmia: none  Ventricular Arrhythmia: none     Care Plan: F/U [facility] ppm in 3 months. OV with cardiology was canceled due to patient being in care facility. SHERLYN with results and next appointment. NICOLE Smith     ECHO (2/2024)   Interpretation Summary     The visual ejection fraction is 55-60%.  Grade I or " early diastolic dysfunction.  No regional wall motion abnormalities noted.  There is a bioprosthetic aortic valve.  The prosthetic aortic valve is well-seated.  There is trace aortic regurgitation.  Mean aortic gradient 14mmHg,    ASSESSMENT/PLAN  (S42.201A) Displaced fracture of proximal end of right humerus  )  R53.1) Weakness  (R53.81) Physical deconditioning  Comment:   Fell  Displaced fracture of right humerus and non-operative recommendations  Pt saw [facility] in 4/2024 and he has limited motion of his right arm.  Discontinued to immobilizer.  If arm hurts, he won't use his arm.  No follow up needed  Plan: Continue with plan of care no changes at this time, adjustment as needed        (I50.9) Congestive heart failure, unspecified HF chronicity, unspecified heart failure type (H)  (I44.2) Complete heart block (H)  (I25.10) Coronary artery disease involving native coronary artery of native heart without angina pectoris  (Z95.0) Cardiac pacemaker in situ  (Z95.3) Status post transcatheter aortic valve replacement (TAVR) using bioprosthesis  (I65.21) Stenosis of right carotid artery  Comment: chronic.  Currently taking rosuvastatin, aspirin,  VS and Weight is stable   Plan:   Continue with plan of care no changes at this time, adjustment as needed           (E11.59) Type 2 diabetes mellitus with other circulatory complication, without long-term current use of insulin (H)  Comment: chronic.     Diet controlled  Last hgb A1c=6.4 (2/2024)   Blood sugars have been less than 180   Plan:   Stop taking blood sugars        (D64.9) Anemia, unspecified type  Comment: Chronic.  Taking Vitamin B12.   Plan: CBC with next labs check    Consider iron studies when hgb stabilizes        (F32.1) Depression, major, single episode, moderate (H)  (F41.9) anxiety   Comment: chronic   Taking citalopram, hydrozyzine and trazodone  Plan: Continue with plan of care no changes at this time, adjustment as needed        (K59.01) Slow transit  constipation  Comment: chronic.   Currently taking MiraLAX and prn senna    Plan: Continue with plan of care no changes at this time, adjustment as needed          [F03.C4] Severe dementia with anxiety, unspecified dementia type (H)   Comment: Chronic, progressive.   Patient's last BIMS was 2/15 which indications severe impairment,  Review of systems in HPI difficult to obtain cognitive impairment. Patient requiring 24-hour skilled nursing care and would be a high risk of wandering if he were moved off the dementia care unit.   He has had difficultly sleeping and been restless.  Hydroxyzine and trazodone were started with good success.   Expect further functional and cognitive decline.  Pt did not have urinary retention with start of hydroxyzine  Expect weight loss.     Plan:Continue with plan of care no changes at this time, adjustment as needed         Electronically signed by:      LESTER Otoole CNP  1940      Orders  Stop taking blood sugars.   Pt's blood sugars have been controlled with diet.      LESTER Otoole CNP on 6/15/2024 at 7:59 PM      Sincerely,        LESTER Otoole CNP

## 2024-06-14 NOTE — TELEPHONE ENCOUNTER
Prior Authorization Retail Medication Request    Medication/Dose: Hydroxyzine HCI 25MG Tbalets  Diagnosis and ICD code (if different than what is on RX):    New/renewal/insurance change PA/secondary ins. PA:  Previously Tried and Failed:  N/A  Rationale:  SEE DX    Insurance   Primary:     [facility] MEDICARE ADVANTAGE     Insurance ID:  [id number]     Secondary (if applicable):MEDICARE   Insurance ID:  [id number]     Pharmacy Information (if different than what is on RX)      [place], [place] - [address] [place] [address]     Phone: [phone]     Fax: [phone]

## 2024-06-16 NOTE — PROGRESS NOTES
Erick Shahid  1940      Orders  Stop taking blood sugars.   Pt's blood sugars have been controlled with diet.      LESTER Otoole CNP on 6/15/2024 at 7:59 PM

## 2024-06-20 NOTE — TELEPHONE ENCOUNTER
Prior Authorization Approval    Authorization Effective Date: 3/22/2024  Authorization Expiration Date: 6/20/2025  Medication: Hydroxyzine HCI 25MG Tbalets  Reference #:     Insurance Company: [employer] - Phone [phone] Fax [phone]  Which Pharmacy is filling the prescription (Not needed for infusion/clinic administered): [place], [place] - [address] [facility]  Pharmacy Notified: Yes  Patient Notified: Instructed pharmacy to notify patient when script is ready to /ship.

## 2024-06-20 NOTE — TELEPHONE ENCOUNTER
Central Prior Authorization Team   Phone: [phone]    PA Initiation    Medication: Hydroxyzine HCI 25MG Tbalets  Insurance Company: [employer] - Phone [phone] Fax [phone]  Pharmacy Filling the Rx: [facility] - [place], [place] - [address] [facility]  Filling Pharmacy Phone: [phone]  Filling Pharmacy Fax:    Start Date: 6/20/2024

## 2024-06-27 NOTE — UTILIZATION REVIEW
Admission Status; Secondary Review Determination       Under the authority of the Utilization Management Committee, the utilization review process indicated a secondary review on the above patient. The review outcome is based on review of the medical records, discussions with staff, and applying clinical experience noted on the date of the review.     (x) Inpatient Status Appropriate - This patient's medical care is consistent with medical management for inpatient care and reasonable inpatient medical practice.     RATIONALE FOR DETERMINATION   83-year-old male with a medical history of cognitive impairment, hyperlipidemia, diabetes mellitus, coronary artery disease, and critical aortic stenosis presented to the ED due to two unwitnessed falls and progressive cognitive decline. Critical aortic stenosis with an aortic valve area of 0.63 and gradient of 63 mmHg was diagnosed, along with a new cardiomyopathy and HFrEF likely secondary to the aortic stenosis. Troponin levels were elevated due to demand ischemia from the falls. He also had coronary artery disease, LBBB, and hyperlipidemia. Cardiology recommended TAVR, initially patient refused and was waiting for placement, with decreased ejection fraction down to 30% and risk with patient syncope cardiology discussed with the patient and the family and they agreed on having TAVR done.  The patient is undergoing work-up for the procedure, including a coronary angiogram today.   Discussed with Dr. Wallis    The expected length of stay at the time of admission was more than 2 nights because of the severity of illness, intensity of service provided, and risk for adverse outcome. Inpatient admission is appropriate.         This document was produced using voice recognition software       The information on this document is developed by the utilization review team in order for the business office to ensure compliance. This only denotes the appropriateness of proper  Patient's prescription was refilled on 6/26/2024 by Dr. Pelon Rodriguez.   admission status and does not reflect the quality of care rendered.   The definitions of Inpatient Status and Observation Status used in making the determination above are those provided in the CMS Coverage Manual, Chapter 1 and Chapter 6, section 70.4.   Sincerely,   NATALIIA ANDUJAR MD   System Medical Director   Utilization [facility].

## 2024-07-12 NOTE — TELEPHONE ENCOUNTER
[facility] Triage Nurse Telephone Encounter    Provider: LESTER Colvin CNP  Facility: [facility] Facility Type:  [facility]    Caller: Constance  Call Back Number: [phone]    Allergies:  No Known Allergies     Reason for call:  Patient had a bowel movement last evening with blood streaks in it.  Blood also noted when wiping patient.  Blood was bright red.  No odor.  No further incidences since.  Patient denies nausea or abdominal pain.  Nurse not sure if patient has hemorrhoid's.  VS; 101/66, 60, 18, 97.7. and O2 96% on RA.  Advised nurse to update us if issue happens again.  Anything else you want to add?    Verbal Order/Direction given by Provider: Preparation H or Anusol ointment (or therapeutic equivalent) per package instructions QID PRN after bowel movement for hemorrhoid pain  per MAR.    Provider giving Order:  LESTER Colvin CNP    Verbal Order given to: Cedric Anderson RN

## 2024-07-23 NOTE — TELEPHONE ENCOUNTER
[facility] Triage Nurse Telephone Encounter    Provider: LESTER Colvin CNP  Facility: [facility] Facility Type:  [facility]    Caller: Gideon  Call Back Number: [phone]    Allergies:  No Known Allergies     Reason for call: Pt was left alone in the bathroom and fell attempting to self transfer at 1230. No c/o discomfort and pain in RLE and lower back. No other injuries noted. VS stable.     Verbal Order/Direction given by Provider:   - Monitor for increased pain and f unable to move the hip/perform ROM then send to ER for imaging    Provider giving Order:  Nadeem Ochoa MD    Verbal Order given to: Cedric Degroot, RN    UPDATE: THIS WRITER RECEIVED A CALL FROM [facility] AND SHE IS REQUESTING AN X-RAY ORDER VERSUS SENDING TO ER.  IF PAIN CONTINUES TO GET WORSE OF COURSE THEY WILL SEND OUT BUT RIGHT NOW THEY WOULD LIKE AN X-RAY TO BE DONE IN-HOUSE.  UNABLE TO GET A HOLD OF FAMILY TO SPEAK WITH THEM REGARDING THE OPTIONS.  PATIENT IS ABLE TO STAND, NO DEFORMITIES NOTED, OR SHORTENING OF THE LEG.  PAIN IS NEAR THE RIGHT HIP.  THIS WRITER GAVE ORDER FOR X-RAY.      DEB SAMUELS RN

## 2024-07-24 PROBLEM — S12.591A: Status: ACTIVE | Noted: 2024-01-01

## 2024-07-24 PROBLEM — S32.011A CLOSED STABLE BURST FRACTURE OF FIRST LUMBAR VERTEBRA, INITIAL ENCOUNTER (H): Status: ACTIVE | Noted: 2024-01-01

## 2024-07-24 NOTE — H&P
[facility]    History and Physical  Hospitalist       Date of Admission:  7/23/2024  Date of Service (when I saw the patient): 07/24/24    ASSESSMENT  Erick Shahid is a markedly pleasant 84 year old gentleman with past medical history that is most notable for chronic dementia, as well as CAD, chronic systolic CHF, critical aortic stenosis status post TAVR, complete heart block status post pacemaker placement, and Type 2 Diabetes mellitus, among others; who presents with mechanical fall and is found to have acute C6 cervical spine fracture and acute L1 burst fracture.    PLAN     Acute C6 cervical spine fracture and acute L1 burst fracture: Of note, Mr. Shahid has advanced dementia and resides in a memory care facility. He was admitted here after a fall in 2/2024 and found to have an acute right humerus fracture, that was managed non-operatively.     Now, he presents for evaluation after another mechanical fall. In the ED, he is afebrile, without hypotension, tachycardia, or hypoxia. X-rays of pelvis and right shoulder show a healing chronic right humerus fracture without clear evidence of other acute fractures. CT of thoracic spine reportedly shows changes of ankylosing spondylitis without acute fracture. CT Head shows no acute process. CT of cervical spine shows an acute discovertebral fracture involving C6, with mild loss of height, and mild widening of the C6-C7 disc space. CT of the lumbar spine shows an acute burst fracture of L1, without loss of height of L1, involving the bilateral pedicles, with approximately 5 to 6 mm retropulsion with mild spinal stenosis.      -- Inpatient. NPO. Bedrest. Fall precautions. Q 4 neuro checks. Neurosurgery consulted. MRI scans of cervical and lumbar spine ordered. Tylenol, Oxycodone, IV Dilaudid as needed for pain. Anti-emetics as needed.    -- His care goals have been discussed by me tonight with his family who confirm DNR DNI status and  request focus and attention primarily on palliative relief of pain, anxiety, and any agitation. They are unsure if neurosurgical intervention would be within his goals of care at this point in his life.    -- Repeat CBC and BMP in AM. SW consulted for disposition planning.    Acute leukocytosis: Suspect due to stress reaction. Repeat in AM.    Acute on chronic thrombocytopenia: Mild at 107; has been as low as 133 in the past. If it drops further in AM could consider Hematology evaluation    Possible ankylosing spondylitis: Suggested incidentally on CT imaging. Unclear if he has been diagnosed with such a condition in the past. Noted    Chronic dementia with sleep disturbance: Would order low dose Seroquel if agitation, hallucination, or sundowning occur overnight.      -- Resume home celexa, trazodone when verified.    CAD : Cardiac catheterization done in 7/2023 reportedly showed single-vessel occlusive disease of a mid small size nondominant RCA. The remainder of coronary arteries showed minimal nonocclusive disease.      -- Resume ASA 81 mg pending Neurosurgical guidance    Critical aortic stenosis and chronic systolic CHF with improved LVEF: Prior EF had been as low as 35% reportedly. He underwent TAVR in 8/2023. Most recent TTE in 2/2024 showed preserved LVEF.      -- Caution with IV fluid while hospitalized    Complete heart block: Noted after TAVR in 8/2023. He underwent placement of dual chamber pacemaker. Noted    Hyperlipidemia: Resume home crestor when verified    Type 2 Diabetes mellitus:   Recent Labs   Lab Test 02/05/24  0630   A1C 6.4*    Diet controlled as an outpatient.    -- ISS insulin while hospitalized.     Chronic anemia: Monitor closely while hospitalized  Recent Labs   Lab Test 07/24/24  0100 02/21/24  0626 02/16/24  1126   HGB 10.2* 10.2* 10.6*     Macular degeneration: Resume home eye drops when verified.    GERD: Resume PPI when verified    I have spent 75 minutes on the date of service  doing chart review, history, examination, documentation, and further activities per the note.    Chief Complaint   Neck pain    Unable to obtain a history from the patient due to confusion ; history obtained from children at the bedside, and the ED physician whom I have spoken with    History of Present Illness   Erick Shahid is a markedly pleasant 84 year old gentleman who presents from his memory care facility after a fall, while in the bathroom; reportedly staff heard the fall from outside and came in and found him on the ground. He was brought in for further evaluation. wIn the ED he has complained of neck pain as well as pain in the back and right shoulder. On my interview now, after a collar has been placed, he is sleeping peacefully and denies pain when easily awakened. His family say that he often has vivid dreams at night and becomes unable to tell dream from reality, and becomes easily agitated at night. He has fallen multiple times, most recently 2/2024 when he fractured his right humerus. He requires assistance for all activities of daily living normally. He otherwise denies any other acute complaints.    In the ED,   07/23 2108 125/59 99.1  F (37.3  C) 79 18 96 %     CBC and BMP were notable for WBC 14.3, HGB 10.2, , Glucose 166, otherwise were within the normal reference range.     The case was discussed with Neurosurgery in the ED.    Recent Results (from the past 24 hour(s))   XR Pelvis w Hip Right 1 View    Narrative    EXAM: XR PELVIS AND HIP RIGHT 1 VIEW  LOCATION: [facility]  DATE: 7/23/2024    INDICATION: fall, hip pain  COMPARISON: 7/23/2023.      Impression    IMPRESSION: Internal fixation bolt extends across an old, healed right femoral neck fracture. Intramedullary samantha extends the length of the right femur, with 2 distal locking screws. No evidence for acute femoral fracture. Osteopenia. Cortical defect   along the medial wall of the right acetabulum  not present previously and concerning for an acute fracture. This could be further characterized with CT.    XR Shoulder Right G/E 3 Views    Narrative    EXAM: XR SHOULDER RIGHT G/E 3 VIEWS  LOCATION: [facility]  DATE: 7/23/2024    INDICATION: shoulder pain s p fall  COMPARISON: 2/13/2024      Impression    IMPRESSION: Continued healing of the displaced humeral head and neck fracture with partial resorption and remodeling of the humeral head fragment. Mild to moderate AC joint degeneration.   CT Head w/o Contrast    Narrative    EXAM: CT HEAD W/O CONTRAST, CT CERVICAL SPINE W/O CONTRAST  LOCATION: [facility]  DATE: 7/23/2024    INDICATION: Unwitnessed fall. Possible loss of consciousness. Possible head injury.  COMPARISON: None.  TECHNIQUE:   1) Routine CT Head without IV contrast. Multiplanar reformats. Dose reduction techniques were used.  2) Routine CT Cervical Spine without IV contrast. Multiplanar reformats. Dose reduction techniques were used.    FINDINGS:   HEAD CT:   INTRACRANIAL CONTENTS: No intracranial hemorrhage, extraaxial collection, or mass effect.  No CT evidence of acute infarct. Mild to moderate presumed chronic small vessel ischemic changes. Mild to moderate generalized volume loss. No hydrocephalus.     VISUALIZED ORBITS/SINUSES/MASTOIDS: No intraorbital abnormality. No paranasal sinus mucosal disease. No middle ear or mastoid effusion.    BONES/SOFT TISSUES: No acute abnormality.    CERVICAL SPINE CT:   VERTEBRA: Acute fracture involving C6 with a vertically oriented fracture line involving the anterior C6 vertebra extending from the superior to inferior endplates, mild C6-C7 disc space widening. Mild loss of height of C6. Other vertebral bodies are   normal in height. Loss of lordosis. Osteopenic bones.    CANAL/FORAMINA: At C3-4, severe bilateral foraminal stenosis. At C4-5, moderate bilateral foraminal stenosis. At C5-6, moderate bilateral  foraminal stenosis. At C6-7, moderate bilateral foraminal stenosis.    PARASPINAL: No extraspinal abnormality. Visualized lung fields demonstrate chronic scarring right upper lobe.      Impression    IMPRESSION:  HEAD CT:  1.  No CT evidence for acute intracranial process.  2.  Brain atrophy and presumed chronic microvascular ischemic changes as above.    CERVICAL SPINE CT:  1.  Acute discovertebral fracture involving C6 with mild loss of height, mild widening of the C6-C7 disc space.   CT Thoracic Spine w/o Contrast    Narrative    EXAM: CT THORACIC SPINE W/O CONTRAST  LOCATION: [facility]  DATE: 7/23/2024    INDICATION: Fall, back pain.  COMPARISON: None.  TECHNIQUE: Routine CT Thoracic Spine without IV contrast. Multiplanar reformats. Dose reduction techniques were used.     FINDINGS:  VERTEBRA: No fracture or posttraumatic subluxation. Changes of ankylosing spondylitis. Osteopenic bones.    CANAL/FORAMINA: No high grade canal or neural foraminal stenosis.    PARASPINAL: No acute extraspinal abnormality. Multiple old right rib fractures.      Impression    IMPRESSION:  1.  No fracture or posttraumatic subluxation.  2. Changes of ankylosing spondylitis.   CT Lumbar Spine w/o Contrast    Narrative    EXAM: CT LUMBAR SPINE W/O CONTRAST  LOCATION: [facility]  DATE: 7/23/2024    INDICATION: Fall, back pain  COMPARISON: None.  TECHNIQUE: Routine CT Lumbar Spine without IV contrast. Multiplanar reformats. Dose reduction techniques were used.     FINDINGS:  VERTEBRA: Acute burst fracture involving L1 extending into bilateral pedicles, approximately 5 mm retropulsion with mild spinal stenosis. No loss of height of the vertebral body. Grade 1 anterolisthesis L4-L5, minor retrolisthesis L5-S1. Changes of DISH.   Osteopenic bones.    CANAL/FORAMINA: At L4-5, moderate central stenosis, high-grade left foraminal stenosis.    PARASPINAL: No acute extraspinal abnormality.        Impression    IMPRESSION:  1. Acute burst fracture L1 without loss of height of L1, involving bilateral pedicles, approximately 5 to 6 mm retropulsion with mild spinal stenosis. Consider follow-up MRI to exclude posterior ligamentous complex involvement.     CT Cervical Spine w/o Contrast    Narrative    EXAM: CT HEAD W/O CONTRAST, CT CERVICAL SPINE W/O CONTRAST  LOCATION: [facility]  DATE: 7/23/2024    INDICATION: Unwitnessed fall. Possible loss of consciousness. Possible head injury.  COMPARISON: None.  TECHNIQUE:   1) Routine CT Head without IV contrast. Multiplanar reformats. Dose reduction techniques were used.  2) Routine CT Cervical Spine without IV contrast. Multiplanar reformats. Dose reduction techniques were used.    FINDINGS:   HEAD CT:   INTRACRANIAL CONTENTS: No intracranial hemorrhage, extraaxial collection, or mass effect.  No CT evidence of acute infarct. Mild to moderate presumed chronic small vessel ischemic changes. Mild to moderate generalized volume loss. No hydrocephalus.     VISUALIZED ORBITS/SINUSES/MASTOIDS: No intraorbital abnormality. No paranasal sinus mucosal disease. No middle ear or mastoid effusion.    BONES/SOFT TISSUES: No acute abnormality.    CERVICAL SPINE CT:   VERTEBRA: Acute fracture involving C6 with a vertically oriented fracture line involving the anterior C6 vertebra extending from the superior to inferior endplates, mild C6-C7 disc space widening. Mild loss of height of C6. Other vertebral bodies are   normal in height. Loss of lordosis. Osteopenic bones.    CANAL/FORAMINA: At C3-4, severe bilateral foraminal stenosis. At C4-5, moderate bilateral foraminal stenosis. At C5-6, moderate bilateral foraminal stenosis. At C6-7, moderate bilateral foraminal stenosis.    PARASPINAL: No extraspinal abnormality. Visualized lung fields demonstrate chronic scarring right upper lobe.      Impression    IMPRESSION:  HEAD CT:  1.  No CT evidence for acute  intracranial process.  2.  Brain atrophy and presumed chronic microvascular ischemic changes as above.    CERVICAL SPINE CT:  1.  Acute discovertebral fracture involving C6 with mild loss of height, mild widening of the C6-C7 disc space.       PHYSICAL EXAM  Blood pressure 121/69, pulse 82, temperature 99.1  F (37.3  C), temperature source Oral, resp. rate 18, SpO2 92%.  Constitutional: Alert and oriented to person only; no apparent distress  HEENT: cervical collar in place moist mucus membranes  Respiratory: lungs clear to auscultation bilaterally  Cardiovascular: regular S1 S2  GI: abdomen soft non tender non distended bowel sounds positive  Musculoskeletal: mild to moderate bilateral LE edema     DVT Prophylaxis: Pneumatic Compression Devices  Code Status: DNR / DNI, confirmed with family    Disposition: Expected discharge in several days    Justin Andrade MD, MD    Past Medical History    I have reviewed this patient's medical history and updated it with pertinent information if needed.   Past Medical History:   Diagnosis Date    Benign essential hypertension     BPH (benign prostatic hyperplasia)     CAD (coronary artery disease)     Cardiac pacemaker in situ 08/2023    For complete heart block    Carotid stenosis     Chronic heart failure with reduced ejection fraction and diastolic dysfunction (H)     Critical aortic valve stenosis     TAVR done in 2023    Dementia (H)     Diabetes mellitus, type 2 (H)     on metformin    Dyslipidemia     LBBB (left bundle branch block)     Macular degeneration (senile) of retina     Right humeral fracture 02/2024    Sleep disturbances        Past Surgical History   I have reviewed this patient's surgical history and updated it with pertinent information if needed.  Past Surgical History:   Procedure Laterality Date    CV CORONARY ANGIOGRAM N/A 07/28/2023    Procedure: Coronary Angiogram;  Surgeon: Mando Field MD;  Location: [facility] CARDIAC CATH LAB    CV  PCI N/A 07/28/2023    Procedure: Percutaneous Coronary Intervention;  Surgeon: Mando Field MD;  Location:  [facility]ARD[facility] C[facility] LAB    CV TRANSCATHETER AORTIC VALVE REPLACEMENT  08/15/2023    CV TRANSCATHETER AORTIC VALVE REPLACEMENT-FEMORAL APPROACH N/A 8/15/2023    Procedure: Transcatheter Aortic Valve Replacement-Femoral Approach;  Surgeon: Bossman Felipe MD;  Location:  [facility] CARDIAC CATH LAB    EP PACEMAKER DEVICE & LEAD IMPLANT- RIGHT ATRIAL & LEFT VENTRICULAR N/A 8/15/2023    Procedure: Pacemaker Device & Lead Implant- Right Atrial & Left Ventricular;  Surgeon: Oscar Mendoza MD;  Location:  [facility] CARDI[facility] CATH LAB    ORIF HIP FRACTURE Right 2018       Prior to Admission Medications   Prior to Admission Medications   Prescriptions Last Dose Informant Patient Reported? Taking?   Vitamin D3 (CHOLECALCIFEROL) 25 mcg (1000 units) tablet  Spouse/Significant Other Yes No   Sig: Take 25 mcg by mouth daily   acetaminophen (TYLENOL) 500 MG tablet   Yes No   Sig: Take 1,000 mg by mouth 3 times daily   aspirin (ASA) 81 MG chewable tablet   No No   Sig: Take 1 tablet (81 mg) by mouth daily   citalopram (CELEXA) 20 MG tablet   Yes No   Sig: Take 20 mg by mouth daily   hydrOXYzine HCl (ATARAX) 25 MG tablet   No No   Sig: Take 1 tablet (25 mg) by mouth at bedtime   hydrocortisone 1 % CREA cream   Yes No   Sig: Place rectally 4 times daily as needed for itching   omeprazole (PRILOSEC) 20 MG DR capsule   No No   Sig: Take 1 capsule (20 mg) by mouth daily   polyethylene glycol (MIRALAX) 17 g packet   Yes No   Sig: Take 1 packet by mouth daily   rosuvastatin (CRESTOR) 20 MG tablet  Spouse/Significant Other Yes No   Sig: Take 20 mg by mouth at bedtime   senna-docusate (SENOKOT-S/PERICOLACE) 8.6-50 MG tablet   Yes No   Sig: Take 2 tablets by mouth daily as needed for constipation   traZODone (DESYREL) 50 MG tablet   Yes No   Sig: Take 50 mg by mouth at bedtime Take 25 mg in the AM   vitamin B-12  (CYANOCOBALAMIN) 1000 MCG tablet  Spouse/Significant Other Yes No   Sig: Take 1,000 mcg by mouth daily   vitamin C (ASCORBIC ACID) 500 MG tablet  Spouse/Significant Other Yes No   Sig: Take 500 mg by mouth daily      Facility-Administered Medications: None     Allergies   No Known Allergies    Social History   I have reviewed this patient's social history and updated it with pertinent information if needed. Erick Shahid  reports that he has never smoked. He has never used smokeless tobacco. He reports current alcohol use. He reports that he does not use drugs.    Family History   Family history assessed and, except as above, is non-contributory.    No family history on file.    Review of Systems   The 10 point Review of Systems is negative other than noted in the HPI or here.     Primary Care Physician   Yancy Walker    Data   Labs Ordered and Resulted from Time of ED Arrival to Time of ED Departure - No data to display    Data reviewed today:  I personally reviewed the head CT image(s) showing no acute pathology .

## 2024-07-24 NOTE — PLAN OF CARE
"Goal Outcome Evaluation:    Denies CP, SOB. All pt needs met at this time. PCDs on. Adelaida George Neurosurg PAC called RN with MRI results and she ordered xray. She verbalized that pt could do xrays sitting in chair since pt is Ax2 with lift and bedrest. She also verbalized that pt should stay bedrest until Xrays come back. She verbalized that pt could eat from her perspective (RN paged hospitalist who changed order.) Per wife, pt was eating a \"soft diet\" with \"mildly thickened liquids\" at his [facility] unit, hospitalist put in diet order. RN paged Yara ADAME neurosurg PAC at 1957 with xray results, oncoming RN aware.       "

## 2024-07-24 NOTE — ED PROVIDER NOTES
Emergency Department Note      History of Present Illness     Chief Complaint   Fall and Hip Pain    History is limited due to the condition of the patient.     HPI   Erick Shahid is a 84 year old male with a history of hypertension, chronic heart failure, diabetes mellitus 2, and aortic valve stenosis amongst others who presents to the ED with hip pain following a fall. He is accompanied in the ED by his son and daughter who explain that earlier today the patient suffered a fall while using the bathroom. The fall itself was unwitnessed but care home staff reported shortly afterward and the patient was lying on his right side. Son and daughter are unsure if there was loss of consciousness. Patient presents with pain in the right shoulder, right leg, and lower back. Patient is ambulatory via a wheelchair at baseline.     Independent Historian   Son and Daughter as detailed above.    Review of External Notes   I reviewed medical records from: Hospital admission on 2/13/2024 for a fall     Past Medical History     Medical History and Problem List   Benign essential hypertension  BPH (benign prostatic hyperplasia)  Chronic heart failure with reduced ejection fraction and diastolic dysfunction   Cognitive impairment  Critical aortic valve stenosis  Diabetes mellitus, type 2  Dyslipidemia  First degree atrioventricular block  LBBB (left bundle branch block)  Macular degeneration (senile) of retina  Dementia  Depression  Complete heart block  Traumatic rhabdomyolysis  Multiple falls    Medications   Tylenol  Aspirin 81mg  Atarax  Prilosec  Miralax  Crestor  Senokot  Desyrel  Cyanocobalamin  Ascorbic acid  Cholecalciferol     Surgical History   Past Surgical History:   Procedure Laterality Date    CV CORONARY ANGIOGRAM N/A 07/28/2023    Procedure: Coronary Angiogram;  Surgeon: Mando Field MD;  Location: [facility] CARDIAC CATH LAB    CV PCI N/A 07/28/2023    Procedure: Percutaneous Coronary Intervention;   Surgeon: Mando Field MD;  Location:  HEART CARDIAC CATH LAB    CV TRANSCATHETER AORTIC VALVE REPLACEMENT  08/15/2023    CV TRANSCATHETER AORTIC VALVE REPLACEMENT-FEMORAL APPROACH N/A 8/15/2023    Procedure: Transcatheter Aortic Valve Replacement-Femoral Approach;  Surgeon: Bossman Felipe MD;  Location:  HEART CARDIAC CATH LAB    EP PACEMAKER DEVICE & LEAD IMPLANT- RIGHT ATRIAL & LEFT VENTRICULAR N/A 8/15/2023    Procedure: Pacemaker Device & Lead Implant- Right Atrial & Left Ventricular;  Surgeon: Oscar Mendoza MD;  Location:  HEART CARDIAC CATH LAB    ORIF HIP FRACTURE Right 2018       Physical Exam     Patient Vitals for the past 24 hrs:   BP Temp Temp src Pulse Resp SpO2   07/23/24 2340 -- -- -- -- -- 92 %   07/23/24 2330 121/69 -- -- 82 -- --   07/23/24 2200 114/71 -- -- 82 -- 92 %   07/23/24 2130 117/57 -- -- 81 -- 97 %   07/23/24 2110 125/59 -- -- -- -- 94 %   07/23/24 2108 125/59 99.1  F (37.3  C) Oral 79 18 96 %     Physical Exam  Constitutional: Well developed, nontox appearance, hard of hearing  Head: Atraumatic.   Neck:  no stridor, no C-spine tenderness  Eyes: no scleral icterus  Cardiovascular: RRR, 2+ bilat radial pulses  Pulmonary/Chest: nml resp effort  Abdominal: ND, soft, NT, no rebound or guarding   Back: No obvious T or L-spine tenderness (exam still limited secondary to patient's dementia and ability to follow direction)  Ext: Warm, well perfused, limited range of motion at baseline of right shoulder, no new obvious deformity, some limited range of motion at right hip, left upper and lower extremity unremarkable  Neurological: Disoriented at baseline, symmetric facies, moves ext x4, neuroexam limited secondary to patient's dementia and ability to follow directions  Skin: Skin is warm and dry.   Psychiatric: Behavior is normal. Thought content normal.   Nursing note and vitals reviewed.    Diagnostics     Lab Results   Labs Ordered and Resulted from Time of ED Arrival to  Time of ED Departure - No data to display    Imaging   CT Head w/o Contrast   Final Result   IMPRESSION:   HEAD CT:   1.  No CT evidence for acute intracranial process.   2.  Brain atrophy and presumed chronic microvascular ischemic changes as above.      CERVICAL SPINE CT:   1.  Acute discovertebral fracture involving C6 with mild loss of height, mild widening of the C6-C7 disc space.      CT Thoracic Spine w/o Contrast   Final Result   IMPRESSION:   1.  No fracture or posttraumatic subluxation.   2. Changes of ankylosing spondylitis.      CT Lumbar Spine w/o Contrast   Final Result   IMPRESSION:   1. Acute burst fracture L1 without loss of height of L1, involving bilateral pedicles, approximately 5 to 6 mm retropulsion with mild spinal stenosis. Consider follow-up MRI to exclude posterior ligamentous complex involvement.         CT Cervical Spine w/o Contrast   Final Result   IMPRESSION:   HEAD CT:   1.  No CT evidence for acute intracranial process.   2.  Brain atrophy and presumed chronic microvascular ischemic changes as above.      CERVICAL SPINE CT:   1.  Acute discovertebral fracture involving C6 with mild loss of height, mild widening of the C6-C7 disc space.      XR Shoulder Right G/E 3 Views   Final Result   IMPRESSION: Continued healing of the displaced humeral head and neck fracture with partial resorption and remodeling of the humeral head fragment. Mild to moderate AC joint degeneration.      XR Pelvis w Hip Right 1 View   Final Result   IMPRESSION: Internal fixation bolt extends across an old, healed right femoral neck fracture. Intramedullary samantha extends the length of the right femur, with 2 distal locking screws. No evidence for acute femoral fracture. Osteopenia. Cortical defect    along the medial wall of the right acetabulum not present previously and concerning for an acute fracture. This could be further characterized with CT.       MR Cervical Spine w/o Contrast    (Results Pending)   Lumbar  spine MRI w/o contrast    (Results Pending)     Independent Interpretation   Right shoulder x-ray chronic fracture to the humeral head without evidence of dislocation, right hip and pelvis x-ray with no evidence of dislocation or fracture    ED Course      Medications Administered   Medications - No data to display    Procedures   Procedures     Discussion of Management   Neurosurgery, Yara Francisco  Admitting hospitalist, Dr. Andrade    ED Course   ED Course as of 07/24/24 0041   e Jul 23, 2024 2116 I obtained history and examined the patient as noted above.     Optional/Additional Documentation  Social determinants affecting patient's health include: Age increasing risk of presentation to the emergency department     Medical Decision Making / Diagnosis     CMS Diagnoses: None    MIPS       None    MDM   Erick Shahid is a 84 year old male presenting w/ reported right shoulder and right hip pain status post mechanical fall     DDx includes fracture, contusion, sprain, tendinitis, dislocation, extremity pain NOS.  Doubt vascular etiology, septic arthritis given history and physical exam.  No evidence of compartment syndrome on exam.  CMS is intact distally in the extremity.   No other concern for significant trauma other than the areas imaged as above based on history and physical exam.  Imaging sig for C6 and L1 fractures as described above.  Discussed patient with neurosurgery who recommended MRIs of C-spine and L-spine as well as admission for further evaluation and management.  C-collar placed in the emergency department were and spinal precautions ordered.  Basic labs ordered and pending upon admission.  The patient was subsequently admitted to the hospitalist service for further evaluation management.  Interventions as noted above with improvement in symptoms.  Patient's children counseled on all results, diagnosis and disposition.  They are understanding and agreeable to plan. Patient admitted in  guarded condition..      Disposition   The patient was admitted to the hospital.     Diagnosis     ICD-10-CM    1. Other closed nondisplaced fracture of sixth cervical vertebra, initial encounter (H)  S12.591A       2. Closed stable burst fracture of first lumbar vertebra, initial encounter (H)  S32.011A            Discharge Medications   New Prescriptions    No medications on file     Scribe Disclosure:  I, HOANG PERLA, am serving as a scribe at 9:22 PM on 7/23/2024 to document services personally performed by Julio Cesar Boyd MD based on my observations and the provider's statements to me.      Julio Cesar Boyd MD  07/24/24 0041       Julio Cesar Boyd MD  07/24/24 0042

## 2024-07-24 NOTE — ED TRIAGE NOTES
Pt brought in by EMS from [facility]. Fell today after going to bathroom, hitting R hip and possible R shoulder. Some swelling noted.  Pt is confused and is at baseline. Denies pain at this time, xray unable to come get images until tomorrow.

## 2024-07-24 NOTE — TELEPHONE ENCOUNTER
Is the implanted device safe for MRI Exam? Yes  Is this device 3T compatible? Yes  Device Type: Pacemaker      Device Information: [employer], PPM [place] (D)      Cardiology Orders for Device Programming     -- Yes -- The patient has a MRI conditional pulse generator and leads from the same     -- Yes -- The pulse generator and leads have been implanted for at least 6 weeks. (Does not apply to [employer] devices)    -- Yes-- The device is implanted in the right or left pectoral region    -- Correct, none known -- There are not any additional active cardiac devices, abandoned leads, lead extenders or adapters    -- Yes -- Lead impedances are within the normal range per  guidelines.    -- Yes -- If the patient is pacemaker dependent the thresholds are less than or equal to 4.0 V @ 1.0 ms    -- Yes -- RA and RV leads are programmed to bipolar pacing operation or pacing off. If no, MRI TO CONTACT THE DEVICE REP FOR PROGRAMMING     Date of last Remote Device check: 4/18/24  Results of last Device check:       Device programming during the scan guidelines   Pacing Mode (check one): DOO  Pacing Rate: 60  bpm or 20bpm > intrinsic rhythm, not to exceed 120bpm    If remote reprogramming is applicable, please contact the Rep to assist    MARKIE Chnadra

## 2024-07-24 NOTE — CONSULTS
Neurosurgery Consult    Assessment  83 yo male with dementia presenting after fall with acute C6 fracture with widening of the C6-C7 disk space.    L1 burst fracture involving both pedicles with retropulsion and mild stenosis.    Plan:  In regarding to cervical spine, he hs been placed in a cervical collar.  Will attempt cervical MRI.    In regard to lumbar spine, will attempt MRI.  Bed rest until MRI complete.    HPI    Erick Shahid is a markedly pleasant 84 year old gentleman who presents from his memory care facility after a fall, while in the bathroom; reportedly staff heard the fall from outside and came in and found him on the ground. He was brought in for further evaluation. wIn the ED he has complained of neck pain as well as pain in the back and right shoulder.   Per notes family say that he often has vivid dreams at night and becomes unable to tell dream from reality, and becomes easily agitated at night. He has fallen multiple times, most recently 2/2024 when he fractured his right humerus. He requires assistance for all activities of daily living normally.      Medical history  Benign essential hypertension  BPH (benign prostatic hyperplasia)  Chronic heart failure with reduced ejection fraction and diastolic dysfunction   Cognitive impairment  Critical aortic valve stenosis  Diabetes mellitus, type 2  Dyslipidemia  First degree atrioventricular block  LBBB (left bundle branch block)  Macular degeneration (senile) of retina  Dementia  Depression  Complete heart block  Traumatic rhabdomyolysis  Multiple falls    Exam  B/P: 154/71, T: 99.6, P: 81, R: 18   He's awake with eyes open but does not speak.  Does not follow commands, will raise left arm up when assisted, both legs will move well to painful stimuli.  Right arm does not move well (sounds as though chronic from humerus fracture)      Imaging   CT Head w/o Contrast   Final Result   IMPRESSION:   HEAD CT:   1.  No CT evidence for acute  intracranial process.   2.  Brain atrophy and presumed chronic microvascular ischemic changes as above.       CERVICAL SPINE CT:   1.  Acute discovertebral fracture involving C6 with mild loss of height, mild widening of the C6-C7 disc space.       CT Thoracic Spine w/o Contrast   Final Result   IMPRESSION:   1.  No fracture or posttraumatic subluxation.   2. Changes of ankylosing spondylitis.       CT Lumbar Spine w/o Contrast   Final Result   IMPRESSION:   1. Acute burst fracture L1 without loss of height of L1, involving bilateral pedicles, approximately 5 to 6 mm retropulsion with mild spinal stenosis. Consider follow-up MRI to exclude posterior ligamentous complex involvement.           CT Cervical Spine w/o Contrast   Final Result   IMPRESSION:   HEAD CT:   1.  No CT evidence for acute intracranial process.   2.  Brain atrophy and presumed chronic microvascular ischemic changes as above.       CERVICAL SPINE CT:   1.  Acute discovertebral fracture involving C6 with mild loss of height, mild widening of the C6-C7 disc space.       XR Shoulder Right G/E 3 Views   Final Result   IMPRESSION: Continued healing of the displaced humeral head and neck fracture with partial resorption and remodeling of the humeral head fragment. Mild to moderate AC joint degeneration.       XR Pelvis w Hip Right 1 View   Final Result   IMPRESSION: Internal fixation bolt extends across an old, healed right femoral neck fracture. Intramedullary samantha extends the length of the right femur, with 2 distal locking screws. No evidence for acute femoral fracture. Osteopenia. Cortical defect    along the medial wall of the right acetabulum not present previously and concerning for an acute fracture. This could be further characterized with CT.     Discussed with Dr. Gabriel.    Yara Ricci, CESARS  [facility] Neurosurgery  [address] [address]    Tel [phone]  Pager  [phone]

## 2024-07-24 NOTE — PROGRESS NOTES
[facility]  Hospitalist Progress Note    Assessment & Plan   Erick Shahid is a 84 year old male who was admitted on 7/23/2024 due to a mechanical fall that resulted in acute C6 cervical spine fracture and acute L1 burst fracture.     Past medical history significant for Chronic dementia, CAD, HTN, HLP, Chronic HFrEF, DM2, Chronic anemia, Chronic thrombocytopenia, GERD, Macular degeneration, History of critical aortic stenosis status post TAVR, History of complete heart block status post pacemaker placement.      Patient presented after a sustaining a mechanical fall and was found to have acute C6 cervical spine fracture and acute L1 burst fracture.     *Of note, Mr. Shahid has advanced dementia and resides in a memory care facility. He was admitted here after a fall in 2/2024 and found to have an acute right humerus fracture, that was managed non-operatively.      Now, he presented for evaluation after another mechanical fall. In the ED, he was afebrile, without hypotension, tachycardia, or hypoxia. X-rays of pelvis and right shoulder showed a healing chronic right humerus fracture without clear evidence of other acute fractures. CT of thoracic spine reportedly showed changes of ankylosing spondylitis without acute fracture. CT Head showed no acute process. CT of cervical spine showed an acute discovertebral fracture involving C6, with mild loss of height, and mild widening of the C6-C7 disc space. CT of the lumbar spine showed an acute burst fracture of L1, without loss of height of L1, involving the bilateral pedicles, with approximately 5 to 6 mm retropulsion with mild spinal stenosis.      Neurosurgery was contacted in the ED.      Acute C6 cervical spine fracture and acute L1 burst fracture  --Neurosurgery consult appreciated:  --Reviewed consult note 7/24.    --Hard cervical collar in place.    --NPO.  --Strict Bedrest and fall precautions.   --Q4 neuro checks.  --MRI scans of  cervical and lumbar spine ordered.   --Pain management with:  --PRN Tylenol, PRN Oxycodone, PRN IV Dilaudid.  --PRN Anti-emetics available.    --CBC and BMP ordered for 7/24.      Goals of care  *Admitting Hospitalist discussed goals of care with his family who confirmed DNR/DNI status and requested focus and attention primarily on palliative relief of pain, anxiety, and any agitation. They are unsure if neurosurgical intervention would be within his goals of care at this point in his life.  --SW consulted for disposition planning.     Acute leukocytosis  *Suspect due to stress reaction.   --CBC ordered for 7/24.       Acute on chronic thrombocytopenia  *Mild at 107; has been as low as 133 in the past.   --If it drops further in AM could consider Hematology evaluation     Possible ankylosing spondylitis  *Suggested incidentally on CT imaging. Unclear if he has been diagnosed with such a condition in the past.      Chronic dementia with sleep disturbance  Would order low dose Seroquel if agitation, hallucination, or sundowning occur overnight.  --Resumed on PTA Celexa 20 mg/d and trazodone 50 mg at bedtime and 25 mg every AM.    --Resumed on PTA Atarax 25 mg at bedtime.       CAD  HTN  HLP  *Cardiac catheterization done in 7/2023 reportedly showed single-vessel occlusive disease of a mid small size nondominant RCA. The remainder of coronary arteries showed minimal nonocclusive disease.  --Resume ASA 81 mg daily pending Neurosurgical guidance.  --Resumed on PTA Crestor.     Critical aortic stenosis and chronic systolic CHF with improved LVEF  *Prior EF had been as low as 35% reportedly. He underwent TAVR in 8/2023. Most recent TTE in 2/2024 showed preserved LVEF.  --Caution with IV fluid while hospitalized.       Complete heart block, history  S/p PPM  Noted after TAVR in 8/2023. He underwent placement of dual chamber pacemaker.      Type 2 Diabetes mellitus      Recent Labs   Lab Test 02/05/24  0630   A1C 6.4*    *Diet controlled as an outpatient.  --ISS insulin while hospitalized.      Chronic anemia  --Monitor closely while hospitalized.        Recent Labs   Lab Test 07/24/24  0100 02/21/24  0626 02/16/24  1126   HGB 10.2* 10.2* 10.6*      Macular degeneration  *Does not appear to be on eye drops PTA.      GERD  --Resumed on PTA PPI.    Clinically Significant Risk Factors Present on Admission                # Drug Induced Platelet Defect: home medication list includes an antiplatelet medication    # Chronic heart failure with preserved ejection fraction: heart failure noted on problem list and last echo with EF >50%  # Dementia: noted on problem list  # Anemia: based on hgb <11  # Financial/Environmental Concerns:     # Pacemaker present         Diet: NPO for Medical/Clinical Reasons Except for: Meds, Ice Chips     DVT Prophylaxis: Pneumatic Compression Devices   Pablo Catheter: Not present  Lines: None     Cardiac Monitoring: None  Code Status: No CPR- Do NOT Intubate            Disposition Plan    Inpatient status.  Anticipate greater than 2 evening hospitalization while undergoing continued work-up/management of Acute fracture of C6 and acute burst fracture of L1.      Medically Ready for Discharge: Anticipated in 2-4 Days    The patient's care was discussed with the Bedside Nurse and Patient.  Reviewed Admission H&P and Neurosurgery consult notes.      The patient has been discussed with Dr. Francisco, who agrees with the assessment and plan at this time.    John Byrne PA-C  [facility]  Securely message with the Vocera Web Console (learn more here)      Interval History   Patient was seen in his hospital room where he was laying flat in bed upon arrival.  Patient denied having any fevers or pain.  Notably, he is slow to respond and at times sounds gurgling but unclear whether or not his mouth is just dry as on second attempt to answer questions his speech is usually more  clear.    Briefly reviewed events that led to patient's admission to the hospital.  Discussed with bedside nurse.  Appears plan is to proceed with MRI imaging studies that might not occur until later in the afternoon due to availability.    -Data reviewed today: I reviewed all new labs and imaging results over the last 24 hours. I personally reviewed no images or EKG's today.    Physical Exam   BP (!) 140/61 (BP Location: Right arm)   Pulse 80   Temp 99.8  F (37.7  C) (Oral)   Resp 18   SpO2 92%     Constitutional: Awake, alert, cooperative, no apparent distress.    ENT: Normocephalic, without obvious abnormality, atraumatic, oral pharynx with dry mucus membranes, tonsils without erythema or exudates.  Neck: Hard cervical collar in place.    Pulmonary: No increased work of breathing, fair air exchange, clear to auscultation bilaterally, no crackles or wheezing.  Cardiovascular: Regular rate and rhythm, normal S1 and S2, no S3 or S4, and no murmur noted.  GI: Normal bowel sounds, soft, non-distended, non-tender.  Skin/Integumen: Visualized skin appeared clear.  Extremities: Bilateral lower extremity edema noted, and calves are non-TTP bilaterally.       Medical Decision Making       Please see A&P for additional details of medical decision making.  GREATER THAN 35 MINUTES SPENT BY ME on the date of service doing chart review, history, exam, documentation & further activities per the note.         Medications   Current Facility-Administered Medications   Medication Dose Route Frequency Provider Last Rate Last Admin     Current Facility-Administered Medications   Medication Dose Route Frequency Provider Last Rate Last Admin    insulin aspart (NovoLOG) injection (RAPID ACTING)  1-7 Units Subcutaneous Q4H Justin Andrade MD   1 Units at 07/24/24 0439    sodium chloride (PF) 0.9% PF flush 3 mL  3 mL Intracatheter Q8H Justin Andrade MD   3 mL at 07/24/24 0348       Data   Recent Labs   Lab 07/24/24  0800  07/24/24  0619 07/24/24  0357 07/24/24  0100   WBC  --   --   --  14.3*   HGB  --   --   --  10.2*   MCV  --   --   --  98   PLT  --   --   --  107*   NA  --   --   --  135   POTASSIUM  --   --   --  4.2   CHLORIDE  --   --   --  99   CO2  --   --   --  27   BUN  --   --   --  22.0   CR  --   --   --  1.08   ANIONGAP  --   --   --  9   FERMIN  --   --   --  9.1   * 156* 155* 166*       Recent Results (from the past 24 hour(s))   XR Pelvis w Hip Right 1 View    Narrative    EXAM: XR PELVIS AND HIP RIGHT 1 VIEW  LOCATION: [facility]  DATE: 7/23/2024    INDICATION: fall, hip pain  COMPARISON: 7/23/2023.      Impression    IMPRESSION: Internal fixation bolt extends across an old, healed right femoral neck fracture. Intramedullary samantha extends the length of the right femur, with 2 distal locking screws. No evidence for acute femoral fracture. Osteopenia. Cortical defect   along the medial wall of the right acetabulum not present previously and concerning for an acute fracture. This could be further characterized with CT.    XR Shoulder Right G/E 3 Views    Narrative    EXAM: XR SHOULDER RIGHT G/E 3 VIEWS  LOCATION: [facility]  DATE: 7/23/2024    INDICATION: shoulder pain s p fall  COMPARISON: 2/13/2024      Impression    IMPRESSION: Continued healing of the displaced humeral head and neck fracture with partial resorption and remodeling of the humeral head fragment. Mild to moderate AC joint degeneration.   CT Head w/o Contrast    Narrative    EXAM: CT HEAD W/O CONTRAST, CT CERVICAL SPINE W/O CONTRAST  LOCATION: [facility]  DATE: 7/23/2024    INDICATION: Unwitnessed fall. Possible loss of consciousness. Possible head injury.  COMPARISON: None.  TECHNIQUE:   1) Routine CT Head without IV contrast. Multiplanar reformats. Dose reduction techniques were used.  2) Routine CT Cervical Spine without IV contrast. Multiplanar reformats. Dose reduction  techniques were used.    FINDINGS:   HEAD CT:   INTRACRANIAL CONTENTS: No intracranial hemorrhage, extraaxial collection, or mass effect.  No CT evidence of acute infarct. Mild to moderate presumed chronic small vessel ischemic changes. Mild to moderate generalized volume loss. No hydrocephalus.     VISUALIZED ORBITS/SINUSES/MASTOIDS: No intraorbital abnormality. No paranasal sinus mucosal disease. No middle ear or mastoid effusion.    BONES/SOFT TISSUES: No acute abnormality.    CERVICAL SPINE CT:   VERTEBRA: Acute fracture involving C6 with a vertically oriented fracture line involving the anterior C6 vertebra extending from the superior to inferior endplates, mild C6-C7 disc space widening. Mild loss of height of C6. Other vertebral bodies are   normal in height. Loss of lordosis. Osteopenic bones.    CANAL/FORAMINA: At C3-4, severe bilateral foraminal stenosis. At C4-5, moderate bilateral foraminal stenosis. At C5-6, moderate bilateral foraminal stenosis. At C6-7, moderate bilateral foraminal stenosis.    PARASPINAL: No extraspinal abnormality. Visualized lung fields demonstrate chronic scarring right upper lobe.      Impression    IMPRESSION:  HEAD CT:  1.  No CT evidence for acute intracranial process.  2.  Brain atrophy and presumed chronic microvascular ischemic changes as above.    CERVICAL SPINE CT:  1.  Acute discovertebral fracture involving C6 with mild loss of height, mild widening of the C6-C7 disc space.   CT Thoracic Spine w/o Contrast    Narrative    EXAM: CT THORACIC SPINE W/O CONTRAST  LOCATION: [facility]  DATE: 7/23/2024    INDICATION: Fall, back pain.  COMPARISON: None.  TECHNIQUE: Routine CT Thoracic Spine without IV contrast. Multiplanar reformats. Dose reduction techniques were used.     FINDINGS:  VERTEBRA: No fracture or posttraumatic subluxation. Changes of ankylosing spondylitis. Osteopenic bones.    CANAL/FORAMINA: No high grade canal or neural foraminal  stenosis.    PARASPINAL: No acute extraspinal abnormality. Multiple old right rib fractures.      Impression    IMPRESSION:  1.  No fracture or posttraumatic subluxation.  2. Changes of ankylosing spondylitis.   CT Lumbar Spine w/o Contrast    Narrative    EXAM: CT LUMBAR SPINE W/O CONTRAST  LOCATION: [facility]  DATE: 7/23/2024    INDICATION: Fall, back pain  COMPARISON: None.  TECHNIQUE: Routine CT Lumbar Spine without IV contrast. Multiplanar reformats. Dose reduction techniques were used.     FINDINGS:  VERTEBRA: Acute burst fracture involving L1 extending into bilateral pedicles, approximately 5 mm retropulsion with mild spinal stenosis. No loss of height of the vertebral body. Grade 1 anterolisthesis L4-L5, minor retrolisthesis L5-S1. Changes of DISH.   Osteopenic bones.    CANAL/FORAMINA: At L4-5, moderate central stenosis, high-grade left foraminal stenosis.    PARASPINAL: No acute extraspinal abnormality.       Impression    IMPRESSION:  1. Acute burst fracture L1 without loss of height of L1, involving bilateral pedicles, approximately 5 to 6 mm retropulsion with mild spinal stenosis. Consider follow-up MRI to exclude posterior ligamentous complex involvement.     CT Cervical Spine w/o Contrast    Narrative    EXAM: CT HEAD W/O CONTRAST, CT CERVICAL SPINE W/O CONTRAST  LOCATION: [facility]  DATE: 7/23/2024    INDICATION: Unwitnessed fall. Possible loss of consciousness. Possible head injury.  COMPARISON: None.  TECHNIQUE:   1) Routine CT Head without IV contrast. Multiplanar reformats. Dose reduction techniques were used.  2) Routine CT Cervical Spine without IV contrast. Multiplanar reformats. Dose reduction techniques were used.    FINDINGS:   HEAD CT:   INTRACRANIAL CONTENTS: No intracranial hemorrhage, extraaxial collection, or mass effect.  No CT evidence of acute infarct. Mild to moderate presumed chronic small vessel ischemic changes. Mild to  moderate generalized volume loss. No hydrocephalus.     VISUALIZED ORBITS/SINUSES/MASTOIDS: No intraorbital abnormality. No paranasal sinus mucosal disease. No middle ear or mastoid effusion.    BONES/SOFT TISSUES: No acute abnormality.    CERVICAL SPINE CT:   VERTEBRA: Acute fracture involving C6 with a vertically oriented fracture line involving the anterior C6 vertebra extending from the superior to inferior endplates, mild C6-C7 disc space widening. Mild loss of height of C6. Other vertebral bodies are   normal in height. Loss of lordosis. Osteopenic bones.    CANAL/FORAMINA: At C3-4, severe bilateral foraminal stenosis. At C4-5, moderate bilateral foraminal stenosis. At C5-6, moderate bilateral foraminal stenosis. At C6-7, moderate bilateral foraminal stenosis.    PARASPINAL: No extraspinal abnormality. Visualized lung fields demonstrate chronic scarring right upper lobe.      Impression    IMPRESSION:  HEAD CT:  1.  No CT evidence for acute intracranial process.  2.  Brain atrophy and presumed chronic microvascular ischemic changes as above.    CERVICAL SPINE CT:  1.  Acute discovertebral fracture involving C6 with mild loss of height, mild widening of the C6-C7 disc space.

## 2024-07-24 NOTE — PROGRESS NOTES
RECEIVING UNIT ED HANDOFF REVIEW    ED Nurse Handoff Report was reviewed by: Giorgio Keller RN on July 24, 2024 at 2:50 AM

## 2024-07-24 NOTE — PROGRESS NOTES
Contacted regarding 85 yo male with dementia presenting to ER after fall.    Imaging reveals L1 burst fracture involving both pedicles with retropulsion.  In addition a C6 fracture with widening of the C6-C7 disk space.    Imaging:  Cervical CT  1.  Acute discovertebral fracture involving C6 with mild loss of height, mild widening of the C6-C7 disc space.    Lumbar CT  IMPRESSION:  1. Acute burst fracture L1 without loss of height of L1, involving bilateral pedicles, approximately 5 to 6 mm retropulsion with mild spinal stenosis. Consider follow-up MRI to exclude posterior ligamentous complex involvement.      RECOMMENDATIONS:  Bed rest  Cervical hard collar.  MRI cervical spine and lumbar spine.  Admit for management.    Yara Ricci MPAS  [facility] Neurosurgery  [address] [address]    Tel [phone]  Pager [phone]

## 2024-07-24 NOTE — ED NOTES
Bed: ED03  Expected date: 7/23/24  Expected time: 8:56 PM  Means of arrival: Ambulance  Comments:  HEMS 429 84M fall, hip injury

## 2024-07-24 NOTE — PLAN OF CARE
Summary:  Came from [facility] for  a mechanical fall and is found to have acute C6 cervical spine fracture and acute L1 burst fracture.  Date & Time: 7/24/24 8186-6382  Behavior & Aggression: Green  Fall Risk: Yes  Orientation: POLLY  ABNL VS/O2: VSS on 1 L NC ex HTN  ABNL Labs: See flowsheets  Pain Management: Painad scale used PRN dilaudid given, Nonverbal pain indicators decreased   Bowel/Bladder: Incont, ext cath in place no BM this shift   Drains: Ext cath   Diet: NPO  Activity Level: Bedrest  Tests/Procedures: Xray, MRI planned for AM  Anticipated  DC Date: Pending   Significant Information: Pt nonverbal this shift ex occasional moaning w/ Right Upper/Lower extremity movement   Pt has pacemaker in place

## 2024-07-24 NOTE — PROGRESS NOTES
[facility]    Neurosurgery  Daily Note    Assessment & Plan   83 yo male with dementia presented 7/24 from memory care facility after fall with acute C6 fracture with widening of the C6-C7 disk space, L1 burst fracture involving both pedicles with retropulsion and mild stenosis.    AM ROUNDS: denies pain. Baseline dementia.   Exam: appears stable compared to overnight      Plan:  In regarding to cervical spine, he has been placed in a cervical collar. Orthotics consulted for raina collar and additional padding   Will attempt cervical MRI.     In regard to lumbar spine, will attempt MRI.  Bed rest until MRI complete.    Dr. Gabriel in agreement    Adelaida Vazquez PA-C  [facility] Neurosurgery  [address]  [address] [place] [address]  Tel [phone]  Fax [phone]  Text page via [facility] Paging/Directory    Active Problems:    Closed stable burst fracture of first lumbar vertebra, initial encounter (H)    Other closed nondisplaced fracture of sixth cervical vertebra, initial encounter (H)     Adelaida Holden PA-C    Interval History   Stable     Physical Exam   Temp: 99.8  F (37.7  C) Temp src: Oral BP: (!) 140/61 Pulse: 80   Resp: 18 SpO2: 92 % O2 Device: Nasal cannula Oxygen Delivery: 1 LPM  There were no vitals filed for this visit.  Vital Signs with Ranges  Temp:  [99.1  F (37.3  C)-99.8  F (37.7  C)] 99.8  F (37.7  C)  Pulse:  [79-82] 80  Resp:  [18] 18  BP: (114-154)/(57-71) 140/61  SpO2:  [89 %-97 %] 92 %  No intake/output data recorded.    Asleep, awakens with cares and follows some commands  Unable to lift right arm above head (baseline per patient and RN from prior right humerus fx 2/2024)  Other moving BUE symmetrically. Admits sensation intact. Negative hoffmanns BL   Wiggles toes on command. Will not lift legs off bed or bend knees on command. Movings legs to painful stimuli     Medications   Current Facility-Administered Medications   Medication Dose Route  Frequency Provider Last Rate Last Admin      Current Facility-Administered Medications   Medication Dose Route Frequency Provider Last Rate Last Admin    [Held by provider] aspirin (ASA) chewable tablet 81 mg  81 mg Oral Daily John Byrne PA-C        citalopram (celeXA) tablet 20 mg  20 mg Oral At Bedtime John Byrne PA-C        hydrOXYzine HCl (ATARAX) tablet 25 mg  25 mg Oral At Bedtime John Byrne PA-C        insulin aspart (NovoLOG) injection (RAPID ACTING)  1-7 Units Subcutaneous Q4H Justin Andrade MD   1 Units at 07/24/24 0439    pantoprazole (PROTONIX) EC tablet 40 mg  40 mg Oral QAM AC Cortney Cooper, [facility]        [Held by provider] polyethylene glycol (MIRALAX) Packet 17 g  17 g Oral Daily John Byrne PA-C        rosuvastatin (CRESTOR) tablet 20 mg  20 mg Oral At Bedtime John Byrne PA-C        sodium chloride (PF) 0.9% PF flush 3 mL  3 mL Intracatheter Q8H Justin Andrade MD   3 mL at 07/24/24 0348    [START ON 7/25/2024] traZODone (DESYREL) half-tab 25 mg  25 mg Oral QAM John Byrne PA-C        traZODone (DESYREL) tablet 50 mg  50 mg Oral At Bedtime John Byrne PA-C

## 2024-07-24 NOTE — ED NOTES
[facility]  ED Nurse Handoff Report    ED Chief complaint: Fall and Hip Pain      ED Diagnosis:   Final diagnoses:   Other closed nondisplaced fracture of sixth cervical vertebra, initial encounter (H)   Closed stable burst fracture of first lumbar vertebra, initial encounter (H)       Code Status: DNR / DNI    Allergies: No Known Allergies    Patient Story: Pt brought in by EMS from [facility]. Fell today after going to bathroom, hitting R hip and possible R shoulder. Some swelling noted.  Pt is confused and is at baseline. Denies pain at this time, xray unable to come get images until tomorrow.   Focused Assessment:      CT Head w/o Contrast   Final Result   IMPRESSION:   HEAD CT:   1.  No CT evidence for acute intracranial process.   2.  Brain atrophy and presumed chronic microvascular ischemic changes as above.      CERVICAL SPINE CT:   1.  Acute discovertebral fracture involving C6 with mild loss of height, mild widening of the C6-C7 disc space.      CT Thoracic Spine w/o Contrast   Final Result   IMPRESSION:   1.  No fracture or posttraumatic subluxation.   2. Changes of ankylosing spondylitis.      CT Lumbar Spine w/o Contrast   Final Result   IMPRESSION:   1. Acute burst fracture L1 without loss of height of L1, involving bilateral pedicles, approximately 5 to 6 mm retropulsion with mild spinal stenosis. Consider follow-up MRI to exclude posterior ligamentous complex involvement.         CT Cervical Spine w/o Contrast   Final Result   IMPRESSION:   HEAD CT:   1.  No CT evidence for acute intracranial process.   2.  Brain atrophy and presumed chronic microvascular ischemic changes as above.      CERVICAL SPINE CT:   1.  Acute discovertebral fracture involving C6 with mild loss of height, mild widening of the C6-C7 disc space.      XR Shoulder Right G/E 3 Views   Final Result   IMPRESSION: Continued healing of the displaced humeral head and neck fracture with partial resorption and  remodeling of the humeral head fragment. Mild to moderate AC joint degeneration.      XR Pelvis w Hip Right 1 View   Final Result   IMPRESSION: Internal fixation bolt extends across an old, healed right femoral neck fracture. Intramedullary samantha extends the length of the right femur, with 2 distal locking screws. No evidence for acute femoral fracture. Osteopenia. Cortical defect    along the medial wall of the right acetabulum not present previously and concerning for an acute fracture. This could be further characterized with CT.       MR Cervical Spine w/o Contrast    (Results Pending)   Lumbar spine MRI w/o contrast    (Results Pending)     Labs Ordered and Resulted from Time of ED Arrival to Time of ED Departure   CBC WITH PLATELETS AND DIFFERENTIAL - Abnormal       Result Value    WBC Count 14.3 (*)     RBC Count 3.08 (*)     Hemoglobin 10.2 (*)     Hematocrit 30.2 (*)     MCV 98      MCH 33.1 (*)     MCHC 33.8      RDW 13.6      Platelet Count 107 (*)     % Neutrophils 85      % Lymphocytes 8      % Monocytes 6      % Eosinophils 0      % Basophils 0      % Immature Granulocytes 1      NRBCs per 100 WBC 0      Absolute Neutrophils 12.2 (*)     Absolute Lymphocytes 1.2      Absolute Monocytes 0.9      Absolute Eosinophils 0.0      Absolute Basophils 0.0      Absolute Immature Granulocytes 0.1      Absolute NRBCs 0.0     BASIC METABOLIC PANEL         Treatments and/or interventions provided: CT, XR, Aspen collar placed by MD, spinal precautions   Patient's response to treatments and/or interventions: Pt appears to be resting comfortably     To be done/followed up on inpatient unit:  Per admitting     Does this patient have any cognitive concerns?: Baseline dementia    Activity level - Baseline/Home:  Wheelchair  Activity Level - Current:   Unknown    Patient's Preferred language: English   Needed?: No    Isolation: None  Infection: Not Applicable  Patient tested for COVID 19 prior to admission:  NO  Bariatric?: No    Vital Signs:   Vitals:    07/23/24 2130 07/23/24 2200 07/23/24 2330 07/23/24 2340   BP: 117/57 114/71 121/69    Pulse: 81 82 82    Resp:       Temp:       TempSrc:       SpO2: 97% 92%  92%       Cardiac Rhythm:     Was the PSS-3 completed:   Yes  What interventions are required if any?               Family Comments: Wife, daughter, and son are health care agents   OBS brochure/video discussed/provided to patient/family: N/A                  For the majority of the shift this patient's behavior was Green.   Behavioral interventions performed were Care and rounding.    ED NURSE PHONE NUMBER: *[phone]

## 2024-07-24 NOTE — PHARMACY-ADMISSION MEDICATION HISTORY
Pharmacist Admission Medication History    Admission medication history is complete. The information provided in this note is only as accurate as the sources available at the time of the update.    Information Source(s): Facility ([facility]) medication list/MAR via N/A    Pertinent Information: None    Changes made to PTA medication list:  Added: None  Deleted: hydrocortisone cream  Changed: Split trazodone into 2 entries (25 am + 50 hs); changed citalopram to bedtime dosing    Allergies reviewed with patient and updates made in EHR: unable to assess    Medication History Completed By: Jenn Martínez [facility] 7/24/2024 8:36 AM    PTA Med List   Medication Sig Last Dose    acetaminophen (TYLENOL) 500 MG tablet Take 1,000 mg by mouth 3 times daily 0800, 1400, 2000 7/23/2024 at 2000    aspirin (ASA) 81 MG chewable tablet Take 1 tablet (81 mg) by mouth daily 7/23/2024 at 0800    citalopram (CELEXA) 20 MG tablet Take 20 mg by mouth at bedtime 7/23/2024 at 2000    hydrOXYzine HCl (ATARAX) 25 MG tablet Take 1 tablet (25 mg) by mouth at bedtime 7/23/2024 at 2000    omeprazole (PRILOSEC) 20 MG DR capsule Take 1 capsule (20 mg) by mouth daily 7/23/2024 at 0700    polyethylene glycol (MIRALAX) 17 g packet Take 1 packet by mouth daily 7/23/2024 at 0800    rosuvastatin (CRESTOR) 20 MG tablet Take 20 mg by mouth at bedtime 7/23/2024 at 2000    senna-docusate (SENOKOT-S/PERICOLACE) 8.6-50 MG tablet Take 2 tablets by mouth daily as needed for constipation prn    traZODone (DESYREL) 50 MG tablet Take 25 mg by mouth every morning 7/23/2024 at 0800    traZODone (DESYREL) 50 MG tablet Take 50 mg by mouth at bedtime 7/23/2024 at 2000    vitamin B-12 (CYANOCOBALAMIN) 1000 MCG tablet Take 1,000 mcg by mouth daily 7/23/2024 at 0800    vitamin C (ASCORBIC ACID) 500 MG tablet Take 500 mg by mouth daily 7/23/2024 at 0800    Vitamin D3 (CHOLECALCIFEROL) 25 mcg (1000 units) tablet Take 25 mcg by mouth daily 7/23/2024 at 0800

## 2024-07-24 NOTE — CONSULTS
Care Management Initial Consult    General Information  Assessment completed with: Spouse or significant other, Zainab  Type of CM/SW Visit: Initial Assessment    Primary Care Provider verified and updated as needed: Yes   Readmission within the last 30 days: no previous admission in last 30 days      Reason for Consult: discharge planning  Advance Care Planning: Advance Care Planning Reviewed: present on chart          Communication Assessment  Patient's communication style: spoken language (English or Bilingual)             Cognitive  Cognitive/Neuro/Behavioral: .WDL except  Level of Consciousness: confused  Arousal Level: opens eyes spontaneously  Orientation: other (see comments) (POLLY pt not verbalizing)  Mood/Behavior: calm  Best Language: 3 - Mute  Speech: garbled, incoherent    Living Environment:   People in home: alone     Current living Arrangements: assisted living  Name of Facility: [facility]   Able to return to prior arrangements: yes       Family/Social Support:  Care provided by: other (see comments)  Provides care for: no one, unable/limited ability to care for self  Marital Status:   Children, Wife  Zainab       Description of Support System: Supportive, Involved     Has spouse [facility] 2 adult male/female children who live locally and are very involved/supportive.    Current Resources:   Patient receiving home care services: No     Community Resources: None  Equipment currently used at home: wheelchair, manual-wheelchair bound- wheels around with wheelchair on the unit.  Supplies currently used at home:  facility resident    Employment/Financial:  Employment Status: retired        Financial Concerns: none           Does the patient's insurance plan have a 3 day qualifying hospital stay waiver?  Yes     Which insurance plan 3 day waiver is available? Alternative insurance waiver    Will the waiver be used for post-acute placement? Undetermined at this  time    Lifestyle & Psychosocial Needs:  Social Determinants of Health     Food Insecurity: No Food Insecurity (7/17/2023)    Received from [employer] [facility], [facility] Take the Interview [facility]    Food Insecurity     Worried About Running Out of Food in the Last Year: 1   Depression: Not at risk (7/17/2023)    Received from [facility] Take the Interview [facility], [facility]    PHQ-2     PHQ-2 TOTAL SCORE: 0   Housing Stability: Low Risk  (7/17/2023)    Received from [facility] Take the Interview [facility], [facility] Take the Interview [facility]    Housing Stability     Unable to Pay for Housing in the Last Year: 1   Tobacco Use: Low Risk  (11/15/2023)    Patient History     Smoking Tobacco Use: Never     Smokeless Tobacco Use: Never     Passive Exposure: Not on file   Financial Resource Strain: Low Risk  (7/17/2023)    Received from [employer] [facility] Take the Interview [facility], [facility]    Financial Resource Strain     Difficulty of Paying Living Expenses: 3     Difficulty of Paying Living Expenses: Not on file   Alcohol Use: Not on file   Transportation Needs: No Transportation Needs (7/17/2023)    [facility] [place] [facility], [facility]    Transportation Needs     Lack of Transportation (Medical): 1   Physical Activity: Not on file   Interpersonal Safety: Not on file   Stress: Not on file   Social Connections: Unknown (7/22/2024)    Received from [facility] [employer] [facility]    Social Connections     Frequency of Communication with Friends and Family: Not on file   Health Literacy: Not on file       Functional Status:  Prior to admission patient needed assistance: Patient in the Memory Care at [facility]. (Locked unit). Receives assistance with all his cares. Assistance with ADL's, bathing, dressing,  laundry/cleaning. Needs assistance with eating. Favors his right arm from a previous Humerus fracture in 2/24 so difficulty feeding himself. Wheelchair bound- wheels around in wheelchair.             Mental Health Status:    History of Dementia      Chemical Dependency Status:      N/a          Values/Beliefs:  Spiritual, Cultural Beliefs, Worship Practices, Values that affect care: no               Additional Information:  Writer acknowledges Inpatient Consult for Discharge Planning. Patient is a 84 year old male admitted on 7/23/24 from the ED. Patient had an unwitnessed mechanical fall at his Memory Care Assisted Living. Patient lives at [facility]. He was found to have acute C6 cervical spine fracture and acute L1 burst fracture. Writer spoke with patient's spouse Zainab who lives independently in a [facility]. Patient has been at [facility] for approximately a year. He requires total care-requiring assistance with all activities. Has [facility] services. He is wheelchair bound and uses his wheelchair to wheel around his [facility]NA-non mobile. Patient not able to follow commands. At discharge patient would benefit most going back to his familiar setting, [facility]/Memory Care and getting therapies. Going to a TCU would be difficult if he does not follow commands.  Neurosurgery has been consulted. Will await therapy recommendations. Plan to call [facility] to see if patient could return back when medically stable. Awaiting Neurosurgery plan. Case Management to continue to follow for discharge planning.          Era Hernandez RN  Care Coordinator  [phone]

## 2024-07-24 NOTE — PROGRESS NOTES
MRIs reviewed    Plan  -upright cervical XR and if stable OK to remove collar  -upright lumbar XR  -lumbar findings- bracing would only be for comfort since patient is typically wheelchair bound    Will update patient and family tomorrow after upright XR    DR. Gabriel has reviewed all imaging and in agreement with plans      Narrative & Impression   MRI OF THE LUMBAR SPINE WITHOUT CONTRAST 7/24/2024 4:29 PM      COMPARISON: CT lumbar spine one-day prior.     HISTORY: L1 fracture on CT.     TECHNIQUE: Multiplanar, multisequence MRI images of the lumbar spine  were acquired without IV contrast.                                                                      IMPRESSION: There are five lumbar-type vertebrae for the purposes of  this dictation. 5 lumbar type vertebrae. Mild burst compression  fracture deformity of the L1 vertebral body again noted without  change. Vertebral body heights and contours otherwise normal. Mild  degenerative anterolisthesis of L4 upon L5. Mild degenerative  retrolisthesis of L5 upon S1. Alignment otherwise normal. There is  bone marrow edema in the L1 vertebral body consistent with a recent  fracture. Marrow signal otherwise normal. There is posterior disc  bulging to varying degrees at all levels of the lumbar spine. There is  facet arthropathy throughout the lumbar spine. There is mild spinal  canal narrowing at the upper L1 level due to retropulsion of bony  fragments into the spinal canal. No high-grade spinal canal stenosis.  No high-grade neural foraminal stenosis at any level of the lumbar  spine.     HANNAH MANTILLA MD        MR CERVICAL SPINE WITHOUT CONTRAST  7/24/2024 3:46 PM      HISTORY: C6 fracture on CT.        COMPARISON:  CT cervical spine from yesterday.     TECHNIQUE: Multiplanar multisequence cervical MR without contrast.     FINDINGS:     Straightening of normal cervical lordosis.     No abnormal bone marrow signal in the fractured C6 vertebral body.  No  abnormal signal in C6-7 intervertebral disc space. No prevertebral  soft tissue edema.     Multilevel disc height loss and osteophyte formation The findings on a  level by level basis are as follows:     C2-3: Mild disc height loss and disc degeneration. Uncovertebral  spurring. Bilateral facet arthropathy. Mild to moderate spinal canal  stenosis. Mild to moderate bilateral neural foraminal stenosis.     C3-4: Mild to moderate disc height loss and disc degeneration. Disc  osteophyte complex and bilateral uncinate spurring. Severe bilateral  neural foraminal stenosis. Mild spinal canal stenosis.     C4-5: Bony fusion. Uncovertebral spurring and bilateral facet  arthropathy. Moderate bilateral neural foraminal stenosis. No spinal  canal stenosis.     C5-6: Bony fusion. Uncovertebral spurring. Mild bilateral neural  foraminal stenosis. No spinal canal stenosis.     C6-7: Moderate to severe disc height loss and disc degeneration.  Uncovertebral spurring and bilateral facet arthropathy. Moderate right  and mild to moderate left neural foraminal stenosis. No spinal canal  stenosis.     C7-T1: No spinal canal or neural foraminal stenosis.     The visualized skull base, posterior fossa, and paraspinal soft  tissues are within normal limits.                                                                      IMPRESSION:  1. No abnormal bone marrow signal in the fractured C6 vertebral body.  No abnormal signal in C6-7 intervertebral disc space. No prevertebral  soft tissue edema. Findings favor chronic fracture.  2. Multilevel cervical spondylosis as detailed above.     JEANNE JANSEN MD

## 2024-07-24 NOTE — PROGRESS NOTES
S: Bernardo is a 84 yom that presents at the [facility] room #2401 for a Almont cervical collar and Aspen replacement pads. The patient already has an Aspen Cervical Collar. The patient has a C6 Cervical fracture.    O: The patient was fit with a Almont Cervical Collar. I saw that the patient already has Aspen cervical collar replacement pads, therefore new ones were not delivered.    A: I demonstrated the donning and doffing of the collars to both the patient. Questions were invited and answered. I then the  s written documentation.    P: The orthosis should be worn according to the physician s directions, during showering. Please monitor for indications of too much skin pressure. Unless otherwise directed by a physician, care should be taken to evenly divide pillows/head supports between the shoulder blades and the patient s head with the goal of maintaining the patient s head/cervical spine neutral in terms of flexion/extension. This will reduce potential chin pressures.    G: The objective/goal of the cervical collar is to reduce cervical spine motion and provide cervical stability.    Please contact the [facility] at [phone] if you have any questions. Thank you, Go    Note electronically signed by Go Solano, Board Eligible

## 2024-07-24 NOTE — TELEPHONE ENCOUNTER
Received call from MRI requesting MRI Cardiology Clearance for patient.  Message routed to the device team to complete MRI Safety Clearance Form.    Inpatient MRI Phone number: [phone].

## 2024-07-25 NOTE — PLAN OF CARE
Goal Outcome Evaluation:  Patient vital signs are at baseline:yes on 1 lpm oxygen   Patient able to ambulate as they were prior to admission or with assist devices provided by therapies during their stay:Yes wheelchair bond   Patient MUST void prior to discharge: Yes   Patient able to tolerate oral intake:Yes total feed   Pain has adequate pain control using Oral analgesics:No denied   Does patient have an identified :. Yes   Has goal D/C date and time been discussed with patient: Discharge back [facility] care unit , cervical collar was discontinued. Pt is  blood sugar check , was up in chair with 2 assist  with,  Pt was  Transported by EMS drivers and handoff package was deliver to . All  IV  access removed.

## 2024-07-25 NOTE — DISCHARGE SUMMARY
[facility]    Discharge Summary  Hospitalist    Date of Admission:  7/23/2024  Date of Discharge:  7/25/2024  2:54 PM  Provider:  Estelle Vasquez, DO    Discharge Diagnoses    Acute traumatic C6 and L1 fracture, s/p mechanical fall at [facility]  Dementia, severe  Leukocytosis, improved  DM, diet controlled  5.   Wounds, groin and sacrum area    Other medical issues:  Past Medical History:   Diagnosis Date    Benign essential hypertension     BPH (benign prostatic hyperplasia)     CAD (coronary artery disease)     Cardiac pacemaker in situ 08/2023    For complete heart block    Carotid stenosis     Chronic heart failure with reduced ejection fraction and diastolic dysfunction (H)     Critical aortic valve stenosis     TAVR done in 2023    Dementia (H)     Diabetes mellitus, type 2 (H)     on metformin    Dyslipidemia     LBBB (left bundle branch block)     Macular degeneration (senile) of retina     Right humeral fracture 02/2024    Sleep disturbances    Acute on chronic anemia    History of Present Illness   Erick Shahid is an 84 year old male who presented from assisted living facility after he sustained .  Please see the admission history and physical for full details.    Hospital Course   Erick Shahid was admitted on 7/23/2024.  The following problems were addressed during his hospitalization:     Acute traumatic C6 and L1 fractures, s/p mechanical fall at [facility]    Pt presented from memory care for evaluation after sustaining a mechanical fall.  Cervical CT with evidence of acute C6 fracture; Lumbar CT with evidence of L1 fracture. He was placed in a cervical collar and neurosurgery consulted.  MRI of the cervical and lumbar spine were requested and reviewed.  There was no spinal cord injury.  Repeat xrays requested and reviewed; Cervical collar was removed and light activity ok at time of discharge after lumbar xray review (of not he is wheelchair bound at baseline).   Neurosurgery recommends follow-up with xrays prior to clinic office visit in 3-6 weeks.      2. Leukocytosis    Noted to have leukocytosis on admission that was suspected to be d/t acute stress response from mechanical fall and fractures.  WBC was decreasing at time of discharge.  No fevers or acute infectious process was identified     3. Wounds    Nursing skin assessment noted wounds of the groin and scrotum.  [facility] nurse was consulted, but he was discharge back before they were able to assess and advice further treatment.  He will have close follow-up with PCP in the next week and additional oupt wound care/assessments can be completed at that time.       Significant Results and Procedures   none    Pending Results   Unresulted Labs Ordered in the Past 30 Days of this Admission       No orders found from 6/23/2024 to 7/24/2024.            Code Status   DNR / DNI       Primary Care Physician   Yancy Walker    Physical Exam   Temp: 98.9  F (37.2  C) Temp src: Oral BP: 131/60 Pulse: 70   Resp: 16 SpO2: 100 % O2 Device: None (Room air) Oxygen Delivery: 2 LPM  There were no vitals filed for this visit.  Vital Signs with Ranges  Temp:  [98.9  F (37.2  C)-100.1  F (37.8  C)] 98.9  F (37.2  C)  Pulse:  [70-88] 70  Resp:  [16-18] 16  BP: ()/(59-63) 131/60  SpO2:  [88 %-100 %] 100 %  I/O last 3 completed shifts:  In: 60 [P.O.:60]  Out: 400 [Urine:400]    GEN:  Awake in recliner chair, eyes are open, nonverbal  HEENT:  Normocephalic/atraumatic, PERRL, no scleral icterus, no nasal discharge  NECK:  cervical collar in place   CV:  Regular rate and rhythm, no loud murmur to ausc.  S1 + S2 noted  LUNGS:  Clear to auscultation ant/lat bilaterally.  No clear rales/rhonchi/wheezing auscultated bilaterally.  No costal retractions bilaterally.  Symmetric chest rise on inhalation noted.  EXT:  trace pretibial edema bilaterally; no cyanosis bilaterally.   SKIN:  Dry to touch, no rashes or jaundice noted.      Discharge  Disposition   Discharged to assisted living    Consultations This Hospital Stay   CARE MANAGEMENT / SOCIAL WORK IP CONSULT  NEUROSURGERY IP CONSULT  WOUND OSTOMY CONTINENCE NURSE  IP CONSULT  CARE MANAGEMENT / SOCIAL WORK IP CONSULT    Time Spent on this Encounter   IEstelle DO, personally saw the patient today and spent greater than 30 minutes discharging this patient.    Discharge Orders      XR Lumbar Spine 2/3 Views     Follow-up and recommended labs and tests     Your Neurosurgical follow-up appointments have been recommended in 3-6 weeks with XR prior. You may call [phone] to make, confirm or change your appointment dates and/or times.     Activity    Your activity upon discharge: Please limit your lifting to no more that ten pounds and avoid excessive bending, twisting and turning at the lumbar spine. You should also avoid excessive jostling and jarring activities.     Reason for your hospital stay    You were admitted after sustaining mechanical fall at [facility]     Activity    Your activity upon discharge: light activity as tolerated; pt is noted to be wheelchair-bound     Follow-up and recommended labs and tests     Follow up with primary care provider, Yancy Walker, within 5-7 days for hospital follow- up.  The following labs/tests are recommended: CBC  .     Diet    Follow this diet upon discharge:       Combination Diet Mechanical/Dental Soft Diet; Mildly Thick (level 2)     Discharge Medications   Current Discharge Medication List        CONTINUE these medications which have NOT CHANGED    Details   acetaminophen (TYLENOL) 500 MG tablet Take 1,000 mg by mouth 3 times daily 0800, 1400, 2000      aspirin (ASA) 81 MG chewable tablet Take 1 tablet (81 mg) by mouth daily  Qty: 90 tablet, Refills: 3    Associated Diagnoses: S/P TAVR (transcatheter aortic valve replacement); Aortic stenosis, severe      citalopram (CELEXA) 20 MG tablet Take 20 mg by mouth at bedtime      hydrOXYzine  "HCl (ATARAX) 25 MG tablet Take 1 tablet (25 mg) by mouth at bedtime    Associated Diagnoses: Anxiety      omeprazole (PRILOSEC) 20 MG DR capsule Take 1 capsule (20 mg) by mouth daily    Associated Diagnoses: Anemia, unspecified type      polyethylene glycol (MIRALAX) 17 g packet Take 1 packet by mouth daily      rosuvastatin (CRESTOR) 20 MG tablet Take 20 mg by mouth at bedtime      senna-docusate (SENOKOT-S/PERICOLACE) 8.6-50 MG tablet Take 2 tablets by mouth daily as needed for constipation      !! traZODone (DESYREL) 50 MG tablet Take 25 mg by mouth every morning      !! traZODone (DESYREL) 50 MG tablet Take 50 mg by mouth at bedtime      vitamin B-12 (CYANOCOBALAMIN) 1000 MCG tablet Take 1,000 mcg by mouth daily      vitamin C (ASCORBIC ACID) 500 MG tablet Take 500 mg by mouth daily      Vitamin D3 (CHOLECALCIFEROL) 25 mcg (1000 units) tablet Take 25 mcg by mouth daily       !! - Potential duplicate medications found. Please discuss with provider.        Allergies   No Known Allergies  Data   Recent Labs   Lab 07/25/24  1350 07/24/24  2302 07/24/24  0100   WBC 11.3* 11.6* 14.3*   HGB  --  9.4* 10.2*   HCT  --  27.6* 30.2*   MCV  --  97 98   PLT  --  86* 107*     Recent Labs   Lab 07/25/24  1157 07/25/24  0821 07/24/24  2302 07/24/24  0357 07/24/24  0100   NA  --   --  138  --  135   POTASSIUM  --   --  4.0  --  4.2   CHLORIDE  --   --  101  --  99   CO2  --   --  27  --  27   ANIONGAP  --   --  10  --  9   * 133* 143*   < > 166*   BUN  --   --  20.8  --  22.0   CR  --   --  0.78  --  1.08   GFRESTIMATED  --   --  87  --  67   FERMIN  --   --  8.9  --  9.1    < > = values in this interval not displayed.     7-Day Micro Results       No results found for the last 168 hours.          No results for input(s): \"COLOR\", \"APPEARANCE\", \"URINEGLC\", \"URINEBILI\", \"URINEKETONE\", \"SG\", \"UBLD\", \"URINEPH\", \"PROTEIN\", \"UROBILINOGEN\", \"NITRITE\", \"LEUKEST\", \"RBCU\", \"WBCU\" in the last 168 hours.  Results for orders placed or " performed during the hospital encounter of 07/23/24   CT Head w/o Contrast    Narrative    EXAM: CT HEAD W/O CONTRAST, CT CERVICAL SPINE W/O CONTRAST  LOCATION: [facility]  DATE: 7/23/2024    INDICATION: Unwitnessed fall. Possible loss of consciousness. Possible head injury.  COMPARISON: None.  TECHNIQUE:   1) Routine CT Head without IV contrast. Multiplanar reformats. Dose reduction techniques were used.  2) Routine CT Cervical Spine without IV contrast. Multiplanar reformats. Dose reduction techniques were used.    FINDINGS:   HEAD CT:   INTRACRANIAL CONTENTS: No intracranial hemorrhage, extraaxial collection, or mass effect.  No CT evidence of acute infarct. Mild to moderate presumed chronic small vessel ischemic changes. Mild to moderate generalized volume loss. No hydrocephalus.     VISUALIZED ORBITS/SINUSES/MASTOIDS: No intraorbital abnormality. No paranasal sinus mucosal disease. No middle ear or mastoid effusion.    BONES/SOFT TISSUES: No acute abnormality.    CERVICAL SPINE CT:   VERTEBRA: Acute fracture involving C6 with a vertically oriented fracture line involving the anterior C6 vertebra extending from the superior to inferior endplates, mild C6-C7 disc space widening. Mild loss of height of C6. Other vertebral bodies are   normal in height. Loss of lordosis. Osteopenic bones.    CANAL/FORAMINA: At C3-4, severe bilateral foraminal stenosis. At C4-5, moderate bilateral foraminal stenosis. At C5-6, moderate bilateral foraminal stenosis. At C6-7, moderate bilateral foraminal stenosis.    PARASPINAL: No extraspinal abnormality. Visualized lung fields demonstrate chronic scarring right upper lobe.      Impression    IMPRESSION:  HEAD CT:  1.  No CT evidence for acute intracranial process.  2.  Brain atrophy and presumed chronic microvascular ischemic changes as above.    CERVICAL SPINE CT:  1.  Acute discovertebral fracture involving C6 with mild loss of height, mild widening of the  C6-C7 disc space.   XR Shoulder Right G/E 3 Views    Narrative    EXAM: XR SHOULDER RIGHT G/E 3 VIEWS  LOCATION: [facility]  DATE: 7/23/2024    INDICATION: shoulder pain s p fall  COMPARISON: 2/13/2024      Impression    IMPRESSION: Continued healing of the displaced humeral head and neck fracture with partial resorption and remodeling of the humeral head fragment. Mild to moderate AC joint degeneration.   CT Thoracic Spine w/o Contrast    Narrative    EXAM: CT THORACIC SPINE W/O CONTRAST  LOCATION: [facility]  DATE: 7/23/2024    INDICATION: Fall, back pain.  COMPARISON: None.  TECHNIQUE: Routine CT Thoracic Spine without IV contrast. Multiplanar reformats. Dose reduction techniques were used.     FINDINGS:  VERTEBRA: No fracture or posttraumatic subluxation. Changes of ankylosing spondylitis. Osteopenic bones.    CANAL/FORAMINA: No high grade canal or neural foraminal stenosis.    PARASPINAL: No acute extraspinal abnormality. Multiple old right rib fractures.      Impression    IMPRESSION:  1.  No fracture or posttraumatic subluxation.  2. Changes of ankylosing spondylitis.   CT Lumbar Spine w/o Contrast    Narrative    EXAM: CT LUMBAR SPINE W/O CONTRAST  LOCATION: [facility]  DATE: 7/23/2024    INDICATION: Fall, back pain  COMPARISON: None.  TECHNIQUE: Routine CT Lumbar Spine without IV contrast. Multiplanar reformats. Dose reduction techniques were used.     FINDINGS:  VERTEBRA: Acute burst fracture involving L1 extending into bilateral pedicles, approximately 5 mm retropulsion with mild spinal stenosis. No loss of height of the vertebral body. Grade 1 anterolisthesis L4-L5, minor retrolisthesis L5-S1. Changes of DISH.   Osteopenic bones.    CANAL/FORAMINA: At L4-5, moderate central stenosis, high-grade left foraminal stenosis.    PARASPINAL: No acute extraspinal abnormality.       Impression    IMPRESSION:  1. Acute burst fracture L1  without loss of height of L1, involving bilateral pedicles, approximately 5 to 6 mm retropulsion with mild spinal stenosis. Consider follow-up MRI to exclude posterior ligamentous complex involvement.     XR Pelvis w Hip Right 1 View    Narrative    EXAM: XR PELVIS AND HIP RIGHT 1 VIEW  LOCATION: [facility]  DATE: 7/23/2024    INDICATION: fall, hip pain  COMPARISON: 7/23/2023.      Impression    IMPRESSION: Internal fixation bolt extends across an old, healed right femoral neck fracture. Intramedullary samantha extends the length of the right femur, with 2 distal locking screws. No evidence for acute femoral fracture. Osteopenia. Cortical defect   along the medial wall of the right acetabulum not present previously and concerning for an acute fracture. This could be further characterized with CT.    CT Cervical Spine w/o Contrast    Narrative    EXAM: CT HEAD W/O CONTRAST, CT CERVICAL SPINE W/O CONTRAST  LOCATION: [facility]  DATE: 7/23/2024    INDICATION: Unwitnessed fall. Possible loss of consciousness. Possible head injury.  COMPARISON: None.  TECHNIQUE:   1) Routine CT Head without IV contrast. Multiplanar reformats. Dose reduction techniques were used.  2) Routine CT Cervical Spine without IV contrast. Multiplanar reformats. Dose reduction techniques were used.    FINDINGS:   HEAD CT:   INTRACRANIAL CONTENTS: No intracranial hemorrhage, extraaxial collection, or mass effect.  No CT evidence of acute infarct. Mild to moderate presumed chronic small vessel ischemic changes. Mild to moderate generalized volume loss. No hydrocephalus.     VISUALIZED ORBITS/SINUSES/MASTOIDS: No intraorbital abnormality. No paranasal sinus mucosal disease. No middle ear or mastoid effusion.    BONES/SOFT TISSUES: No acute abnormality.    CERVICAL SPINE CT:   VERTEBRA: Acute fracture involving C6 with a vertically oriented fracture line involving the anterior C6 vertebra extending from  the superior to inferior endplates, mild C6-C7 disc space widening. Mild loss of height of C6. Other vertebral bodies are   normal in height. Loss of lordosis. Osteopenic bones.    CANAL/FORAMINA: At C3-4, severe bilateral foraminal stenosis. At C4-5, moderate bilateral foraminal stenosis. At C5-6, moderate bilateral foraminal stenosis. At C6-7, moderate bilateral foraminal stenosis.    PARASPINAL: No extraspinal abnormality. Visualized lung fields demonstrate chronic scarring right upper lobe.      Impression    IMPRESSION:  HEAD CT:  1.  No CT evidence for acute intracranial process.  2.  Brain atrophy and presumed chronic microvascular ischemic changes as above.    CERVICAL SPINE CT:  1.  Acute discovertebral fracture involving C6 with mild loss of height, mild widening of the C6-C7 disc space.   MR Cervical Spine w/o Contrast    Narrative    MR CERVICAL SPINE WITHOUT CONTRAST  7/24/2024 3:46 PM     HISTORY: C6 fracture on CT.       COMPARISON:  CT cervical spine from yesterday.    TECHNIQUE: Multiplanar multisequence cervical MR without contrast.    FINDINGS:    Straightening of normal cervical lordosis.    No abnormal bone marrow signal in the fractured C6 vertebral body. No  abnormal signal in C6-7 intervertebral disc space. No prevertebral  soft tissue edema.    Multilevel disc height loss and osteophyte formation The findings on a  level by level basis are as follows:    C2-3: Mild disc height loss and disc degeneration. Uncovertebral  spurring. Bilateral facet arthropathy. Mild to moderate spinal canal  stenosis. Mild to moderate bilateral neural foraminal stenosis.    C3-4: Mild to moderate disc height loss and disc degeneration. Disc  osteophyte complex and bilateral uncinate spurring. Severe bilateral  neural foraminal stenosis. Mild spinal canal stenosis.    C4-5: Bony fusion. Uncovertebral spurring and bilateral facet  arthropathy. Moderate bilateral neural foraminal stenosis. No spinal  canal  stenosis.    C5-6: Bony fusion. Uncovertebral spurring. Mild bilateral neural  foraminal stenosis. No spinal canal stenosis.    C6-7: Moderate to severe disc height loss and disc degeneration.  Uncovertebral spurring and bilateral facet arthropathy. Moderate right  and mild to moderate left neural foraminal stenosis. No spinal canal  stenosis.    C7-T1: No spinal canal or neural foraminal stenosis.    The visualized skull base, posterior fossa, and paraspinal soft  tissues are within normal limits.      Impression    IMPRESSION:  1. No abnormal bone marrow signal in the fractured C6 vertebral body.  No abnormal signal in C6-7 intervertebral disc space. No prevertebral  soft tissue edema. Findings favor chronic fracture.  2. Multilevel cervical spondylosis as detailed above.    JEANNE JANSEN MD         SYSTEM ID:  [id number]   Lumbar spine MRI w/o contrast    Narrative    MRI OF THE LUMBAR SPINE WITHOUT CONTRAST 7/24/2024 4:29 PM     COMPARISON: CT lumbar spine one-day prior.    HISTORY: L1 fracture on CT.    TECHNIQUE: Multiplanar, multisequence MRI images of the lumbar spine  were acquired without IV contrast.      Impression    IMPRESSION: There are five lumbar-type vertebrae for the purposes of  this dictation. 5 lumbar type vertebrae. Mild burst compression  fracture deformity of the L1 vertebral body again noted without  change. Vertebral body heights and contours otherwise normal. Mild  degenerative anterolisthesis of L4 upon L5. Mild degenerative  retrolisthesis of L5 upon S1. Alignment otherwise normal. There is  bone marrow edema in the L1 vertebral body consistent with a recent  fracture. Marrow signal otherwise normal. There is posterior disc  bulging to varying degrees at all levels of the lumbar spine. There is  facet arthropathy throughout the lumbar spine. There is mild spinal  canal narrowing at the upper L1 level due to retropulsion of bony  fragments into the spinal canal. No high-grade spinal  canal stenosis.  No high-grade neural foraminal stenosis at any level of the lumbar  spine.    HANNAH MANTILLA MD         SYSTEM ID:  [id number]   XR Chest 1 View    Narrative    XR CHEST 1 VIEW   7/24/2024 3:08 PM     HISTORY: Encounter for pre-mri screening --verify device/lead  intregrity.; Cardiac pacemaker in situ    COMPARISON: Chest radiograph 10/13/2023      Impression    IMPRESSION: Stable size of cardiomediastinal silhouette. Stable  position of pacemaker leads which overlie the right atrium and right  ventricle. No definite airspace consolidation, pleural effusion or  pneumothorax. Old right rib fractures are again noted. No definite  acute bony abnormality.    AUBREY NUÑEZ MD         SYSTEM ID:  [id number]   XR Cervical Spine 2/3 Views    Narrative    EXAM: XR CERVICAL SPINE 2/3 VIEWS  LOCATION: [facility]  DATE: 7/24/2024    INDICATION: upright XR for c6 fracture, ok for sitting  COMPARISON: None.      Impression    IMPRESSION: The fracture findings identified on prior CT assessment are not well seen on the plain film assessment. Bones are somewhat osteopenic appearing. Multilevel facet disease. No clear findings of traumatic listhesis.       XR Lumbar Spine 2/3 Views    Narrative    EXAM: XR LUMBAR SPINE 2/3 VIEWS  LOCATION: [facility]  DATE: 7/24/2024    INDICATION: L1 fracture, upright XR, sitting is ok  COMPARISON: CT lumbar spine 7/23/2024.      Impression    IMPRESSION: Compression fracture findings at L1 with slightly greater loss of vertebral body height anteriorly as compared to the prior CT assessment. There is loss of height in the anterior L1 level measuring 2.3 cm previously measuring 2.9 cm. No clear   findings of significant bony retropulsion. No new compression fracture. Bones are osteopenic.

## 2024-07-25 NOTE — PROGRESS NOTES
Care Management Discharge Note    Discharge Date: 07/25/2024       Discharge Disposition: Assisted Living, Skilled Nursing Facility. Returning to [facility]    Discharge Services:  none    Discharge DME:  none    Discharge Transportation:  [facility] Stretcher Ride Discharge time [id number]    Private pay costs discussed: transportation costs (spouse requested)    Does the patient's insurance plan have a 3 day qualifying hospital stay waiver?  Yes     Which insurance plan 3 day waiver is available? Alternative insurance waiver    Will the waiver be used for post-acute placement? No    PAS Confirmation Code:  n/a  Patient/family educated on Medicare website which has current facility and service quality ratings:  n/a    Education Provided on the Discharge Plan:  yes  Persons Notified of Discharge Plans:  Patient and patient's spouse Geovani Lamb-Isis, bedside RN Angelica  Patient/Family in Agreement with the Plan: yes    Handoff Referral Completed: No    Additional Information:  (Please refer to previous follow-up note today)  Writer informed that patient has been medically cleared to discharge today. Patient will be returning back to [facility]. Writer spoke with MARKIE Steele at [facility]. He has agreed to accept patient back today. Zainab, patient's spouse has requested a ride be set up for patient. A stretcher ride set up (due to hs confusion) with a transport range of [phone]. Bedside RN and Charge, and spouse Zainab who is present in patient's room has been informed. Orders have been faxed to [phone]. No further discharge needs.     Addendum: Packet requested from [facility].    Medical Necessity Form completed. Faxed to [phone]        Era Hernandez RN  Care Coordinator  [phone]

## 2024-07-25 NOTE — PLAN OF CARE
Goal Outcome Evaluation:         Date/Time: 07/24/2024 ([id number])    Mental Status: Alert, POLLY- orientation. Does not follow command  Activity/dangle: Mechanical lift  Diet: Mechanical soft diet with mild thickened liquid  Pain: POLLY- PAIND score 0  Pablo/Voiding: Incontinent  Tele/Restraints/Iso: No  02/LDA: IV-SL, on 2LNC. O2 sat on RA 88%  D/C Date: TBD  Other Info: POLLY neuro assessment- patient does not follow command. Meds whole with apple sauce. Aspen collar in place- patient removed it once this shift. Dentures, hearing aids and glasses @ bedside.

## 2024-07-25 NOTE — CONSULTS
Attempted to see patient at noon today for Woc consult and assessment of groin and scrotum wounds, but patient was sitting up in the chair and about to eat lunch. I spoke with the patient's wife about his wounds and the care he had been receiving prior to admission, and told them I would return later to see him. Upon arriving to see the patient around 3 pm, the charge informed me that he had already been discharged to a TCU.

## 2024-07-25 NOTE — PROGRESS NOTES
Updated patient wife on imaging and plans  Imaging, history and plans reviewed with Dr. Gabriel as well     Cervical collar removed  L1 fracture- light activity, is wheelchair bound so did not recommend bracing at this time, will follow up in 3-6 weeks with XR prior (our team to coordinate)    Our team will sign off    Wife understood plans and in agreement  DR. Gabriel in agreement    Adelaida Vazquez PA-C  [facility] Neurosurgery  [address] [place] [id number]  Tel [phone]  Fax [phone]  Text page via [facility] Paging/Directory

## 2024-07-25 NOTE — PROGRESS NOTES
Care Management Follow Up      Additional Information:  Writer called [facility] to check to see if patient could return back to [facility] at discharge. Writer spoke with Cedric ADEN and  Pepper at [facility]. They informed that patient is wheelchair bound at baseline. He is assist of one with gait belt to pivot into his wheelchair. He is on the memory care unit and is total care. He also needs to be fed since he does not use his right arm very much. (He favors his right arm from a previous Humerus fracture in 2/24). Writer informed that patient has been wearing a Cervical Collar and it has now been discontinued by JANA Son. Patient will no longer need to wear a collar. Patient will need follow-up with Neurosurgery in 3-6 weeks. Patient's spouse Zainab was given this information and writer confirmed with her. Patient is currently wearing 1 Liter of oxygen for comfort (when he was wearing the collar). He does not use Oxygen at baseline and bedside RN discussed weaning this off before discharge. Writer informed a call will be placed to [facility] before discharge. Orders need to be faxed at [phone].  Case Management to follow along for discharge planning.    [facility] can accept patient back today. Spouse Zainab has requested transport via [employer].              Era Hernandez RN  Care Coordinator  [phone]

## 2024-07-25 NOTE — PLAN OF CARE
Goal Outcome Evaluation:  Pt is alert, does not follow commands, [place]. VSS on 2 L of oxygen NC. Up with assist of 2 using a mechanical lift. Incontinent, changed. Turned and repositioned every 2 hours. IV SL. Garrison collar in place. Will continue to monitor.   POLLY neuro checks - does not follow commands.

## 2024-07-25 NOTE — PROGRESS NOTES
Pt seen and examined earlier today in room - late entry note.  Reviewed recommendations from SHAD (appreciated).  Awaiting WBC and repeat vitals to determine if will be able to return to his [facility] later this afternoon.

## 2024-07-26 NOTE — TELEPHONE ENCOUNTER
[facility] Call Center    Phone Message    May a detailed message be left on voicemail: yes     Reason for Call: Rosanna from Pt's care facility called.  She said that the Pt would require a carlos lift to do Xray.  They have a portable xray machine at the facility and she wants to know if they can just do a portable xray instead.  Please call her back to advise.  Thanks.

## 2024-07-26 NOTE — CONFIDENTIAL NOTE
XR order faxed to [facility] at [phone].    Right Fax confirmed at 12:48pm on 07/26/24.

## 2024-07-26 NOTE — TELEPHONE ENCOUNTER
OK for XR to be completed at patients facility, will need to ensure it is in our system prior to appt. Would recommend completing XR 1-2 days prior.     XR order should be faxed to facility at [phone]. They will complete XR 1-2 days before appt and update our team once complete to get it into our system.     Routed to CSS team to fax XR order.

## 2024-07-29 NOTE — TELEPHONE ENCOUNTER
[facility] Geriatrics Lab Note     Provider: LESTER Colvin CNP  Facility: [facility] Facility Type:  LTC    No Known Allergies    Labs Reviewed by provider: CBC     Verbal Order/Direction given by Provider: Start ferrous sulfate (FEROSUL) 325 (65 Fe) MG tablet every other day by mouth.  Dx anemia.     Provider giving Order:  LESTER Colvin CNP    Verbal Order given to: Ban([phone])    Sly Eason RN

## 2024-07-29 NOTE — TELEPHONE ENCOUNTER
Erick Shahid  1940    Orders  Start ferrous sulfate (FEROSUL) 325 (65 Fe) MG tablet every other day by mouth.  Dx anemia.      Electronically signed.     LESTER Otoole CNP on 7/29/2024 at 3:03 PM

## 2024-07-31 NOTE — PROGRESS NOTES
[facility] GERIATRICS      Code Status:  DNR/DNI  Visit Type: Hospital F/U     Facility:   [facility] (NF) [[id number]]         History of Present Illness: Erick Shahid is a 84 year old male     Patient's past medical history includes  C6 and L1 fracture   displaced proximal right humerus fracture (2/2023)  Dementia   heart failure with preserved ejection fraction   aortic stenosis s/p TAVR 23 mm Allison ROBERTO valve on 8/2023  Complete heart block s/p permanent pacemaker  Carotid stenosis  Coronary artery disease   type 2 diabetes      On 7/23, pt was admitted to the hospital after a fall.  He was found to have C6 and L1 fracture.  Cervical CT with evidence of acute C6 fracture; Lumbar CT with evidence of L1 fracture. He was placed in a cervical collar and neurosurgery consulted.  MRI of the cervical and lumbar spine were requested and reviewed.  There was no spinal cord injury.  Repeat xrays requested and reviewed; Cervical collar was removed and light activity ok at time of discharge after lumbar xray review.  Neurosurgery recommends follow-up with xrays prior to clinic office visit in 3-6 weeks.   Nursing skin assessment noted wounds of the groin and scrotum     Today he was in his wheelchair.   He was alert and did not appear in pain. He did not offer complaints.  He appeared at his baseline.      Current Outpatient Medications   Medication Sig Dispense Refill    acetaminophen (TYLENOL) 500 MG tablet Take 1,000 mg by mouth 3 times daily 0800, 1400, 2000      aspirin (ASA) 81 MG chewable tablet Take 1 tablet (81 mg) by mouth daily 90 tablet 3    citalopram (CELEXA) 20 MG tablet Take 20 mg by mouth at bedtime      ferrous sulfate (FEROSUL) 325 (65 Fe) MG tablet Take 1 tablet (325 mg) by mouth every other day      hydrOXYzine HCl (ATARAX) 25 MG tablet Take 1 tablet (25 mg) by mouth at bedtime      omeprazole (PRILOSEC) 20 MG DR capsule Take 1 capsule (20 mg) by mouth daily      polyethylene  "glycol (MIRALAX) 17 g packet Take 1 packet by mouth daily      rosuvastatin (CRESTOR) 20 MG tablet Take 20 mg by mouth at bedtime      senna-docusate (SENOKOT-S/PERICOLACE) 8.6-50 MG tablet Take 2 tablets by mouth daily as needed for constipation      traZODone (DESYREL) 50 MG tablet Take 25 mg by mouth every morning      traZODone (DESYREL) 50 MG tablet Take 50 mg by mouth at bedtime      vitamin B-12 (CYANOCOBALAMIN) 1000 MCG tablet Take 1,000 mcg by mouth daily      vitamin C (ASCORBIC ACID) 500 MG tablet Take 500 mg by mouth daily      Vitamin D3 (CHOLECALCIFEROL) 25 mcg (1000 units) tablet Take 25 mcg by mouth daily         ALLERGIES:Patient has no known allergies.    Vitals:  /66   Pulse 73   Temp 97.8  F (36.6  C)   Resp 18   Ht 1.702 m (5' 7\")   Wt 72.6 kg (160 lb 1.6 oz)   SpO2 95%   BMI 25.08 kg/m    Body mass index is 25.08 kg/m .    Review of Systems   All other systems reviewed and are negative.         Physical Exam  Vitals and nursing note reviewed.   Cardiovascular:      Rate and Rhythm: Normal rate.   Pulmonary:      Effort: Pulmonary effort is normal.   Neurological:      Mental Status: He is alert. Mental status is at baseline.   Psychiatric:         Mood and Affect: Mood normal.         Labs:     Most Recent 3 CBC's:  Recent Labs   Lab Test 07/29/24  0755 07/25/24  1350 07/24/24  2302 07/24/24  0100   WBC 11.0 11.3* 11.6* 14.3*   HGB 9.6*  --  9.4* 10.2*     --  97 98     --  86* 107*     Most Recent 3 BMP's:  Recent Labs   Lab Test 07/25/24  1157 07/25/24  0821 07/24/24  2302 07/24/24  0357 07/24/24  0100 02/21/24  0626   NA  --   --  138  --  135 138   POTASSIUM  --   --  4.0  --  4.2 3.7   CHLORIDE  --   --  101  --  99 103   CO2  --   --  27  --  27 25   BUN  --   --  20.8  --  22.0 13.7   CR  --   --  0.78  --  1.08 0.61*   ANIONGAP  --   --  10  --  9 10   FERMIN  --   --  8.9  --  9.1 9.1   * 133* 143*   < > 166* 111*    < > = values in this interval not " displayed.     Mo    Assessment/Plan:  (W19.XXXD) Fall, subsequent encounter  (primary encounter diagnosis)  (S12.501D) Closed nondisplaced fracture of sixth cervical vertebra with routine healing, unspecified fracture morphology, subsequent encounter  (S32.010D) Compression fracture of L1 vertebra with routine healing, subsequent encounter  Comment: recently fall with injury   Due to dementia, pt does not understand his limitations  Had C6 and L1 frature  Is wheelchair bound  Plan: follow up with xrays (9/5) with neurosurgery        S42.201A) Displaced fracture of proximal end of right humerus  )  R53.1) Weakness  (R53.81) Physical deconditioning  Comment:   Fell  Displaced fracture of right humerus and non-operative recommendations  Pt saw [facility] in 4/2024 and he has limited motion of his right arm.  Discontinued to immobilizer.  If arm hurts, he won't use his arm.  No follow up needed  Plan: Continue with plan of care no changes at this time, adjustment as needed        (I50.9) Congestive heart failure, unspecified HF chronicity, unspecified heart failure type (H)  (I44.2) Complete heart block (H)  (I25.10) Coronary artery disease involving native coronary artery of native heart without angina pectoris  (Z95.0) Cardiac pacemaker in situ  (Z95.3) Status post transcatheter aortic valve replacement (TAVR) using bioprosthesis  (I65.21) Stenosis of right carotid artery  Comment: chronic.  Currently taking rosuvastatin, aspirin,  VS and Weight is stable   Plan:   Follow up with cardiology in 8/2024           (E11.59) Type 2 diabetes mellitus with other circulatory complication, without long-term current use of insulin (H)  Comment: chronic.     Diet controlled  Lab Results   Component Value Date    A1C 6.4 02/05/2024    A1C 6.0 07/30/2023     Blood sugars are usually less than 180 at facility   Plan:   Recommend stop taking blood sugars  Repeat hgb A1c        (D64.9) Anemia, unspecified type  Comment: Chronic.  Taking  Vitamin B12.   Hemoglobin   Date Value Ref Range Status   07/29/2024 9.6 (L) 13.3 - 17.7 g/dL Final   ]  Plan: CBC with next labs check         (F32.1) Depression, major, single episode, moderate (H)  (F41.9) anxiety   Comment: chronic   Taking citalopram, hydrozyzine and trazodone  Plan: Continue with plan of care no changes at this time, adjustment as needed        (K59.01) Slow transit constipation  Comment: chronic.   Currently taking MiraLAX and prn senna    Plan: Continue with plan of care no changes at this time, adjustment as needed          [F03.C4] Severe dementia with anxiety, unspecified dementia type (H)   Comment: Chronic, progressive.    Review of systems in HPI difficult to obtain cognitive impairment. Patient requiring 24-hour skilled nursing care and would be a high risk of wandering if he were moved off the dementia care unit.   He has had difficultly sleeping and been restless.  Hydroxyzine and trazodone were started with good success.   Expect further functional and cognitive decline.  Pt did not have urinary retention with start of hydroxyzine  Expect weight loss.  Pt has decreased understanding of his limitations and has increased risk of falls and unfortunately has suffered injury with falls.    Plan:Continue with plan of care no changes at this time, adjustment as needed        Electronically signed by:    LESTER Otoole CNP       MEDICATIONS:  MED REC REQUIRED  Post Medication Reconciliation Status:  Discharge medications reconciled and changed, see notes/orders        45 minutes was spent reviewing medical record from [facility], assessing patient, talking with pt and answering their questions/concerns, coordinating above plan of care with nursing , SW, therapy and dietitian and patient and reviewed medications with patient and counseled pt. on above plan of care.  Counseled on medications and side effects.

## 2024-08-04 NOTE — PROGRESS NOTES
Erick Shahid  1940    Orders      BMP, CBC, Hgb A1c, Vitamin B12, Vitamin D level next lab day.  Dx diabetes.      Electronically signed    LESTER Otoole CNP on 8/4/2024 at 9:57 AM

## 2024-08-05 NOTE — TELEPHONE ENCOUNTER
Erick Shahid  1940    Orders    Pt's family requested pt be evaluated by hospice.  Pt has a had a significant decline in the past year.  He has had multiple falls with fractures.      Discontinue aspirin  Discontinue ferrous sulfate  Discontinue omeprazole  Discontinue Rosuvastatin  Discontinue Senna-Docusate as needed  Discontinue Vitamin B-12  Discontinue Vitamin C  Discontinue Vitamin D3  Discontinue blood sugar checks    Electronically signed.     Yancy Walker, LESTER CNP on 8/5/2024 at 2:56 PM

## 2024-08-30 NOTE — PROGRESS NOTES
Erick Shahid is a 84 year old male seen August 22, 2024 at [facility] where he has resided for 9 months (admit to Board and Care 11/2023) seen for regulatory visit and to follow up dementia with anxiety.  Pt is seen in his room up to Marco, with his wife Wilda present helping to feed him lunch.   They both feel he is doing okay; no reports of pain.   With frequent falls, dementia and overall decline, pt signed on to [facility] earlier this month.         By chart review, pt has a history HFpEF, CAD, DM2.  He had critical aortic stenosis which was thought to be causing falls, s/p TAVR in August 2023 which was complicated by complete heart block and he received a dual chamber pacemaker.  He was more confused and then started on sertraline for depression after that hospital and TCU stay.    He had a [facility] hospitalization in October 2023 for aphasia and possible LLE weakness   Workup for stroke, infection, etc was unremarkable.   He was seen by Neurology and thought to have REM sleep disorder, placed on melatonin.  He was discharged to [facility] but unable to regain enough functional status to return home, so transitioned to Memory Care unit.   Pt had a February 2024 [facility] hospitalization following an unwitnessed fall in which he suffered an acute impacted and displaced right proximal humerus fracture.-  Seen by Orthopedics and managed conservatively, with a shoulder immobilizer and activity restrictions.    He was also found to have a CK of 1128, treated with IVF for mild rhabdomyolysis.    Discharged to [facility] with PHYSICAL THERAPY /OCCUPATIONAL THERAPY      At [facility] admission pt was constantly attempting to self transfer, wouldn't stay in the chair or bed.  Calling his family and exit seeking.  Family asked for a change in medications, so sertraline changed to citalopram and trazodone ordered PRN  These measures seemed to help some  Pt had a July 2024 [facility] hospitalization following a fall in which he suffered  acute C6 and L1 fractures   Treated with a cervical collar, analgesia and returned to        Past Medical History:   Diagnosis Date    Benign essential hypertension     BPH (benign prostatic hyperplasia)     CAD (coronary artery disease)     Cardiac pacemaker in situ 08/2023    For complete heart block    Carotid stenosis     Chronic heart failure with reduced ejection fraction and diastolic dysfunction (H)     Critical aortic valve stenosis     TAVR done in 2023    Dementia (H)     Diabetes mellitus, type 2 (H)     on metformin    Dyslipidemia     LBBB (left bundle branch block)     Macular degeneration (senile) of retina     Right humeral fracture 02/2024    Sleep disturbances      Past Surgical History:   Procedure Laterality Date    CV CORONARY ANGIOGRAM N/A 07/28/2023    Procedure: Coronary Angiogram;  Surgeon: Mando Field MD;  Location:  HEART CARDIAC CATH LAB    CV PCI N/A 07/28/2023    Procedure: Percutaneous Coronary Intervention;  Surgeon: Mando Field MD;  Location:  HEART CARDIAC CATH LAB    CV TRANSCATHETER AORTIC VALVE REPLACEMENT  08/15/2023    CV TRANSCATHETER AORTIC VALVE REPLACEMENT-FEMORAL APPROACH N/A 8/15/2023    Procedure: Transcatheter Aortic Valve Replacement-Femoral Approach;  Surgeon: Bossman Felipe MD;  Location:  HEART CARDIAC CATH LAB    EP PACEMAKER DEVICE & LEAD IMPLANT- RIGHT ATRIAL & LEFT VENTRICULAR N/A 8/15/2023    Procedure: Pacemaker Device & Lead Implant- Right Atrial & Left Ventricular;  Surgeon: Oscar Mendoza MD;  Location:  HEART CARDIAC CATH LAB    ORIF HIP FRACTURE Right 2018     SH:  Previously lived with his wife KIMBERLYN Lamb apartment in [place].      They have a son Toribio and daughter Lin    Pt is a retired civil and   Non smoker     ROS:  limited by pt's dementia       SLUMS 9/30     Weight in July 2023 was 184 lbs>>> in Feb 2024 was 172 lbs  Wt Readings from Last 5 Encounters:   08/22/24 75.9 kg (167 lb 6.4 oz)  "  08/06/24 75.8 kg (167 lb)   07/31/24 72.6 kg (160 lb 1.6 oz)   06/12/24 76.8 kg (169 lb 4.8 oz)   05/29/24 76.2 kg (167 lb 14.4 oz)      BP Readings from Last 3 Encounters:   08/22/24 138/74   08/06/24 (!) 141/61   07/31/24 131/66      EXAM:  NAD  /74   Pulse 82   Temp 97.8  F (36.6  C)   Resp 18   Ht 1.702 m (5' 7\")   Wt 75.9 kg (167 lb 6.4 oz)   SpO2 94%   BMI 26.22 kg/m     [place]  NECK: no adenopathy, no asymmetry, masses, or scars and thyroid normal to palpation  RESP: decreased BS, no rales or wheeze   CV: regular rate and rhythm, normal S1 S2, I/VI GABBIE  ABDOMEN:  soft, nontender, no HSM or masses and bowel sounds normal  MS: extremities with trace ankle edema     Neuro: very limited verbal skills  Psych: affect okay, pleasant       Lab Results   Component Value Date     08/05/2024    POTASSIUM 4.7 08/05/2024    CHLORIDE 105 08/05/2024    CO2 25 08/05/2024    ANIONGAP 8 08/05/2024     (H) 07/25/2024    BUN 12.8 08/05/2024    CR 0.68 08/05/2024    GFRESTIMATED >90 08/05/2024    FERMIN 8.9 08/05/2024     Lab Results   Component Value Date    WBC 6.8 08/05/2024      HGB 9.2 08/05/2024       08/05/2024       08/05/2024      CT SCAN OF THE HEAD WITHOUT CONTRAST   2/13/2024  1. No evidence of acute intracranial hemorrhage, mass, or herniation.  2. Mild diffuse parenchymal volume loss and white matter changes  likely due to chronic microvascular ischemic disease.    ECHO 2/14/2024   The visual ejection fraction is 55-60%.   Grade I or early diastolic dysfunction.  No regional wall motion abnormalities noted.  There is a bioprosthetic aortic valve.  The prosthetic aortic valve is well-seated.  There is trace aortic regurgitation.    Mean aortic gradient 14mmHg,      IMP/PLAN:     (R29.6) Falls frequently   (S12.501D) Closed nondisplaced fracture of sixth cervical vertebra with routine healing, unspecified fracture morphology, subsequent encounter  (S32.010D) Compression fracture of " L1 vertebra with routine healing, subsequent encounter  (S42.201A) Displaced fracture of proximal end of right humerus  Comment: had course of Physical Therapy   Plan: Acetaminophen 1000 mg tid for associated pain     (I25.10) Coronary artery disease involving native coronary artery of native heart without angina pectoris    (Z95.3) Status post transcatheter aortic valve replacement (TAVR) using bioprosthesis  (I65.21) Stenosis of right carotid artery  Comment: improved symptoms and LV function after TAVR     Plan: symptom management only     (E11.59) Type 2 diabetes mellitus with other circulatory complication, without long-term current use of insulin (H)  Comment:   Lab Results   Component Value Date    A1C 6.0 08/05/2024     Plan: diet controlled     (F03.C4) Severe dementia with anxiety, unspecified dementia type (H)  (F41.9) Anxiety  Comment: decline in cognitive function with associated anxiety and restlessness     Plan: [facility] unit for assist with med admin, meals, activity and secure unit   Citalopram 20 mg/day   Trazodone 25 mg AM and 50 mg HS for anxiety and restlessness  Hydroxyzine 25 mg/HS     (Z51.5) Hospice care patient  Comment: PRNs available  Plan: treat with comfort      Marianela Mac MD

## 2024-10-20 NOTE — PLAN OF CARE
Goal Outcome Evaluation:      Plan of Care Reviewed With: patient, child, spouse    Overall Patient Progress: improving    Orientation/Cognitive:A/Ox4, forgetful   Observation Goals (Met/ Not Met):not met   Mobility Level/Assist Equipment:A1 gb/w  Fall Risk (Y/N):Yes   Behavior Concerns:none   Pain Management:denies pain   Tele/VS/O2:VSS on room air, slightly tor at times. Tele= sinus tor  ABNL Lab/BG: No new this shift  Diet:regular   Bowel/Bladder:cont  Skin Concerns: rash to perineum, Right groin site  Drains/Devices:IV SL   Tests/Procedures for next shift:angio completed, plan of CT  for TAVR on Monday   Anticipated DC date & active delays:pending   Patient Stated Goal for Today:  Other important info: Transfer to Gen Surg in AM               Time-based billing (NON-critical care)

## (undated) DEVICE — INTRODUCER CATH VASC 5FRX10CM  MPIS-501-NT-U-SST

## (undated) DEVICE — INTRO TERUMO 7FRX25CM W/MARKER RSB703

## (undated) DEVICE — DEFIB PRO-PADZ LVP LQD GEL ADULT 8900-2105-01

## (undated) DEVICE — Device

## (undated) DEVICE — KIT HAND CONTROL ANGIOTOUCH ACIST 65CM AT-P65

## (undated) DEVICE — RAD INTRODUCER KIT MICRO 5FRX10CM .018 NITINOL G/W

## (undated) DEVICE — CABLE PCNG 12FT REMINGTON PACI

## (undated) DEVICE — CATH ANGIO INFINITI 3DRC 6FRX100CM 534676T

## (undated) DEVICE — CATH EP PACEL 5FRX110CM 1MM RIGHT HEART CURVE 401763

## (undated) DEVICE — CABLE PACING ALLIGATOR CLIP 12FT 5833SL

## (undated) DEVICE — RAD CLOSURE ANGIOSEAL 8FR  610131

## (undated) DEVICE — TOTE ANGIO CORP PC15AT SAN32CC83O

## (undated) DEVICE — MANIFOLD KIT ANGIO AUTOMATED 014613

## (undated) DEVICE — WIRE GUIDE LUNDERQUIST 0.035"X260CM DBL CVD

## (undated) DEVICE — WIRE GUIDE 0.035"X150CM EMERALD STR 502542

## (undated) DEVICE — PACK PCMKR PERM SRG PROC LF SAN32PC573

## (undated) DEVICE — SHEATH PRELUDE SNAP 7FRX13CM PLS-1007

## (undated) DEVICE — CATH ANGIO INFINITI PIGTAIL 145 6 SH 6FRX110CM  534-652S

## (undated) DEVICE — CATH ANGIO SUPERTORQUE AL1 6FRX100CM 532-645

## (undated) DEVICE — GUIDEWIRE VASC SAFARI 0.035"X275CM CVD TIP H74939406S1

## (undated) DEVICE — LINE MONITOR NASAL SMART CAPNOLINE ADULT LONG 12463

## (undated) DEVICE — WIRE GUIDE 0.035"X260CM SAFE-T-J EXCHANGE G00517

## (undated) DEVICE — CATH GD 5.4FR ID / 7FR OD X 43

## (undated) DEVICE — CRIMPER FOR EDWARDS TAVR VALVE 9600CRA

## (undated) DEVICE — SLITTER ADJSTBL 6232ADJ

## (undated) DEVICE — DRAPE IOBAN INCISE 36X23" 6651EZ

## (undated) DEVICE — INFLATION DEVICE ATRION QL2530

## (undated) DEVICE — INTRO TERUMO 6FRX25CM W/MARKER RSB603

## (undated) DEVICE — CLOSURE DEVICE 6FR VASC PROGLIDE MEDICATED SUTURE 12673-03

## (undated) DEVICE — SYR ANGIOGRAPHY MULTIUSE KIT ACIST 014612

## (undated) DEVICE — INTRO SHEATH 6FRX10CM PINNACLE RSS602

## (undated) DEVICE — CATH ANGIO JUDKINS JL4 6FRX100CM INFINITI 534620T

## (undated) RX ORDER — BUPIVACAINE HYDROCHLORIDE 2.5 MG/ML
INJECTION, SOLUTION EPIDURAL; INFILTRATION; INTRACAUDAL
Status: DISPENSED
Start: 2023-08-15

## (undated) RX ORDER — HEPARIN SODIUM 1000 [USP'U]/ML
INJECTION, SOLUTION INTRAVENOUS; SUBCUTANEOUS
Status: DISPENSED
Start: 2023-07-28

## (undated) RX ORDER — HEPARIN SODIUM 200 [USP'U]/100ML
INJECTION, SOLUTION INTRAVENOUS
Status: DISPENSED
Start: 2023-07-28

## (undated) RX ORDER — FENTANYL CITRATE 50 UG/ML
INJECTION, SOLUTION INTRAMUSCULAR; INTRAVENOUS
Status: DISPENSED
Start: 2023-08-15

## (undated) RX ORDER — HEPARIN SODIUM 200 [USP'U]/100ML
INJECTION, SOLUTION INTRAVENOUS
Status: DISPENSED
Start: 2023-08-15

## (undated) RX ORDER — HEPARIN SODIUM 1000 [USP'U]/ML
INJECTION, SOLUTION INTRAVENOUS; SUBCUTANEOUS
Status: DISPENSED
Start: 2023-08-15

## (undated) RX ORDER — ASPIRIN 325 MG
TABLET ORAL
Status: DISPENSED
Start: 2023-08-15

## (undated) RX ORDER — LIDOCAINE HYDROCHLORIDE 10 MG/ML
INJECTION, SOLUTION EPIDURAL; INFILTRATION; INTRACAUDAL; PERINEURAL
Status: DISPENSED
Start: 2023-07-28

## (undated) RX ORDER — LIDOCAINE HYDROCHLORIDE 10 MG/ML
INJECTION, SOLUTION EPIDURAL; INFILTRATION; INTRACAUDAL; PERINEURAL
Status: DISPENSED
Start: 2023-08-15

## (undated) RX ORDER — FENTANYL CITRATE 50 UG/ML
INJECTION, SOLUTION INTRAMUSCULAR; INTRAVENOUS
Status: DISPENSED
Start: 2023-07-28